# Patient Record
Sex: MALE | Race: WHITE | Employment: FULL TIME | URBAN - METROPOLITAN AREA
[De-identification: names, ages, dates, MRNs, and addresses within clinical notes are randomized per-mention and may not be internally consistent; named-entity substitution may affect disease eponyms.]

---

## 2018-08-06 PROBLEM — I10 HTN (HYPERTENSION), BENIGN: Status: ACTIVE | Noted: 2018-08-06

## 2018-08-06 PROBLEM — E78.2 MIXED HYPERLIPIDEMIA: Status: ACTIVE | Noted: 2018-08-06

## 2018-08-06 PROBLEM — E11.9 TYPE 2 DIABETES MELLITUS WITHOUT COMPLICATION, WITHOUT LONG-TERM CURRENT USE OF INSULIN (HCC): Status: ACTIVE | Noted: 2018-08-06

## 2018-08-06 PROBLEM — E11.65 TYPE 2 DIABETES MELLITUS WITH HYPERGLYCEMIA, WITHOUT LONG-TERM CURRENT USE OF INSULIN (HCC): Status: ACTIVE | Noted: 2018-08-06

## 2018-08-06 PROBLEM — J44.9 CHRONIC OBSTRUCTIVE PULMONARY DISEASE (HCC): Status: ACTIVE | Noted: 2018-08-06

## 2018-08-06 PROBLEM — H40.053 INCREASED PRESSURE IN THE EYE, BILATERAL: Status: ACTIVE | Noted: 2018-08-06

## 2019-02-04 PROBLEM — Z95.5 HISTORY OF CORONARY ARTERY STENT PLACEMENT: Status: ACTIVE | Noted: 2019-02-04

## 2020-02-07 ENCOUNTER — HOSPITAL ENCOUNTER (OUTPATIENT)
Dept: GENERAL RADIOLOGY | Age: 70
Discharge: HOME | End: 2020-02-07
Attending: INTERNAL MEDICINE
Payer: MEDICARE

## 2020-02-07 DIAGNOSIS — J44.9 CHRONIC OBSTRUCTIVE PULMONARY DISEASE, UNSPECIFIED COPD TYPE (HCC): ICD-10-CM

## 2020-02-07 PROCEDURE — 71046 X-RAY EXAM CHEST 2 VIEWS: CPT

## 2020-02-21 ENCOUNTER — HOSPITAL ENCOUNTER (OUTPATIENT)
Dept: CT IMAGING | Age: 70
Discharge: HOME | End: 2020-02-21
Attending: INTERNAL MEDICINE
Payer: MEDICARE

## 2020-02-21 DIAGNOSIS — J44.9 CHRONIC OBSTRUCTIVE PULMONARY DISEASE, UNSPECIFIED COPD TYPE (HCC): ICD-10-CM

## 2020-02-21 PROCEDURE — G0297 LDCT FOR LUNG CA SCREEN: HCPCS

## 2020-06-01 PROBLEM — E11.21 TYPE 2 DIABETES WITH NEPHROPATHY (HCC): Status: ACTIVE | Noted: 2020-06-01

## 2020-06-01 PROBLEM — E11.9 TYPE 2 DIABETES MELLITUS WITHOUT COMPLICATION, WITHOUT LONG-TERM CURRENT USE OF INSULIN (HCC): Status: RESOLVED | Noted: 2018-08-06 | Resolved: 2020-06-01

## 2020-08-31 PROBLEM — E66.01 SEVERE OBESITY (HCC): Status: ACTIVE | Noted: 2020-08-31

## 2020-09-17 PROBLEM — I73.9 PVD (PERIPHERAL VASCULAR DISEASE) (HCC): Status: ACTIVE | Noted: 2020-09-17

## 2021-02-26 ENCOUNTER — HOSPITAL ENCOUNTER (OUTPATIENT)
Dept: CT IMAGING | Age: 71
Discharge: HOME | End: 2021-02-26
Attending: INTERNAL MEDICINE
Payer: MEDICARE

## 2021-02-26 VITALS — WEIGHT: 230 LBS | BODY MASS INDEX: 32.93 KG/M2 | HEIGHT: 70 IN

## 2021-02-26 DIAGNOSIS — J43.2 CENTRILOBULAR EMPHYSEMA (HCC): ICD-10-CM

## 2021-02-26 PROCEDURE — 71271 CT THORAX LUNG CANCER SCR C-: CPT

## 2021-07-12 PROBLEM — M17.0 LOCALIZED OSTEOARTHRITIS OF KNEES, BILATERAL: Status: ACTIVE | Noted: 2021-07-12

## 2021-08-18 ENCOUNTER — HOSPITAL ENCOUNTER (INPATIENT)
Age: 71
Setting detail: SURGERY ADMIT
End: 2021-08-18
Attending: ORTHOPAEDIC SURGERY | Admitting: ORTHOPAEDIC SURGERY

## 2021-09-08 RX ORDER — CEFAZOLIN SODIUM/WATER 2 G/20 ML
2 SYRINGE (ML) INTRAVENOUS ONCE
Status: CANCELLED | OUTPATIENT
Start: 2021-09-08 | End: 2021-09-08

## 2021-09-13 ENCOUNTER — HOSPITAL ENCOUNTER (OUTPATIENT)
Dept: SURGERY | Age: 71
Discharge: HOME | End: 2021-09-13
Attending: ORTHOPAEDIC SURGERY
Payer: MEDICARE

## 2021-09-13 ENCOUNTER — HOSPITAL ENCOUNTER (OUTPATIENT)
Dept: REHABILITATION | Age: 71
Discharge: HOME | End: 2021-09-13
Attending: ORTHOPAEDIC SURGERY
Payer: MEDICARE

## 2021-09-13 VITALS
HEART RATE: 72 BPM | BODY MASS INDEX: 33.7 KG/M2 | WEIGHT: 234.9 LBS | DIASTOLIC BLOOD PRESSURE: 79 MMHG | TEMPERATURE: 97.8 F | OXYGEN SATURATION: 95 % | RESPIRATION RATE: 16 BRPM | SYSTOLIC BLOOD PRESSURE: 140 MMHG

## 2021-09-13 LAB
ANION GAP SERPL CALC-SCNC: 7 MMOL/L (ref 7–16)
APTT PPP: 36.2 SEC (ref 24.1–35.1)
BASOPHILS # BLD: 0.1 K/UL (ref 0–0.2)
BASOPHILS NFR BLD: 1 % (ref 0–2)
BUN SERPL-MCNC: 13 MG/DL (ref 8–23)
CALCIUM SERPL-MCNC: 10.3 MG/DL (ref 8.3–10.4)
CHLORIDE SERPL-SCNC: 103 MMOL/L (ref 98–107)
CO2 SERPL-SCNC: 29 MMOL/L (ref 21–32)
CREAT SERPL-MCNC: 0.79 MG/DL (ref 0.8–1.5)
DIFFERENTIAL METHOD BLD: ABNORMAL
EOSINOPHIL # BLD: 0.2 K/UL (ref 0–0.8)
EOSINOPHIL NFR BLD: 3 % (ref 0.5–7.8)
ERYTHROCYTE [DISTWIDTH] IN BLOOD BY AUTOMATED COUNT: 13.6 % (ref 11.9–14.6)
EST. AVERAGE GLUCOSE BLD GHB EST-MCNC: 143 MG/DL
GLUCOSE SERPL-MCNC: 127 MG/DL (ref 65–100)
HBA1C MFR BLD: 6.6 % (ref 4.2–6.3)
HCT VFR BLD AUTO: 43.9 % (ref 41.1–50.3)
HGB BLD-MCNC: 14.1 G/DL (ref 13.6–17.2)
IMM GRANULOCYTES # BLD AUTO: 0 K/UL (ref 0–0.5)
IMM GRANULOCYTES NFR BLD AUTO: 0 % (ref 0–5)
INR PPP: 1
LYMPHOCYTES # BLD: 1.3 K/UL (ref 0.5–4.6)
LYMPHOCYTES NFR BLD: 16 % (ref 13–44)
MCH RBC QN AUTO: 31.8 PG (ref 26.1–32.9)
MCHC RBC AUTO-ENTMCNC: 32.1 G/DL (ref 31.4–35)
MCV RBC AUTO: 98.9 FL (ref 79.6–97.8)
MONOCYTES # BLD: 0.6 K/UL (ref 0.1–1.3)
MONOCYTES NFR BLD: 8 % (ref 4–12)
NEUTS SEG # BLD: 5.8 K/UL (ref 1.7–8.2)
NEUTS SEG NFR BLD: 73 % (ref 43–78)
NRBC # BLD: 0 K/UL (ref 0–0.2)
PLATELET # BLD AUTO: 229 K/UL (ref 150–450)
PMV BLD AUTO: 10.5 FL (ref 9.4–12.3)
POTASSIUM SERPL-SCNC: 4.4 MMOL/L (ref 3.5–5.1)
PROTHROMBIN TIME: 13.8 SEC (ref 12.6–14.5)
RBC # BLD AUTO: 4.44 M/UL (ref 4.23–5.6)
SODIUM SERPL-SCNC: 139 MMOL/L (ref 136–145)
WBC # BLD AUTO: 8 K/UL (ref 4.3–11.1)

## 2021-09-13 PROCEDURE — 80048 BASIC METABOLIC PNL TOTAL CA: CPT

## 2021-09-13 PROCEDURE — 85610 PROTHROMBIN TIME: CPT

## 2021-09-13 PROCEDURE — 77030012341 HC CHMB SPCR OPTC MDI VYRM -A

## 2021-09-13 PROCEDURE — 85730 THROMBOPLASTIN TIME PARTIAL: CPT

## 2021-09-13 PROCEDURE — 97161 PT EVAL LOW COMPLEX 20 MIN: CPT

## 2021-09-13 PROCEDURE — 36415 COLL VENOUS BLD VENIPUNCTURE: CPT

## 2021-09-13 PROCEDURE — 83036 HEMOGLOBIN GLYCOSYLATED A1C: CPT

## 2021-09-13 PROCEDURE — 87641 MR-STAPH DNA AMP PROBE: CPT

## 2021-09-13 PROCEDURE — 94760 N-INVAS EAR/PLS OXIMETRY 1: CPT

## 2021-09-13 PROCEDURE — 85025 COMPLETE CBC W/AUTO DIFF WBC: CPT

## 2021-09-13 PROCEDURE — 77030027138 HC INCENT SPIROMETER -A

## 2021-09-13 RX ORDER — BIMATOPROST 0.1 MG/ML
1 SOLUTION/ DROPS OPHTHALMIC EVERY EVENING
COMMUNITY

## 2021-09-13 NOTE — PERIOP NOTES
Your patient recently had labs drawn during a hospital appointment due to an upcoming surgery. The results are attached. If you have any questions or concerns please reach out to your patient for a follow-up in your office. Please do not respond to this message as my mailbox is not monitored. You may call [phone] with questions or concerns. Recent Results (from the past 12 hour(s))   CBC WITH AUTOMATED DIFF    Collection Time: 09/13/21 12:14 PM   Result Value Ref Range    WBC 8.0 4.3 - 11.1 K/uL    RBC 4.44 4.23 - 5.6 M/uL    HGB 14.1 13.6 - 17.2 g/dL    HCT 43.9 41.1 - 50.3 %    MCV 98.9 (H) 79.6 - 97.8 FL    MCH 31.8 26.1 - 32.9 PG    MCHC 32.1 31.4 - 35.0 g/dL    RDW 13.6 11.9 - 14.6 %    PLATELET 201 147 - 001 K/uL    MPV 10.5 9.4 - 12.3 FL    ABSOLUTE NRBC 0.00 0.0 - 0.2 K/uL    DF AUTOMATED      NEUTROPHILS 73 43 - 78 %    LYMPHOCYTES 16 13 - 44 %    MONOCYTES 8 4.0 - 12.0 %    EOSINOPHILS 3 0.5 - 7.8 %    BASOPHILS 1 0.0 - 2.0 %    IMMATURE GRANULOCYTES 0 0.0 - 5.0 %    ABS. NEUTROPHILS 5.8 1.7 - 8.2 K/UL    ABS. LYMPHOCYTES 1.3 0.5 - 4.6 K/UL    ABS. MONOCYTES 0.6 0.1 - 1.3 K/UL    ABS. EOSINOPHILS 0.2 0.0 - 0.8 K/UL    ABS. BASOPHILS 0.1 0.0 - 0.2 K/UL    ABS. IMM.  GRANS. 0.0 0.0 - 0.5 K/UL   HEMOGLOBIN A1C WITH EAG    Collection Time: 09/13/21 12:14 PM   Result Value Ref Range    Hemoglobin A1c 6.6 (H) 4.2 - 6.3 %    Est. average glucose 198 mg/dL   METABOLIC PANEL, BASIC    Collection Time: 09/13/21 12:14 PM   Result Value Ref Range    Sodium 139 136 - 145 mmol/L    Potassium 4.4 3.5 - 5.1 mmol/L    Chloride 103 98 - 107 mmol/L    CO2 29 21 - 32 mmol/L    Anion gap 7 7 - 16 mmol/L    Glucose 127 (H) 65 - 100 mg/dL    BUN 13 8 - 23 MG/DL    Creatinine 0.79 (L) 0.8 - 1.5 MG/DL    GFR est AA >60 >60 ml/min/1.73m2    GFR est non-AA >60 >60 ml/min/1.73m2    Calcium 10.3 8.3 - 10.4 MG/DL   PROTHROMBIN TIME + INR    Collection Time: 09/13/21 12:14 PM   Result Value Ref Range    Prothrombin time 13.8 12.6 - 14.5 sec    INR 1.0     PTT    Collection Time: 09/13/21 12:14 PM   Result Value Ref Range    aPTT 36.2 (H) 24.1 - 35.1 SEC

## 2021-09-13 NOTE — PERIOP NOTES
Labs done today within [facility] protocols - except PTT - will have [facility] review. Recent Results (from the past 12 hour(s))   CBC WITH AUTOMATED DIFF    Collection Time: 09/13/21 12:14 PM   Result Value Ref Range    WBC 8.0 4.3 - 11.1 K/uL    RBC 4.44 4.23 - 5.6 M/uL    HGB 14.1 13.6 - 17.2 g/dL    HCT 43.9 41.1 - 50.3 %    MCV 98.9 (H) 79.6 - 97.8 FL    MCH 31.8 26.1 - 32.9 PG    MCHC 32.1 31.4 - 35.0 g/dL    RDW 13.6 11.9 - 14.6 %    PLATELET 719 693 - 184 K/uL    MPV 10.5 9.4 - 12.3 FL    ABSOLUTE NRBC 0.00 0.0 - 0.2 K/uL    DF AUTOMATED      NEUTROPHILS 73 43 - 78 %    LYMPHOCYTES 16 13 - 44 %    MONOCYTES 8 4.0 - 12.0 %    EOSINOPHILS 3 0.5 - 7.8 %    BASOPHILS 1 0.0 - 2.0 %    IMMATURE GRANULOCYTES 0 0.0 - 5.0 %    ABS. NEUTROPHILS 5.8 1.7 - 8.2 K/UL    ABS. LYMPHOCYTES 1.3 0.5 - 4.6 K/UL    ABS. MONOCYTES 0.6 0.1 - 1.3 K/UL    ABS. EOSINOPHILS 0.2 0.0 - 0.8 K/UL    ABS. BASOPHILS 0.1 0.0 - 0.2 K/UL    ABS. IMM.  GRANS. 0.0 0.0 - 0.5 K/UL   HEMOGLOBIN A1C WITH EAG    Collection Time: 09/13/21 12:14 PM   Result Value Ref Range    Hemoglobin A1c 6.6 (H) 4.2 - 6.3 %    Est. average glucose 417 mg/dL   METABOLIC PANEL, BASIC    Collection Time: 09/13/21 12:14 PM   Result Value Ref Range    Sodium 139 136 - 145 mmol/L    Potassium 4.4 3.5 - 5.1 mmol/L    Chloride 103 98 - 107 mmol/L    CO2 29 21 - 32 mmol/L    Anion gap 7 7 - 16 mmol/L    Glucose 127 (H) 65 - 100 mg/dL    BUN 13 8 - 23 MG/DL    Creatinine 0.79 (L) 0.8 - 1.5 MG/DL    GFR est AA >60 >60 ml/min/1.73m2    GFR est non-AA >60 >60 ml/min/1.73m2    Calcium 10.3 8.3 - 10.4 MG/DL   PROTHROMBIN TIME + INR    Collection Time: 09/13/21 12:14 PM   Result Value Ref Range    Prothrombin time 13.8 12.6 - 14.5 sec    INR 1.0     PTT    Collection Time: 09/13/21 12:14 PM   Result Value Ref Range    aPTT 36.2 (H) 24.1 - 35.1 SEC

## 2021-09-13 NOTE — PROGRESS NOTES
Aristeo January  : [birth year](51 y.o.) [facility] at [address] [address].  [phone]       Physical Therapy Prehab Plan of Treatment and Evaluation Summary:2021    ICD-10: Treatment Diagnosis:   · Pain in Left Knee (M25.562)  · Stiffness of Left Knee, Not elsewhere classified (M25.662)  · Difficulty in walking, Not elsewhere classified (R26.2)  Precautions/Allergies:   Shellfish derived  MEDICAL/REFERRING DIAGNOSIS:  Unilateral primary osteoarthritis, left knee [M17.12]  REFERRING PHYSICIAN: Shaji Azevedo MD  DATE OF SURGERY: 21    Assessment:   Comments:  Patient presents prior to L TKA. He needs a R TKA as well and plans on having in December. He will return home at d/c with assist from his spouse. Patient denies pain other than when going up/down stairs but has limited ROM. PROBLEM LIST (Impacting functional limitations):  Mr. Benito Ocampo presents with the following left lower extremity(s) problems:  1. Range of Motion  2. Home Exercise Program  3. Pain   INTERVENTIONS PLANNED:  1. Home Exercise Program  2. Educational Discussion      TREATMENT PLAN: Effective Dates: 2021 TO 2021. Frequency/Duration: Patient to continue to perform home exercise program at least twice per day up until his surgery. GOALS: (Goals have been discussed and agreed upon with patient.)  Discharge Goals: Time Frame: 1 Day  1. Patient will demonstrate independence with a home exercise program designed to increase strength, range of motion, balance, coordination, functional technique and pain control to minimize functional deficits and optimize patient for total joint replacement. Rehabilitation Potential For Stated Goals: Good  Regarding Saulo Hauser's therapy, I certify that the treatment plan above will be carried out by a therapist or under their direction.   Thank you for this referral,  Kerstin Sicard, PT               HISTORY: Present Symptoms:  Pain Intensity 1: 0   History of Present Injury/Illness (Reason for Referral):  Medical/Referring Diagnosis: Unilateral primary osteoarthritis, left knee [M17.12]   Past Medical History/Comorbidities:   Mr. Brendan Hawthorne  has a past medical history of Adverse effect of anesthesia, [facility] (08/06/2018), Coronary artery disease, History of coronary artery stent placement (02/04/2019), HTN (hypertension), benign (8/6/2018), Increased pressure in the eye, bilateral (8/6/2018), Mixed hyperlipidemia (8/6/2018), CITLALI on CPAP, and Type 2 diabetes mellitus with hyperglycemia, without long-term current use of insulin ([facility] (08/06/2018). Mr. Brendan Hawthorne  has a past surgical history that includes hx prostatectomy; hx hernia repair (1985); hx wisdom teeth extraction; and hx coronary stent placement (1995). Social History/Living Environment:   Home Environment: Private residence  # Steps to Enter: 3  Rails to Enter: No  One/Two Story Residence: One story  Living Alone: No  Support Systems: Spouse/Significant Other  Patient Expects to be Discharged toVF Cor[de-identified]ration  Current DME Used/Available at Home: Walker;Cane, straight; Shower chair (borrowing walker)  Tub or Shower Type: Shower    Work/Activity:  Independent   Dominant Side:  RIGHT  Current Medications:  See Pre-assessment nursing note   Number of Personal Factors/Comorbidities that affect the Plan of Care: 0: LOW COMPLEXITY   EXAMINATION:   ADLs (Current Functional Status):   Ambulation:  [x] Independent  [] Walk Indoors Only  [] Walk Outdoors  [] Use Assistive Device  [] Use Wheelchair Only Dressing:  [x] Independent  Requires Assistance from Someone for:  [] Sock/Shoes  [] Pants  [] Everything   Bathing/Showering:   [x] Independent  [] Requires Assistance from Someone  [] Sponge Bath Only Household Activities:  [x] Routine house and yard work  [] Light Housework Only  [] None   Observation/Orthostatic Postural Assessment:   [facility], Genu varus right  ROM/Flexibility:   AROM: Generally decreased, functional                LLE AROM  L Knee Flexion: 94  L Knee Extension: 6          Strength:   Strength: Generally decreased, functional       LLE Strength  L Hip Flexion: 4+  L Knee Flexion: 5  L Knee Extension: 5          Functional Mobility:    Coordination: Within functional limits    Stand to Sit: Independent  Sit to Stand: Independent  Distance (ft): 200 Feet (ft)  Ambulation - Level of Assistance: Independent          Balance:    Sitting: Intact  Standing: Intact   Body Structures Involved:  1. Joints Body Functions Affected:  1. Movement Related Activities and Participation Affected:  1. Mobility   Number of elements that affect the Plan of Care: 3: MODERATE COMPLEXITY   CLINICAL PRESENTATION:   Presentation: Stable and uncomplicated: LOW COMPLEXITY   CLINICAL DECISION MAKING:   Tool Used: Knee injury and Osteoarthritis Outcome Score for Joint Replacement (KOOS, JR)  Score:  Initial: 17 (Interval: 44.905) 9/13/2021 Most Recent: TBD   Interpretation of Score: The KOOS, JR contains 7 items from the original KOOS survey. Items are coded from 0 to 4, none to extreme respectively. Josue Mckee is scored by summing the raw response (range 0-28) and then converting it to an interval score using the table provided below. The interval score ranges from 0 to 100 where 0 represents total knee disability and 100 represents perfect knee health. Medical Necessity:   · Mr. Carlita Tucker is expected to optimize his lower extremity strength and ROM in preparation for joint replacement surgery. Reason for Services/Other Comments:  · Achieve baseline assesment of musculoskeletal system, functional mobility and home environment. , educate in PT HEP in preparation for surgery, educate in hospital plan of care.    Use of outcome tool(s) and clinical judgement create a POC that gives a: Clear prediction of patient's progress: LOW COMPLEXITY TREATMENT:   Treatment/Session Assessment:  Patient was instructed in PT- HEP to increase strength and ROM in LEs. Answered all questions. · Post session pain:  0  · Compliance with Program/Exercises: Will assess as treatment progresses.   Total Treatment Duration:  PT Patient Time In/Time Out  Time In: 1245  Time Out: 75 Amado Kumar PT

## 2021-09-13 NOTE — PROGRESS NOTES
21 1200   Oxygen Therapy   O2 Sat (%) 94 %   Pulse via Oximetry 78 beats per minute   O2 Device None (Room air)   Pre-Treatment   Breath Sounds Bilateral Clear   Pre FEV1 (liters)   (pt not wearing a msk during COVID)     Initial respiratory Assessment completed with pt. Pt was interviewed and evaluated in Joint camp prior to surgery. Patient ID:  Lashawn Long  [id number]  49 y.o.  1950  Surgeon: Dr. Perico Conway  Date of Surgery: 2021  Procedure: Total Left Knee Arthroplasty  Primary Care Physician: Chapis Cohen [place] [phone]  Specialists: [facility] NEXT APPOINTMENT 2021    Pt taught proper COUGH technique  DIAPHRAGMATIC BREATHING EXERCISE INSTRUCTIONS GIVEN    History of smokin PPPD FOR 30 YEARS                 Quit date:         Secondhand smoke:DENIES    Past procedures with Oxygen desaturation or delayed awakening:DENIES    Past Medical History:   Diagnosis Date    Adverse effect of anesthesia     \"bowel function slow to return\"    Chronic obstructive pulmonary disease [facility] 2018    daily and rescue inhaler    Coronary artery disease     History of coronary artery stent placement 2019 RCA    HTN (hypertension), benign 2018    Increased pressure in the eye, bilateral 2018    Mixed hyperlipidemia 2018    CITLALI on CPAP     uses CPAP    Type 2 diabetes mellitus with hyperglycemia, without long-term current use of insulin ([facility] 2018    pre diabetic      CENTRILOBULAR EMPHYSEMA, BASILAR ATELECTASIS ON CHEST X-RAY  & SCARRING  Respiratory history:DENIES SOB                                                                   Respiratory meds:  STIOTO BID  PT uses MDI PRN with PROAIR. MDI instructions given verbally & written along with spacer.   Pt to use home MDI's morning of surgery & bring to [address]:             NO     PAST SLEEP STUDY:        YES                   IN [facility]  HX OF CITLALI:                        YES                   CITLALI assessment:                                               SLEEPS ON SIDE        PHYSICAL EXAM   Body mass index is 33.7 kg/m². Visit Vitals  BP (!) 140/79 (BP 1 Location: Right arm, BP Patient Position: At rest;Sitting)   Pulse 72   Temp 97.8 °F (36.6 °C)   Resp 16   Wt 106.5 kg (234 lb 14.4 oz)   SpO2 95%   BMI 33.70 kg/m²     Neck circumference:40      cm    Loud snoring:                                                 YES             Witnessed apnea or wakening gasping or choking:            APNEA  Awakens with headaches:                                               DENIES  Morning or daytime tiredness/ sleepiness:                   TIRED  Dry mouth or sore throat in morning:            YES                                              Ahuja stage:  4                                   SACS score:13  Stop Bang   STOP-BANG  Does the patient snore loudly (louder than talking or loud enough to be heard through closed doors)?: Yes  Does the patient often feel tired, fatigued, or sleepy during the daytime, even after a \"good\" night's sleep?: Yes  Has anyone ever observed the patient stop breathing during their sleep? : Yes  Does the patient have or are they being treated for high blood pressure?: Yes  Is the patient's BMI greater than 35?: No  Is your neck circumference greater than 17 inches (Male) or 16 inches (Female)?: No  Is the patient older than 48?: Yes  Is the patient male?: Yes  CITLALI Score: 6  Has the patient been referred to Sleep Medicine?: No  Has the patient previously been diagnosed with Obstructive Sleep Apnea?: Yes  Treated or Untreated?: Treated                            Pt. Is positive for CITLALI and uses HOME CPAP and will bring to Hospital day of surgery. PT WILL NEED ASSISTANCE PLACING CPAP ON HS DURING HOSPITALIZATION. Pt. Is positive for CITLALI and uses HOME CPAP and will bring to Hospital day of surgery.   PT WILL NEED ASSISTANCE PLACING CPAP ON HS DURING HOSPITALIZATION.     ALBUTEROL Q 6 PRN                                        Referrals:    Pt. Phone Number:

## 2021-09-13 NOTE — PERIOP NOTES
Patient verified name and . Order for consent IS found in EHR and matches case posting; patient verified. Type 3 surgery, Joint camp assessment complete. Labs per surgeon: CBC,BMP, PT/PTT, A1C, mrsa swab ; results (processing)  Labs per anesthesia protocol: no additional  EKG:in EMR dated 10/20/20 - within [facility] protocols. Most recent card note and echo in EMR if needed DOS. Pt has routine f/u with Pulm on 21  1510 - charge RN to f/u. Patient COVID test date 21  9835; Patient aware. The testing center [facility], [place]  and telephone number [phone] provided to the patient if not appointment found. MRSA/MSSA swab collected; pharmacy to review and dose antibiotic as appropriate. Hospital approved surgical skin cleanser and instructions to return bottle on DOS given per hospital policy. Patient provided with handouts including Guide to Surgery, Pain Management, Hand Hygiene, Blood Transfusion Education, and [facility] Brochure. Patient answered medical/surgical history questions at their best of ability. All prior to admission medications documented in [facility]. Original medication prescription bottle were visualized during patient appointment. Patient instructed to hold all vitamins 3 weeks prior to surgery and NSAIDS 5 days prior to surgery. Patient teach back successful and patient demonstrates knowledge of instruction.

## 2021-09-13 NOTE — PERIOP NOTES
PLEASE CONTINUE TAKING ALL PRESCRIPTION MEDICATIONS UP TO THE DAY OF SURGERY UNLESS OTHERWISE DIRECTED BELOW. DISCONTINUE all vitamins and supplements 21 days prior to surgery. DISCONTINUE Non-Steriodal Anti-Inflammatory (NSAIDS) such as Advil and Aleve 5 days prior to surgery. Home Medications to take  the day of surgery      Use inhalerS     Aspirin 81 mg        Home Medications   to Hold           Comments    Covid test 9/27/21  9:05 am @ [address]    On the day before surgery please take Acetaminophen 1000mg in the morning and then again before bed. You may substitute for Tylenol 650 mg.      Bring inhalers and eye drops to hospital.        Please do not bring home medications with you on the day of surgery unless otherwise directed by your nurse. If you are instructed to bring home medications, please give them to your nurse as they will be administered by the nursing staff. If you have any questions, please call [facility] [phone]   A copy of this note was provided to the patient for reference.

## 2021-09-14 LAB
BACTERIA SPEC CULT: ABNORMAL
SERVICE CMNT-IMP: ABNORMAL

## 2021-09-15 RX ORDER — ALBUTEROL SULFATE 0.83 MG/ML
2.5 SOLUTION RESPIRATORY (INHALATION)
Status: CANCELLED | OUTPATIENT
Start: 2021-09-15

## 2021-09-19 ENCOUNTER — APPOINTMENT (OUTPATIENT)
Dept: GENERAL RADIOLOGY | Age: 71
End: 2021-09-19
Attending: EMERGENCY MEDICINE
Payer: MEDICARE

## 2021-09-19 ENCOUNTER — HOSPITAL ENCOUNTER (EMERGENCY)
Age: 71
Discharge: HOME | End: 2021-09-19
Attending: EMERGENCY MEDICINE
Payer: MEDICARE

## 2021-09-19 VITALS
WEIGHT: 233 LBS | HEIGHT: 70 IN | OXYGEN SATURATION: 94 % | SYSTOLIC BLOOD PRESSURE: 178 MMHG | TEMPERATURE: 98.3 F | BODY MASS INDEX: 33.36 KG/M2 | HEART RATE: 80 BPM | RESPIRATION RATE: 18 BRPM | DIASTOLIC BLOOD PRESSURE: 85 MMHG

## 2021-09-19 DIAGNOSIS — S62.001A CLOSED NONDISPLACED FRACTURE OF SCAPHOID OF RIGHT WRIST, UNSPECIFIED PORTION OF SCAPHOID, INITIAL ENCOUNTER: Primary | ICD-10-CM

## 2021-09-19 DIAGNOSIS — S52.501A CLOSED FRACTURE OF DISTAL END OF RIGHT RADIUS, UNSPECIFIED FRACTURE MORPHOLOGY, INITIAL ENCOUNTER: ICD-10-CM

## 2021-09-19 PROCEDURE — 75810000053 HC SPLINT APPLICATION

## 2021-09-19 PROCEDURE — 73110 X-RAY EXAM OF WRIST: CPT

## 2021-09-19 PROCEDURE — 99283 EMERGENCY DEPT VISIT LOW MDM: CPT

## 2021-09-19 RX ORDER — NAPROXEN 500 MG/1
500 TABLET ORAL 2 TIMES DAILY WITH MEALS
Qty: 20 TABLET | Refills: 0 | Status: SHIPPED | OUTPATIENT
Start: 2021-09-19 | End: 2021-11-18

## 2021-09-19 RX ORDER — HYDROCODONE BITARTRATE AND ACETAMINOPHEN 7.5; 325 MG/1; MG/1
1 TABLET ORAL
Qty: 17 TABLET | Refills: 0 | Status: SHIPPED | OUTPATIENT
Start: 2021-09-19 | End: 2021-09-22

## 2021-09-19 NOTE — ED NOTES
I have reviewed discharge instructions with the patient. The patient verbalized understanding. Patient left ED via Discharge Method: ambulatory to Home with family. Opportunity for questions and clarification provided. Patient given 2 scripts. To continue your aftercare when you leave the hospital, you may receive an automated call from our care team to check in on how you are doing. This is a free service and part of our promise to provide the best care and service to meet your aftercare needs.  If you have questions, or wish to unsubscribe from this service please call [phone]. Thank you for Choosing our [facility] Emergency Department.

## 2021-09-19 NOTE — ED PROVIDER NOTES
75-year-old right-hand-dominant male presents with complaints of right wrist and hand pain. Patient reports that he tripped on a step yesterday fell catching himself baseline his right hand. States she has a small abrasion to his right elbow but is otherwise uninjured    No headache vomiting or diarrhea    The history is provided by the patient. Fall  The accident occurred yesterday. The fall occurred while walking. He fell from a height of ground level. He landed on hard floor. There was no blood loss. The point of impact was the right wrist. The pain is present in the right wrist. The pain is moderate. He was ambulatory at the scene. There was no entrapment after the fall. There was no drug use involved in the accident. There was no alcohol use involved in the accident. Pertinent negatives include no visual change, no fever, no numbness, no abdominal pain, no nausea, no vomiting, no headaches, no loss of consciousness and no laceration. The risk factors include being elderly. The symptoms are aggravated by use of injured limb and pressure on injury. He has tried nothing for the symptoms. Past Medical History:   Diagnosis Date    Adverse effect of anesthesia     \"bowel function slow to return\"    Chronic obstructive pulmonary disease ([facility] [address] 08/06/2018    daily and rescue inhaler    Coronary artery disease     History of coronary artery stent placement 02/04/2019 1995 RCA    HTN (hypertension), benign 8/6/2018    Increased pressure in the eye, bilateral 8/6/2018    Mixed hyperlipidemia 8/6/2018    CITLALI on CPAP     uses CPAP    Type 2 diabetes mellitus with hyperglycemia, without long-term current use of insulin ([facility] [address] 08/06/2018    pre diabetic       Past Surgical History:   Procedure Laterality Date    HX CORONARY STENT PLACEMENT  1995    X 1     HX HERNIA REPAIR  1985    HX PROSTATECTOMY      HX WISDOM TEETH EXTRACTION           History reviewed. No pertinent family history.     Social History     Socioeconomic History    Marital status:      Spouse name: Not on file    Number of children: Not on file    Years of education: Not on file    Highest education level: Not on file   Occupational History    Not on file   Tobacco Use    Smoking status: Former Smoker     Packs/day: 1.00     Years: 30.00     Pack years: 30.00    Smokeless tobacco: Never Used   Substance and Sexual Activity    Alcohol use: No    Drug use: Not on file    Sexual activity: Not on file   Other Topics Concern    Not on file   Social History Narrative    Not on file     Social Determinants of Health     Financial Resource Strain:     Difficulty of Paying Living Expenses:    Food Insecurity:     Worried About [address] in the Last Year:     [address] [employer] in the Last Year:    Transportation Needs:     Lack of Transportation (Medical):  Lack of Transportation (Non-Medical):    Physical Activity:     Days of Exercise per Week:     Minutes of Exercise per Session:    Stress:     Feeling of Stress :    Social Connections:     Frequency of Communication with Friends and Family:     Frequency of Social Gatherings with Friends and Family:     Attends [employer] Services:     Active Member of Clubs or Organizations:     Attends Club or Organization Meetings:     Marital Status:    Intimate Partner Violence:     Fear of Current or Ex-Partner:     Emotionally Abused:     Physically Abused:     Sexually Abused: ALLERGIES: Shellfish derived    Review of Systems   Constitutional: Negative for activity change, chills, diaphoresis and fever. HENT: Negative for dental problem, hearing loss, nosebleeds, rhinorrhea and sore throat. Eyes: Negative for pain, discharge, redness and visual disturbance. Respiratory: Negative for cough, chest tightness and shortness of breath. Cardiovascular: Negative for chest pain, palpitations and leg swelling.    Gastrointestinal: Negative for abdominal pain, constipation, diarrhea, nausea and vomiting. Endocrine: Negative for cold intolerance, heat intolerance, polydipsia and polyuria. Genitourinary: Negative for dysuria and flank pain. Musculoskeletal: Positive for arthralgias. Negative for back pain, joint swelling, myalgias and neck pain. Skin: Negative for pallor and rash. Allergic/Immunologic: Negative for environmental allergies and food allergies. Neurological: Negative for dizziness, tremors, loss of consciousness, light-headedness, numbness and headaches. Hematological: Negative for adenopathy. Does not bruise/bleed easily. Psychiatric/Behavioral: Negative for confusion and dysphoric mood. The patient is not nervous/anxious and is not hyperactive. All other systems reviewed and are negative. Vitals:    09/19/21 0835   BP: (!) 178/85   Pulse: 80   Resp: 18   Temp: 98.3 °F (36.8 °C)   SpO2: 94%   Weight: 105.7 kg (233 lb)   Height: 5' 10\" (1.778 m)            Physical Exam  Vitals and nursing note reviewed. Constitutional:       General: He is in acute distress. Appearance: Normal appearance. He is well-developed. He is obese. HENT:      Head: Normocephalic and atraumatic. Right Ear: External ear normal.      Left Ear: External ear normal.      Mouth/Throat:      Mouth: Mucous membranes are moist.      Pharynx: Oropharynx is clear. No oropharyngeal exudate. Eyes:      General: No scleral icterus. Extraocular Movements: Extraocular movements intact. Conjunctiva/sclera: Conjunctivae normal.      Pupils: Pupils are equal, round, and reactive to light. Neck:      Thyroid: No thyromegaly. Vascular: No JVD. Cardiovascular:      Rate and Rhythm: Normal rate and regular rhythm. Pulses: Normal pulses. Heart sounds: Normal heart sounds. No murmur heard. No friction rub. No gallop. Pulmonary:      Effort: Pulmonary effort is normal. No respiratory distress.       Breath sounds: Normal breath sounds. No wheezing. Musculoskeletal:         General: Swelling, tenderness and signs of injury present. No deformity. Right wrist: Swelling, tenderness and bony tenderness present. No effusion, lacerations or crepitus. Decreased range of motion. Normal pulse. Left wrist: Normal.        Arms:       Cervical back: Normal range of motion and neck supple. Skin:     General: Skin is warm and dry. Capillary Refill: Capillary refill takes less than 2 seconds. Findings: No laceration or rash. Neurological:      General: No focal deficit present. Mental Status: He is alert and oriented to person, place, and time. Cranial Nerves: No cranial nerve deficit. Sensory: No sensory deficit. Motor: No abnormal muscle tone. Coordination: Coordination normal.   Psychiatric:         Mood and Affect: Mood normal.         Behavior: Behavior normal.         Thought Content:  Thought content normal.         Judgment: Judgment normal.          MDM  Number of Diagnoses or Management Options  Closed fracture of distal end of right radius, unspecified fracture morphology, initial encounter: new and requires workup  Closed nondisplaced fracture of scaphoid of right wrist, unspecified portion of scaphoid, initial encounter: new and requires workup  Diagnosis management comments: Imaging reveals scaphoid fracture  Several other abnormalities of of indeterminate age    Patient will need close follow-up with orthopedics    Placed in sugar tong splint       Amount and/or Complexity of Data Reviewed  Tests in the radiology section of CPT®: ordered and reviewed  Tests in the medicine section of CPT®: reviewed  Decide to obtain previous medical records or to obtain history from someone other than the patient: yes  Review and summarize past medical records: yes  Independent visualization of images, tracings, or specimens: yes    Risk of Complications, Morbidity, and/or Mortality  Presenting problems: moderate  Diagnostic procedures: low  Management options: low  General comments: Elements of this note have been dictated via voice recognition software. Text and phrases may be limited by the accuracy of the software. The chart has been reviewed, but errors may still be present. Patient Progress  Patient progress: improved         Splint Eris Steele    Date/Time: 9/19/2021 10:20 AM  Performed by: Roberth Contreras MD  Authorized by: Roberth Contreras MD     Consent:     Consent obtained:  Verbal    Consent given by:  Patient    Risks discussed:  Pain and swelling    Alternatives discussed:  Delayed treatment and alternative treatment  Pre-procedure details:     Sensation:  Normal    Skin color:  Pink  Procedure details:     Laterality:  Right    Location:  Wrist    Wrist:  R wrist    Strapping: no      Splint type:  Sugar tong    Supplies:  Cotton padding, elastic bandage and Ortho-Glass  Post-procedure details:     Pain:  Improved    Sensation:  Normal    Skin color: Thank    Patient tolerance of procedure:   Tolerated well, no immediate complications

## 2021-09-19 NOTE — DISCHARGE INSTRUCTIONS
Rest/Ice/Elevate/Compress(splint)  Do not drink alcohol or drive while taking the prescription pain medications  Call your doctor/follow up doctor to set up appointment for recheck visit  Return to ER for any worsening symptoms or new problems which may arise    Use sling as needed    Call your orthopedic surgeon in the morning for close follow-up visit

## 2021-09-19 NOTE — ED TRIAGE NOTES
Pt states that he tripped on a step and fell yesterday. He broke his fall with his right hand.  Pt is having pain in the right hand and wrist.

## 2021-09-21 ENCOUNTER — ANESTHESIA EVENT (OUTPATIENT)
Dept: SURGERY | Age: 71
End: 2021-09-21
Payer: MEDICARE

## 2021-09-21 NOTE — H&P (VIEW-ONLY)
Orthopa[facility] Note    Name: Elvin Burroughs  YOB: 1950  Gender: male  MRN: [id number]      CC: Patient referred for evaluation of upper extremity pain    HPI: Elvin Burroughs is a 66 y.o. male  with a chief complaint of wrist pain after a fall down some steps. He was seen in the emergency department, x-rays were obtained, he was told he had a broken bone in his wrist.  He was placed in a splint and referred here for further care of note the patient is scheduled to undergo total knee arthroplasty with Dr. Lola Hashimoto next Thursday. ROS/Meds/PSH/PMH/FH/SH: I personally reviewed the patients standard intake form. Pertinents are discussed in the HPI    Physical Examination:    Musculoskeletal Exam:  Examination on the Right upper extremity demonstrates cap refill < 5 seconds in all fingers, skin is intact. He is tender to palpation over the anatomic snuffbox and dorsal SL interval. No tenderness over the distal radius or ulna. Finger range of motion is limited. Light touch sensation is intact throughout. Imaging / Electrodiagnostic Tests:     I independently reviewed and interpreted radiographs of the right wrist, 3 views. They demonstrate a nondisplaced scaphoid waist fracture, and there is also a well-circumscribed cyst with sclerotic borders at the lunate facet of the distal radius. There are cystic changes within the ulnar head and distal pole of the scaphoid as well, and calcification of the TFCC    Assessment:     ICD-10-CM ICD-9-CM    1. Closed transverse fracture of waist of scaphoid bone of right wrist, initial encounter  S62.021A 814.01 REFERRAL TO ORTHOPEDIC SURGERY       Plan:   We discussed the diagnosis and different treatment options.   His scaphoid waist fracture is nondisplaced and he could consider nonsurgical treatment with placement into a cast.  I did discuss that nonsurgical treatment of scaphoid waist fractures can require protracted treatment in a cast sometimes 2 or 3 months or more, and even after this is there is a possibility that the scaphoid fracture goes on to a delayed or nonunion which may necessitate surgical treatment. I also discussed surgical management with the patient which would consist of open reduction internal fixation of the scaphoid fracture with a percutaneous approach. This would allow faster rehabilitation, earlier range of motion, and given that he is scheduled to undergo a total knee arthroplasty and will need to bear weight with a walker, I would recommend surgical treatment. The patient is in agreement. Patient understands risks and benefits of right scaphoid fracture open reduction with internal fixation including but not limited to nerve injury, vessel injury, infection, failure to achieve desired results and possible need for additional surgery. Patient understands and wishes to proceed with surgery. On Exam:   The patient is alert and oriented; ;   Lung auscultation is clear bilaterally   Heart has RRR without murmurs      Patient voiced accordance and understanding of the information provided and the formulated plan. All questions were answered to the patient's satisfaction during the encounter.     4 This is an acute complicated injury  Treatment at this time: Elective major surgery with procedural risk factors    Claudell Labrum, MD  Orthopaedic Surgery  09/21/21  4:03 PM

## 2021-09-22 ENCOUNTER — ANESTHESIA (OUTPATIENT)
Dept: SURGERY | Age: 71
End: 2021-09-22
Payer: MEDICARE

## 2021-09-22 ENCOUNTER — APPOINTMENT (OUTPATIENT)
Dept: GENERAL RADIOLOGY | Age: 71
End: 2021-09-22
Attending: ORTHOPAEDIC SURGERY
Payer: MEDICARE

## 2021-09-22 ENCOUNTER — HOSPITAL ENCOUNTER (OUTPATIENT)
Age: 71
Setting detail: OUTPATIENT SURGERY
Discharge: HOME | End: 2021-09-22
Attending: ORTHOPAEDIC SURGERY | Admitting: ORTHOPAEDIC SURGERY
Payer: MEDICARE

## 2021-09-22 VITALS
SYSTOLIC BLOOD PRESSURE: 118 MMHG | RESPIRATION RATE: 16 BRPM | HEART RATE: 70 BPM | OXYGEN SATURATION: 96 % | WEIGHT: 234 LBS | TEMPERATURE: 97.7 F | DIASTOLIC BLOOD PRESSURE: 65 MMHG | BODY MASS INDEX: 33.58 KG/M2

## 2021-09-22 DIAGNOSIS — S62.024A CLOSED NONDISPLACED FRACTURE OF MIDDLE THIRD OF SCAPHOID BONE OF RIGHT WRIST, INITIAL ENCOUNTER: Primary | ICD-10-CM

## 2021-09-22 LAB
GLUCOSE BLD STRIP.AUTO-MCNC: 117 MG/DL (ref 65–100)
SERVICE CMNT-IMP: ABNORMAL

## 2021-09-22 PROCEDURE — 77030033138 HC SUT PGA STRATFX J&J -B: Performed by: ORTHOPAEDIC SURGERY

## 2021-09-22 PROCEDURE — 76942 ECHO GUIDE FOR BIOPSY: CPT | Performed by: ORTHOPAEDIC SURGERY

## 2021-09-22 PROCEDURE — 76210000063 HC OR PH I REC FIRST 0.5 HR: Performed by: ORTHOPAEDIC SURGERY

## 2021-09-22 PROCEDURE — C1713 ANCHOR/SCREW BN/BN,TIS/BN: HCPCS | Performed by: ORTHOPAEDIC SURGERY

## 2021-09-22 PROCEDURE — 74011000250 HC RX REV CODE- 250: Performed by: NURSE ANESTHETIST, CERTIFIED REGISTERED

## 2021-09-22 PROCEDURE — 74011250636 HC RX REV CODE- 250/636: Performed by: NURSE ANESTHETIST, CERTIFIED REGISTERED

## 2021-09-22 PROCEDURE — 76010010054 HC POST OP PAIN BLOCK: Performed by: ORTHOPAEDIC SURGERY

## 2021-09-22 PROCEDURE — 74011250636 HC RX REV CODE- 250/636: Performed by: ANESTHESIOLOGY

## 2021-09-22 PROCEDURE — 76010000160 HC OR TIME 0.5 TO 1 HR INTENSV-TIER 1: Performed by: ORTHOPAEDIC SURGERY

## 2021-09-22 PROCEDURE — 2709999900 HC NON-CHARGEABLE SUPPLY: Performed by: ORTHOPAEDIC SURGERY

## 2021-09-22 PROCEDURE — 77030037918 HC DRVR CANN DISP SKEL -C: Performed by: ORTHOPAEDIC SURGERY

## 2021-09-22 PROCEDURE — 82962 GLUCOSE BLOOD TEST: CPT

## 2021-09-22 PROCEDURE — 77030031388 HC WRE K SKEL -B: Performed by: ORTHOPAEDIC SURGERY

## 2021-09-22 PROCEDURE — 25628 OPTX CARPL SCPHD FX INT FIXJ: CPT | Performed by: ORTHOPAEDIC SURGERY

## 2021-09-22 PROCEDURE — 77030000032 HC CUF TRNQT ZIMM -B: Performed by: ORTHOPAEDIC SURGERY

## 2021-09-22 PROCEDURE — 76210000020 HC REC RM PH II FIRST 0.5 HR: Performed by: ORTHOPAEDIC SURGERY

## 2021-09-22 PROCEDURE — 77030031139 HC SUT VCRL2 J&J -A: Performed by: ORTHOPAEDIC SURGERY

## 2021-09-22 PROCEDURE — 74011250636 HC RX REV CODE- 250/636: Performed by: ORTHOPAEDIC SURGERY

## 2021-09-22 PROCEDURE — 76060000032 HC ANESTHESIA 0.5 TO 1 HR: Performed by: ORTHOPAEDIC SURGERY

## 2021-09-22 PROCEDURE — 77030003602 HC NDL NRV BLK BBMI -B: Performed by: ANESTHESIOLOGY

## 2021-09-22 PROCEDURE — 77030037917 HC BIT DRL DISP SKEL -C: Performed by: ORTHOPAEDIC SURGERY

## 2021-09-22 DEVICE — IMPLANTABLE DEVICE: Type: IMPLANTABLE DEVICE | Site: WRIST | Status: FUNCTIONAL

## 2021-09-22 RX ORDER — LIDOCAINE HYDROCHLORIDE 10 MG/ML
0.1 INJECTION INFILTRATION; PERINEURAL AS NEEDED
Status: DISCONTINUED | OUTPATIENT
Start: 2021-09-22 | End: 2021-09-22 | Stop reason: HOSPADM

## 2021-09-22 RX ORDER — CEFAZOLIN SODIUM/WATER 2 G/20 ML
2 SYRINGE (ML) INTRAVENOUS ONCE
Status: COMPLETED | OUTPATIENT
Start: 2021-09-22 | End: 2021-09-22

## 2021-09-22 RX ORDER — SODIUM CHLORIDE 0.9 % (FLUSH) 0.9 %
5-40 SYRINGE (ML) INJECTION AS NEEDED
Status: DISCONTINUED | OUTPATIENT
Start: 2021-09-22 | End: 2021-09-22 | Stop reason: HOSPADM

## 2021-09-22 RX ORDER — SODIUM CHLORIDE, SODIUM LACTATE, POTASSIUM CHLORIDE, CALCIUM CHLORIDE 600; 310; 30; 20 MG/100ML; MG/100ML; MG/100ML; MG/100ML
100 INJECTION, SOLUTION INTRAVENOUS CONTINUOUS
Status: DISCONTINUED | OUTPATIENT
Start: 2021-09-22 | End: 2021-09-22 | Stop reason: HOSPADM

## 2021-09-22 RX ORDER — PROPOFOL 10 MG/ML
INJECTION, EMULSION INTRAVENOUS
Status: DISCONTINUED | OUTPATIENT
Start: 2021-09-22 | End: 2021-09-22 | Stop reason: HOSPADM

## 2021-09-22 RX ORDER — HYDROCODONE BITARTRATE AND ACETAMINOPHEN 5; 325 MG/1; MG/1
1 TABLET ORAL
Qty: 15 TABLET | Refills: 0 | Status: SHIPPED | OUTPATIENT
Start: 2021-09-22 | End: 2021-09-27

## 2021-09-22 RX ORDER — MIDAZOLAM HYDROCHLORIDE 1 MG/ML
2 INJECTION, SOLUTION INTRAMUSCULAR; INTRAVENOUS ONCE
Status: COMPLETED | OUTPATIENT
Start: 2021-09-22 | End: 2021-09-22

## 2021-09-22 RX ORDER — FENTANYL CITRATE 50 UG/ML
100 INJECTION, SOLUTION INTRAMUSCULAR; INTRAVENOUS ONCE
Status: DISCONTINUED | OUTPATIENT
Start: 2021-09-22 | End: 2021-09-22 | Stop reason: HOSPADM

## 2021-09-22 RX ORDER — SODIUM CHLORIDE 0.9 % (FLUSH) 0.9 %
5-40 SYRINGE (ML) INJECTION EVERY 8 HOURS
Status: DISCONTINUED | OUTPATIENT
Start: 2021-09-22 | End: 2021-09-22 | Stop reason: HOSPADM

## 2021-09-22 RX ORDER — NALOXONE HYDROCHLORIDE 0.4 MG/ML
0.04 INJECTION, SOLUTION INTRAMUSCULAR; INTRAVENOUS; SUBCUTANEOUS
Status: DISCONTINUED | OUTPATIENT
Start: 2021-09-22 | End: 2021-09-22 | Stop reason: HOSPADM

## 2021-09-22 RX ORDER — HYDROMORPHONE HYDROCHLORIDE 2 MG/ML
0.5 INJECTION, SOLUTION INTRAMUSCULAR; INTRAVENOUS; SUBCUTANEOUS
Status: DISCONTINUED | OUTPATIENT
Start: 2021-09-22 | End: 2021-09-22 | Stop reason: HOSPADM

## 2021-09-22 RX ORDER — OXYCODONE HYDROCHLORIDE 5 MG/1
5 TABLET ORAL
Status: DISCONTINUED | OUTPATIENT
Start: 2021-09-22 | End: 2021-09-22 | Stop reason: HOSPADM

## 2021-09-22 RX ORDER — MIDAZOLAM HYDROCHLORIDE 1 MG/ML
2 INJECTION, SOLUTION INTRAMUSCULAR; INTRAVENOUS
Status: DISCONTINUED | OUTPATIENT
Start: 2021-09-22 | End: 2021-09-22 | Stop reason: HOSPADM

## 2021-09-22 RX ORDER — LIDOCAINE HYDROCHLORIDE 20 MG/ML
INJECTION, SOLUTION EPIDURAL; INFILTRATION; INTRACAUDAL; PERINEURAL AS NEEDED
Status: DISCONTINUED | OUTPATIENT
Start: 2021-09-22 | End: 2021-09-22 | Stop reason: HOSPADM

## 2021-09-22 RX ADMIN — EPINEPHRINE 10 ML: 1 INJECTION, SOLUTION, CONCENTRATE INTRAVENOUS at 11:02

## 2021-09-22 RX ADMIN — CEFAZOLIN 2 G: 1 INJECTION, POWDER, FOR SOLUTION INTRAVENOUS at 14:09

## 2021-09-22 RX ADMIN — SODIUM CHLORIDE, SODIUM LACTATE, POTASSIUM CHLORIDE, AND CALCIUM CHLORIDE 100 ML/HR: 600; 310; 30; 20 INJECTION, SOLUTION INTRAVENOUS at 11:07

## 2021-09-22 RX ADMIN — LIDOCAINE HYDROCHLORIDE 40 MG: 20 INJECTION, SOLUTION EPIDURAL; INFILTRATION; INTRACAUDAL; PERINEURAL at 14:12

## 2021-09-22 RX ADMIN — MIDAZOLAM 1 MG: 1 INJECTION INTRAMUSCULAR; INTRAVENOUS at 10:56

## 2021-09-22 RX ADMIN — ROPIVACAINE HYDROCHLORIDE 30 ML: 5 INJECTION, SOLUTION EPIDURAL; INFILTRATION; PERINEURAL at 11:02

## 2021-09-22 RX ADMIN — PROPOFOL 120 MCG/KG/MIN: 10 INJECTION, EMULSION INTRAVENOUS at 14:12

## 2021-09-22 RX ADMIN — SODIUM CHLORIDE, SODIUM LACTATE, POTASSIUM CHLORIDE, AND CALCIUM CHLORIDE: 600; 310; 30; 20 INJECTION, SOLUTION INTRAVENOUS at 14:03

## 2021-09-22 NOTE — INTERVAL H&P NOTE
H&P Update:  Tia Torres was seen and examined. History and physical has been reviewed. The patient has been examined. There have been no significant clinical changes since the completion of the originally dated History and Physical.  Informed consent was obtained today.     Taye Houser MD  Orthopaedic Surgery  09/22/21  1:58 PM

## 2021-09-22 NOTE — DISCHARGE INSTRUCTIONS
Postoperative  Instructions:      Weightbearing or Lifting: You  are  not  allowed  to  lift  any  weight  or  bear  any  weight  on  the  surgical  extremity  until  cleared  by  your  surgeon. Dressing  instructions:    Keep  your  dressing  and/or  splint  clean  and  dry  at  all  times. It  will  be  removed  at  your  first  post-operative  appointment or therapy apppointment. Your  stitches  will  be  removed  at  this  visit. Showering  Instructions:  May  shower  But keep surgical dressing clean and dry until removed as explained above. Pain  Control:  - You  have  been  given  a  prescription  to  be  taken  as  directed  for  post-operative  pain  control. In  addition,  elevate  the  operative  extremity  above  the  heart  at  all  times  to  prevent  swelling  and  throbbing  pain. - If you develop constipation while taking narcotic pain medications (Norco, Hydrocodone, Percocet, Oxycodone, Dilaudid, Hydromorphone) take  over-the-counter  Colace,  100mg  by  mouth  twice  a  Day. - Nausea  is  a  common  side  effect  of  many  pain  medications. You  will  want  to  eat something  before  taking  your  pain  medicine  to  help  prevent  Nausea. - If  you  are  taking  a  prescription  pain  medication  that  contains  acetaminophen,  we  recommend  that  you  do  not  take  additional  over  the  counter  acetaminophen  (Tylenol®). Other  pain  relieving  options:   - Using  a  cold  pack  to  ice  the  affected  area  a  few  times  a  day  (15  to  20  minutes  at  a  time)  can  help  to  relieve  pain,  reduce  swelling  and  bruising.      - Elevation  of  the  affected  area  can  also  help  to  reduce  pain  and  swelling. Did  you  receive  a  nerve  Block? A  nerve  block  can  provide  pain  relief  for  one  hour  to  two  days  after  your  surgery.   As  long  as  the  nerve  block  is  working,  you  will  experience  little  or  no sensation  in  the  area  the  surgeon  operated  on. As  the  nerve  block  wears  off,  you  will  begin  to  experience  pain  or  discomfort. It  is  very  important  that  you  begin  taking  your  prescribed  pain  medication  before  the  nerve  block  fully  wears  off. The first sign that the nerve block is wearing off is tingling in your fingers. Treating  your  pain  at  the  first  sign  of  the  block  wearing  off  will  ensure  your  pain  is  better  controlled  and  more  tolerable  when  full-sensation  returns. Do  not  wait  until  the  pain  is  intolerable,  as  the  medicine  will  be  less  effective. It  is  better  to  treat  pain  in  advance  than  to  try  and  catch  up. General  Anesthesia or Sedation:      If  you  did  not  receive  a  nerve  block  during  your  surgery,  you  will  need  to  start  taking  your  pain  medication  shortly  after  your  surgery  and  should  continue  to  do  so  as  prescribed  by  your  surgeon. Please  call  [phone]  with any concern and ask to speak with Cristobal Baird    Concerning problems include:      -  Excessive  redness  of  the  incisions      -  Drainage  for  more  than  2  Days after surgery or any foul smelling drainage  -  Fever  of  more  than  101.5  F      Please  call  [phone]  if  you  do  not  receive  or  are  unsure  of  your  first  follow-up  appointment. You  should  see  the  doctor  10-14  days  after  your  Surgery. Thank you for choosing me and [address] [place] [address] for your care. I will go above and beyond to ensure you receive the best care possible.     Radha Centeno MD

## 2021-09-22 NOTE — ANESTHESIA PREPROCEDURE EVALUATION
Relevant Problems   RESPIRATORY SYSTEM   (+) Chronic obstructive pulmonary disease (HCC)      CARDIOVASCULAR   (+) HTN (hypertension), benign      ENDOCRINE   (+) Severe obesity (HCC)   (+) Type 2 diabetes with nephropathy (HCC)       Anesthetic History   No history of anesthetic complications            Review of Systems / Medical History  Pertinent labs reviewed    Pulmonary    COPD (stable, room air sat 93% per pt.): moderate    Sleep apnea: CPAP  Smoker (former)         Neuro/Psych   Within defined limits           Cardiovascular    Hypertension  Valvular problems/murmurs (mild AI and AS 10/20.): aortic stenosis and aortic insufficiency        CAD, PAD and cardiac stents (1995)    Exercise tolerance: <4 METS: Ambulates unassisted. Comments: Preserved EF echo 10/20.    GI/Hepatic/Renal  Within defined limits              Endo/Other    Diabetes (prediabetic): type 2    Obesity and arthritis     Other Findings   Comments: Glaucoma         Physical Exam    Airway  Mallampati: II  TM Distance: 4 - 6 cm  Neck ROM: normal range of motion   Mouth opening: Normal     Cardiovascular  Regular rate and rhythm,  S1 and S2 normal,  no murmur, click, rub, or gallop             Dental    Dentition: Full upper dentures     Pulmonary  Breath sounds clear to auscultation              Comments: distant Abdominal  GI exam deferred       Other Findings            Anesthetic Plan    ASA: 3  Anesthesia type: total IV anesthesia      Post-op pain plan if not by surgeon: peripheral nerve block single    Induction: Intravenous  Anesthetic plan and risks discussed with: Patient

## 2021-09-22 NOTE — OP NOTES
ORTHOPAEDIC SURGICAL NOTE        Elvin Burroughs male 66 y.o.  [id number]   9/22/2021     PRE-OP DIAGNOSIS: Closed transverse fracture of waist of scaphoid of right wrist, initial encounter [S62.021A]   POST-OP DIAGNOSIS: Closed transverse fracture of waist of scaphoid of right wrist, initial encounter [S62.021A]   LATERALITY: Right     Procedure(s):  RIGHT SCAPHOID OPEN REDUCTION INTERNAL FIXATION    SURGEON:   Mike Hernández MD     IMPLANTS:   Implant Name Type Inv. Item Serial No.  Lot No. LRB No. Used Action   3.5 x 22 screw      [id number] Right 1 Implanted   K WIRE FIX L165MM DIA1. 4MM DBL TRCR TIP FOR HDLSS COMPR SCR - [id number]  K WIRE FIX L165MM DIA1. 4MM DBL TRCR TIP FOR HDLSS COMPR SCR  [employer] [id number] Right 1 Implanted          ANESTHESIA: Regional     STAFF:    Circ-1: Richy Gilliam RN  Scrub Tech-1IversFelicity Morales     ESTIMATED BLOOD LOSS: Minimal       TOTAL IV FLUIDS : See anesthesia note    COMPLICATIONS: None     DRAINS:       TOURNIQUET TIME:   Total Tourniquet Time Documented:  Forearm (Right) - 11 minutes  Total: Forearm (Right) - 11 minutes       INDICATION FOR PROCEDURE:     Elvin Burroughs sustained a nondisplaced right scaphoid waist fracture as a result of a fall. Surgical and non-surgical treatment options were discussed with the patient and their family, as well as the risk and benefits of each option. After thorough discussion, the patient decided to proceed with surgical management. Specific to this treatment plan, we discussed in detail surgical risks including scar, pain, bleeding, infection, anesthetic risks, neurovascular injury, failure to achieve desired results, hardware problems, need for further surgery,  weakness, stiffness, risk of death and potential risk of other unforseen complication. The patient consented to the procedure after discussion of the risks and benefits.          DESCRIPTION OF PROCEDURE:     The patient was identified in the holding room. The right upper extremity was marked and confirmed as the correct operative site. They were then brought to the OR and transferred onto the OR table in the supine position. All bony prominences were well padded. SCDs were placed on the bilateral legs throughout the case. A timeout was performed, verifying the correct patient, the correct side  and the correct procedure. Antibiotics were then administered, and were redosed during the procedure as needed at indicated intervals. A non-sterile tourniquet was placed on the arm. The Right upper extremity was pre scrubbed and then prepped and draped in routine sterile fashion. An incisional timeout was performed re-confirming the correct patient, surgical site and procedure, as well as verifying antibiotics. Fluoroscopy was used to localize the starting point in the scaphoid for the skeletal dynamics redirect screw guidewire. The guidewire was inserted percutaneously and driven across the scaphoid fracture. Appropriate positioning of the guidewire was confirmed on fluoroscopy. Following this a small incision was made along the guidewire, and I then drilled, measured and placed a 3.5 mm Skeletal Dynamics Reduct screw across the fracture site. I ensured that the screw was fully seated below subchondral bone. The guidewire was removed, and final fluoroscopy views ensured appropriate placement of the screw. The tourniquet was deflated and hemostasis was ensured. The wounds were then thoroughly irrigated and closed with 4-0 nylon. A sterile dressing was applied followed by a thumb spica splint     The patient was awakened and taken to PACU in stable condition. All sponge and needle counts were correct at the end of the case. I was present and scrubbed for the entire procedure. Fluoroscopy was utilized for this case and images were saved for reference.        DISPOSITION:    The patient will be discharged home.     Weightbearing status: NWB BEKAH       CHEMICAL VTE (DVT) PROPHYLAXIS: None warranted for this case     FOLLOW-UP:  10-14 days for wound check and XRs    Alysha Gonzalez MD    09/22/21  4:52 PM

## 2021-09-22 NOTE — BRIEF OP NOTE
Brief Postoperative Note    Patient: Victor Manuel Bishop  YOB: 1950  MRN: [id number]    Date of Procedure: 9/22/2021     Pre-Op Diagnosis: Closed transverse fracture of waist of scaphoid of right wrist, initial encounter [S62.021A]    Post-Op Diagnosis: Same as preoperative diagnosis. Procedure(s):  RIGHT SCAPHOID OPEN REDUCTION INTERNAL FIXATION    Surgeon(s):  Griffin Russell MD    Surgical Assistant: None    Anesthesia: Regional     Estimated Blood Loss (mL): Minimal    Complications: None    Specimens: * No specimens in log *     Implants:   Implant Name Type Inv. Item Serial No.  Lot No. LRB No. Used Action   3.5 x 22 screw      [id number] Right 1 Implanted   K WIRE FIX L165MM DIA1. 4MM DBL TRCR TIP FOR HDLSS COMPR SCR - [id number]  K WIRE FIX L165MM DIA1. 4MM DBL TRCR TIP FOR HDLSS COMPR SCR  [employer] [id number] Right 1 Implanted       Drains: * No LDAs found *    Findings: see full note    Electronically Signed by Iliana Dean MD on 9/22/2021 at 2:46 PM

## 2021-09-22 NOTE — ANESTHESIA POSTPROCEDURE EVALUATION
Procedure(s):  RIGHT SCAPHOID OPEN REDUCTION INTERNAL FIXATION.     total IV anesthesia    Anesthesia Post Evaluation      Multimodal analgesia: multimodal analgesia used between 6 hours prior to anesthesia start to PACU discharge  Patient location during evaluation: PACU  Patient participation: complete - patient participated  Level of consciousness: awake  Pain management: adequate  Airway patency: patent  Anesthetic complications: no  Cardiovascular status: acceptable  Respiratory status: acceptable  Hydration status: acceptable  Post anesthesia nausea and vomiting:  none  Final Post Anesthesia Temperature Assessment:  Normothermia (36.0-37.5 degrees C)      INITIAL Post-op Vital signs:   Vitals Value Taken Time   /65 09/22/21 1508   Temp 36.5 °C (97.7 °F) 09/22/21 1508   Pulse 70 09/22/21 1508   Resp 16 09/22/21 1508   SpO2 96 % 09/22/21 1508

## 2021-09-22 NOTE — ANESTHESIA PROCEDURE NOTES
Peripheral Block    Start time: 9/22/2021 10:56 AM  End time: 9/22/2021 11:02 AM  Performed by: Erika Callejas MD  Authorized by: Erika Callejas MD       Pre-procedure: Indications: at surgeon's request, post-op pain management and procedure for pain    Preanesthetic Checklist: patient identified, risks and benefits discussed, site marked, timeout performed, anesthesia consent given and patient being monitored    Timeout Time: 10:56 EDT  Preanesthetic Checklist comment:  Risk of nerve damage discussed. Block Type:   Block Type:  Axillary  Laterality:  Right  Monitoring:  Standard ASA monitoring, continuous pulse ox, frequent vital sign checks, heart rate, oxygen and responsive to questions  Injection Technique:  Single shot  Procedures: ultrasound guided and nerve stimulator    Prep: chlorhexidine    Needle Type:  Stimuplex (4 Inch)  Needle Gauge:  20 G  Needle Localization:  Ultrasound guidance and nerve stimulator  Motor Response comment:   Motor Response: minimal motor response >0.4 mA   Medication Injected:  Mepivacaine 1.5% with epinephrine 1:200,000 injection, 10 mL (Mixture components: EPINEPHrine HCl (PF) 1 mg/mL (1 mL) Soln, . 005 mL; mepivacaine-pf 15 mg/mL (1.5 %) Soln, 1 mL)  ropivacaine 0.5% with epinephrine 1:200,000 injection, 30 mL (Mixture components: EPINEPHrine HCl (PF) 1 mg/mL (1 mL) Soln, . 005 mL; ropivacaine (PF) 5 mg/mL (0.5 %) Soln, 1 mL)  Med Admin Time: 9/22/2021 11:02 AM    Assessment:  Number of attempts:  1  Injection Assessment:  Incremental injection every 5 mL, local visualized surrounding nerve on ultrasound, negative aspiration for blood, no paresthesia, no intravascular symptoms and ultrasound image on chart  Patient tolerance:  Patient tolerated the procedure well with no immediate complications  3 cc 1% lidocaine injected at site of needle insertion. Needle inserted and placed in close proximity to the nerve under real time ultrasound guidance.   Ultrasound was used to visualize the spread of local anesthetic in close proximity to the nerve being blocked. The nerve appeared anatomically normal and there were no abnormal findings.

## 2021-09-24 NOTE — H&P
H&P    Patient ID:  Clifton Johnson  [id number]  55 y.o.  1950  Surgeon:  Jolene Llanos MD  Date of Surgery: * No surgery date entered *  Procedure: Left Total Knee Arthroplasty  Primary Care Physician: Chichi Gaffney DO        Subjective:  Clifton Johnson is a 66 y.o. WHITE/NON- male who presents with left knee pain. He has history of left knee pain for several months. Symptoms worse with walking long distances and relieved with rest. Conservative treatment consisting of  activity modification and injections have not helped. The patient lives with their family. The patients goal after surgery is improved pain and function. Past Medical History:   Diagnosis Date    Adverse effect of anesthesia     \"bowel function slow to return\"    Chronic obstructive pulmonary disease ([facility] [address] 08/06/2018    daily and rescue inhaler    Coronary artery disease     History of coronary artery stent placement 02/04/2019 1995 RCA    HTN (hypertension), benign 8/6/2018    Increased pressure in the eye, bilateral 8/6/2018    Mixed hyperlipidemia 8/6/2018    CITLALI on CPAP     uses CPAP    Type 2 diabetes mellitus with hyperglycemia, without long-term current use of insulin ([facility] [address] 08/06/2018    pre diabetic      Past Surgical History:   Procedure Laterality Date    HX CORONARY STENT PLACEMENT  1995    X 1     HX HERNIA REPAIR  1985    HX PROSTATECTOMY      HX WISDOM TEETH EXTRACTION       No family history on file. Social History     Tobacco Use    Smoking status: Former Smoker     Packs/day: 1.00     Years: 30.00     Pack years: 30.00    Smokeless tobacco: Never Used   Substance Use Topics    Alcohol use: No       Prior to Admission medications    Medication Sig Start Date End Date Taking? Authorizing Provider   HYDROcodone-acetaminophen (Norco) 5-325 mg per tablet Take 1 Tablet by mouth every six (6) hours as needed for Pain for up to 5 days. Max Daily Amount: 4 Tablets. 9/22/21 9/27/21  Camacho Hernandez MD   naproxen (NAPROSYN) 500 mg tablet Take 1 Tablet by mouth two (2) times daily (with meals). 9/19/21   Jerome Sharma MD   bimatoprost (Lumigan) 0.01 % ophthalmic drops Administer 1 Drop to both eyes every evening. Provider, Historical   tiotropium-olodateroL (Stiolto Respimat) 2.5-2.5 mcg/actuation inhaler Take 2 Puffs by inhalation daily. Patient taking differently: Take 2 Puffs by inhalation daily. Take / use AM day of surgery  per anesthesia protocols. 7/12/21   Brothers, Surekha VARGAS, DO   metFORMIN (GLUCOPHAGE) 500 mg tablet TAKE 1 TABLET BY MOUTH  TWICE DAILY WITH MEALS 6/25/21   Brothmaryann, Surekha VARGAS, DO   enalapril (VASOTEC) 5 mg tablet TAKE 1 TABLET BY MOUTH  TWICE DAILY 6/20/21   Brothmaryann, Surekha VARGAS, DO   atorvastatin (LIPITOR) 40 mg tablet TAKE 1 TABLET BY MOUTH  DAILY  Patient taking differently: Take 40 mg by mouth every evening. 6/7/21   Christian Surekha VARGAS, DO   nitroglycerin (NITROSTAT) 0.4 mg SL tablet 1 Tab by SubLINGual route every five (5) minutes as needed for Chest Pain. 10/20/20   Fabiola Hernandez MD   albuterol (PROVENTIL HFA, VENTOLIN HFA, PROAIR HFA) 90 mcg/actuation inhaler Take  by inhalation. Provider, Historical   aspirin delayed-release 81 mg tablet Take 81 mg by mouth daily. Take / use AM day of surgery  per anesthesia protocols. Provider, Historical     Allergies   Allergen Reactions    Shellfish Derived Hives        REVIEW OF SYSTEMS:  CONSTITUTIONAL: Denies fever, decreased appetite, weight loss/gain, night sweats or fatigue. HEENT: Denies vision or hearing changes. denies glasses. denies hearing aids. CARDIAC: Denies CP, palpitations, rheumatic fever, murmur, peripheral edema, carotid artery disease or syncopal episodes. RESPIRATORY: Denies dyspnea on exertion, asthma, COPD or orthopnea. GI: Denies GERD, history of GI bleed or melena, PUD, hepatitis or cirrhosis. : Denies dysuria, hematuria. denies BPH symptoms.  HEMATOLOGIC: Denies anemia or blood disorders. ENDOCRINE: Denies thyroid disease. MUSCULOSKELETAL: See HPI. NEUROLOGIC: Denies seizure, peripheral neuropathy or memory loss. PSYCH: Denies depression, anxiety or insomnia. SKIN: Denies rash or open sores. Objective:    PHYSICAL EXAM  GENERAL: No data found. EYES: PERRL. EOM intact. MOUTH:Teeth and Gums normal. NECK: Full ROM. Trachea midline. No thyromegaly or JVD. CARDIOVASCULAR: Regular rate and rhythm. No murmur or gallops. No carotid bruits. Peripheral pulses: radial 2 +, PT 2+, DP 2+ bilaterally. LUNGS: CTA bilaterally. No wheezes, rhonchi or rales. GI: positive BS. Abdomen nontender. NEUROLOGIC: Alert and oriented x 3. Bilateral equal strong had grasp and bilateral equal strong plantar flexion and dorsiflexion. GAIT: abnormal MUSCULOSKELETAL: ROM: full with pain. Tenderness: over the medial and lateral joint lines. Crepitus: present. SKIN: No rash, bruising, swelling, redness or warmth. Labs:  No results found for this or any previous visit (from the past 24 hour(s)). Xray Left knee: joint space narrowing with advanced degenerative changes. Assessment:  Advanced Left Knee Osteoarthritis. Total Leftt Knee Arthroplasty Indicated. Patient Active Problem List   Diagnosis Code    HTN (hypertension), benign I10    Mixed hyperlipidemia E78.2    Chronic obstructive pulmonary disease ([address] J44.9    Increased pressure in the eye, bilateral H40.053    History of coronary artery stent placement Z95.5    Type 2 diabetes with nephropathy ([address] E11.21    Severe obesity ([address] E66.01    PVD (peripheral vascular disease) ([address] I73.9    Localized osteoarthritis of knees, bilateral M17.0       Plan:  I have advised the patient of the risks and consequences, including possible complications of performing total joint replacement, as well as not doing this operation. The patient had the opportunity to ask questions and have them answered to their satisfaction.      Signed:  Flori Randhawa [place], [place] 9/24/2021

## 2021-11-11 ENCOUNTER — HOSPITAL ENCOUNTER (OUTPATIENT)
Dept: SURGERY | Age: 71
Discharge: HOME | End: 2021-11-11
Attending: ORTHOPAEDIC SURGERY
Payer: MEDICARE

## 2021-11-11 VITALS
RESPIRATION RATE: 18 BRPM | WEIGHT: 238 LBS | DIASTOLIC BLOOD PRESSURE: 80 MMHG | SYSTOLIC BLOOD PRESSURE: 141 MMHG | HEIGHT: 70 IN | BODY MASS INDEX: 34.07 KG/M2 | OXYGEN SATURATION: 95 % | TEMPERATURE: 97.6 F | HEART RATE: 58 BPM

## 2021-11-11 LAB
ANION GAP SERPL CALC-SCNC: 5 MMOL/L (ref 7–16)
APTT PPP: 37.2 SEC (ref 24.1–35.1)
BACTERIA SPEC CULT: ABNORMAL
BASOPHILS # BLD: 0.1 K/UL (ref 0–0.2)
BASOPHILS NFR BLD: 1 % (ref 0–2)
BUN SERPL-MCNC: 12 MG/DL (ref 8–23)
CALCIUM SERPL-MCNC: 9.9 MG/DL (ref 8.3–10.4)
CHLORIDE SERPL-SCNC: 106 MMOL/L (ref 98–107)
CO2 SERPL-SCNC: 28 MMOL/L (ref 21–32)
CREAT SERPL-MCNC: 0.86 MG/DL (ref 0.8–1.5)
DIFFERENTIAL METHOD BLD: NORMAL
EOSINOPHIL # BLD: 0.2 K/UL (ref 0–0.8)
EOSINOPHIL NFR BLD: 4 % (ref 0.5–7.8)
ERYTHROCYTE [DISTWIDTH] IN BLOOD BY AUTOMATED COUNT: 13.6 % (ref 11.9–14.6)
EST. AVERAGE GLUCOSE BLD GHB EST-MCNC: 146 MG/DL
GLUCOSE SERPL-MCNC: 115 MG/DL (ref 65–100)
HBA1C MFR BLD: 6.7 % (ref 4.2–6.3)
HCT VFR BLD AUTO: 43.5 % (ref 41.1–50.3)
HGB BLD-MCNC: 14.3 G/DL (ref 13.6–17.2)
IMM GRANULOCYTES # BLD AUTO: 0 K/UL (ref 0–0.5)
IMM GRANULOCYTES NFR BLD AUTO: 0 % (ref 0–5)
INR PPP: 1.1
LYMPHOCYTES # BLD: 1.6 K/UL (ref 0.5–4.6)
LYMPHOCYTES NFR BLD: 24 % (ref 13–44)
MCH RBC QN AUTO: 31.8 PG (ref 26.1–32.9)
MCHC RBC AUTO-ENTMCNC: 32.9 G/DL (ref 31.4–35)
MCV RBC AUTO: 96.9 FL (ref 79.6–97.8)
MONOCYTES # BLD: 0.6 K/UL (ref 0.1–1.3)
MONOCYTES NFR BLD: 9 % (ref 4–12)
NEUTS SEG # BLD: 4.1 K/UL (ref 1.7–8.2)
NEUTS SEG NFR BLD: 62 % (ref 43–78)
NRBC # BLD: 0 K/UL (ref 0–0.2)
PLATELET # BLD AUTO: 215 K/UL (ref 150–450)
PMV BLD AUTO: 10.4 FL (ref 9.4–12.3)
POTASSIUM SERPL-SCNC: 4.2 MMOL/L (ref 3.5–5.1)
PROTHROMBIN TIME: 14.2 SEC (ref 12.6–14.5)
RBC # BLD AUTO: 4.49 M/UL (ref 4.23–5.6)
SERVICE CMNT-IMP: ABNORMAL
SODIUM SERPL-SCNC: 139 MMOL/L (ref 136–145)
WBC # BLD AUTO: 6.6 K/UL (ref 4.3–11.1)

## 2021-11-11 PROCEDURE — 85730 THROMBOPLASTIN TIME PARTIAL: CPT

## 2021-11-11 PROCEDURE — 80048 BASIC METABOLIC PNL TOTAL CA: CPT

## 2021-11-11 PROCEDURE — 83036 HEMOGLOBIN GLYCOSYLATED A1C: CPT

## 2021-11-11 PROCEDURE — 36415 COLL VENOUS BLD VENIPUNCTURE: CPT

## 2021-11-11 PROCEDURE — 85610 PROTHROMBIN TIME: CPT

## 2021-11-11 PROCEDURE — 87641 MR-STAPH DNA AMP PROBE: CPT

## 2021-11-11 PROCEDURE — 85025 COMPLETE CBC W/AUTO DIFF WBC: CPT

## 2021-11-11 NOTE — PERIOP NOTES
PLEASE CONTINUE TAKING ALL PRESCRIPTION MEDICATIONS UP TO THE DAY OF SURGERY UNLESS OTHERWISE DIRECTED BELOW. DISCONTINUE all vitamins, herbals and supplements 21 days prior to surgery. DISCONTINUE Non-Steriodal Anti-Inflammatory (NSAIDS) such as Advil, Ibuprofen, and Aleve 5 days prior to surgery. Home Medications to HOLD      All vitamins, supplements, and herbals stop now. All NSAIDs such as Advil, Aleve, Ibuprofen, Diclofenac, Naproxen, etc. Stop 5 days prior to surgery. *IT IS OK TO TAKE TYLENOL*     Home Medications to take  the day of surgery   Albuterol inhaler if needed, Stiolto inhaler, 81 mg ASA        Comments   *The day before surgery, 11/17/21, take Acetaminophen (Tylenol) 1000mg in the morning and again at bedtime*   BRING: inhalers, eye drops, Nitroglycerin, CPAP, bottle of soap (Dynahex), and incentive spirometer to the hospital.           Please do not bring home medications with you on the day of surgery unless otherwise directed by your nurse. If you are instructed to bring home medications, please give them to your nurse as they will be administered by the nursing staff. If you have any questions, please call [facility] [phone] or [facility] [phone]. Copy of above instructions given to patient.

## 2021-11-11 NOTE — PERIOP NOTES
Patient verified name and . Order for consent found in EHR and matches case posting; patient verified. Type 3 surgery, walk-in assessment complete. Labs per surgeon: CBC,BMP, PT/PTT, HgbA1c; results pending. T&S DOS and POC glucose DOS; orders signed and held in EHR. Labs per anesthesia protocol: no additional  EKG:Completed 10/26/21; results within anesthesia limits. Cardiology note with surgical clearance (10/26/21), Pulmonary note (10/8/21), and Echo (10/13/20) located in EHR if needed. Echo shows \"minimal\" AS; anesthesia to review per protocol. Charge nurse to f/u. Patient COVID test date 11/15/21 at [phone]; Patient aware. The testing center [facility] [address], [place]  and telephone number [phone] provided to the patient if not appointment found. MRSA/MSSA swab collected; pharmacy to review and dose antibiotic as appropriate. Hospital approved surgical skin cleanser and instructions to return bottle on DOS given per hospital policy. Patient provided with handouts including Guide to Surgery, Pain Management, Hand Hygiene, Blood Transfusion Education, and [facility] Brochure. Patient answered medical/surgical history questions at their best of ability. All prior to admission medications documented in [facility]. Original medication prescription bottle NOT visualized during patient appointment. Patient instructed to hold all vitamins, supplements, herbals 3 weeks prior to surgery and NSAIDS 5 days prior to surgery. Patient instructed to continue daily 81 mg ASA due h/o cardiac stent, per anesthesia protocol. Patient teach back successful and patient demonstrates knowledge of instruction.

## 2021-11-11 NOTE — ADVANCED PRACTICE NURSE
Total Joint Surgery Preoperative Chart Review      Patient ID:  Yash Roman  [id number]  24 y.o.  1950  Surgeon: Dr. Nataliia Serrano  Date of Surgery: 2021  Procedure: Total Left Knee Arthroplasty  Primary Care Physician: Jorge Ambrosio [place] [phone]  Specialty Physician(s):      Subjective:   Yash Roman is a 66 y.o. WHITE/NON- male who presents for preoperative evaluation for Total Left Knee arthroplasty. This is a preoperative chart review note based on data collected by the nurse at the surgical Pre-Assessment visit. Past Medical History:   Diagnosis Date    Adverse effect of anesthesia     \"bowel function slow to return\"    Chronic obstructive pulmonary disease ([facility] [address] 2018    daily and rescue inhaler, followed by Dr. Ema Simons Coronary artery disease     Followed by Dr. Aileen Chong H/O echocardiogram 10/01/2020    EF 55-60%    History of coronary artery stent placement 2019 RCA, daily 81 mg ASA    HTN (hypertension), benign 2018    Increased pressure in the eye, bilateral 2018    Mixed hyperlipidemia 2018    CITLALI on CPAP     uses CPAP    Type 2 diabetes mellitus with hyperglycemia, without long-term current use of insulin ([facility] [address] 2018    pre diabetic      Past Surgical History:   Procedure Laterality Date    HX CORONARY STENT PLACEMENT  1995    X 1     HX HERNIA REPAIR  1985    HX ORTHOPAEDIC Right 2021    RIGHT SCAPHOID OPEN REDUCTION INTERNAL FIXATION.  HX PROSTATECTOMY      HX WISDOM TEETH EXTRACTION       No family history on file. Social History     Tobacco Use    Smoking status: Former Smoker     Packs/day: 1.00     Years: 30.00     Pack years: 30.00     Types: Cigarettes     Quit date:      Years since quittin.8    Smokeless tobacco: Never Used   Substance Use Topics    Alcohol use: No       Prior to Admission medications    Medication Sig Start Date End Date Taking?  Authorizing Provider cpap machine kit by Does Not Apply route. Yes Provider, Historical   naproxen (NAPROSYN) 500 mg tablet Take 1 Tablet by mouth two (2) times daily (with meals). 9/19/21  Yes Demi Sharma MD   bimatoprost (Lumigan) 0.01 % ophthalmic drops Administer 1 Drop to both eyes every evening. Yes Provider, Historical   tiotropium-olodateroL (Stiolto Respimat) 2.5-2.5 mcg/actuation inhaler Take 2 Puffs by inhalation daily. Patient taking differently: Take 2 Puffs by inhalation daily. Take / use AM day of surgery  per anesthesia protocols. 7/12/21  Yes Brothers, Maico Lopez, DO   metFORMIN (GLUCOPHAGE) 500 mg tablet TAKE 1 TABLET BY MOUTH  TWICE DAILY WITH MEALS 6/25/21  Yes Brothers, Brendajazmyne Oliver T, DO   enalapril (VASOTEC) 5 mg tablet TAKE 1 TABLET BY MOUTH  TWICE DAILY 6/20/21  Yes Brothers, Brendajazmyne Oliver T, DO   atorvastatin (LIPITOR) 40 mg tablet TAKE 1 TABLET BY MOUTH  DAILY  Patient taking differently: Take 40 mg by mouth every evening. 6/7/21  Yes Brothmaryann Maico Lopez, DO   nitroglycerin (NITROSTAT) 0.4 mg SL tablet 1 Tab by SubLINGual route every five (5) minutes as needed for Chest Pain. 10/20/20  Yes Zeus Agrawal MD   albuterol (PROVENTIL HFA, VENTOLIN HFA, PROAIR HFA) 90 mcg/actuation inhaler Take  by inhalation. Yes Provider, Historical   aspirin delayed-release 81 mg tablet Take 81 mg by mouth daily. Take / use AM day of surgery  per anesthesia protocols.    Yes Provider, Historical     Allergies   Allergen Reactions    Shellfish Derived Hives          Objective:     Physical Exam:   Patient Vitals for the past 24 hrs:   Temp Pulse Resp BP SpO2   11/11/21 1347 97.6 °F (36.4 °C) (!) 58 18 (!) 141/80 95 %       ECG:    EKG Results     None          Data Review:   Labs:     Labs pending    Problem List:  )  Patient Active Problem List   Diagnosis Code    HTN (hypertension), benign I10    Mixed hyperlipidemia E78.2    Chronic obstructive pulmonary disease ([facility] [address] J44.9    Increased pressure in the eye, bilateral H40.053  History of coronary artery stent placement Z95.5    Type 2 diabetes with nephropathy ([facility]) E11.21    Severe obesity ([facility]) E66.01    PVD (peripheral vascular disease) ([facility]) I73.9    Localized osteoarthritis of knees, bilateral M17.0       Total Joint Surgery Pre-Assessment Recommendations:           Patient is to wear home CPAP during hospitalization. Continuous saturation monitoring during hospitalization. O2 prn per respiratory protocol.      Signed By: TRE Stock    November 11, 2021

## 2021-11-12 NOTE — H&P
H&P    Patient ID:  Patience Garcia  [id number]  64 y.o.  1950  Surgeon:  Gely Espinosa MD  Date of Surgery: * No surgery date entered *  Procedure: Left Total Knee Arthroplasty  Primary Care Physician: Carmencita Mccurdy DO        Subjective:  Patience Garcia is a 66 y.o. WHITE/NON- male who presents with left knee pain. He has history of left knee pain for several months. Symptoms worse with walking long distances and relieved with rest. Conservative treatment consisting of  activity modification and injections have not helped. The patient lives with their family. The patients goal after surgery is improved pain and function. Past Medical History:   Diagnosis Date    Adverse effect of anesthesia     \"bowel function slow to return\"    Aortic stenosis     \"minimal\" per echo dated 10/13/21    Chronic obstructive pulmonary disease ([facility] 08/06/2018    daily and rescue inhaler, followed by Dr. Tenisha Truong Coronary artery disease     Followed by Dr. Miranda Bonilla H/O echocardiogram 10/01/2020    EF 55-60%    History of coronary artery stent placement 02/04/2019 1995 RCA, daily 81 mg ASA    HTN (hypertension), benign 8/6/2018    Increased pressure in the eye, bilateral 8/6/2018    Mixed hyperlipidemia 8/6/2018    Murmur     Echo 10/13/20 EF 55-60%, \"minimal aortic valve stenosis) mild aortic regurgitation     CITLALI on CPAP     uses CPAP    Type 2 diabetes mellitus with hyperglycemia, without long-term current use of insulin ([facility] [address] 08/06/2018    pre diabetic, HGB A1c 6.7 on 11/11/21      Past Surgical History:   Procedure Laterality Date    HX CORONARY STENT PLACEMENT  1995    X 1     HX HERNIA REPAIR  1985    HX ORTHOPAEDIC Right 09/2021    RIGHT SCAPHOID OPEN REDUCTION INTERNAL FIXATION.  HX PROSTATECTOMY      HX WISDOM TEETH EXTRACTION       No family history on file.    Social History     Tobacco Use    Smoking status: Former Smoker     Packs/day: 1.00 Years: 30.00     Pack years: 30.00     Types: Cigarettes     Quit date: 18     Years since quittin.8    Smokeless tobacco: Never Used   Substance Use Topics    Alcohol use: No       Prior to Admission medications    Medication Sig Start Date End Date Taking? Authorizing Provider   cpap machine kit by Does Not Apply route. Provider, Historical   naproxen (NAPROSYN) 500 mg tablet Take 1 Tablet by mouth two (2) times daily (with meals). 21   Jeremias Sharma MD   bimatoprost (Lumigan) 0.01 % ophthalmic drops Administer 1 Drop to both eyes every evening. Provider, Historical   tiotropium-olodateroL (Stiolto Respimat) 2.5-2.5 mcg/actuation inhaler Take 2 Puffs by inhalation daily. Patient taking differently: Take 2 Puffs by inhalation daily. Take / use AM day of surgery  per anesthesia protocols. 21   Brothers, Justen VARGAS, DO   metFORMIN (GLUCOPHAGE) 500 mg tablet TAKE 1 TABLET BY MOUTH  TWICE DAILY WITH MEALS 21   BrothersJusten, DO   enalapril (VASOTEC) 5 mg tablet TAKE 1 TABLET BY MOUTH  TWICE DAILY 21   BrothersJusten, DO   atorvastatin (LIPITOR) 40 mg tablet TAKE 1 TABLET BY MOUTH  DAILY  Patient taking differently: Take 40 mg by mouth every evening. 21   Brothmaryann Justen VARGAS, DO   nitroglycerin (NITROSTAT) 0.4 mg SL tablet 1 Tab by SubLINGual route every five (5) minutes as needed for Chest Pain. 10/20/20   Rene Madsen MD   albuterol (PROVENTIL HFA, VENTOLIN HFA, PROAIR HFA) 90 mcg/actuation inhaler Take  by inhalation. Provider, Historical   aspirin delayed-release 81 mg tablet Take 81 mg by mouth daily. Take / use AM day of surgery  per anesthesia protocols. Provider, Historical     Allergies   Allergen Reactions    Shellfish Derived Hives        REVIEW OF SYSTEMS:  CONSTITUTIONAL: Denies fever, decreased appetite, weight loss/gain, night sweats or fatigue. HEENT: Denies vision or hearing changes. denies glasses. denies hearing aids.  CARDIAC: Denies CP, palpitations, rheumatic fever, murmur, peripheral edema, carotid artery disease or syncopal episodes. RESPIRATORY: Denies dyspnea on exertion, asthma, COPD or orthopnea. GI: Denies GERD, history of GI bleed or melena, PUD, hepatitis or cirrhosis. : Denies dysuria, hematuria. denies BPH symptoms. HEMATOLOGIC: Denies anemia or blood disorders. ENDOCRINE: Denies thyroid disease. MUSCULOSKELETAL: See HPI. NEUROLOGIC: Denies seizure, peripheral neuropathy or memory loss. PSYCH: Denies depression, anxiety or insomnia. SKIN: Denies rash or open sores. Objective:    PHYSICAL EXAM  GENERAL: No data found. EYES: PERRL. EOM intact. MOUTH:Teeth and Gums normal. NECK: Full ROM. Trachea midline. No thyromegaly or JVD. CARDIOVASCULAR: Regular rate and rhythm. No murmur or gallops. No carotid bruits. Peripheral pulses: radial 2 +, PT 2+, DP 2+ bilaterally. LUNGS: CTA bilaterally. No wheezes, rhonchi or rales. GI: positive BS. Abdomen nontender. NEUROLOGIC: Alert and oriented x 3. Bilateral equal strong had grasp and bilateral equal strong plantar flexion and dorsiflexion. GAIT: abnormal MUSCULOSKELETAL: ROM: full with pain. Tenderness: over the medial and lateral joint lines. Crepitus: present. SKIN: No rash, bruising, swelling, redness or warmth. Labs:    Recent Results (from the past 24 hour(s))   MSSA/MRSA SC BY PCR, NASAL SWAB    Collection Time: 11/11/21  1:43 PM    Specimen: Nasal swab   Result Value Ref Range    Special Requests: NO SPECIAL REQUESTS      Culture result: (A)       MRSA target DNA not detected, SA target DNA detected. A MRSA negative, SA positive test result does not preclude MRSA nasal colonization.    CBC WITH AUTOMATED DIFF    Collection Time: 11/11/21  2:23 PM   Result Value Ref Range    WBC 6.6 4.3 - 11.1 K/uL    RBC 4.49 4.23 - 5.6 M/uL    HGB 14.3 13.6 - 17.2 g/dL    HCT 43.5 41.1 - 50.3 %    MCV 96.9 79.6 - 97.8 FL    MCH 31.8 26.1 - 32.9 PG    MCHC 32.9 31.4 - 35.0 g/dL    RDW 13.6 11.9 - 14.6 %    PLATELET 164 192 - 504 K/uL    MPV 10.4 9.4 - 12.3 FL    ABSOLUTE NRBC 0.00 0.0 - 0.2 K/uL    DF AUTOMATED      NEUTROPHILS 62 43 - 78 %    LYMPHOCYTES 24 13 - 44 %    MONOCYTES 9 4.0 - 12.0 %    EOSINOPHILS 4 0.5 - 7.8 %    BASOPHILS 1 0.0 - 2.0 %    IMMATURE GRANULOCYTES 0 0.0 - 5.0 %    ABS. NEUTROPHILS 4.1 1.7 - 8.2 K/UL    ABS. LYMPHOCYTES 1.6 0.5 - 4.6 K/UL    ABS. MONOCYTES 0.6 0.1 - 1.3 K/UL    ABS. EOSINOPHILS 0.2 0.0 - 0.8 K/UL    ABS. BASOPHILS 0.1 0.0 - 0.2 K/UL    ABS. IMM. GRANS. 0.0 0.0 - 0.5 K/UL   HEMOGLOBIN A1C WITH EAG    Collection Time: 11/11/21  2:23 PM   Result Value Ref Range    Hemoglobin A1c 6.7 (H) 4.20 - 6.30 %    Est. average glucose 597 mg/dL   METABOLIC PANEL, BASIC    Collection Time: 11/11/21  2:23 PM   Result Value Ref Range    Sodium 139 136 - 145 mmol/L    Potassium 4.2 3.5 - 5.1 mmol/L    Chloride 106 98 - 107 mmol/L    CO2 28 21 - 32 mmol/L    Anion gap 5 (L) 7 - 16 mmol/L    Glucose 115 (H) 65 - 100 mg/dL    BUN 12 8 - 23 MG/DL    Creatinine 0.86 0.8 - 1.5 MG/DL    GFR est AA >60 >60 ml/min/1.73m2    GFR est non-AA >60 >60 ml/min/1.73m2    Calcium 9.9 8.3 - 10.4 MG/DL   PROTHROMBIN TIME + INR    Collection Time: 11/11/21  2:23 PM   Result Value Ref Range    Prothrombin time 14.2 12.6 - 14.5 sec    INR 1.1     PTT    Collection Time: 11/11/21  2:23 PM   Result Value Ref Range    aPTT 37.2 (H) 24.1 - 35.1 SEC       Xray Left knee: joint space narrowing with advanced degenerative changes. Assessment:  Advanced Left Knee Osteoarthritis. Total Leftt Knee Arthroplasty Indicated.   Patient Active Problem List   Diagnosis Code    HTN (hypertension), benign I10    Mixed hyperlipidemia E78.2    Chronic obstructive pulmonary disease ([address] J44.9    Increased pressure in the eye, bilateral H40.053    History of coronary artery stent placement Z95.5    Type 2 diabetes with nephropathy ([address] E11.21    Severe obesity ([address] E66.01    PVD (peripheral vascular disease) ([facility] I73.9    Localized osteoarthritis of knees, bilateral M17.0       Plan:  I have advised the patient of the risks and consequences, including possible complications of performing total joint replacement, as well as not doing this operation. The patient had the opportunity to ask questions and have them answered to their satisfaction.      Signed:  GRIS Nguyen 11/12/2021

## 2021-11-12 NOTE — PERIOP NOTES
Dr. Alysha Rodriguez (anesthesia) reviewed Echo dated 10/13/20 and PTT 37.2. No further orders received. Ok to proceed.

## 2021-11-17 ENCOUNTER — ANESTHESIA EVENT (OUTPATIENT)
Dept: SURGERY | Age: 71
End: 2021-11-17
Payer: MEDICARE

## 2021-11-18 ENCOUNTER — ANESTHESIA (OUTPATIENT)
Dept: SURGERY | Age: 71
End: 2021-11-18
Payer: MEDICARE

## 2021-11-18 ENCOUNTER — HOSPITAL ENCOUNTER (OUTPATIENT)
Age: 71
Setting detail: OUTPATIENT SURGERY
Discharge: HOME HEALTH | End: 2021-11-18
Attending: ORTHOPAEDIC SURGERY | Admitting: ORTHOPAEDIC SURGERY
Payer: MEDICARE

## 2021-11-18 ENCOUNTER — HOME HEALTH ADMISSION (OUTPATIENT)
Dept: HOME HEALTH SERVICES | Facility: HOME HEALTH | Age: 71
End: 2021-11-18
Payer: MEDICARE

## 2021-11-18 VITALS
OXYGEN SATURATION: 91 % | TEMPERATURE: 97.8 F | HEART RATE: 80 BPM | SYSTOLIC BLOOD PRESSURE: 158 MMHG | DIASTOLIC BLOOD PRESSURE: 86 MMHG | RESPIRATION RATE: 18 BRPM

## 2021-11-18 DIAGNOSIS — Z96.652 TOTAL KNEE REPLACEMENT STATUS, LEFT: Primary | ICD-10-CM

## 2021-11-18 LAB
GLUCOSE BLD STRIP.AUTO-MCNC: 156 MG/DL (ref 65–100)
SERVICE CMNT-IMP: ABNORMAL

## 2021-11-18 PROCEDURE — 74011250637 HC RX REV CODE- 250/637: Performed by: PHYSICIAN ASSISTANT

## 2021-11-18 PROCEDURE — 77030003665 HC NDL SPN BBMI -A: Performed by: ANESTHESIOLOGY

## 2021-11-18 PROCEDURE — 77030037715 HC DRSG ZIP STRY -B: Performed by: ORTHOPAEDIC SURGERY

## 2021-11-18 PROCEDURE — 74011000258 HC RX REV CODE- 258: Performed by: ORTHOPAEDIC SURGERY

## 2021-11-18 PROCEDURE — 74011250636 HC RX REV CODE- 250/636: Performed by: ANESTHESIOLOGY

## 2021-11-18 PROCEDURE — 77030012935 HC DRSG AQUACEL BMS -B: Performed by: ORTHOPAEDIC SURGERY

## 2021-11-18 PROCEDURE — 77030019557 HC ELECTRD VES SEAL MEDT -F: Performed by: ORTHOPAEDIC SURGERY

## 2021-11-18 PROCEDURE — 76060000035 HC ANESTHESIA 2 TO 2.5 HR: Performed by: ORTHOPAEDIC SURGERY

## 2021-11-18 PROCEDURE — 2709999900 HC NON-CHARGEABLE SUPPLY

## 2021-11-18 PROCEDURE — 2709999900 HC NON-CHARGEABLE SUPPLY: Performed by: ORTHOPAEDIC SURGERY

## 2021-11-18 PROCEDURE — 97116 GAIT TRAINING THERAPY: CPT

## 2021-11-18 PROCEDURE — 76942 ECHO GUIDE FOR BIOPSY: CPT | Performed by: ORTHOPAEDIC SURGERY

## 2021-11-18 PROCEDURE — 76010000162 HC OR TIME 1.5 TO 2 HR INTENSV-TIER 1: Performed by: ORTHOPAEDIC SURGERY

## 2021-11-18 PROCEDURE — 74011250637 HC RX REV CODE- 250/637: Performed by: ANESTHESIOLOGY

## 2021-11-18 PROCEDURE — 76210000001 HC OR PH I REC 2.5 TO 3 HR: Performed by: ORTHOPAEDIC SURGERY

## 2021-11-18 PROCEDURE — 77030003602 HC NDL NRV BLK BBMI -B: Performed by: ANESTHESIOLOGY

## 2021-11-18 PROCEDURE — 97110 THERAPEUTIC EXERCISES: CPT

## 2021-11-18 PROCEDURE — 74011000250 HC RX REV CODE- 250: Performed by: ORTHOPAEDIC SURGERY

## 2021-11-18 PROCEDURE — 74011250636 HC RX REV CODE- 250/636: Performed by: ORTHOPAEDIC SURGERY

## 2021-11-18 PROCEDURE — 94760 N-INVAS EAR/PLS OXIMETRY 1: CPT

## 2021-11-18 PROCEDURE — 76010010054 HC POST OP PAIN BLOCK: Performed by: ORTHOPAEDIC SURGERY

## 2021-11-18 PROCEDURE — 77030006835 HC BLD SAW SAG STRY -B: Performed by: ORTHOPAEDIC SURGERY

## 2021-11-18 PROCEDURE — 97161 PT EVAL LOW COMPLEX 20 MIN: CPT

## 2021-11-18 PROCEDURE — 82962 GLUCOSE BLOOD TEST: CPT

## 2021-11-18 PROCEDURE — 77030040361 HC SLV COMPR DVT MDII -B

## 2021-11-18 PROCEDURE — 27447 TOTAL KNEE ARTHROPLASTY: CPT | Performed by: ORTHOPAEDIC SURGERY

## 2021-11-18 PROCEDURE — C1776 JOINT DEVICE (IMPLANTABLE): HCPCS | Performed by: ORTHOPAEDIC SURGERY

## 2021-11-18 PROCEDURE — 77030018836 HC SOL IRR NACL ICUM -A: Performed by: ORTHOPAEDIC SURGERY

## 2021-11-18 PROCEDURE — 27447 TOTAL KNEE ARTHROPLASTY: CPT | Performed by: PHYSICIAN ASSISTANT

## 2021-11-18 PROCEDURE — 77030031139 HC SUT VCRL2 J&J -A: Performed by: ORTHOPAEDIC SURGERY

## 2021-11-18 PROCEDURE — 97165 OT EVAL LOW COMPLEX 30 MIN: CPT

## 2021-11-18 PROCEDURE — 77030038692 HC WND DEB SYS IRMX -B: Performed by: ORTHOPAEDIC SURGERY

## 2021-11-18 PROCEDURE — 77030040922 HC BLNKT HYPOTHRM STRY -A: Performed by: ANESTHESIOLOGY

## 2021-11-18 PROCEDURE — 77030020044 HC CLD THERAPY UNIT -B

## 2021-11-18 PROCEDURE — 97535 SELF CARE MNGMENT TRAINING: CPT

## 2021-11-18 PROCEDURE — 74011000250 HC RX REV CODE- 250: Performed by: ANESTHESIOLOGY

## 2021-11-18 PROCEDURE — 77030033067 HC SUT PDO STRATFX SPIR J&J -B: Performed by: ORTHOPAEDIC SURGERY

## 2021-11-18 PROCEDURE — 77030016544 HC BLD SAW RECIP1 STRY -B: Performed by: ORTHOPAEDIC SURGERY

## 2021-11-18 PROCEDURE — 77030037714 HC CLOSR DEV INCIS ZIP STRY -C: Performed by: ORTHOPAEDIC SURGERY

## 2021-11-18 PROCEDURE — 74011250636 HC RX REV CODE- 250/636: Performed by: PHYSICIAN ASSISTANT

## 2021-11-18 PROCEDURE — 77030007880 HC KT SPN EPDRL BBMI -B: Performed by: ANESTHESIOLOGY

## 2021-11-18 DEVICE — INSERT TIB SZ 7 THK6MM KNEE POST STBL ROT PLATFRM ATTUNE: Type: IMPLANTABLE DEVICE | Site: KNEE | Status: FUNCTIONAL

## 2021-11-18 DEVICE — KNEE K2 TOT HEMI ADV CMTLS IMPL CAPPED SYNTHES: Type: IMPLANTABLE DEVICE | Status: FUNCTIONAL

## 2021-11-18 DEVICE — ATTUNE RP TIB BASE SZ 8 POR: Type: IMPLANTABLE DEVICE | Site: KNEE | Status: FUNCTIONAL

## 2021-11-18 DEVICE — COMPONENT FEM PS 7 LT KNEE POROUS ATTUNE: Type: IMPLANTABLE DEVICE | Site: KNEE | Status: FUNCTIONAL

## 2021-11-18 RX ORDER — HYDROMORPHONE HYDROCHLORIDE 2 MG/ML
0.5 INJECTION, SOLUTION INTRAMUSCULAR; INTRAVENOUS; SUBCUTANEOUS
Status: DISCONTINUED | OUTPATIENT
Start: 2021-11-18 | End: 2021-11-18 | Stop reason: HOSPADM

## 2021-11-18 RX ORDER — ATORVASTATIN CALCIUM 40 MG/1
40 TABLET, FILM COATED ORAL EVERY EVENING
Status: DISCONTINUED | OUTPATIENT
Start: 2021-11-18 | End: 2021-11-18 | Stop reason: HOSPADM

## 2021-11-18 RX ORDER — FENTANYL CITRATE 50 UG/ML
100 INJECTION, SOLUTION INTRAMUSCULAR; INTRAVENOUS ONCE
Status: COMPLETED | OUTPATIENT
Start: 2021-11-18 | End: 2021-11-18

## 2021-11-18 RX ORDER — METHOCARBAMOL 750 MG/1
750 TABLET, FILM COATED ORAL
Status: DISCONTINUED | OUTPATIENT
Start: 2021-11-18 | End: 2021-11-18 | Stop reason: HOSPADM

## 2021-11-18 RX ORDER — MIDAZOLAM HYDROCHLORIDE 1 MG/ML
2 INJECTION, SOLUTION INTRAMUSCULAR; INTRAVENOUS
Status: COMPLETED | OUTPATIENT
Start: 2021-11-18 | End: 2021-11-18

## 2021-11-18 RX ORDER — MIDAZOLAM HYDROCHLORIDE 1 MG/ML
INJECTION, SOLUTION INTRAMUSCULAR; INTRAVENOUS AS NEEDED
Status: DISCONTINUED | OUTPATIENT
Start: 2021-11-18 | End: 2021-11-18 | Stop reason: HOSPADM

## 2021-11-18 RX ORDER — NALOXONE HYDROCHLORIDE 0.4 MG/ML
.2-.4 INJECTION, SOLUTION INTRAMUSCULAR; INTRAVENOUS; SUBCUTANEOUS
Status: DISCONTINUED | OUTPATIENT
Start: 2021-11-18 | End: 2021-11-18 | Stop reason: HOSPADM

## 2021-11-18 RX ORDER — TRANEXAMIC ACID 100 MG/ML
INJECTION, SOLUTION INTRAVENOUS AS NEEDED
Status: DISCONTINUED | OUTPATIENT
Start: 2021-11-18 | End: 2021-11-18 | Stop reason: HOSPADM

## 2021-11-18 RX ORDER — DEXAMETHASONE SODIUM PHOSPHATE 4 MG/ML
INJECTION, SOLUTION INTRA-ARTICULAR; INTRALESIONAL; INTRAMUSCULAR; INTRAVENOUS; SOFT TISSUE
Status: COMPLETED | OUTPATIENT
Start: 2021-11-18 | End: 2021-11-18

## 2021-11-18 RX ORDER — PROMETHAZINE HYDROCHLORIDE 25 MG/1
25 TABLET ORAL
Status: DISCONTINUED | OUTPATIENT
Start: 2021-11-18 | End: 2021-11-18 | Stop reason: HOSPADM

## 2021-11-18 RX ORDER — SODIUM CHLORIDE, SODIUM LACTATE, POTASSIUM CHLORIDE, CALCIUM CHLORIDE 600; 310; 30; 20 MG/100ML; MG/100ML; MG/100ML; MG/100ML
100 INJECTION, SOLUTION INTRAVENOUS CONTINUOUS
Status: DISCONTINUED | OUTPATIENT
Start: 2021-11-18 | End: 2021-11-18 | Stop reason: HOSPADM

## 2021-11-18 RX ORDER — LIDOCAINE HYDROCHLORIDE 10 MG/ML
0.3 INJECTION INFILTRATION; PERINEURAL ONCE
Status: DISCONTINUED | OUTPATIENT
Start: 2021-11-18 | End: 2021-11-18 | Stop reason: HOSPADM

## 2021-11-18 RX ORDER — CEFAZOLIN SODIUM/WATER 2 G/20 ML
2 SYRINGE (ML) INTRAVENOUS ONCE
Status: COMPLETED | OUTPATIENT
Start: 2021-11-18 | End: 2021-11-18

## 2021-11-18 RX ORDER — SODIUM CHLORIDE 0.9 % (FLUSH) 0.9 %
5-40 SYRINGE (ML) INJECTION EVERY 8 HOURS
Status: DISCONTINUED | OUTPATIENT
Start: 2021-11-18 | End: 2021-11-18 | Stop reason: HOSPADM

## 2021-11-18 RX ORDER — EPHEDRINE SULFATE/0.9% NACL/PF 50 MG/5 ML
SYRINGE (ML) INTRAVENOUS AS NEEDED
Status: DISCONTINUED | OUTPATIENT
Start: 2021-11-18 | End: 2021-11-18 | Stop reason: HOSPADM

## 2021-11-18 RX ORDER — DEXAMETHASONE SODIUM PHOSPHATE 100 MG/10ML
10 INJECTION INTRAMUSCULAR; INTRAVENOUS ONCE
Status: DISCONTINUED | OUTPATIENT
Start: 2021-11-19 | End: 2021-11-18 | Stop reason: HOSPADM

## 2021-11-18 RX ORDER — OXYCODONE HYDROCHLORIDE 5 MG/1
5-10 TABLET ORAL
Qty: 60 TABLET | Refills: 0 | Status: SHIPPED | OUTPATIENT
Start: 2021-11-18 | End: 2021-11-25

## 2021-11-18 RX ORDER — OXYCODONE HYDROCHLORIDE 5 MG/1
10 TABLET ORAL
Status: COMPLETED | OUTPATIENT
Start: 2021-11-18 | End: 2021-11-18

## 2021-11-18 RX ORDER — ALBUTEROL SULFATE 90 UG/1
2 AEROSOL, METERED RESPIRATORY (INHALATION)
Status: DISCONTINUED | OUTPATIENT
Start: 2021-11-18 | End: 2021-11-18 | Stop reason: HOSPADM

## 2021-11-18 RX ORDER — ASPIRIN 81 MG/1
81 TABLET ORAL EVERY 12 HOURS
Qty: 60 TABLET | Refills: 0 | Status: SHIPPED | OUTPATIENT
Start: 2021-11-18 | End: 2021-12-18

## 2021-11-18 RX ORDER — HYDROMORPHONE HYDROCHLORIDE 1 MG/ML
1 INJECTION, SOLUTION INTRAMUSCULAR; INTRAVENOUS; SUBCUTANEOUS
Status: DISCONTINUED | OUTPATIENT
Start: 2021-11-18 | End: 2021-11-18 | Stop reason: HOSPADM

## 2021-11-18 RX ORDER — ROPIVACAINE HYDROCHLORIDE 2 MG/ML
INJECTION, SOLUTION EPIDURAL; INFILTRATION; PERINEURAL
Status: COMPLETED | OUTPATIENT
Start: 2021-11-18 | End: 2021-11-18

## 2021-11-18 RX ORDER — SODIUM CHLORIDE 9 MG/ML
100 INJECTION, SOLUTION INTRAVENOUS CONTINUOUS
Status: DISCONTINUED | OUTPATIENT
Start: 2021-11-18 | End: 2021-11-18 | Stop reason: HOSPADM

## 2021-11-18 RX ORDER — ACETAMINOPHEN 500 MG
1000 TABLET ORAL ONCE
Status: DISCONTINUED | OUTPATIENT
Start: 2021-11-18 | End: 2021-11-18 | Stop reason: HOSPADM

## 2021-11-18 RX ORDER — PROMETHAZINE HYDROCHLORIDE 25 MG/1
25 TABLET ORAL
Qty: 40 TABLET | Refills: 0 | Status: SHIPPED | OUTPATIENT
Start: 2021-11-18 | End: 2022-01-12 | Stop reason: ALTCHOICE

## 2021-11-18 RX ORDER — METFORMIN HYDROCHLORIDE 500 MG/1
500 TABLET ORAL 2 TIMES DAILY WITH MEALS
Status: DISCONTINUED | OUTPATIENT
Start: 2021-11-18 | End: 2021-11-18 | Stop reason: HOSPADM

## 2021-11-18 RX ORDER — LATANOPROST 50 UG/ML
1 SOLUTION/ DROPS OPHTHALMIC EVERY EVENING
Status: DISCONTINUED | OUTPATIENT
Start: 2021-11-18 | End: 2021-11-18 | Stop reason: HOSPADM

## 2021-11-18 RX ORDER — LISINOPRIL 5 MG/1
5 TABLET ORAL 2 TIMES DAILY
Status: DISCONTINUED | OUTPATIENT
Start: 2021-11-18 | End: 2021-11-18 | Stop reason: HOSPADM

## 2021-11-18 RX ORDER — AMOXICILLIN 250 MG
2 CAPSULE ORAL DAILY
Status: DISCONTINUED | OUTPATIENT
Start: 2021-11-19 | End: 2021-11-18 | Stop reason: HOSPADM

## 2021-11-18 RX ORDER — ACETAMINOPHEN 650 MG/1
650 SUPPOSITORY RECTAL ONCE
Status: DISCONTINUED | OUTPATIENT
Start: 2021-11-18 | End: 2021-11-18

## 2021-11-18 RX ORDER — SODIUM CHLORIDE 0.9 % (FLUSH) 0.9 %
5-40 SYRINGE (ML) INJECTION AS NEEDED
Status: DISCONTINUED | OUTPATIENT
Start: 2021-11-18 | End: 2021-11-18 | Stop reason: HOSPADM

## 2021-11-18 RX ORDER — PROPOFOL 10 MG/ML
INJECTION, EMULSION INTRAVENOUS
Status: DISCONTINUED | OUTPATIENT
Start: 2021-11-18 | End: 2021-11-18 | Stop reason: HOSPADM

## 2021-11-18 RX ORDER — SODIUM CHLORIDE, SODIUM LACTATE, POTASSIUM CHLORIDE, CALCIUM CHLORIDE 600; 310; 30; 20 MG/100ML; MG/100ML; MG/100ML; MG/100ML
100 INJECTION, SOLUTION INTRAVENOUS CONTINUOUS
Status: ACTIVE | OUTPATIENT
Start: 2021-11-18 | End: 2021-11-18

## 2021-11-18 RX ORDER — DEXAMETHASONE SODIUM PHOSPHATE 4 MG/ML
INJECTION, SOLUTION INTRA-ARTICULAR; INTRALESIONAL; INTRAMUSCULAR; INTRAVENOUS; SOFT TISSUE AS NEEDED
Status: DISCONTINUED | OUTPATIENT
Start: 2021-11-18 | End: 2021-11-18 | Stop reason: HOSPADM

## 2021-11-18 RX ORDER — NITROGLYCERIN 0.4 MG/1
0.4 TABLET SUBLINGUAL
Status: DISCONTINUED | OUTPATIENT
Start: 2021-11-18 | End: 2021-11-18

## 2021-11-18 RX ORDER — ACETAMINOPHEN 500 MG
1000 TABLET ORAL EVERY 6 HOURS
Qty: 56 TABLET | Refills: 0 | Status: SHIPPED | OUTPATIENT
Start: 2021-11-19 | End: 2021-11-26

## 2021-11-18 RX ORDER — PROPOFOL 10 MG/ML
INJECTION, EMULSION INTRAVENOUS AS NEEDED
Status: DISCONTINUED | OUTPATIENT
Start: 2021-11-18 | End: 2021-11-18 | Stop reason: HOSPADM

## 2021-11-18 RX ORDER — ACETAMINOPHEN 500 MG
1000 TABLET ORAL EVERY 6 HOURS
Status: DISCONTINUED | OUTPATIENT
Start: 2021-11-18 | End: 2021-11-18 | Stop reason: HOSPADM

## 2021-11-18 RX ORDER — CELECOXIB 200 MG/1
200 CAPSULE ORAL EVERY 12 HOURS
Status: DISCONTINUED | OUTPATIENT
Start: 2021-11-18 | End: 2021-11-18 | Stop reason: HOSPADM

## 2021-11-18 RX ORDER — KETOROLAC TROMETHAMINE 30 MG/ML
INJECTION, SOLUTION INTRAMUSCULAR; INTRAVENOUS AS NEEDED
Status: DISCONTINUED | OUTPATIENT
Start: 2021-11-18 | End: 2021-11-18 | Stop reason: HOSPADM

## 2021-11-18 RX ORDER — OXYCODONE HYDROCHLORIDE 5 MG/1
5-10 TABLET ORAL
Status: DISCONTINUED | OUTPATIENT
Start: 2021-11-18 | End: 2021-11-18 | Stop reason: HOSPADM

## 2021-11-18 RX ORDER — DIPHENHYDRAMINE HCL 25 MG
25 CAPSULE ORAL
Status: DISCONTINUED | OUTPATIENT
Start: 2021-11-18 | End: 2021-11-18 | Stop reason: HOSPADM

## 2021-11-18 RX ORDER — ONDANSETRON 8 MG/1
8 TABLET, ORALLY DISINTEGRATING ORAL
Status: DISCONTINUED | OUTPATIENT
Start: 2021-11-18 | End: 2021-11-18 | Stop reason: HOSPADM

## 2021-11-18 RX ORDER — CEFAZOLIN SODIUM/WATER 2 G/20 ML
2 SYRINGE (ML) INTRAVENOUS EVERY 8 HOURS
Status: DISCONTINUED | OUTPATIENT
Start: 2021-11-18 | End: 2021-11-18 | Stop reason: HOSPADM

## 2021-11-18 RX ORDER — CELECOXIB 200 MG/1
200 CAPSULE ORAL ONCE
Status: COMPLETED | OUTPATIENT
Start: 2021-11-18 | End: 2021-11-18

## 2021-11-18 RX ORDER — ACETAMINOPHEN 325 MG/1
975 TABLET ORAL ONCE
Status: DISCONTINUED | OUTPATIENT
Start: 2021-11-18 | End: 2021-11-18

## 2021-11-18 RX ORDER — METHOCARBAMOL 750 MG/1
750 TABLET, FILM COATED ORAL
Qty: 40 TABLET | Refills: 0 | Status: SHIPPED | OUTPATIENT
Start: 2021-11-18

## 2021-11-18 RX ORDER — ONDANSETRON 2 MG/ML
INJECTION INTRAMUSCULAR; INTRAVENOUS AS NEEDED
Status: DISCONTINUED | OUTPATIENT
Start: 2021-11-18 | End: 2021-11-18 | Stop reason: HOSPADM

## 2021-11-18 RX ORDER — CELECOXIB 200 MG/1
200 CAPSULE ORAL DAILY
Qty: 30 CAPSULE | Refills: 0 | Status: SHIPPED | OUTPATIENT
Start: 2021-11-18 | End: 2021-12-18

## 2021-11-18 RX ORDER — ROPIVACAINE HYDROCHLORIDE 2 MG/ML
INJECTION, SOLUTION EPIDURAL; INFILTRATION; PERINEURAL AS NEEDED
Status: DISCONTINUED | OUTPATIENT
Start: 2021-11-18 | End: 2021-11-18 | Stop reason: HOSPADM

## 2021-11-18 RX ORDER — ASPIRIN 81 MG/1
81 TABLET ORAL EVERY 12 HOURS
Status: DISCONTINUED | OUTPATIENT
Start: 2021-11-18 | End: 2021-11-18 | Stop reason: HOSPADM

## 2021-11-18 RX ADMIN — PROPOFOL 20 MG: 10 INJECTION, EMULSION INTRAVENOUS at 09:14

## 2021-11-18 RX ADMIN — PHENYLEPHRINE HYDROCHLORIDE 100 MCG: 10 INJECTION INTRAVENOUS at 09:57

## 2021-11-18 RX ADMIN — TRANEXAMIC ACID 1 G: 100 INJECTION, SOLUTION INTRAVENOUS at 10:19

## 2021-11-18 RX ADMIN — MIDAZOLAM 2 MG: 1 INJECTION INTRAMUSCULAR; INTRAVENOUS at 09:37

## 2021-11-18 RX ADMIN — ROPIVACAINE HYDROCHLORIDE 40 MG: 2 INJECTION, SOLUTION EPIDURAL; INFILTRATION at 08:31

## 2021-11-18 RX ADMIN — MEPIVACAINE HYDROCHLORIDE 60 MG: 15 INJECTION, SOLUTION EPIDURAL; INFILTRATION at 09:12

## 2021-11-18 RX ADMIN — PHENYLEPHRINE HYDROCHLORIDE 100 MCG: 10 INJECTION INTRAVENOUS at 09:44

## 2021-11-18 RX ADMIN — DEXAMETHASONE SODIUM PHOSPHATE 4 MG: 4 INJECTION, SOLUTION INTRAMUSCULAR; INTRAVENOUS at 08:31

## 2021-11-18 RX ADMIN — OXYCODONE 10 MG: 5 TABLET ORAL at 16:45

## 2021-11-18 RX ADMIN — PHENYLEPHRINE HYDROCHLORIDE 50 MCG: 10 INJECTION INTRAVENOUS at 09:22

## 2021-11-18 RX ADMIN — OXYCODONE 10 MG: 5 TABLET ORAL at 12:05

## 2021-11-18 RX ADMIN — MIDAZOLAM 2 MG: 1 INJECTION INTRAMUSCULAR; INTRAVENOUS at 08:29

## 2021-11-18 RX ADMIN — SODIUM CHLORIDE, SODIUM LACTATE, POTASSIUM CHLORIDE, AND CALCIUM CHLORIDE: 600; 310; 30; 20 INJECTION, SOLUTION INTRAVENOUS at 09:39

## 2021-11-18 RX ADMIN — DEXAMETHASONE SODIUM PHOSPHATE 8 MG: 4 INJECTION, SOLUTION INTRA-ARTICULAR; INTRALESIONAL; INTRAMUSCULAR; INTRAVENOUS; SOFT TISSUE at 09:14

## 2021-11-18 RX ADMIN — TRANEXAMIC ACID 1 G: 100 INJECTION, SOLUTION INTRAVENOUS at 09:23

## 2021-11-18 RX ADMIN — CEFAZOLIN 2 G: 1 INJECTION, POWDER, FOR SOLUTION INTRAVENOUS at 08:58

## 2021-11-18 RX ADMIN — CELECOXIB 200 MG: 200 CAPSULE ORAL at 07:19

## 2021-11-18 RX ADMIN — Medication 3 AMPULE: at 07:19

## 2021-11-18 RX ADMIN — Medication 7 MG: at 09:56

## 2021-11-18 RX ADMIN — PHENYLEPHRINE HYDROCHLORIDE 50 MCG: 10 INJECTION INTRAVENOUS at 09:49

## 2021-11-18 RX ADMIN — PHENYLEPHRINE HYDROCHLORIDE 100 MCG: 10 INJECTION INTRAVENOUS at 10:14

## 2021-11-18 RX ADMIN — PHENYLEPHRINE HYDROCHLORIDE 100 MCG: 10 INJECTION INTRAVENOUS at 10:06

## 2021-11-18 RX ADMIN — ONDANSETRON 4 MG: 2 INJECTION INTRAMUSCULAR; INTRAVENOUS at 09:14

## 2021-11-18 RX ADMIN — PHENYLEPHRINE HYDROCHLORIDE 100 MCG: 10 INJECTION INTRAVENOUS at 09:33

## 2021-11-18 RX ADMIN — FENTANYL CITRATE 100 MCG: 50 INJECTION INTRAMUSCULAR; INTRAVENOUS at 08:29

## 2021-11-18 RX ADMIN — PROPOFOL 75 MCG/KG/MIN: 10 INJECTION, EMULSION INTRAVENOUS at 09:14

## 2021-11-18 RX ADMIN — PHENYLEPHRINE HYDROCHLORIDE 50 MCG: 10 INJECTION INTRAVENOUS at 09:25

## 2021-11-18 RX ADMIN — SODIUM CHLORIDE, SODIUM LACTATE, POTASSIUM CHLORIDE, AND CALCIUM CHLORIDE 100 ML/HR: 600; 310; 30; 20 INJECTION, SOLUTION INTRAVENOUS at 07:00

## 2021-11-18 NOTE — INTERVAL H&P NOTE
Update History & Physical    The Patient's History and Physical of November 12, 21 was reviewed with the patient and I examined the patient. There was no change. The surgical site was confirmed by the patient and me. Plan:  The risk, benefits, expected outcome, and alternative to the recommended procedure have been discussed with the patient. Patient understands and wants to proceed with the procedure.     Electronically signed by GRIS Hanna on 11/18/2021 at 8:36 AM

## 2021-11-18 NOTE — OP NOTES
[address] [facility]  CementlessTotal Knee Arthroplasty  Patient:Ravindra Batista   : 1950  Medical Record [id number]  Pre-operative Diagnosis:  Primary osteoarthritis of left knee [M17.12]  Post-operative Diagnosis: Primary osteoarthritis of left knee     Surgeon: Roverto Diaz MD  Assistant: Zaida Kc PA-C    Anesthesia: Spinal    Procedure: Cementless Total Knee Arthroplasty   The complexity of the total joint surgery requires the use of a first assistant for positioning, retraction and assistance in closure. The patient's BMI is 34.15, BMI's greater then 40 make surgical exposure and retraction extremely difficult and increase operative time. Tourniquet Time: none  EBL: 150cc  Additional Findings: Severe DJD/ Pre-op ROM / Post-op  0-125  Releases none    Boni Fulling was brought to the operating room and positioned on the operating table. He was anethestized  IV antibiotics were administered per CMS protocol. Prior to the incision being made a timeout was called identifying the patient, procedure ,operative side and surgeon. The left leg was prepped and draped in the usual sterile manner  An anterior longitudinal incision was accomplished just medial to the tibial tubercle and extending approximal 6 centimeters proximal to the superior pole of the patella. A medial parapatellar capsular incision was performed. The medial capsular flap was elevated around to the insertion of the semimembranous tendon. The patella was everted and the knee flexed and externally rotated. The medial and external menisci were excised. The lateral half of the fat pad excised and the patella femoral ligament was released. The anterior cruciate ligament was resected and the posterior cruciate ligament was substituted. Using extramedullary instrumentation, the tibial cut was accomplished with appropriate posterior slope.   Approxiamately 2 mm of bone was removed from the low side of the tibia. The distal femur was next addressed. A drill hole was made above the intracondylar notch. Using appropriate intramedullary instrumentation,a 5 degree valgus distal cut was accomplished. A femur was sized. The anterior and posterior cuts were then made about the distal femur. The osteophytes were removed from the tibial and femoral surfaces. The flexion and extension gaps were assessed with the appropriate spacer blocks. Additional surgical procedures included none. The flexion and extension gaps were deemed appropriately balanced. The appropriate cutting blocks were then utilized to perform the anterior chamfer, posterior chamfer and notch cuts, with appropriate lateral tranlation accomplished for the patellofemoral groove. The tibia was sized. The tibial base plate was pinned into place with the appropriate external rotation and stem site prepared. A preliminary range of motion was accomplished with the above size trial components. A polyethylene insert allowed the patient to obtain full extension as well as appropriate flexion. The patient's ligaments were stable in flexion and extension to medial and lateral stressing and the alignment was through the appropriate mechanical axis. The patella was then everted. Given acceptable condition, the patella was left unresurfaced following patelloplasty. All trial components were removed and the implants were impacted into position with good fixation. The Irrasept lavage protocol was  performed. Jann Wallis knee was placed through range of motion and noted to be stable as mentioned above with the trial components. The wound was dry, therefore no drain was used. The operative knee was injected with 60cc of Naropin, 10 cc's of morphine and 1 cc of 30mg of Toradol.   The capsular layer was closed using a #1 vicryl suture, while subcutaneous layers were closed using 2-0 Vicryl interrupted sutures and a #1 Stratofix. Finally the skin was closed using 3-0 Vicryl and a Zipline closure. A sterile sterile bandage was applied. An Iceman cryo pad was applied on the operative leg. Sponge count and needle counts were correct. Anastacia Fajardo left the operating room     Implants:   Implant Name Type Inv.  Item Serial No.  Lot No. LRB No. Used Action   ATTUNE RP TIB BASE SZ 8 POR - [id number]  ATTUNE RP TIB BASE SZ 8 POR  [facility] [employer] [id number] Left 1 Implanted   COMPONENT FEM PS 7 LT KNEE POROUS ATTUNE - [id number]  COMPONENT FEM PS 7 LT KNEE POROUS ATTUNE  [facility] [employer] [id number] Left 1 Implanted   INSERT TIB SZ 7 THK6MM KNEE POST STBL ROT PLATFRM ATTUNE - [id number]  INSERT TIB SZ 7 THK6MM KNEE POST STBL ROT PLATFRM ATTUNE  [facility] [employer] [id number] Left 1 Implanted     Signed By: Param Mitchell MD

## 2021-11-18 NOTE — DISCHARGE SUMMARY
[address] [place] [address]  Total Joint Discharge Summary      Patient ID:  Saranya Ontiveros  [id number]  24 y.o.  1950    Admit date: 11/18/2021  Discharge date and time: 11-18-21  Admitting Physician: Emerson Hooper MD  Surgeon: Same  Admission Diagnoses: Primary osteoarthritis of left knee [M17.12]  Discharge Diagnoses: Active Problems:    * No active hospital problems. *                              Perioperative Antibiotics: Ancef 1 to 3 g was given depending on patient's weight. If allergic to Ancef or due to other indications, patient was given Vancomycin/Gent per protocol      Hospital Medications given:   [unfilled]  [unfilled]  [unfilled]    Discharge Medications given:  Current Discharge Medication List      START taking these medications    Details   acetaminophen (TYLENOL) 500 mg tablet Take 2 Tablets by mouth every six (6) hours for 7 days. Qty: 56 Tablet, Refills: 0      celecoxib (CELEBREX) 200 mg capsule Take 1 Capsule by mouth daily for 30 days. Qty: 30 Capsule, Refills: 0      methocarbamoL (ROBAXIN) 750 mg tablet Take 1 Tablet by mouth four (4) times daily as needed for Muscle Spasm(s). Qty: 40 Tablet, Refills: 0      oxyCODONE IR (ROXICODONE) 5 mg immediate release tablet Take 1-2 Tablets by mouth every four (4) hours as needed for Pain for up to 7 days. Max Daily Amount: 60 mg.  Qty: 60 Tablet, Refills: 0    Associated Diagnoses: Total knee replacement status, left      promethazine (PHENERGAN) 25 mg tablet Take 1 Tablet by mouth every six (6) hours as needed for Nausea. Qty: 40 Tablet, Refills: 0         CONTINUE these medications which have CHANGED    Details   aspirin delayed-release 81 mg tablet Take 1 Tablet by mouth every twelve (12) hours every twelve (12) hours for 30 days. Qty: 60 Tablet, Refills: 0         CONTINUE these medications which have NOT CHANGED    Details   bimatoprost (Lumigan) 0.01 % ophthalmic drops Administer 1 Drop to both eyes every evening. tiotropium-olodateroL (Stiolto Respimat) 2.5-2.5 mcg/actuation inhaler Take 2 Puffs by inhalation daily. Qty: 3 Inhaler, Refills: 3    Associated Diagnoses: Chronic obstructive pulmonary disease, unspecified COPD type (HCC)      metFORMIN (GLUCOPHAGE) 500 mg tablet TAKE 1 TABLET BY MOUTH  TWICE DAILY WITH MEALS  Qty: 180 Tablet, Refills: 1    Comments: Requesting 1 year supply  Associated Diagnoses: Type 2 diabetes with nephropathy (HCC)      enalapril (VASOTEC) 5 mg tablet TAKE 1 TABLET BY MOUTH  TWICE DAILY  Qty: 180 Tablet, Refills: 3    Comments: Requesting 1 year supply      atorvastatin (LIPITOR) 40 mg tablet TAKE 1 TABLET BY MOUTH  DAILY  Qty: 90 Tablet, Refills: 3    Comments: Requesting 1 year supply  Associated Diagnoses: Mixed hyperlipidemia      cpap machine kit by Does Not Apply route. nitroglycerin (NITROSTAT) 0.4 mg SL tablet 1 Tab by SubLINGual route every five (5) minutes as needed for Chest Pain. Qty: 1 Bottle, Refills: 3      albuterol (PROVENTIL HFA, VENTOLIN HFA, PROAIR HFA) 90 mcg/actuation inhaler Take  by inhalation. STOP taking these medications       naproxen (NAPROSYN) 500 mg tablet Comments:   Reason for Stopping:                Additional DVT Prophylaxis:  DYLAN Hose,Plexi-Pulse    Postoperative transfusions:   none  Post Op complications: none    Hemoglobin at discharge:   Lab Results   Component Value Date/Time    HGB 14.3 11/11/2021 02:23 PM       Wound appears to be healing without any evidence of infection. Physical Therapy started on the day following surgery and progressed to independent ambulation with the aid of a walker. At the time of discharge, able to go up and down stairs and had understanding of precautions needed following surgery.       PT/OT:                             Discharged to: home    Discharge instructions:  -Rx pain medication given  - Anticoagulate with: Ecotrin 81 mg PO BID x 4 weeks  -Resume pre hospital diet             -Resume home medications per medical continuation form     -Ambulate with walker, appropriate total joint protocol  -Follow up in office as scheduled       Signed:  GRIS Beard  11/18/2021  12:20 PM

## 2021-11-18 NOTE — ANESTHESIA PREPROCEDURE EVALUATION
Relevant Problems   RESPIRATORY SYSTEM   (+) Chronic obstructive pulmonary disease (HCC)      CARDIOVASCULAR   (+) HTN (hypertension), benign      ENDOCRINE   (+) Severe obesity (HCC)   (+) Type 2 diabetes with nephropathy (HCC)       Anesthetic History   No history of anesthetic complications            Review of Systems / Medical History  Pertinent labs reviewed    Pulmonary    COPD: moderate    Sleep apnea: CPAP  Smoker (former)         Neuro/Psych   Within defined limits           Cardiovascular    Hypertension  Valvular problems/murmurs (mild AI and AS 10/20.): aortic stenosis and aortic insufficiency        CAD, PAD and cardiac stents (1995)    Exercise tolerance: <4 METS: Ambulates unassisted. Comments: Preserved EF echo 10/20.    GI/Hepatic/Renal  Within defined limits              Endo/Other    Diabetes (prediabetic): type 2    Obesity and arthritis     Other Findings   Comments: Glaucoma           Physical Exam    Airway  Mallampati: II  TM Distance: 4 - 6 cm  Neck ROM: normal range of motion   Mouth opening: Normal     Cardiovascular    Rhythm: regular  Rate: normal    Murmur: Grade 2     Dental    Dentition: Full upper dentures     Pulmonary  Breath sounds clear to auscultation               Abdominal  GI exam deferred       Other Findings            Anesthetic Plan    ASA: 3  Anesthesia type: spinal      Post-op pain plan if not by surgeon: peripheral nerve block single    Induction: Intravenous  Anesthetic plan and risks discussed with: Patient

## 2021-11-18 NOTE — PROGRESS NOTES
Had therapy. Medicated with oxycodone 10 mg. Has been dressed and in recliner. O2 sat in 80s per RT and instructed pt to use IS. Pt ambulated on room air in bullock, sat 87% to 92%. Wants to go home. Has CPAP at home and sat monitor. [facility]. Home therapy to see pt. Prescriptions were sent to pharmacy. I have reviewed discharge instructions with the patient and spouse. The patient and spouse verbalized understanding.

## 2021-11-18 NOTE — PROGRESS NOTES
Care Management Interventions  PCP Verified by CM: Yes  Transition of Care Consult (CM Consult): [address]: Yes  Support Systems: Spouse/Significant Other  Discharge Location  Discharge Placement: Home with home health ([address] [place] [address])    Patient is a 66y.o. year old male admitted for Left TKA . Patient plans to return home on discharge. Order received to arrange home health. Patient without preference towards agency. [facility]. Patient needs a bedside commodePatient requesting we arrange a bedside commode. Referral sent to [address] [place] [address] Str. delivered to the hospital room prior to discharge. Will follow until discharge.      SILAS Neves

## 2021-11-18 NOTE — PROGRESS NOTES
Problem: Self Care Deficits Care Plan (Adult)  Goal: *Acute Goals and Plan of Care (Insert Text)  Outcome: Resolved/Met  Note:   Pt will be able to perform self care with stand by to minimal assistance and ambulate short distances with family that is capable of assisting patient. JOINT CAMP OCCUPATIONAL THERAPY TKA: Initial Assessment, Daily Note, Discharge, Treatment Day: Day of Assessment, and PM 11/18/2021  OUTPATIENT SURGERY: Hospital Day: 1  Payor: [employer] / Plan: [address] / Product Type: Managed Care Medicare /      NAME/AGE/GENDER: Carrol Schultz is a 66 y.o. male   PRIMARY DIAGNOSIS:  Primary osteoarthritis of left knee [M17.12]   Procedure(s) and Anesthesia Type:     * LEFT KNEE ARTHROPLASTY TOTAL/**SDD** - Spinal (Left)  ICD-10: Treatment Diagnosis:    Pain in Left Knee (M25.562)  Stiffness of Left Knee, Not elsewhere classified (D81.677)      ASSESSMENT:     Mr. Leidy Gonzales is s/p left TKA and presents with decreased weight bearing on left LE and decreased independence with functional mobility and activities of daily living as compared to baseline level of function and safety. Patient would benefit from skilled Occupational Therapy to maximize independence and safety with self-care task and functional mobility. Pt would also benefit from education on adaptive equipment and safety precautions in preparation for going home. Patient completed toileting and dressing as charted below in ADL grid and is ambulating with rolling walker and stand by assist.  Patient plans to return home with good family support. Family able to provide patient with appropriate level of assistance at this time. OT reviewed safety precautions throughout session and therapy schedule for the remainder of today. Patient instructed to call for assistance when needing to get up from recliner and all needs in reach. Patient verbalized understanding of call light.        This section established at most recent assessment   PROBLEM LIST (Impairments causing functional limitations):  Decreased Strength  Decreased ADL/Functional Activities  Decreased Transfer Abilities  Increased Pain  Increased Fatigue  Decreased Flexibility/Joint Mobility  Decreased Knowledge of Precautions   INTERVENTIONS PLANNED: (Benefits and precautions of occupational therapy have been discussed with the patient.)  Activities of daily living training  Adaptive equipment training  Balance training  Clothing management  Donning&doffing training  Theraputic activity     TREATMENT PLAN: Frequency/Duration: Follow patient 1 time to address above goals. Rehabilitation Potential For Stated Goals: Good     RECOMMENDED REHABILITATION/EQUIPMENT: (at time of discharge pending progress): Continue Skilled Therapy. OCCUPATIONAL PROFILE AND HISTORY:   History of Present Injury/Illness (Reason for Referral): Pt presents this date s/p (left) TKA. Past Medical History/Comorbidities:    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  [facility]  has a past surgical history that includes hx prostatectomy; hx hernia repair (1985); hx wisdom teeth extraction; hx coronary stent placement (1995); and hx orthopaedic (Right, 09/2021).   Social History/Living Environment:   Home Environment: Private residence  # Steps to Enter: 3  Rails to Enter: No  One/Two Story Residence: One story  Living Alone: No  Support Systems: Spouse/Significant Other  Patient Expects to be Discharged to[de-identified] House  Current DME Used/Available at Home: Winsome Poser; Cane, straight; Shower chair (borrowing walker)  Tub or Shower Type: Shower    Prior Level of Function/Work/Activity:  Independent with all activities of daily living to include driving. Number of Personal Factors/Comorbidities that affect the Plan of Care: Brief history (0):  LOW COMPLEXITY   ASSESSMENT OF OCCUPATIONAL PERFORMANCE[de-identified]   Most Recent Physical Functioning:   Balance  Sitting: Intact  Standing: With support       Gross Assessment: Yes  Gross Assessment  AROM: Generally decreased, functional (right LE)  Strength: Generally decreased, functional (right LE)          LLE AROM  L Knee Flexion: 75  L Knee Extension: 5 Coordination  Fine Motor Skills-Upper: Left Intact; Right Intact  Gross Motor Skills-Upper: Left Intact; Right Intact         Mental Status  Neurologic State: Alert  Orientation Level: Oriented X4  Cognition: Follows commands                Basic ADLs (From Assessment) Complex ADLs (From Assessment)   Basic ADL  Feeding: Independent  Oral Facial Hygiene/Grooming: Supervision  Bathing:  Moderate assistance  Upper Body Dressing: Supervision  Lower Body Dressing: Stand-by assistance  Toileting: Stand by assistance     Grooming/Bathing/Dressing Activities of Daily Living                       Functional Transfers  Bathroom Mobility: Stand-by assistance  Toilet Transfer : Stand-by assistance  Shower Transfer: Stand-by assistance     Bed/Mat Mobility  Supine to Sit: Stand-by assistance  Sit to Stand: Stand-by assistance  Stand to Sit: Stand-by assistance  Bed to Chair: Stand-by assistance         Physical Skills Involved:  Range of Motion  Balance  Pain (acute) Cognitive Skills Affected (resulting in the inability to perform in a timely and safe manner):  none Psychosocial Skills Affected:  Environmental Adaptation   Number of elements that affect the Plan of Care: 3-5:  MODERATE COMPLEXITY   CLINICAL DECISION MAKING:   [employer] AM-PAC 6 Clicks   Daily Activity Inpatient Short Form  How much help from another person does the patient currently need. .. Total A Lot A Little None   1. Putting on and taking off regular lower body clothing? [] 1   [] 2   [] 3   [x] 4   2. Bathing (including washing, rinsing, drying)? [] 1   [] 2   [] 3   [x] 4   3. Toileting, which includes using toilet, bedpan or urinal?   [] 1   [] 2   [] 3   [x] 4   4. Putting on and taking off regular upper body clothing? [] 1   [] 2   [] 3   [x] 4   5. Taking care of personal grooming such as brushing teeth? [] 1   [] 2   [] 3   [x] 4   6. Eating meals? [] 1   [] 2   [] 3   [x] 4   © 2007, [address] [address], under license to [employer]. All rights reserved     Score:  Initial:24 Most Recent: X (Date: -- )    Interpretation of Tool:  Represents activities that are increasingly more difficult (i.e. Bed mobility, Transfers, Gait). Use of outcome tool(s) and clinical judgement create a POC that gives a: LOW COMPLEXITY            TREATMENT:   (In addition to Assessment/Re-Assessment sessions the following treatments were rendered)     Pre-treatment Symptoms/Complaints:  no complaint of pain  Pain: Initial:     0 Post Session:  0     Self Care: (25 min): Procedure(s) (per grid) utilized to improve and/or restore self-care/home management as related to dressing, toileting, grooming, and functional mobility . Required minimal verbal cueing to facilitate activities of daily living skills and compensatory activities. Assessment    Treatment/Session Assessment:     Response to Treatment:  pt up in room, bathroom, bullock and gym tolerated well.     Education:  [] Home Exercises  [x] Fall Precautions  [] Hip Precautions [] Going Home Video  [x] Knee/Hip Prosthesis Review  [x] Walker Management/Safety [x] Adaptive Equipment as Needed       Interdisciplinary Collaboration:   Physical Therapist  Occupational Therapist  Registered Nurse    After treatment position/precautions:   Up in chair  Bed/Chair-wheels locked  Call light within reach  RN notified  Family at bedside     Compliance with Program/Exercises: Compliant all of the time. Recommendations/Intent for next treatment session:  Pt doing well all goals met and will do well at home with support from wife. Patient will be discharged home with home health PT. No further Occupational Therapy warranted, will discharge Occupational Therapy services.        Total Treatment Duration:  OT Patient Time In/Time Out  Time In: 1420  Time Out: [address], OT

## 2021-11-18 NOTE — PROGRESS NOTES
11/18/21 1604   Oxygen Therapy   O2 Sat (%) 93 %   Pulse via Oximetry 96 beats per minute   O2 Device None (Room air)   O2 Flow Rate (L/min) 0 l/min   Incentive Spirometry Treatment   Actual Volume (ml) 2000 ml

## 2021-11-18 NOTE — ANESTHESIA PROCEDURE NOTES
Spinal Block    Start time: 11/18/2021 9:04 AM  End time: 11/18/2021 9:12 AM  Performed by: Ericka Hopkins MD  Authorized by: Ericka Hopkins MD     Pre-procedure:   Indications: primary anesthetic  Preanesthetic Checklist: patient identified, risks and benefits discussed, anesthesia consent, site marked, patient being monitored and timeout performed    Timeout Time: 09:02 EST          Spinal Block:   Patient Position:  Seated  Prep Region:  Lumbar  Prep: chlorhexidine and patient draped      Location:  L3-4          Needle:   Needle Type:  Quincke  Needle Gauge:  25 G  Attempts:  1      Events: CSF confirmed, no blood with aspiration and no paresthesia        Assessment:  Insertion:  Uncomplicated  Patient tolerance:  Patient tolerated the procedure well with no immediate complications

## 2021-11-18 NOTE — PROGRESS NOTES
Problem: Mobility Impaired (Adult and Pediatric)  Goal: *Acute Goals and Plan of Care (Insert Text)  Outcome: Progressing Towards Goal  Note: GOALS (1-4 days):  (1.)Mr. Danni Rios will move from supine to sit and sit to supine  in bed with STAND BY ASSIST.  (2.)Mr. Danni Rios will transfer from bed to chair and chair to bed with STAND BY ASSIST using the least restrictive device. (3.)Mr. Danni Rios will ambulate with STAND BY ASSIST for 200 feet with the least restrictive device. (4.)Mr. Danni Rios will ambulate up/down 3 steps with left railing with MINIMAL ASSIST with cane. (5.)Mr. Danni Rios will increase left knee ROM to 0°-90°. Goals met except 5  ________________________________________________________________________________________________       PHYSICAL THERAPY JOINT CAMP TKA: Initial Assessment and PM 11/18/2021  OUTPATIENT SURGERY: Hospital Day: 1  Payor: Ruslan Garza / Plan: [address] / Product Type: [employer] Medicare /      NAME/AGE/GENDER: Patience Garcia is a 66 y.o. male   PRIMARY DIAGNOSIS:  Primary osteoarthritis of left knee [M17.12]   Procedure(s) and Anesthesia Type:     * LEFT KNEE ARTHROPLASTY TOTAL/**SDD** - Spinal (Left)  ICD-10: Treatment Diagnosis:    Pain in Left Knee (M25.562)  Stiffness of Left Knee, Not elsewhere classified (M25.662)  Difficulty in walking, Not elsewhere classified (R26.2)      ASSESSMENT:     Mr. Danni Rios presents with decreased strength and range of motion left lower extremity and with decreased independence with functional mobility s/p left TKA. Pt will benefit from skilled PT interventions to maximize independence with functional mobility and TKA management. Pt did well with assessment and exercises and gait and steps. He stood at toilet to urinate. Pt plans to discharge to home today with continued therapy for follow up. Pt. Christopher Tenorio to go home today, wife present.    Reviewed safety and importance of rom and swelling control and extension positioning and that he will have more pain and swelling at home as the block wears off. He did well with mobility. They had no questions or concerns. This section established at most recent assessment   PROBLEM LIST (Impairments causing functional limitations):  Decreased Strength  Decreased ADL/Functional Activities  Decreased Transfer Abilities  Decreased Ambulation Ability/Technique  Decreased Flexibility/Joint Mobility  Edema/Girth  Decreased [place] with Home Exercise Program   INTERVENTIONS PLANNED: (Benefits and precautions of physical therapy have been discussed with the patient.)  Bed Mobility  Cold  Gait Training  Home Exercise Program (HEP)  Range of Motion (ROM)  Therapeutic Activites  Therapeutic Exercise/Strengthening  Transfer Training     TREATMENT PLAN: Frequency/Duration: Follow patient BID for duration of hospital stay to address above goals. Rehabilitation Potential For Stated Goals: Good     RECOMMENDED REHABILITATION/EQUIPMENT: (at time of discharge pending progress): Continue Skilled Therapy and Home Health: Physical Therapy. HISTORY:   History of Present Injury/Illness (Reason for Referral):  Pt s/p total knee arthroplasty on 11/18  Past Medical History/Comorbidities:   Mr. 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   Mr. Mehreen boyce  has a past surgical history that includes hx prostatectomy; hx hernia repair (1985); hx wisdom teeth extraction; hx coronary stent placement (1995); and hx orthopaedic (Right, 2021). Social History/Living Environment:   Support Systems: Spouse/Significant Other  Prior Level of Function/Work/Activity:  independent   Number of Personal Factors/Comorbidities that affect the Plan of Care: 1-2: MODERATE COMPLEXITY   EXAMINATION:   Most Recent Physical Functioning:   Gross Assessment: Yes  Gross Assessment  AROM: Generally decreased, functional (right LE)  Strength: Generally decreased, functional (right LE)          LLE AROM  L Knee Flexion: 75  L Knee Extension: 5          Bed Mobility  Supine to Sit: Stand-by assistance    Transfers  Sit to Stand: Stand-by assistance  Stand to Sit: Stand-by assistance  Bed to Chair: Stand-by assistance    Balance  Sitting: Intact  Standing: With support    Posture  Posture Assessment: Genu varus right         Weight Bearing Status  Left Side Weight Bearing: As tolerated  Distance (ft): 250 Feet (ft)  Ambulation - Level of Assistance: Stand-by assistance  Assistive Device: Walker, rolling  Speed/Leah: Delayed  Step Length: Left shortened; Right shortened  Stance: Left decreased  Gait Abnormalities: Antalgic; Decreased step clearance  Number of Stairs Trained: 3  Stairs - Level of Assistance: Contact guard assistance; Stand-by assistance  Rail Use: Left  (cane in right)  Interventions: Safety awareness training; Verbal cues     Braces/Orthotics:     Left Knee Cold  Type: Cryocuff      Body Structures Involved:  Joints  Muscles Body Functions Affected: Movement Related Activities and Participation Affected: Mobility  Self Care   Number of elements that affect the Plan of Care: 4+: HIGH COMPLEXITY   CLINICAL PRESENTATION:   Presentation: Stable and uncomplicated: LOW COMPLEXITY   CLINICAL DECISION MAKIN [facility] [address] [address] AM-PAC 6 Clicks   Basic Mobility Inpatient Short Form  How much difficulty does the patient currently have. .. Unable A Lot A Little None   1. Turning over in bed (including adjusting bedclothes, sheets and blankets)?    [] 1   [] 2   [] 3   [x] 4   2. Sitting down on and standing up from a chair with arms ( e.g., wheelchair, bedside commode, etc.)   [] 1   [] 2   [] 3   [x] 4   3. Moving from lying on back to sitting on the side of the bed? [] 1   [] 2   [] 3   [x] 4   How much help from another person does the patient currently need. .. Total A Lot A Little None   4. Moving to and from a bed to a chair (including a wheelchair)? [] 1   [] 2   [] 3   [x] 4   5. Need to walk in hospital room? [] 1   [] 2   [] 3   [x] 4   6. Climbing 3-5 steps with a railing? [] 1   [] 2   [] 3   [x] 4   © 2007, Trustees of [address] [place] [address], under license to [employer]. All rights reserved     Score:  Initial: 24 Most Recent: X (Date: -- )    Interpretation of Tool:  Represents activities that are increasingly more difficult (i.e. Bed mobility, Transfers, Gait). Medical Necessity:     Patient is expected to demonstrate progress in   strength, range of motion, and functional technique   to   decrease assistance required with functional mobility and TKA managment  . Reason for Services/Other Comments:  Patient continues to require skilled intervention due to   Inability to complete functional mobility and TKA management independently  . Use of outcome tool(s) and clinical judgement create a POC that gives a: Clear prediction of patient's progress: LOW COMPLEXITY            TREATMENT:   (In addition to Assessment/Re-Assessment sessions the following treatments were rendered)     Pre-treatment Symptoms/Complaints:  none  Pain Initial:      Post Session:  0   assessment  Gait Training (25 Minutes):  Gait training to improve and/or restore physical functioning as related to mobility, strength, and balance.   Ambulated 250 Feet (ft) with Stand-by assistance using a Walker, rolling and minimal Safety awareness training; Verbal cues related to their stance phase to promote proper body alignment, promote proper body posture, and promote proper body mechanics. Therapeutic Exercise: (15 Minutes):  Exercises per grid below to improve mobility and strength. Required minimal visual, verbal, and manual cues to promote proper body alignment, promote proper body posture, and promote proper body mechanics. Progressed range and repetitions as indicated. Date:  11/18 Date:   Date:     ACTIVITY/EXERCISE AM PM AM PM AM PM     []  []  []  []  []  []   Ankle Pumps  10       Quad Sets  10       Gluteal Sets  10       Hip ABd/ADduction  10       Straight Leg Raises  10       Knee Slides  10       Short Arc Quads  10       Chair Slides                           B = bilateral; AA = active assistive; A = active; P = passive      Treatment/Session Assessment:     Response to Treatment:  did well. Education:  [x] Home Exercises  [x] Fall Precautions  [x] Use of Cold Therapy Unit [x] D/C Instruction Review  [x] Knee Prosthesis Review  [x] Walker Management/Safety [] Adaptive Equipment as Needed  [x] No pillow under knee       Interdisciplinary Collaboration:   Occupational Therapist  Registered Nurse    After treatment position/precautions:   Up in chair  Bed/Chair-wheels locked  Bed in low position  Caregiver at bedside  Call light within reach  Family at bedside    Compliance with Program/Exercises: Compliant most of the time. Recommendations/Intent for next treatment session:  Treatment next visit will focus on increasing Mr. Yonatan Meraz independence with bed mobility, transfers, gait training, strength/ROM exercises, modalities for pain, and patient education.       Total Treatment Duration:  PT Patient Time In/Time Out  Time In: 1415  Time Out: 1 Nely Poon PT

## 2021-11-18 NOTE — PERIOP NOTES
TRANSFER - OUT REPORT:    Verbal report given to [facility] (name) on Dc Conway  being transferred to [facility](unit) for routine post - op       Report consisted of patients Situation, Background, Assessment and   Recommendations(SBAR). Information from the following report(s) SBAR, Kardex, OR Summary, Procedure Summary, Intake/Output and MAR was reviewed with the receiving nurse. Lines:   Peripheral IV 11/18/21 Left; Posterior Wrist (Active)   Site Assessment Clean, dry, & intact 11/18/21 1125   Phlebitis Assessment 0 11/18/21 1125   Infiltration Assessment 0 11/18/21 1125   Dressing Status Clean, dry, & intact 11/18/21 1125   Dressing Type Transparent 11/18/21 1125   Hub Color/Line Status Green 11/18/21 1100        Opportunity for questions and clarification was provided.       Patient transported with:   O2 @ 3 liters

## 2021-11-18 NOTE — DISCHARGE INSTRUCTIONS
[address] [facility]   Patient Discharge Instructions    Dc Conway / [id number] : 1950    Admitted 2021 Discharged: 2021     IF YOU HAVE ANY PROBLEMS ONCE YOU ARE AT HOME CALL THE FOLLOWING NUMBERS:   Main office number: [phone]    Take Home Medications         · It is important that you take the medication exactly as they are prescribed. · Keep your medication in the bottles provided by the pharmacist and keep a list of the medication names, dosages, and times to be taken in your wallet. · Do not take other medications without consulting your doctor. What to do at [address] [place] [address] Ave your prehospital diet. If you have excessive nausea or vomitting call your doctor's office     Home Physical Therapy is arranged. Use rolling walker when walking. Patients who have had a joint replacement should not drive until you are seen for your follow up appointment by Dr. Perry Dominguez. When to Call    - Call if you have a temperature greater then 101  - Unable to keep food down  - Loose control of your bladder or bowel function  - Are unable to bear any weight   - Need a pain medication refill     Patient Education        Total Knee Replacement: What to Expect at  [address] had a total knee replacement. The doctor replaced the worn ends of the bones that connect to your knee (thighbone and lower leg bone) with plastic and metal parts. When you leave the hospital, you should be able to move around with a walker or crutches. But you will need someone to help you at home until you have more energy and can move around better. You will go home with a bandage and stitches, staples, skin glue, or tape strips. Change the bandage as your doctor tells you to. If you have stitches or staples, your doctor will remove them about 2 weeks after your surgery. Glue or tape strips will fall off on their own over time.  You may still have some mild pain, and the area may be swollen for a few months after surgery. Your knee will continue to improve for up to a year. You will probably use a walker for some time after surgery. When you are ready, you can use a cane. You may be able to walk without support after a couple weeks, or when you are comfortable. You will need to do months of physical rehabilitation (rehab) after a knee replacement. Rehab will help you strengthen the muscles of the knee and help you regain movement. After you recover, your artificial knee will allow you to do normal daily activities with less pain or no pain at all. You may be able to hike, dance, or ride a bike. Talk to your doctor about whether you can do more strenuous activities. Always tell your caregivers that you have an artificial knee. How long it will take to walk on your own, return to normal activities, and go back to work depends on your health and how well your rehabilitation (rehab) program goes. The better you do with your rehab exercises, the quicker you will get your strength and movement back. This care sheet gives you a general idea about how long it will take for you to recover. But each person recovers at a different pace. Follow the steps below to get better as quickly as possible. How can you care for yourself at home? Activity    · Rest when you feel tired. You may take a nap, but don't stay in bed all day. When you sit, use a chair with arms. You can use the arms to help you stand up.     · Work with your physical therapist to find the best way to exercise. What you can do as your knee heals will depend on whether your new knee is cemented or uncemented. You may not be able to do certain things for a while if your new knee is uncemented.     · After your knee has healed enough, you can do more strenuous activities with caution. ? You can golf, but you may want to use a golf cart for some time. And don't wear shoes with spikes.   ? You can bike on a flat road or on a stationary bike. Talk to your doctor before biking uphill. ? Your doctor may suggest that you stay away from activities that put stress on your knee. These include tennis, badminton, contact sports like football, jumping (such as in basketball), jogging, and running. ? Avoid activities where you might fall.     · Do not sit for more than 1 hour at a time. Get up and walk around for a while before you sit again. If you must sit for a long time, prop up your leg with a chair or footstool. This will help you avoid swelling.     · Ask your doctor when you can drive again. It may take several weeks after knee replacement surgery before it's safe for you to drive.     · When you get into a car, sit on the edge of the seat. Then pull in your legs, and turn to face the front.     · You should be able to do many everyday activities 3 to 6 weeks after your surgery. You will probably need to take 4 to 16 weeks off from work. When you can go back to work depends on the type of work you do and how you feel.     · Ask your doctor when it is okay for you to have sex.     · For 12 weeks, do not lift anything heavier than 10 pounds and do not lift weights. Diet    · By the time you leave the hospital, you should be eating your normal diet. If your stomach is upset, try bland, low-fat foods like plain rice, broiled chicken, toast, and yogurt. Your doctor may suggest that you take iron and vitamin supplements.     · Drink plenty of fluids (unless your doctor tells you not to).   · Eat healthy foods, and watch your portion sizes. Try to stay at your ideal weight. Too much weight puts more stress on your new knee.     · You may notice that your bowel movements are not regular right after your surgery. This is common. Try to avoid constipation and straining with bowel movements. Drinking enough fluids, taking a stool softener, and eating foods that are good sources of fiber can help you avoid constipation.  If you have not had a bowel movement after a couple of days, talk to your doctor. Medicines    · Your doctor will tell you if and when you can restart your medicines. You will also get instructions about taking any new medicines.     · If you take aspirin or some other blood thinner, ask your doctor if and when to start taking it again. Make sure that you understand exactly what your doctor wants you to do.     · Your doctor may give you a blood-thinning medicine to prevent blood clots. If you take a blood thinner, be sure you get instructions about how to take your medicine safely. Blood thinners can cause serious bleeding problems. This medicine could be in pill form or as a shot (injection). If a shot is needed, your doctor will tell you how to do this.     · Be safe with medicines. Take pain medicines exactly as directed. ? If the doctor gave you a prescription medicine for pain, take it as prescribed. ? If you are not taking a prescription pain medicine, ask your doctor if you can take an over-the-counter medicine. ? Plan to take your pain medicine 30 minutes before exercises. It is easier to prevent pain before it starts than to stop it after it has started.     · If you think your pain medicine is making you sick to your stomach:  ? Take your medicine after meals (unless your doctor has told you not to). ? Ask your doctor for a different pain medicine.     · If your doctor prescribed antibiotics, take them as directed. Do not stop taking them just because you feel better. You need to take the full course of antibiotics. Incision care    · If your doctor told you how to care for your cut (incision), follow your doctor's instructions. You will have a dressing over the cut. A dressing helps the incision heal and protects it. Your doctor will tell you how to take care of this.     · If you did not get instructions, follow this general advice:  ?  If you have strips of tape on the cut the doctor made, leave the tape on for a week or until it falls off.  ? If you have stitches or staples, your doctor will tell you when to come back to have them removed. ? If you have skin glue on the cut, leave it on until it falls off. Skin glue is also called skin adhesive or liquid stitches. ? Change the bandage every day. ? Wash the area daily with warm water, and pat it dry. Don't use hydrogen peroxide or alcohol. They can slow healing. ? You may cover the area with a gauze bandage if it oozes fluid or rubs against clothing. ? You may shower 24 to 48 hours after surgery. Pat the incision dry. Don't swim or take a bath for the first 2 weeks, or until your doctor tells you it is okay. Exercise    · Your rehab program will give you a number of exercises to do to help you get back your knee's range of motion and strength. Always do them as your therapist tells you. Ice    · For pain and swelling, put ice or a cold pack on the area for 10 to 20 minutes at a time. Put a thin cloth between the ice and your skin. Other instructions    · Wear compression stockings if your doctor told you to. These may help to prevent blood clots. Your doctor will tell you how long you need to keep wearing the compression stockings.     · Carry a medical alert card that says you have an artificial joint. You have metal pieces in your knee. These may set off some airport metal detectors. Follow-up care is a key part of your treatment and safety. Be sure to make and go to all appointments, and call your doctor if you are having problems. It's also a good idea to know your test results and keep a list of the medicines you take. When should you call for help? Call 911 anytime you think you may need emergency care. For example, call if:    · You passed out (lost consciousness).     · You have severe trouble breathing.     · You have sudden chest pain and shortness of breath, or you cough up blood.    Call your doctor now or seek immediate medical care if:    · You have signs of infection, such as:  ? Increased pain, swelling, warmth, or redness. ? Red streaks leading from the incision. ? Pus draining from the incision. ? A fever.     · You have signs of a blood clot, such as:  ? Pain in your calf, back of the knee, thigh, or groin. ? Redness and swelling in your leg or groin.     · Your incision comes open and begins to bleed, or the bleeding increases.     · You have pain that does not get better after you take pain medicine. Watch closely for changes in your health, and be sure to contact your doctor if:    · You do not have a bowel movement after taking a laxative. Where can you learn more? Go to http://www.gray.com/  Enter T054 in the search box to learn more about \"Total Knee Replacement: What to Expect at Home. \"  Current as of: July 1, 2021               Content Version: 13.0  © 2006-2021 [employer]. Care instructions adapted under license by [employer] (which disclaims liability or warranty for this information). If you have questions about a medical condition or this instruction, always ask your healthcare professional. [place] [address] any warranty or liability for your use of this information. Information obtained by :  I understand that if any problems occur once I am at home I am to contact my physician. I understand and acknowledge receipt of the instructions indicated above.                                                                                                                                            Physician's or R.N.'s Signature                                                                  Date/Time                                                                                                                                              Patient or Representative Signature                                                          Date/Time

## 2021-11-18 NOTE — PROGRESS NOTES
TRANSFER - IN REPORT:    Verbal report received from Eleazar Mancuso RN on Donnalee Meter  being received from PACU for routine post - op      Report consisted of patients Situation, Background, Assessment and   Recommendations(SBAR). Information from the following report(s) SBAR was reviewed with the receiving nurse. Opportunity for questions and clarification was provided. Assessment completed upon patients arrival to unit and care assumed. Oriented to room, bed controls, and how to order meals. Aquacel dry and intact with iceman.  SCDs and yellow gripper socks to LEs and instructed not to get up without staff to assist.

## 2021-11-18 NOTE — ANESTHESIA PROCEDURE NOTES
Peripheral Block    Start time: 11/18/2021 8:28 AM  End time: 11/18/2021 8:31 AM  Performed by: Duane Chary, MD  Authorized by: Duane Chary, MD       Pre-procedure: Indications: at surgeon's request and post-op pain management    Preanesthetic Checklist: patient identified, risks and benefits discussed, site marked, timeout performed, anesthesia consent given and patient being monitored    Timeout Time: 08:28 EST          Block Type:   Block Type:   Adductor canal  Laterality:  Left  Monitoring:  Continuous pulse ox, frequent vital sign checks, heart rate, oxygen and responsive to questions  Injection Technique:  Single shot  Procedures: ultrasound guided    Patient Position: supine  Prep: chlorhexidine    Location:  Mid thigh  Needle Gauge:  20 G  Needle Localization:  Ultrasound guidance  Medication Injected:  Ropivacaine (NAROPIN) 2 mg/mL (0.2 %) injection, 40 mg  dexamethasone (DECADRON) 4 mg/mL injection, 4 mg  Med Admin Time: 11/18/2021 8:31 AM    Assessment:  Number of attempts:  1  Injection Assessment:  Incremental injection every 5 mL, local visualized surrounding nerve on ultrasound, negative aspiration for blood, no intravascular symptoms, no paresthesia and ultrasound image on chart  Patient tolerance:  Patient tolerated the procedure well with no immediate complications

## 2021-11-19 ENCOUNTER — HOME CARE VISIT (OUTPATIENT)
Dept: SCHEDULING | Facility: HOME HEALTH | Age: 71
End: 2021-11-19
Payer: MEDICARE

## 2021-11-19 VITALS
HEART RATE: 68 BPM | RESPIRATION RATE: 16 BRPM | SYSTOLIC BLOOD PRESSURE: 130 MMHG | DIASTOLIC BLOOD PRESSURE: 78 MMHG | OXYGEN SATURATION: 91 % | TEMPERATURE: 98.9 F

## 2021-11-19 PROCEDURE — 400013 HH SOC

## 2021-11-19 PROCEDURE — G0151 HHCP-SERV OF PT,EA 15 MIN: HCPCS

## 2021-11-22 ENCOUNTER — HOME CARE VISIT (OUTPATIENT)
Dept: SCHEDULING | Facility: HOME HEALTH | Age: 71
End: 2021-11-22
Payer: MEDICARE

## 2021-11-22 VITALS
HEART RATE: 96 BPM | RESPIRATION RATE: 16 BRPM | DIASTOLIC BLOOD PRESSURE: 66 MMHG | TEMPERATURE: 98.1 F | SYSTOLIC BLOOD PRESSURE: 134 MMHG | OXYGEN SATURATION: 94 %

## 2021-11-22 PROCEDURE — G0157 HHC PT ASSISTANT EA 15: HCPCS

## 2021-11-23 ENCOUNTER — HOME CARE VISIT (OUTPATIENT)
Dept: SCHEDULING | Facility: HOME HEALTH | Age: 71
End: 2021-11-23
Payer: MEDICARE

## 2021-11-23 VITALS
RESPIRATION RATE: 16 BRPM | TEMPERATURE: 97.2 F | OXYGEN SATURATION: 93 % | HEART RATE: 96 BPM | SYSTOLIC BLOOD PRESSURE: 118 MMHG | DIASTOLIC BLOOD PRESSURE: 70 MMHG

## 2021-11-23 PROCEDURE — G0157 HHC PT ASSISTANT EA 15: HCPCS

## 2021-11-26 ENCOUNTER — HOME CARE VISIT (OUTPATIENT)
Dept: SCHEDULING | Facility: HOME HEALTH | Age: 71
End: 2021-11-26
Payer: MEDICARE

## 2021-11-26 VITALS
TEMPERATURE: 98 F | DIASTOLIC BLOOD PRESSURE: 70 MMHG | OXYGEN SATURATION: 93 % | RESPIRATION RATE: 16 BRPM | SYSTOLIC BLOOD PRESSURE: 118 MMHG | HEART RATE: 68 BPM

## 2021-11-26 PROCEDURE — G0157 HHC PT ASSISTANT EA 15: HCPCS

## 2021-11-26 NOTE — CASE COMMUNICATION
Upon arrival, pt had significant amount of fluid in the bandage. This was odd, as the pt had no drainage prior to this visit. Pt reported that, after his shower, it was saturated. The aquacel appeared to be intact, but it clearly was not. PTA removed the aquacel and replaced with a new aquacel dressing due to the old dressing being completely saturated. The wound was not actively draining, and an image was captured via [employer]. Pt is  scheduled for routine removal of dressing this coming Monday.  PTA simply informing team.

## 2021-11-29 ENCOUNTER — HOME CARE VISIT (OUTPATIENT)
Dept: SCHEDULING | Facility: HOME HEALTH | Age: 71
End: 2021-11-29
Payer: MEDICARE

## 2021-11-29 VITALS
SYSTOLIC BLOOD PRESSURE: 132 MMHG | RESPIRATION RATE: 15 BRPM | TEMPERATURE: 98.4 F | HEART RATE: 96 BPM | DIASTOLIC BLOOD PRESSURE: 80 MMHG | OXYGEN SATURATION: 91 %

## 2021-11-29 PROCEDURE — G0157 HHC PT ASSISTANT EA 15: HCPCS

## 2021-11-29 NOTE — CASE COMMUNICATION
Pt requesting to attend OP PT at [facility]. Pt is set for discharge next Wednesday, 12/8/2021. PTA requesting order to be sent to [facility] at earliest convenience. Thanks!

## 2021-12-01 ENCOUNTER — HOME CARE VISIT (OUTPATIENT)
Dept: SCHEDULING | Facility: HOME HEALTH | Age: 71
End: 2021-12-01
Payer: MEDICARE

## 2021-12-01 VITALS
RESPIRATION RATE: 16 BRPM | DIASTOLIC BLOOD PRESSURE: 62 MMHG | OXYGEN SATURATION: 93 % | HEART RATE: 84 BPM | TEMPERATURE: 98.1 F | SYSTOLIC BLOOD PRESSURE: 128 MMHG

## 2021-12-01 PROCEDURE — G0157 HHC PT ASSISTANT EA 15: HCPCS

## 2021-12-06 ENCOUNTER — HOME CARE VISIT (OUTPATIENT)
Dept: SCHEDULING | Facility: HOME HEALTH | Age: 71
End: 2021-12-06
Payer: MEDICARE

## 2021-12-06 VITALS
DIASTOLIC BLOOD PRESSURE: 68 MMHG | HEART RATE: 88 BPM | RESPIRATION RATE: 15 BRPM | OXYGEN SATURATION: 93 % | SYSTOLIC BLOOD PRESSURE: 124 MMHG | TEMPERATURE: 98.3 F

## 2021-12-06 PROCEDURE — G0157 HHC PT ASSISTANT EA 15: HCPCS

## 2021-12-08 ENCOUNTER — HOME CARE VISIT (OUTPATIENT)
Dept: SCHEDULING | Facility: HOME HEALTH | Age: 71
End: 2021-12-08
Payer: MEDICARE

## 2021-12-08 VITALS
RESPIRATION RATE: 16 BRPM | SYSTOLIC BLOOD PRESSURE: 118 MMHG | TEMPERATURE: 97.9 F | HEART RATE: 60 BPM | DIASTOLIC BLOOD PRESSURE: 68 MMHG | BODY MASS INDEX: 43.43 KG/M2 | HEIGHT: 61 IN | OXYGEN SATURATION: 96 % | WEIGHT: 230 LBS

## 2021-12-08 PROCEDURE — G0151 HHCP-SERV OF PT,EA 15 MIN: HCPCS

## 2021-12-09 ENCOUNTER — APPOINTMENT (OUTPATIENT)
Dept: PHYSICAL THERAPY | Age: 71
End: 2021-12-09
Payer: MEDICARE

## 2021-12-14 ENCOUNTER — HOSPITAL ENCOUNTER (OUTPATIENT)
Dept: PHYSICAL THERAPY | Age: 71
Discharge: HOME | End: 2021-12-14
Payer: MEDICARE

## 2021-12-14 DIAGNOSIS — Z96.652 S/P TOTAL KNEE ARTHROPLASTY, LEFT: ICD-10-CM

## 2021-12-14 PROCEDURE — 97016 VASOPNEUMATIC DEVICE THERAPY: CPT

## 2021-12-14 PROCEDURE — 97162 PT EVAL MOD COMPLEX 30 MIN: CPT

## 2021-12-14 PROCEDURE — 97110 THERAPEUTIC EXERCISES: CPT

## 2021-12-14 NOTE — THERAPY EVALUATION
Feli Portillo  : 1950 [facility]  [address], [place].  [phone]   [phone]        OUTPATIENT PHYSICAL THERAPY:Initial Assessment 2021         ICD-10: Treatment Diagnosis: Pain in right knee (M25.561) and Stiffness of right knee, not elsewhere classified (M25.661) and Muscle weakness (generalized) (M62.81) and Other abnormalities of gait and mobility (R26.89)  Precautions/Allergies:   Shellfish derived   Fall Risk Score:     Ambulatory/Rehab Services H2 Model Falls Risk Assessment    Risk Factors:       (1)  Gender [Male] Ability to Rise from Chair:       (1)  Pushes up, successful in one attempt    Falls Prevention Plan:       No modifications necessary   Total: (5 or greater = High Risk): 2     [facility] [employer]. All Rights Reserved. [facility] Patent #5,[id number]. Federal Law prohibits the replication, distribution or use without written permission from [employer]     MD Orders: eval and treat MEDICAL/REFERRING DIAGNOSIS:  S/P total knee arthroplasty, left [Z96.652]   DATE OF ONSET: 21 surgery  REFERRING PHYSICIAN: Iron Jackman MD  RETURN PHYSICIAN APPOINTMENT: 1/3/22     INITIAL ASSESSMENT:  Mr. Dario Simon presents with marked stiffness, weakness, pain, altered gait, decreased balance following L TKA. He will benefit from skilled PT to address these deficits to return pt to premorbid status. PROBLEM LIST (Impacting functional limitations):  1. Decreased Strength  2. Decreased ADL/Functional Activities  3. Decreased Transfer Abilities  4. Decreased Ambulation Ability/Technique  5. Decreased Balance  6. Increased Pain  7. Decreased Activity Tolerance  8. Decreased Flexibility/Joint Mobility  9. Decreased [place] with Home Exercise Program INTERVENTIONS PLANNED:  1. Balance Exercise  2. Cryotherapy  3. Home Exercise Program (HEP)  4. Range of Motion (ROM)  5.  Therapeutic Activites  6. Therapeutic Exercise/Strengthening  7. aquatics    TREATMENT PLAN:  Effective Dates: 12/14/2021 TO 3/14/2022 (90 days). Frequency/Duration: 2-3 times a week for 90 Days  GOALS: (Goals have been discussed and agreed upon with patient.)  Short-Term Functional Goals: Time Frame: 4 weeks  1. Pt to report compliance with HEP  2. Pt to restore good quad set for full knee ext, normal gait mechanics. 3. Pt to restore at least  AROM to prevent need for manipulation. 4. Pt to demonstrate normal gait mechanics level surfaces  Discharge Goals: Time Frame: 12 weeks  1. Pt to restore 5-120 AROM for normal ADL's without compensation  2. Pt to increase strength for sit to stand without UE assist x 30 reps  3. Pt to increase strength for reciprocal gait on stairs  4. Pt to increase SLS > 15 sec B for safe amb all surfaces  Rehabilitation Potential For Stated Goals: Good  Regarding You Hauser's therapy, I certify that the treatment plan above will be carried out by a therapist or under their direction. Thank you for this referral,  Dina Narayan, PT       Referring Physician Signature: Nehal Esquivel MD              Date                      HISTORY:   History of Present Injury/Illness (Reason for Referral):  Pt reports L TKA 11/18. Knee is still very stiff. R knee will be done as well. Walking is not a problem, stairs are difficult. Sleeping is difficult due to pain.    Past Medical History/Comorbidities:   Mr. 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 [address] (08/06/2018). Mr. Sanjay Barksdale  has a past surgical history that includes hx prostatectomy; hx hernia repair (1985); hx wisdom teeth extraction; hx coronary stent placement (1995); and hx orthopaedic (Right, 09/2021). 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 , Disp: 60 Tablet, Rfl: 0    celecoxib (CELEBREX) 200 mg capsule, Take 1 Capsule by mouth daily for 30 days. , Disp: 30 Capsule, Rfl: 0    methocarbamoL (ROBAXIN) 750 mg tablet, Take 1 Tablet by mouth four (4) times daily as needed for Muscle Spasm(s). , Disp: 40 Tablet, Rfl: 0    promethazine (PHENERGAN) 25 mg tablet, Take 1 Tablet by mouth every six (6) hours as needed for Nausea., Disp: 40 Tablet, Rfl: 0    cpap machine kit, by Does Not Apply route., Disp: , Rfl:     bimatoprost (Lumigan) 0.01 % ophthalmic drops, Administer 1 Drop to both eyes every evening., Disp: , Rfl:     tiotropium-olodateroL (Stiolto Respimat) 2.5-2.5 mcg/actuation inhaler, Take 2 Puffs by inhalation daily. , Disp: 3 Inhaler, Rfl: 3    enalapril (VASOTEC) 5 mg tablet, TAKE 1 TABLET BY MOUTH  TWICE DAILY, Disp: 180 Tablet, Rfl: 3    atorvastatin (LIPITOR) 40 mg tablet, TAKE 1 TABLET BY MOUTH  DAILY (Patient taking differently: Take 40 mg by mouth every evening.), Disp: 90 Tablet, Rfl: 3    nitroglycerin (NITROSTAT) 0.4 mg SL tablet, 1 Tab by SubLINGual route every five (5) minutes as needed for Chest Pain.  (Patient not taking: Reported on 11/18/2021), Disp: 1 Bottle, Rfl: 3    albuterol (PROVENTIL HFA, VENTOLIN HFA, PROAIR HFA) 90 mcg/actuation inhaler, Take 1 Puff by inhalation every six (6) hours as needed for Shortness of Breath., Disp: , Rfl:   No current facility-administered medications for this encounter. Date Last Reviewed:  12/14/2021   # of Personal Factors/Comorbidities that affect the Plan of Care: 1-2: MODERATE COMPLEXITY   EXAMINATION:   Observation/Orthostatic Postural Assessment:          Incision is clean and healed; dysfunctional breather   Palpation:          Good patellar mobility, some decreased scar mobility distally  ROM:         AROM knee flex-ext R 108-10, L 90-20 before, 103-10 after  Strength:         Fair quad set L  Neurological Screen:        unremarkable  Functional Mobility:         Gait/Ambulation:  Antalgic with straight cane - foot flat B, side to side trunk motion with exaggerated arm swing         Transfers:  Uses UE assist, trunk momentum, L LE in front        Stairs:  Step-to pattern  Balance:          < 1 sec   Body Structures Involved:  1. Joints  2. Muscles Body Functions Affected:  1. Sensory/Pain  2. Movement Related Activities and Participation Affected:  1. General Tasks and Demands  2. Mobility  3. Self Care  4. Domestic Life  5. Interpersonal Interactions and Relationships  6. Community, Social and [place]   # of elements that affect the Plan of Care: 3: MODERATE COMPLEXITY   CLINICAL PRESENTATION:   Presentation: Evolving clinical presentation with changing clinical characteristics: MODERATE COMPLEXITY   CLINICAL DECISION MAKING:   KOOS: 9 raw score, 63.776 interval score    Medical Necessity:   · Patient demonstrates good rehab potential due to higher previous functional level. Reason for Services/Other Comments:  · Patient continues to require present interventions due to patient's inability to resume normal function due to stiffness, weakness, pain.    Use of outcome tool(s) and clinical judgement create a POC that gives a: Questionable prediction of patient's progress: MODERATE COMPLEXITY     Total Treatment Duration:  PT Patient Time In/Time Out  Time In: 0315  Time Out: [address] Tomeka Healy, PT

## 2021-12-14 NOTE — PROGRESS NOTES
Jie Shaffer  : 1950  Primary: Mao Scott Medicare Complete  Secondary:  [facility] @ [address], [address] [place] [address]  [phone]   [phone]      OUTPATIENT PHYSICAL THERAPY: Daily Treatment Note  2021   Pain in right knee (M25.561) and Stiffness of right knee, not elsewhere classified (M25.661) and Muscle weakness (generalized) (M62.81) and Other abnormalities of gait and mobility (R26.89)     21 L TKA  Effective Dates: 2021 TO 3/14/2022 (90 days). Frequency/Duration: 2-3 times a week for 90 Days  GOALS: (Goals have been discussed and agreed upon with patient.)  Short-Term Functional Goals: Time Frame: 4 weeks  1. Pt to report compliance with HEP  2. Pt to restore good quad set for full knee ext, normal gait mechanics. 3. Pt to restore at least  AROM to prevent need for manipulation. 4. Pt to demonstrate normal gait mechanics level surfaces  Discharge Goals: Time Frame: 12 weeks  1. Pt to restore 5-120 AROM for normal ADL's without compensation  2. Pt to increase strength for sit to stand without UE assist x 30 reps  3. Pt to increase strength for reciprocal gait on stairs  4. Pt to increase SLS > 15 sec B for safe amb all surfaces  _______________________________________________________________________________  Pre-treatment Symptoms/Complaints:   [facility] presents with marked stiffness, weakness, pain, altered gait, decreased balance following L TKA.   Pain: Initial: 6/10 Post Session:  4/10   Medications Last Reviewed:  2021    Updated Objective Findings:      Observation/Orthostatic Postural Assessment:          Incision is clean and healed; dysfunctional breather   Palpation:          Good patellar mobility, some decreased scar mobility distally  ROM:         AROM knee flex-ext R 108-10, L 90-20 before, 103-10 after  Strength:         Fair quad set L  Functional Mobility:         Gait/Ambulation: Antalgic with straight cane - foot flat B, side to side trunk motion with exaggerated arm swing         Transfers:  Uses UE assist, trunk momentum, L LE in front        Stairs:  Step-to pattern  Balance:          < 1 sec    KOOS: 9 raw score, 63.776 interval score    See evaluation note from today     TREATMENT:   THERAPEUTIC ACTIVITY: ( see below for minutes): Therapeutic activities per grid below to improve mobility. Required moderate verbal and manual cues to improve functional mobility . THERAPEUTIC EXERCISE: (see below for minutes):  Exercises per grid below to improve strength. Required moderate verbal and manual cues to promote proper body alignment, promote proper body posture, promote proper body mechanics and promote proper body breathing techniques. Progressed resistance, range, repetitions and complexity of movement as indicated. MANUAL THERAPY: (see below for minutes): Joint mobilization and Soft tissue mobilization was utilized and necessary because of the patient's restricted joint motion, painful spasm, loss of articular motion and restricted motion of soft tissue. MODALITIES: (see below for minutes):      for pain modulation    AQUATIC THERAPY (see below for minutes): Aquatic treatment performed per flow grid for Decreased muscle strength, Decreased endurance, Decreased static/dynamic balance and reactive control, Decreased activity endurance, Decompression, Ease of movement and Low impact and reduced weight bearing activity.     Date: 12/14/21       Modalities: 15 mins       Game ready L seated               Manual Therapy:                        Aquatics Activities:        GAIT (F/B/S/M)        SLR gait        Hamstring Curl gait         Rockettes        Squats        Calf raises        Hamstring stretch B        Piriformis stretch B        Step ups ant B        Deep well                                Therapeutic Exercises: 30 mins       Pt education, postural education, HEP, functional breathing 15 mins       Quad set L X 10       LAQ L X 10       Prone lying for ext  7 mins       PFK with overpressure  X 10       Seated PROM flex stretch L passively       HEP: see hand out    [employer] Portal  Treatment/Session Summary:    · Response to Treatment:  Pt had good understanding and atul to information presented. · Communication/Consultation:  None today  · Equipment provided today:  None today  · Recommendations/Intent for next treatment session: Next visit will focus on stretching, strengthening, gait, balance, modalities as indicated.     Total Treatment Billable Duration:  60 mins  PT Patient Time In/Time Out  Time In: 4550  Time Out: [address], PT    Future Appointments   Date Time Provider Susana Esme   12/15/2021  3:00 PM Caryl Healy [facility], PT [facility]   12/17/2021  3:00 PM NolanМарина bass Repress, [facility]   12/20/2021  8:45 AM Caryl Healy [facility], [facility]   12/22/2021  8:00 AM NolanShereeМарина Repress, PTA [facility]   12/23/2021  1:45 PM Deandra Murray MD [facility] UCDG UCD   12/28/2021  1:30 PM NolanShereeМарина Repress, PTA [facility]   12/30/2021 11:00 AM NolanМарина Repress, PTA [facility]   1/3/2022  1:50 PM Siri Whipple MD POAG POA   1/4/2022 10:00 AM Linsey Alvarado NP SSA PSCD PP   1/12/2022  9:00 AM Morgan Fulton T, DO SSA TRIM TRIM   2/28/2022  0:50 AM SFD CT 59 SLICE UNIT 1 SFDRCT SFD   3/3/2022  8:10 AM Annita Leal MD SSA PP PP

## 2021-12-15 ENCOUNTER — HOSPITAL ENCOUNTER (OUTPATIENT)
Dept: PHYSICAL THERAPY | Age: 71
Discharge: HOME | End: 2021-12-15
Payer: MEDICARE

## 2021-12-15 PROCEDURE — 97110 THERAPEUTIC EXERCISES: CPT

## 2021-12-15 PROCEDURE — 97016 VASOPNEUMATIC DEVICE THERAPY: CPT

## 2021-12-15 NOTE — PROGRESS NOTES
Saranya Weston  : 1950  Primary: Monico Scott Medicare Complete  Secondary:  [address] [facility] @ [address], [place].  [phone]   [phone]      OUTPATIENT PHYSICAL THERAPY: Daily Treatment Note  12/15/2021   Pain in right knee (M25.561) and Stiffness of right knee, not elsewhere classified (M25.661) and Muscle weakness (generalized) (M62.81) and Other abnormalities of gait and mobility (R26.89)     21 L TKA  Effective Dates: 2021 TO 3/14/2022 (90 days). Frequency/Duration: 2-3 times a week for 90 Days  GOALS: (Goals have been discussed and agreed upon with patient.)  Short-Term Functional Goals: Time Frame: 4 weeks  1. Pt to report compliance with HEP  2. Pt to restore good quad set for full knee ext, normal gait mechanics. 3. Pt to restore at least  AROM to prevent need for manipulation. 4. Pt to demonstrate normal gait mechanics level surfaces  Discharge Goals: Time Frame: 12 weeks  1. Pt to restore 5-120 AROM for normal ADL's without compensation  2. Pt to increase strength for sit to stand without UE assist x 30 reps  3. Pt to increase strength for reciprocal gait on stairs  4. Pt to increase SLS > 15 sec B for safe amb all surfaces  _______________________________________________________________________________  Pre-treatment Symptoms/Complaints:  I have done the routine 3 times since I saw you. It is still painful to stretch it.   Pain: Initial: 3/10 Post Session: no pain with ex's   Medications Last Reviewed:  12/15/2021    Updated Objective Findings:      Observation/Orthostatic Postural Assessment:          Incision is clean and healed; dysfunctional breather   Palpation:          Good patellar mobility, some decreased scar mobility distally  ROM:         AROM knee flex-ext R 108-10, L 103-15 before, 105-10 after  12/15/21       Tightness in B gastroc, soleus, hams, quads, piriformis  Strength:         Fair quad set L; L glut med weakness noted with SLS 12/15/21  Functional Mobility:         Gait/Ambulation:  Antalgic with straight cane - foot flat B, side to side trunk motion with exaggerated arm swing         Transfers:  Uses UE assist, trunk momentum, L LE in front        Stairs:  Step-to pattern  Balance:          < 1 sec    KOOS: 9 raw score, 63.776 interval score     TREATMENT:   THERAPEUTIC ACTIVITY: ( see below for minutes): Therapeutic activities per grid below to improve mobility. Required moderate verbal and manual cues to improve functional mobility . THERAPEUTIC EXERCISE: (see below for minutes):  Exercises per grid below to improve strength. Required moderate verbal and manual cues to promote proper body alignment, promote proper body posture, promote proper body mechanics and promote proper body breathing techniques. Progressed resistance, range, repetitions and complexity of movement as indicated. MANUAL THERAPY: (see below for minutes): Joint mobilization and Soft tissue mobilization was utilized and necessary because of the patient's restricted joint motion, painful spasm, loss of articular motion and restricted motion of soft tissue. MODALITIES: (see below for minutes):      for pain modulation    AQUATIC THERAPY (see below for minutes): Aquatic treatment performed per flow grid for Decreased muscle strength, Decreased endurance, Decreased static/dynamic balance and reactive control, Decreased activity endurance, Decompression, Ease of movement and Low impact and reduced weight bearing activity.     Date: 12/14/21 12/15/21  Visit 2         Modalities: 15 mins 10 mins         Game ready L seated seated                    Manual Therapy:                                 Aquatics Activities:           GAIT (F/B/S/M)           SLR gait           Hamstring Curl gait            Rockettes           Squats           Calf raises           Hamstring stretch B           Piriformis stretch B           Step ups ant B           SLS B                                 Therapeutic Exercises: 30 mins 45 mins         Pt education, postural education, HEP, functional breathing 15 mins          Quad set L X 10 X 30         SLR L  X 30         LAQ L X 10 X 30                    Calf raises B  X 20         Slant board bent and straight knee   3 x 15 sec         Mini squats  X 20         SLS B  X 3         Prone lying for ext  7 mins 10 mins         PFK with overpressure  X 10 X 10         Seated PROM flex stretch L passively By therapist         HEP: see hand out    [employer] Portal  Treatment/Session Summary:    · Response to Treatment:  Pt maintained flex gains in ROM which was excellent response. He struggles to atul full ext for more than 5-6 mins due to tightness and didn't gain flex ROM passively due to pain with stretching. Pt to continue with stretching at home and will monitor response as next visit. · Communication/Consultation:  None today  · Equipment provided today:  None today  · Recommendations/Intent for next treatment session: Next visit will focus on stretching, strengthening, gait, balance, modalities as indicated.     Total Treatment Billable Duration:  55 mins  PT Patient Time In/Time Out  Time In: 5460  Time Out: [facility], PT    Future Appointments   Date Time Provider Susana Castelani   12/17/2021  3:00 PM Alexandra Healy, PT [facility]   12/20/2021  8:45 AM Alexandra Healy, PT [facility]   12/22/2021  8:00 AM NolanLashawn lozoya Croissant, PTA [facility]   12/28/2021  1:30 PM NolanLashawn lozoya Croissant, PTA [facility]   12/30/2021 11:00 AM NolanLashawn lozoya Croissant, PTA [facility]   1/3/2022  1:50 PM Iron Jackman MD POAG POA   1/4/2022 10:00 AM Arsenio Fagan NP [facility] PSCD PP   1/12/2022  9:00 AM Hermelindo Block T, DO [facility] TRIM TRIM   2/28/2022  9:33 AM SFD CT 59 SLICE UNIT 1 SFDRCT SFD   3/3/2022  8:10 AM Jennifer Leal MD SSA PP PP

## 2021-12-17 ENCOUNTER — HOSPITAL ENCOUNTER (OUTPATIENT)
Dept: PHYSICAL THERAPY | Age: 71
Discharge: HOME | End: 2021-12-17
Payer: MEDICARE

## 2021-12-17 PROCEDURE — 97016 VASOPNEUMATIC DEVICE THERAPY: CPT

## 2021-12-17 PROCEDURE — 97110 THERAPEUTIC EXERCISES: CPT

## 2021-12-20 ENCOUNTER — HOSPITAL ENCOUNTER (OUTPATIENT)
Dept: PHYSICAL THERAPY | Age: 71
Discharge: HOME | End: 2021-12-20
Payer: MEDICARE

## 2021-12-20 PROCEDURE — 97113 AQUATIC THERAPY/EXERCISES: CPT

## 2021-12-20 PROCEDURE — 97016 VASOPNEUMATIC DEVICE THERAPY: CPT

## 2021-12-20 PROCEDURE — 97110 THERAPEUTIC EXERCISES: CPT

## 2021-12-20 NOTE — PROGRESS NOTES
Norbert Toribio  : 1950  Primary: Karina Scott Medicare Complete  Secondary:  [facility] @ [address], [address] [place] [address]  [phone]   [phone]      OUTPATIENT PHYSICAL THERAPY: Daily Treatment Note  2021   Pain in right knee (M25.561) and Stiffness of right knee, not elsewhere classified (M25.661) and Muscle weakness (generalized) (M62.81) and Other abnormalities of gait and mobility (R26.89)     21 L TKA  Effective Dates: 2021 TO 3/14/2022 (90 days). Frequency/Duration: 2-3 times a week for 90 Days  GOALS: (Goals have been discussed and agreed upon with patient.)  Short-Term Functional Goals: Time Frame: 4 weeks  1. Pt to report compliance with HEP  2. Pt to restore good quad set for full knee ext, normal gait mechanics. 3. Pt to restore at least  AROM to prevent need for manipulation. 4. Pt to demonstrate normal gait mechanics level surfaces  Discharge Goals: Time Frame: 12 weeks  1. Pt to restore 5-120 AROM for normal ADL's without compensation  2. Pt to increase strength for sit to stand without UE assist x 30 reps  3. Pt to increase strength for reciprocal gait on stairs  4. Pt to increase SLS > 15 sec B for safe amb all surfaces  _______________________________________________________________________________  Pre-treatment Symptoms/Complaints: I stretched some when I thought about it.   Pain: Initial: 6/10 Post Session: no pain with ex's   Medications Last Reviewed:  2021    Updated Objective Findings:      ROM:         AROM knee flex-ext R 108-10, L 93-15 before, 98-10 after  21       Tightness in B gastroc, soleus, hams, quads, piriformis  Strength:         Fair quad set L; L glut med weakness noted with SLS 12/15/21  Functional Mobility:         Gait/Ambulation:  Antalgic with straight cane - foot flat B, side to side trunk motion with exaggerated arm swing         Transfers:  Uses UE assist, trunk momentum, L LE in front        Stairs:  Step-to pattern  Balance:          < 1 sec    KOOS: 9 raw score, 63.776 interval score     TREATMENT:   THERAPEUTIC ACTIVITY: ( see below for minutes): Therapeutic activities per grid below to improve mobility. Required moderate verbal and manual cues to improve functional mobility . THERAPEUTIC EXERCISE: (see below for minutes):  Exercises per grid below to improve strength. Required moderate verbal and manual cues to promote proper body alignment, promote proper body posture, promote proper body mechanics and promote proper body breathing techniques. Progressed resistance, range, repetitions and complexity of movement as indicated. MANUAL THERAPY: (see below for minutes): Joint mobilization and Soft tissue mobilization was utilized and necessary because of the patient's restricted joint motion, painful spasm, loss of articular motion and restricted motion of soft tissue. MODALITIES: (see below for minutes):      for pain modulation    AQUATIC THERAPY (see below for minutes): Aquatic treatment performed per flow grid for Decreased muscle strength, Decreased endurance, Decreased static/dynamic balance and reactive control, Decreased activity endurance, Decompression, Ease of movement and Low impact and reduced weight bearing activity.     Date: 12/14/21 12/15/21  Visit 2 12/17/21  Visit 3 12/20/21  Visit 4       Modalities: 15 mins 10 mins 10 mins 10 mins       Game ready L seated seated seated seated                  Manual Therapy:                                 Aquatics Activities:    60 mins       GAIT (F/B/S/M)    3 laps       SLR gait    3 laps       Hamstring Curl gait     3 laps       Rockettes    3 laps       Squats    X 30       Calf raises    X 20       Hamstring stretch B    3 x 15 sec       Piriformis stretch B           Step ups ant B           SLS B                                 Therapeutic Exercises: 30 mins 45 mins 45 mins 10 mins       Pt education, postural education, HEP, functional breathing 15 mins          Slant board bent and straight knee   3 x 15 sec         PFK alone and with overpressure  X 10 X 15 X 15 each X 20 each       PROM flex stretch L passively By therapist Prone, seated prone       Bike for ROM   15 mins        HEP: see hand out    [employer] Portal  Treatment/Session Summary:    · Response to Treatment:  Pt lost flex ROM from last visit to start (98-93) and only regained back to 98 vs 110 last visit. Anxiety noted with stretching and pain limiting atul. Encouraged more compliance with frequent stretching at home to maximize gains. Cues needed to correct marked trunk/hip flex posture with ex's. Continue as indicated. · Communication/Consultation:  None today  · Equipment provided today:  None today  · Recommendations/Intent for next treatment session: Next visit will focus on stretching, strengthening, gait, balance, modalities as indicated.     Total Treatment Billable Duration:  80 mins  PT Patient Time In/Time Out  Time In: 0845  Time Out: Cain [address], PT    Future Appointments   Date Time Provider Susana Victoria   12/22/2021  8:00 AM Dusty Francisco, PTA [facility]   12/28/2021  1:30 PM Yue Francisco, PTA [facility]   12/30/2021 11:00 AM Yue Francisco, PTA [facility]   1/3/2022  1:50 PM Ezio Cervantes MD POAG POA   1/4/2022 10:00 AM Rene Walsh NP SSA PSCD PP   1/4/2022  3:00 PM Monet, Maryclare Breath, PT [facility]   1/6/2022  3:00 PM Monet, Maryclare Breath, PT [facility]   1/11/2022  3:00 PM [place], Maryclare Breath, PT [facility]   1/12/2022  9:00 AM Eugenia Domingo DO SSA TRIM TRIM   1/13/2022  3:00 PM Monet, Maryclare Breath, PT [facility]   1/18/2022  3:00 PM Monet, Maryclare Breath, PT [facility]   1/20/2022  3:00 PM Monet, Maryclare Breath, PT [facility]   1/25/2022  3:00 PM Jose Healy Breath, PT [facility]   1/27/2022  3:00 PM Jose Healy Breath, PT [facility]   2/28/2022  6:77 AM SFD CT 64 SLICE UNIT 1 SFDRCT D   3/3/2022  8:10 AM Prince Leal MD SSA PP PP

## 2021-12-22 ENCOUNTER — HOSPITAL ENCOUNTER (OUTPATIENT)
Dept: PHYSICAL THERAPY | Age: 71
Discharge: HOME | End: 2021-12-22
Payer: MEDICARE

## 2021-12-22 PROCEDURE — 97113 AQUATIC THERAPY/EXERCISES: CPT

## 2021-12-22 PROCEDURE — 97016 VASOPNEUMATIC DEVICE THERAPY: CPT

## 2021-12-22 NOTE — PROGRESS NOTES
Ghada Fraser  : 1950  Primary: Chantal Scott Medicare Complete  Secondary:  [facility] @ [address], [address] [place] [address]  [phone]   [phone]      OUTPATIENT PHYSICAL THERAPY: Daily Treatment Note  2021   Pain in right knee (M25.561) and Stiffness of right knee, not elsewhere classified (M25.661) and Muscle weakness (generalized) (M62.81) and Other abnormalities of gait and mobility (R26.89)     21 L TKA  Effective Dates: 2021 TO 3/14/2022 (90 days). Frequency/Duration: 2-3 times a week for 90 Days  GOALS: (Goals have been discussed and agreed upon with patient.)  Short-Term Functional Goals: Time Frame: 4 weeks  1. Pt to report compliance with HEP  2. Pt to restore good quad set for full knee ext, normal gait mechanics. 3. Pt to restore at least  AROM to prevent need for manipulation. 4. Pt to demonstrate normal gait mechanics level surfaces  Discharge Goals: Time Frame: 12 weeks  1. Pt to restore 5-120 AROM for normal ADL's without compensation  2. Pt to increase strength for sit to stand without UE assist x 30 reps  3. Pt to increase strength for reciprocal gait on stairs  4. Pt to increase SLS > 15 sec B for safe amb all surfaces  _______________________________________________________________________________  Pre-treatment Symptoms/Complaints: I did try to stretch more. I spent most of my day in a desk chair so I used that. It felt like I was getting a good stretch.   Pain: Initial: 5/10 Post Session: no pain with ex's   Medications Last Reviewed:  2021    Updated Objective Findings:      ROM:         AROM knee flex-ext R 108-10, L 98-15 before, 98-10 after  21       Tightness in B gastroc, soleus, hams, quads, piriformis  Strength:         Fair quad set L; L glut med weakness noted with SLS 12/15/21  Functional Mobility:         Gait/Ambulation:  Antalgic with straight cane - foot flat B, side to side trunk motion with exaggerated arm swing         Transfers:  Uses UE assist, trunk momentum, L LE in front        Stairs:  Step-to pattern  Balance:          < 1 sec    KOOS: 9 raw score, 63.776 interval score     TREATMENT:   THERAPEUTIC ACTIVITY: ( see below for minutes): Therapeutic activities per grid below to improve mobility. Required moderate verbal and manual cues to improve functional mobility . THERAPEUTIC EXERCISE: (see below for minutes):  Exercises per grid below to improve strength. Required moderate verbal and manual cues to promote proper body alignment, promote proper body posture, promote proper body mechanics and promote proper body breathing techniques. Progressed resistance, range, repetitions and complexity of movement as indicated. MANUAL THERAPY: (see below for minutes): Joint mobilization and Soft tissue mobilization was utilized and necessary because of the patient's restricted joint motion, painful spasm, loss of articular motion and restricted motion of soft tissue. MODALITIES: (see below for minutes):      for pain modulation    AQUATIC THERAPY (see below for minutes): Aquatic treatment performed per flow grid for Decreased muscle strength, Decreased endurance, Decreased static/dynamic balance and reactive control, Decreased activity endurance, Decompression, Ease of movement and Low impact and reduced weight bearing activity.     Date: 12/14/21 12/15/21  Visit 2 12/17/21  Visit 3 12/20/21  Visit 4 12/22/21 Visit 5      Modalities: 15 mins 10 mins 10 mins 10 mins 10 mins      Game ready L seated seated seated seated seated                 Manual Therapy:                                 Aquatics Activities:    60 mins 60 mins      GAIT (F/B/S/M)    3 laps 3 laps      SLR gait    3 laps 3 laps      Hamstring Curl gait     3 laps 3 laps      Rockettes    3 laps 3 laps      Squats    X 30 X 30      Calf raises    X 20 X 20      Hamstring stretch B    3 x 15 sec 3 x 15 sec Piriformis stretch B           Step ups ant B           SLS B     X 2      Flexions stretch at handrail     X 3                 Therapeutic Exercises: 30 mins 45 mins 45 mins 10 mins 10 mins      Pt education, postural education, HEP, functional breathing 15 mins          Slant board bent and straight knee   3 x 15 sec         PFK alone and with overpressure  X 10 X 15 X 15 each X 20 each       PROM flex stretch L passively By therapist Prone, seated prone       Bike for ROM   15 mins  10 mins      HEP: see hand out    [employer] Portal  Treatment/Session Summary:    · Response to Treatment:  Pt has not gained flex - maintained 98 deg from previous visit. States he was stretching using his rolling desk chair with feet flat and rolling forward. Explained to pt how hip mechanics impact knee flexion stretch to the point where that stretch is not effective. Demonstrated seated flexion stretch with hips 90 deg using uninvolved leg to pull LLE into flexion. Emphasized importance of prone flexion stretch hourly to regain motion. Ex's atul fair with pt demonstrating signs of exertion. Requires cues to stand up straight out of forward flexed posture. Cont POC. · Communication/Consultation:  None today  · Equipment provided today:  None today  · Recommendations/Intent for next treatment session: Next visit will focus on stretching, strengthening, gait, balance, modalities as indicated.     Total Treatment Billable Duration:  80 mins  PT Patient Time In/Time Out  Time In: 0800  Time Out: 0935    Rusty Fletcher PTA    Future Appointments   Date Time Provider Susana Victoria   12/28/2021  1:30 PM Pa Jacobo PTA [facility]   12/30/2021 11:00 AM Yue Francisco PTA [facility]   1/3/2022  1:50 PM Matt Licea MD POAG POA   1/4/2022  3:00 PM [facility]   1/6/2022  3:00 PM [facility]riel [facility]   1/11/2022  3:00 PM [facility], [facility] 1/12/2022  9:00 AM Xiao Gonzales, DO SSA TRIM TRIM   1/13/2022  3:00 PM Lowden, Severo Sins, PT [facility]   1/18/2022  3:00 PM Lowden, Severo Sins, PT [facility]   1/20/2022  3:00 PM Monet, Severo Sins, PT [facility]   1/25/2022  3:00 PM Monet, Severo Sins, PT [facility]   1/27/2022  3:00 PM Monet, Severo Sins, PT [facility]   2/17/2022  8:40 AM Juan Logan NP SSA PSCD PP   2/28/2022  2:85 AM SFD CT 64 SLICE UNIT 1 SFDRCT SFD   3/3/2022  8:10 AM Prince Leal MD SSA PP PP

## 2021-12-28 ENCOUNTER — HOSPITAL ENCOUNTER (OUTPATIENT)
Dept: PHYSICAL THERAPY | Age: 71
Discharge: HOME | End: 2021-12-28
Payer: MEDICARE

## 2021-12-28 PROCEDURE — 97016 VASOPNEUMATIC DEVICE THERAPY: CPT

## 2021-12-28 PROCEDURE — 97113 AQUATIC THERAPY/EXERCISES: CPT

## 2021-12-28 PROCEDURE — 97110 THERAPEUTIC EXERCISES: CPT

## 2021-12-30 ENCOUNTER — HOSPITAL ENCOUNTER (OUTPATIENT)
Dept: PHYSICAL THERAPY | Age: 71
Discharge: HOME | End: 2021-12-30
Payer: MEDICARE

## 2021-12-30 PROCEDURE — 97113 AQUATIC THERAPY/EXERCISES: CPT

## 2021-12-30 PROCEDURE — 97016 VASOPNEUMATIC DEVICE THERAPY: CPT

## 2021-12-30 PROCEDURE — 97110 THERAPEUTIC EXERCISES: CPT

## 2021-12-30 NOTE — PROGRESS NOTES
Boris Clermont  : 1950  Primary: Abhishek Scott Medicare Complete  Secondary:  Siddharth Cintron [facility] [address]  [phone]   [phone]      OUTPATIENT PHYSICAL THERAPY: Daily Treatment Note  2021   Pain in right knee (M25.561) and Stiffness of right knee, not elsewhere classified (M25.661) and Muscle weakness (generalized) (M62.81) and Other abnormalities of gait and mobility (R26.89)     21 L TKA  Effective Dates: 2021 TO 3/14/2022 (90 days). Frequency/Duration: 2-3 times a week for 90 Days  GOALS: (Goals have been discussed and agreed upon with patient.)  Short-Term Functional Goals: Time Frame: 4 weeks  1. Pt to report compliance with HEP  2. Pt to restore good quad set for full knee ext, normal gait mechanics. 3. Pt to restore at least  AROM to prevent need for manipulation. 4. Pt to demonstrate normal gait mechanics level surfaces  Discharge Goals: Time Frame: 12 weeks  1. Pt to restore 5-120 AROM for normal ADL's without compensation  2. Pt to increase strength for sit to stand without UE assist x 30 reps  3. Pt to increase strength for reciprocal gait on stairs  4. Pt to increase SLS > 15 sec B for safe amb all surfaces  _______________________________________________________________________________  Pre-treatment Symptoms/Complaints: I am stretching constantly. This is discouraging.   Pain: Initial: 3/10 Post Session: no pain with ex's   Medications Last Reviewed:  2021    Updated Objective Findings:      ROM:         AROM knee flex-ext R 108-10, L 98-15 before, 100-12 after  21       Tightness in B gastroc, soleus, hams, quads, piriformis  Strength:         Fair quad set L; L glut med weakness noted with SLS 12/15/21  Functional Mobility:         Gait/Ambulation:  Antalgic with straight cane - foot flat B, side to side trunk motion with exaggerated arm swing         Transfers:  Uses UE assist, trunk momentum, L LE in front        Stairs:  Step-to pattern  Balance:          < 1 sec    KOOS: 9 raw score, 63.776 interval score     TREATMENT:   THERAPEUTIC ACTIVITY: ( see below for minutes): Therapeutic activities per grid below to improve mobility. Required moderate verbal and manual cues to improve functional mobility . THERAPEUTIC EXERCISE: (see below for minutes):  Exercises per grid below to improve strength. Required moderate verbal and manual cues to promote proper body alignment, promote proper body posture, promote proper body mechanics and promote proper body breathing techniques. Progressed resistance, range, repetitions and complexity of movement as indicated. MANUAL THERAPY: (see below for minutes): Joint mobilization and Soft tissue mobilization was utilized and necessary because of the patient's restricted joint motion, painful spasm, loss of articular motion and restricted motion of soft tissue. MODALITIES: (see below for minutes):      for pain modulation    AQUATIC THERAPY (see below for minutes): Aquatic treatment performed per flow grid for Decreased muscle strength, Decreased endurance, Decreased static/dynamic balance and reactive control, Decreased activity endurance, Decompression, Ease of movement and Low impact and reduced weight bearing activity.     Date: 12/14/21 12/15/21  Visit 2 12/17/21  Visit 3 12/20/21  Visit 4 12/22/21 Visit 5 12/28/21 Visit 6 12/30/21 Visit 7    Modalities: 15 mins 10 mins 10 mins 10 mins 10 mins 15 mins  15 mins     Game ready L seated seated seated seated seated seated seated               Manual Therapy:                                 Aquatics Activities:    60 mins 60 mins 60 mins 60 mins    GAIT (F/B/S/M)    3 laps 3 laps 4 laps 4 laps    SLR gait    3 laps 3 laps 4 laps 4 laps    Hamstring Curl gait     3 laps 3 laps 4 laps 4 laps    Rockettes    3 laps 3 laps 4 laps 4 laps    Squats    X 30 X 30 X 30 X 30 on step    Calf raises    X 20 X 20 X 30 X 30    Hamstring stretch B    3 x 15 sec 3 x 15 sec 3 x 15 sec 3 x 15 sec    Piriformis stretch B           Step ups ant B      X 10 X 15    SLS B     X 2 X 2 X 3               Therapeutic Exercises: 30 mins 45 mins 45 mins 10 mins 10 mins 10 mins 10 mins    Pt education, postural education, HEP, functional breathing 15 mins          Slant board bent and straight knee   3 x 15 sec         PFK alone and with overpressure  X 10 X 15 X 15 each X 20 each   X 20 ea    PROM flex stretch L passively By therapist Prone, seated prone  prone prone    Bike for ROM   15 mins  10 mins 10 mins     HEP: see hand out    [employer] Portal  Treatment/Session Summary:    · Response to Treatment:  Pt continues to lose any motion gained during previous PT session. When asked about stretches pt states he thought stretching while seated in a chair was sufficient for flexion. We again reviewed prone knee flexion with over pressure to eliminate hip compensation. Fair effort during ex's with cues necessary for mechanics. Pt splints through trunk and walks with forward flexed posture. Cont POC. · Communication/Consultation:  None today  · Equipment provided today:  None today  · Recommendations/Intent for next treatment session: Next visit will focus on stretching, strengthening, gait, balance, modalities as indicated.     Total Treatment Billable Duration:  85 mins  PT Patient Time In/Time Out  Time In: 1100  Time Out: 1215    Emily Ayoub PTA    Future Appointments   Date Time Provider Susana Victoria   1/3/2022  1:50 PM Sebas Logan MD [facility]   1/4/2022  3:00 PM Carrie Healy, PT [facility]   1/6/2022  3:00 PM Carrie Healy, PT [facility]   1/11/2022  3:00 PM Carrie Healy, PT [facility]   1/12/2022  9:00 AM Davon Estevez DO [facility] TR[facility]   1/13/2022  3:00 PM Carrie Healy, PT [facility]   1/18/2022  3:00 PM Carrie Healy, PT [facility]   1/20/2022  3:00 PM Robin Healy, PT [facility]   1/25/2022  3:00 PM Robin Healy, PT [facility]   1/27/2022  3:00 PM Robin Healy, PT [facility]   2/17/2022  8:40 AM Jose Tolentino NP [facility] PP   2/28/2022  7:08 AM SFD CT 64 SLICE UNIT 1 [facility]DRCT [facility]   3/3/2022  8:10 AM Renu Leal MD [facility] PP PP

## 2022-01-04 ENCOUNTER — HOSPITAL ENCOUNTER (OUTPATIENT)
Dept: PHYSICAL THERAPY | Age: 72
Discharge: HOME | End: 2022-01-04
Payer: MEDICARE

## 2022-01-04 PROCEDURE — 97113 AQUATIC THERAPY/EXERCISES: CPT

## 2022-01-04 PROCEDURE — 97016 VASOPNEUMATIC DEVICE THERAPY: CPT

## 2022-01-04 PROCEDURE — 97110 THERAPEUTIC EXERCISES: CPT

## 2022-01-04 NOTE — PROGRESS NOTES
Elliot Factor  : 1950  Primary: Xiomy Scott Medicare Complete  Secondary:  Trever Garrido @ [address], [address] [place] [address]  [phone]   [phone]      OUTPATIENT PHYSICAL THERAPY: Daily Treatment Note and Progress Note  2022   Pain in right knee (M25.561) and Stiffness of right knee, not elsewhere classified (M25.661) and Muscle weakness (generalized) (M62.81) and Other abnormalities of gait and mobility (R26.89)     21 L TKA  Effective Dates: 2021 TO 3/14/2022 (90 days). Frequency/Duration: 2-3 times a week for 90 Days  GOALS: (Goals have been discussed and agreed upon with patient.)  Short-Term Functional Goals: Time Frame: 4 weeks  1. Pt to report compliance with HEP - MET  2. Pt to restore good quad set for full knee ext, normal gait mechanics. - MET  3. Pt to restore at least  AROM to prevent need for manipulation. - ongoing  4. Pt to demonstrate normal gait mechanics level surfaces - ongoing  Discharge Goals: Time Frame: 12 weeks  1. Pt to restore 5-120 AROM for normal ADL's without compensation - ongoing  2. Pt to increase strength for sit to stand without UE assist x 30 reps - ongoing 20 reps  3. Pt to increase strength for reciprocal gait on stairs - ongoing  4. Pt to increase SLS > 15 sec B for safe amb all surfaces - MET  _______________________________________________________________________________  Pre-treatment Symptoms/Complaints: I finally figured out the laying on my stomach stretch and I have been doing that at home 4-5 times/day and I can feel it doing its thing.   Pain: Initial: 3/10 Post Session: no pain with ex's   Medications Last Reviewed:  2022    Updated Objective Findings:  22    ROM:         AROM knee flex-ext R 108-10, L 108-10 before        Tightness in B gastroc, soleus, hams, quads, piriformis  Strength:         Good quad set L;  R glut max and med weakness with SLS, gait  Functional Mobility:         Gait/Ambulation:  L trunk SB with gait, R LE ER, B trunk/hip flex        Transfers:  WNL x 20 reps with cues        Stairs:  Reciprocal but antalgic  Balance:          19 sec L, 16 sec R    KOOS: 9 raw score, 63.776 interval score  1/4/22:  7 Raw score,  68.284 Interval score     TREATMENT:   THERAPEUTIC ACTIVITY: ( see below for minutes): Therapeutic activities per grid below to improve mobility. Required moderate verbal and manual cues to improve functional mobility . THERAPEUTIC EXERCISE: (see below for minutes):  Exercises per grid below to improve strength. Required moderate verbal and manual cues to promote proper body alignment, promote proper body posture, promote proper body mechanics and promote proper body breathing techniques. Progressed resistance, range, repetitions and complexity of movement as indicated. MANUAL THERAPY: (see below for minutes): Joint mobilization and Soft tissue mobilization was utilized and necessary because of the patient's restricted joint motion, painful spasm, loss of articular motion and restricted motion of soft tissue. MODALITIES: (see below for minutes):      for pain modulation    AQUATIC THERAPY (see below for minutes): Aquatic treatment performed per flow grid for Decreased muscle strength, Decreased endurance, Decreased static/dynamic balance and reactive control, Decreased activity endurance, Decompression, Ease of movement and Low impact and reduced weight bearing activity.     Date: 12/14/21 12/15/21  Visit 2 12/17/21  Visit 3 12/20/21  Visit 4 12/22/21 Visit 5 12/28/21 Visit 6 12/30/21 Visit 7 1/4/22  Visit 8  PN   Modalities: 15 mins 10 mins 10 mins 10 mins 10 mins 15 mins  15 mins  15 mins   Game ready L knee seated seated seated seated seated seated seated seated              Manual Therapy:                                 Aquatics Activities:    60 mins 60 mins 60 mins 60 mins 60 mins   GAIT (F/B/S/M)    3 laps 3 laps 4 laps 4 laps 4 laps SLR gait    3 laps 3 laps 4 laps 4 laps 4 laps   Hamstring Curl gait     3 laps 3 laps 4 laps 4 laps 4 laps   Rockettes    3 laps 3 laps 4 laps 4 laps 4 laps   Squats    X 30 X 30 X 30 X 30 on step X 30  On step   Calf raises    X 20 X 20 X 30 X 30 X 30   Hamstring stretch B    3 x 15 sec 3 x 15 sec 3 x 15 sec 3 x 15 sec 3 x 15 sec   Piriformis stretch B        3 x 15 sec   Step ups ant B      X 10 X 15 X 20   SLS B     X 2 X 2 X 3 X 3              Therapeutic Exercises: 30 mins 45 mins 45 mins 10 mins 10 mins 10 mins 10 mins 10 mins   Pt education, postural education, HEP, functional breathing 15 mins          Bike for ROM   15 mins  10 mins 10 mins  10 mins   HEP: see hand out    [employer] Portal  Treatment/Session Summary:    · Response to Treatment:  Emphasis on hip strength and normalizing gait. Good ROM gains as pt now compliant and understands PKF with overpressure. · Communication/Consultation:  None today  · Equipment provided today:  None today  · Recommendations/Intent for next treatment session: Next visit will focus on stretching, strengthening, gait, balance, modalities as indicated.     Total Treatment Billable Duration:  85 mins  PT Patient Time In/Time Out  Time In: 6439  Time Out: 7000    Gerald Bowens PT    Future Appointments   Date Time Provider Susana Victoria   1/4/2022  3:00 PM Marleny Healyt, PT [facility]   1/6/2022  3:00 PM Marleny Healy Rist, PT [facility]   1/11/2022  3:00 PM Marleny Healy Rist, PT [facility]   1/12/2022  9:00 AM Kavita Groves, DO [facility]   1/13/2022  3:00 PM Marleny Healy Rist, PT [facility]   1/18/2022  3:00 PM Marleny Healy Rist, PT [facility]   1/20/2022  3:00 PM Marleny Healy Rist, PT [facility]   1/25/2022  3:00 PM SnowmassMarleny garcia Rist, PT [facility]   1/27/2022  3:00 PM Marleny Healy, PT [facility]   2/17/2022  8:40 AM Nick Found, NP [facility] PP   2/28/2022  2:98 AM SFD CT 59 SLICE UNIT 1 SFDRCT SFD 3/3/2022  8:10 AM Devika Leal MD [facility] PP PP

## 2022-01-06 ENCOUNTER — HOSPITAL ENCOUNTER (OUTPATIENT)
Dept: PHYSICAL THERAPY | Age: 72
Discharge: HOME | End: 2022-01-06
Payer: MEDICARE

## 2022-01-06 PROCEDURE — 97016 VASOPNEUMATIC DEVICE THERAPY: CPT

## 2022-01-06 PROCEDURE — 97110 THERAPEUTIC EXERCISES: CPT

## 2022-01-06 PROCEDURE — 97113 AQUATIC THERAPY/EXERCISES: CPT

## 2022-01-06 NOTE — PROGRESS NOTES
Elliot Factor  : 1950  Primary: Xiomy Scott Medicare Complete  Secondary:  [address] @ [address], [place].  [phone]   [phone]      OUTPATIENT PHYSICAL THERAPY: Daily Treatment Note  2022   Pain in right knee (M25.561) and Stiffness of right knee, not elsewhere classified (M25.661) and Muscle weakness (generalized) (M62.81) and Other abnormalities of gait and mobility (R26.89)     21 L TKA  Effective Dates: 2021 TO 3/14/2022 (90 days). Frequency/Duration: 2-3 times a week for 90 Days  GOALS: (Goals have been discussed and agreed upon with patient.)  Short-Term Functional Goals: Time Frame: 4 weeks  1. Pt to report compliance with HEP - MET  2. Pt to restore good quad set for full knee ext, normal gait mechanics. - MET  3. Pt to restore at least  AROM to prevent need for manipulation. - ongoing  4. Pt to demonstrate normal gait mechanics level surfaces - ongoing  Discharge Goals: Time Frame: 12 weeks  1. Pt to restore 5-120 AROM for normal ADL's without compensation - ongoing  2. Pt to increase strength for sit to stand without UE assist x 30 reps - ongoing 20 reps  3. Pt to increase strength for reciprocal gait on stairs - ongoing  4. Pt to increase SLS > 15 sec B for safe amb all surfaces - MET  _______________________________________________________________________________  Pre-treatment Symptoms/Complaints: It isn't too bad. Going down stairs is still difficult.    Pain: Initial: 3/10 Post Session: no pain with ex's   Medications Last Reviewed:  2022    Updated Objective Findings:  22    ROM:         AROM knee flex-ext R 108-10, L 108-10 before, 115-10 after 22       Tightness in B gastroc, soleus, hams, quads, piriformis  Strength:         Good quad set L;  R glut max and med weakness with SLS, gait  Functional Mobility:         Gait/Ambulation:  L trunk SB with gait, R LE ER, B trunk/hip flex        Transfers:  WNL x 20 reps with cues        Stairs:  Reciprocal but antalgic  Balance:          19 sec L, 16 sec R    KOOS: 9 raw score, 63.776 interval score  1/4/22:  7 Raw score,  68.284 Interval score     TREATMENT:   THERAPEUTIC ACTIVITY: ( see below for minutes): Therapeutic activities per grid below to improve mobility. Required moderate verbal and manual cues to improve functional mobility . THERAPEUTIC EXERCISE: (see below for minutes):  Exercises per grid below to improve strength. Required moderate verbal and manual cues to promote proper body alignment, promote proper body posture, promote proper body mechanics and promote proper body breathing techniques. Progressed resistance, range, repetitions and complexity of movement as indicated. MANUAL THERAPY: (see below for minutes): Joint mobilization and Soft tissue mobilization was utilized and necessary because of the patient's restricted joint motion, painful spasm, loss of articular motion and restricted motion of soft tissue. MODALITIES: (see below for minutes):      for pain modulation    AQUATIC THERAPY (see below for minutes): Aquatic treatment performed per flow grid for Decreased muscle strength, Decreased endurance, Decreased static/dynamic balance and reactive control, Decreased activity endurance, Decompression, Ease of movement and Low impact and reduced weight bearing activity.     Date: 12/30/21 Visit 7 1/4/22  Visit 8  PN 1/6/22  Visit 9        Modalities: 15 mins  15 mins 15 mins        Game ready L knee seated seated seated                   Manual Therapy:                                 Aquatics Activities: 60 mins 60 mins 45 mins        GAIT (F/B/S/M) 4 laps 4 laps 4 laps        SLR gait 4 laps 4 laps 4 laps        Hamstring Curl gait  4 laps 4 laps 4 laps        Rockettes 4 laps 4 laps 4 laps        Squats X 30 on step X 30  On step X 30  On step        Calf raises X 30 X 30 X 30  On step        Hamstring stretch B 3 x 15 sec 3 x 15 sec 3 x 15 sec        Piriformis stretch B  3 x 15 sec 3 x 15 sec        Step ups ant B X 15 X 20 X 30        SLS B X 3 X 3 X 3                   Therapeutic Exercises: 10 mins 10 mins 10 mins        Pt education, postural education, HEP, functional breathing           Bike for ROM  10 mins 10 mins        HEP: see hand out    [employer] Portal  Treatment/Session Summary:    · Response to Treatment:  Pt maintaining flex gains. Cues needed for ex's. Good progress overall but remains functionally weak in L > R gluts and quads. Continue as indicated. · Communication/Consultation:  None today  · Equipment provided today:  None today  · Recommendations/Intent for next treatment session: Next visit will focus on stretching, strengthening, gait, balance, modalities as indicated.     Total Treatment Billable Duration:  70 mins  PT Patient Time In/Time Out  Time In: 2936  Time Out: [address] Healy, PT    Future Appointments   Date Time Provider Susana Victoria   1/11/2022  3:00 PM Slinger, Manuel Canovanas, PT [facility]   1/12/2022  9:00 AM Luther Stockton DO [facility] TRIM TRIM   1/13/2022  3:00 PM Monet, Jackson Canovanas, PT [facility]   1/18/2022  3:00 PM MonetManuel Canovanas, PT [facility]   1/20/2022  3:00 PM Slinger, Jackson Canovanas, PT [facility]   1/25/2022  3:00 PM Monet, Jackson Canovanas, PT [facility]   1/27/2022  3:00 PM SlingerManuel Canovanas, PT [facility]   2/17/2022  8:40 AM Rimma July, NP [facility] PP   2/28/2022  6:58 AM SFD CT 64 SLICE UNIT 1 SFDRCT SFD   3/3/2022  8:10 AM Chester Leal MD [facility] PP PP

## 2022-01-11 ENCOUNTER — HOSPITAL ENCOUNTER (OUTPATIENT)
Dept: PHYSICAL THERAPY | Age: 72
Discharge: HOME | End: 2022-01-11
Payer: MEDICARE

## 2022-01-11 PROCEDURE — 97110 THERAPEUTIC EXERCISES: CPT

## 2022-01-11 PROCEDURE — 97016 VASOPNEUMATIC DEVICE THERAPY: CPT

## 2022-01-11 PROCEDURE — 97113 AQUATIC THERAPY/EXERCISES: CPT

## 2022-01-11 NOTE — PROGRESS NOTES
Zohra Goldsmith  : 1950  Primary: Jose Scott Medicare Complete  Secondary:  [address] [facility] @ [address] [place] [address]  [phone]   [phone]      OUTPATIENT PHYSICAL THERAPY: Daily Treatment Note  2022   Pain in right knee (M25.561) and Stiffness of right knee, not elsewhere classified (M25.661) and Muscle weakness (generalized) (M62.81) and Other abnormalities of gait and mobility (R26.89)     21 L TKA  Effective Dates: 2021 TO 3/14/2022 (90 days). Frequency/Duration: 2-3 times a week for 90 Days  GOALS: (Goals have been discussed and agreed upon with patient.)  Short-Term Functional Goals: Time Frame: 4 weeks  1. Pt to report compliance with HEP - MET  2. Pt to restore good quad set for full knee ext, normal gait mechanics. - MET  3. Pt to restore at least  AROM to prevent need for manipulation. - ongoing  4. Pt to demonstrate normal gait mechanics level surfaces - ongoing  Discharge Goals: Time Frame: 12 weeks  1. Pt to restore 5-120 AROM for normal ADL's without compensation - ongoing  2. Pt to increase strength for sit to stand without UE assist x 30 reps - ongoing 20 reps  3. Pt to increase strength for reciprocal gait on stairs - ongoing  4. Pt to increase SLS > 15 sec B for safe amb all surfaces - MET  _______________________________________________________________________________  Pre-treatment Symptoms/Complaints: I am disappointed in the motion. I thought I was stretching enough at home.   Pain: Initial: 3/10 Post Session: no pain with ex's   Medications Last Reviewed:  2022    Updated Objective Findings:  22    ROM:         AROM knee flex-ext R 108-10, L 100-10 before, 115-10 after 22       Tightness in B gastroc, soleus, hams, quads, piriformis  Strength:         Good quad set L;  R glut max and med weakness with SLS, gait  Functional Mobility:         Gait/Ambulation:  L trunk SB with gait, R LE ER, B trunk/hip flex        Transfers:  WNL x 20 reps with cues        Stairs:  Reciprocal but antalgic  Balance:          19 sec L, 16 sec R    KOOS: 9 raw score, 63.776 interval score  1/4/22:  7 Raw score,  68.284 Interval score     TREATMENT:   THERAPEUTIC ACTIVITY: ( see below for minutes): Therapeutic activities per grid below to improve mobility. Required moderate verbal and manual cues to improve functional mobility . THERAPEUTIC EXERCISE: (see below for minutes):  Exercises per grid below to improve strength. Required moderate verbal and manual cues to promote proper body alignment, promote proper body posture, promote proper body mechanics and promote proper body breathing techniques. Progressed resistance, range, repetitions and complexity of movement as indicated. MANUAL THERAPY: (see below for minutes): Joint mobilization and Soft tissue mobilization was utilized and necessary because of the patient's restricted joint motion, painful spasm, loss of articular motion and restricted motion of soft tissue. MODALITIES: (see below for minutes):      for pain modulation    AQUATIC THERAPY (see below for minutes): Aquatic treatment performed per flow grid for Decreased muscle strength, Decreased endurance, Decreased static/dynamic balance and reactive control, Decreased activity endurance, Decompression, Ease of movement and Low impact and reduced weight bearing activity.     Date: 12/30/21 Visit 7 1/4/22  Visit 8  PN 1/6/22  Visit 9 1/11/22  Visit 10       Modalities: 15 mins  15 mins 15 mins 15 mins       Game ready L knee seated seated seated seated                  Manual Therapy:                                 Aquatics Activities: 60 mins 60 mins 45 mins 45 mins       GAIT (F/B/S/M) 4 laps 4 laps 4 laps 4 laps       SLR gait 4 laps 4 laps 4 laps 4 laps       Hamstring Curl gait  4 laps 4 laps 4 laps 4 laps       Rockettes 4 laps 4 laps 4 laps 4 laps       Squats X 30 on step X 30  On step X 30  On step X 30  On step       Calf raises X 30 X 30 X 30  On step X 30  On step       Hamstring stretch B 3 x 15 sec 3 x 15 sec 3 x 15 sec 3 x 15 sec       Piriformis stretch B  3 x 15 sec 3 x 15 sec 3 x 15 sec       Step ups ant B X 15 X 20 X 30 X 30       SLS B X 3 X 3 X 3 X 3                  Therapeutic Exercises: 10 mins 10 mins 10 mins 15 mins       Pt education, postural education, HEP, functional breathing           H/K rocking back    5 mins       Bike for ROM  10 mins 10 mins 10 mins       HEP: see hand out    [employer] Portal  Treatment/Session Summary:    · Response to Treatment:  Pt had lost 8 degrees flex ROM but was able to regain back to 115 after stretching. Added H/K rocking back to HEP so pt can increase leverage for stretching. Continue as indicated. · Communication/Consultation:  None today  · Equipment provided today:  None today  · Recommendations/Intent for next treatment session: Next visit will focus on stretching, strengthening, gait, balance, modalities as indicated.     Total Treatment Billable Duration:  75 mins  PT Patient Time In/Time Out  Time In: 2398  Time Out: [address], PT    Future Appointments   Date Time Provider Susana Victoria   1/12/2022  9:00 AM Conor VARGAS DO [facility] TRIM TRIM   1/13/2022  3:00 PM Teton, Alize Neither, PT [facility]   1/18/2022  3:00 PM Monet, Alize Neither, PT [facility]   1/20/2022  3:00 PM Monet, Alize Neither, PT [facility]   1/25/2022  3:00 PM Teton, Alize Neither, PT [facility]   1/27/2022  3:00 PM Caroline Francisco, PTA [facility]   2/17/2022  8:40 AM Tete Samuel NP [facility] PP   2/28/2022  3:64 AM SFD CT 64 SLICE UNIT 1 SFDRCT SFD   3/3/2022  8:10 AM Ella Leal MD [facility] PP PP

## 2022-01-13 ENCOUNTER — HOSPITAL ENCOUNTER (OUTPATIENT)
Dept: PHYSICAL THERAPY | Age: 72
Discharge: HOME | End: 2022-01-13
Payer: MEDICARE

## 2022-01-13 PROCEDURE — 97016 VASOPNEUMATIC DEVICE THERAPY: CPT

## 2022-01-13 PROCEDURE — 97110 THERAPEUTIC EXERCISES: CPT

## 2022-01-13 PROCEDURE — 97113 AQUATIC THERAPY/EXERCISES: CPT

## 2022-01-13 NOTE — PROGRESS NOTES
Marleen Almodovar  : 1950  Primary: Leslie Scott Medicare Complete  Secondary:  [address] [place] @ [address], [place].  [phone]   [phone]      OUTPATIENT PHYSICAL THERAPY: Daily Treatment Note  2022   Pain in right knee (M25.561) and Stiffness of right knee, not elsewhere classified (M25.661) and Muscle weakness (generalized) (M62.81) and Other abnormalities of gait and mobility (R26.89)     21 L TKA  Effective Dates: 2021 TO 3/14/2022 (90 days). Frequency/Duration: 2-3 times a week for 90 Days  GOALS: (Goals have been discussed and agreed upon with patient.)  Short-Term Functional Goals: Time Frame: 4 weeks  1. Pt to report compliance with HEP - MET  2. Pt to restore good quad set for full knee ext, normal gait mechanics. - MET  3. Pt to restore at least  AROM to prevent need for manipulation. - ongoing  4. Pt to demonstrate normal gait mechanics level surfaces - ongoing  Discharge Goals: Time Frame: 12 weeks  1. Pt to restore 5-120 AROM for normal ADL's without compensation - ongoing  2. Pt to increase strength for sit to stand without UE assist x 30 reps - ongoing 20 reps  3. Pt to increase strength for reciprocal gait on stairs - ongoing  4. Pt to increase SLS > 15 sec B for safe amb all surfaces - MET  _______________________________________________________________________________  Pre-treatment Symptoms/Complaints: I have found the hands/knees stretch to be the best way for me to really stretch it. I am finally starting to sleep better. Pain: Initial: 3/10 Post Session: no pain with ex's. It is affirming to see the motion get better.     Medications Last Reviewed:  2022    Updated Objective Findings:  22    ROM:         AROM knee flex-ext R 108-10, L 115-10 before, 117-10 after 22       Tightness in B gastroc, soleus, hams, quads, piriformis  Strength:         Good quad set L;  R glut max and med weakness with SLS, gait  Functional Mobility:         Gait/Ambulation:  L trunk SB with gait, R LE ER, B trunk/hip flex        Transfers:  WNL x 20 reps with cues        Stairs:  Reciprocal but antalgic  Balance:          19 sec L, 16 sec R    KOOS: 9 raw score, 63.776 interval score  1/4/22:  7 Raw score,  68.284 Interval score     TREATMENT:   THERAPEUTIC ACTIVITY: ( see below for minutes): Therapeutic activities per grid below to improve mobility. Required moderate verbal and manual cues to improve functional mobility . THERAPEUTIC EXERCISE: (see below for minutes):  Exercises per grid below to improve strength. Required moderate verbal and manual cues to promote proper body alignment, promote proper body posture, promote proper body mechanics and promote proper body breathing techniques. Progressed resistance, range, repetitions and complexity of movement as indicated. MANUAL THERAPY: (see below for minutes): Joint mobilization and Soft tissue mobilization was utilized and necessary because of the patient's restricted joint motion, painful spasm, loss of articular motion and restricted motion of soft tissue. MODALITIES: (see below for minutes):      for pain modulation    AQUATIC THERAPY (see below for minutes): Aquatic treatment performed per flow grid for Decreased muscle strength, Decreased endurance, Decreased static/dynamic balance and reactive control, Decreased activity endurance, Decompression, Ease of movement and Low impact and reduced weight bearing activity.     Date: 12/30/21 Visit 7 1/4/22  Visit 8  PN 1/6/22  Visit 9 1/11/22  Visit 10 1/13/22  Visit 11      Modalities: 15 mins  15 mins 15 mins 15 mins 15 mins      Game ready L knee seated seated seated seated seated                 Manual Therapy:                                 Aquatics Activities: 60 mins 60 mins 45 mins 45 mins 45 mins      GAIT (F/B/S/M) 4 laps 4 laps 4 laps 4 laps 4 laps      SLR gait 4 laps 4 laps 4 laps 4 laps 4 laps      Hamstring Curl gait  4 laps 4 laps 4 laps 4 laps 4 laps      Rockettes 4 laps 4 laps 4 laps 4 laps 4 laps      Squats X 30 on step X 30  On step X 30  On step X 30  On step X 30  On step      Calf raises X 30 X 30 X 30  On step X 30  On step X 30  On step      Hamstring stretch B 3 x 15 sec 3 x 15 sec 3 x 15 sec 3 x 15 sec 3 x 15 sec      Piriformis stretch B  3 x 15 sec 3 x 15 sec 3 x 15 sec 3 x 15 sec      Step ups ant B X 15 X 20 X 30 X 30 X 30      SLS B X 3 X 3 X 3 X 3 X 3                 Therapeutic Exercises: 10 mins 10 mins 10 mins 15 mins 10 mins      Pt education, postural education, HEP, functional breathing           H/K rocking back    5 mins       Bike for ROM  10 mins 10 mins 10 mins 10 mins      HEP: see hand out    [employer] Portal  Treatment/Session Summary:    · Response to Treatment:  Excellent progress with ROM. Good effort and atul to ex's. Continue as indicated. · Communication/Consultation:  None today  · Equipment provided today:  None today  · Recommendations/Intent for next treatment session: Next visit will focus on stretching, strengthening, gait, balance, modalities as indicated.     Total Treatment Billable Duration:  70 mins  PT Patient Time In/Time Out  Time In: 0300  Time Out: [facility] PT    Future Appointments   Date Time Provider Susana Victoria   1/18/2022  3:00 PM Amina Healy, PT [facility]   1/20/2022  3:00 PM Amina Healy, PT [facility]   1/25/2022  3:00 PM Amina Healy, PT [facility]   1/27/2022  3:00 PM Marci Francisco, PTA [facility]   2/17/2022  8:40 AM Edgar Coronel NP [facility] PSCD PP   2/28/2022  6:23 AM SFD CT 64 SLICE UNIT 1 SFDRCT SFD   3/3/2022  8:10 AM Lily Lott MD [facility] PP PP   7/11/2022  8:45 AM Brothers, Damian Cabot,  [facility]A TRIM TRIM

## 2022-01-18 ENCOUNTER — HOSPITAL ENCOUNTER (OUTPATIENT)
Dept: PHYSICAL THERAPY | Age: 72
Discharge: HOME | End: 2022-01-18
Payer: MEDICARE

## 2022-01-18 PROCEDURE — 97110 THERAPEUTIC EXERCISES: CPT

## 2022-01-18 PROCEDURE — 97113 AQUATIC THERAPY/EXERCISES: CPT

## 2022-01-18 PROCEDURE — 97016 VASOPNEUMATIC DEVICE THERAPY: CPT

## 2022-01-18 NOTE — PROGRESS NOTES
Elicia Strong  : 1950  Primary: Girish Scott Medicare Complete  Secondary:  [address] @ [address], [facility] [place].  [phone]   [phone]      OUTPATIENT PHYSICAL THERAPY: Daily Treatment Note  2022   Pain in right knee (M25.561) and Stiffness of right knee, not elsewhere classified (M25.661) and Muscle weakness (generalized) (M62.81) and Other abnormalities of gait and mobility (R26.89)     21 L TKA  Effective Dates: 2021 TO 3/14/2022 (90 days). Frequency/Duration: 2-3 times a week for 90 Days  GOALS: (Goals have been discussed and agreed upon with patient.)  Short-Term Functional Goals: Time Frame: 4 weeks  1. Pt to report compliance with HEP - MET  2. Pt to restore good quad set for full knee ext, normal gait mechanics. - MET  3. Pt to restore at least  AROM to prevent need for manipulation. - ongoing  4. Pt to demonstrate normal gait mechanics level surfaces - ongoing  Discharge Goals: Time Frame: 12 weeks  1. Pt to restore 5-120 AROM for normal ADL's without compensation - ongoing  2. Pt to increase strength for sit to stand without UE assist x 30 reps - ongoing 20 reps  3. Pt to increase strength for reciprocal gait on stairs - ongoing  4. Pt to increase SLS > 15 sec B for safe amb all surfaces - MET  _______________________________________________________________________________  Pre-treatment Symptoms/Complaints: I guess if it hurts I can tell that I'm stretching it correctly. Pain: Initial: 3/10 Post Session: This is fine.     Medications Last Reviewed:  2022    Updated Objective Findings:  22    ROM:         AROM knee flex-ext R 108-10, L 115-10 before, 117-10 after 22       Tightness in B gastroc, soleus, hams, quads, piriformis  Strength:         Good quad set L;  R glut max and med weakness with SLS, gait  Functional Mobility:         Gait/Ambulation:  L trunk SB with gait, R LE ER, B trunk/hip flex        Transfers:  WNL x 20 reps with cues        Stairs:  Reciprocal but antalgic  Balance:          19 sec L, 16 sec R    KOOS: 9 raw score, 63.776 interval score  1/4/22:  7 Raw score,  68.284 Interval score     TREATMENT:   THERAPEUTIC ACTIVITY: ( see below for minutes): Therapeutic activities per grid below to improve mobility. Required moderate verbal and manual cues to improve functional mobility . THERAPEUTIC EXERCISE: (see below for minutes):  Exercises per grid below to improve strength. Required moderate verbal and manual cues to promote proper body alignment, promote proper body posture, promote proper body mechanics and promote proper body breathing techniques. Progressed resistance, range, repetitions and complexity of movement as indicated. MANUAL THERAPY: (see below for minutes): Joint mobilization and Soft tissue mobilization was utilized and necessary because of the patient's restricted joint motion, painful spasm, loss of articular motion and restricted motion of soft tissue. MODALITIES: (see below for minutes):      for pain modulation    AQUATIC THERAPY (see below for minutes): Aquatic treatment performed per flow grid for Decreased muscle strength, Decreased endurance, Decreased static/dynamic balance and reactive control, Decreased activity endurance, Decompression, Ease of movement and Low impact and reduced weight bearing activity.     Date: 12/30/21 Visit 7 1/4/22  Visit 8  PN 1/6/22  Visit 9 1/11/22  Visit 10 1/13/22  Visit 11 1/18/22 Visit 12     Modalities: 15 mins  15 mins 15 mins 15 mins 15 mins 15 mins     Game ready L knee seated seated seated seated seated seated                Manual Therapy:                                 Aquatics Activities: 60 mins 60 mins 45 mins 45 mins 45 mins 45 mins     GAIT (F/B/S/M) 4 laps 4 laps 4 laps 4 laps 4 laps 4 laps      SLR gait 4 laps 4 laps 4 laps 4 laps 4 laps 4 laps     Hamstring Curl gait  4 laps 4 laps 4 laps 4 laps 4 laps 4 laps     Rockettes 4 laps 4 laps 4 laps 4 laps 4 laps 4 laps     Squats X 30 on step X 30  On step X 30  On step X 30  On step X 30  On step X 30 on step     Calf raises X 30 X 30 X 30  On step X 30  On step X 30  On step X 30 on step     Hamstring stretch B 3 x 15 sec 3 x 15 sec 3 x 15 sec 3 x 15 sec 3 x 15 sec 3 x 15 sec     Piriformis stretch B  3 x 15 sec 3 x 15 sec 3 x 15 sec 3 x 15 sec 3 x 15 sec     Step ups ant B X 15 X 20 X 30 X 30 X 30 X 30     SLS B X 3 X 3 X 3 X 3 X 3 X 3                Therapeutic Exercises: 10 mins 10 mins 10 mins 15 mins 10 mins 10 mins     Pt education, postural education, HEP, functional breathing           H/K rocking back    5 mins       Bike for ROM  10 mins 10 mins 10 mins 10 mins 10 mins     HEP: see hand out    [employer] Portal  Treatment/Session Summary:    · Response to Treatment:  Most motion maintained, 2 deg restored with bike. Good effort and atul to ex's. Continue as indicated. · Communication/Consultation:  None today  · Equipment provided today:  None today  · Recommendations/Intent for next treatment session: Next visit will focus on stretching, strengthening, gait, balance, modalities as indicated.     Total Treatment Billable Duration:  70 mins  PT Patient Time In/Time Out  Time In: 2521  Time Out: [address] [place] [address]    Future Appointments   Date Time Provider Susana Victoria   1/20/2022  3:00 PM Santino Draper PTA [facility]   1/25/2022  3:00 PM Daniel Healy, PT [facility]   1/27/2022  7:00 PM Daniel Healy, PT [facility]   2/17/2022  8:40 AM Chelo Lamar NP [facility] PSCD PP   2/28/2022  3:77 AM SFD CT 64 SLICE UNIT 1 SFDRCT SFD   3/3/2022  8:10 AM Kaylie Pinzon MD [facility] PP PP   7/11/2022  8:45 AM Cayla Gonzales DO [facility] TRIM TRIM

## 2022-01-20 ENCOUNTER — HOSPITAL ENCOUNTER (OUTPATIENT)
Dept: PHYSICAL THERAPY | Age: 72
Discharge: HOME | End: 2022-01-20
Payer: MEDICARE

## 2022-01-20 PROCEDURE — 97016 VASOPNEUMATIC DEVICE THERAPY: CPT

## 2022-01-20 PROCEDURE — 97113 AQUATIC THERAPY/EXERCISES: CPT

## 2022-01-20 PROCEDURE — 97110 THERAPEUTIC EXERCISES: CPT

## 2022-01-20 NOTE — PROGRESS NOTES
Tu Zhang  : 1950  Primary: Adele Scott Medicare Complete  Secondary:  Jacqueline Rahman @ [place] [address].  [phone]   [phone]      OUTPATIENT PHYSICAL THERAPY: Daily Treatment Note  2022   Pain in right knee (M25.561) and Stiffness of right knee, not elsewhere classified (M25.661) and Muscle weakness (generalized) (M62.81) and Other abnormalities of gait and mobility (R26.89)     21 L TKA  Effective Dates: 2021 TO 3/14/2022 (90 days). Frequency/Duration: 2-3 times a week for 90 Days  GOALS: (Goals have been discussed and agreed upon with patient.)  Short-Term Functional Goals: Time Frame: 4 weeks  1. Pt to report compliance with HEP - MET  2. Pt to restore good quad set for full knee ext, normal gait mechanics. - MET  3. Pt to restore at least  AROM to prevent need for manipulation. - ongoing  4. Pt to demonstrate normal gait mechanics level surfaces - ongoing  Discharge Goals: Time Frame: 12 weeks  1. Pt to restore 5-120 AROM for normal ADL's without compensation - ongoing  2. Pt to increase strength for sit to stand without UE assist x 30 reps - ongoing 20 reps  3. Pt to increase strength for reciprocal gait on stairs - ongoing  4. Pt to increase SLS > 15 sec B for safe amb all surfaces - MET  _______________________________________________________________________________  Pre-treatment Symptoms/Complaints: A little sore, it feels tight. Pain: Initial: 3/10 The bike made my knee hurt last time. Post Session: 1/10 Better after the exercises, actually.    Medications Last Reviewed:  2022    Updated Objective Findings:  22    ROM:         AROM knee flex-ext R 108-10, L 110-10 before, 117-10 after 22       Tightness in B gastroc, soleus, hams, quads, piriformis  Strength:         Good quad set L;  R glut max and med weakness with SLS, gait  Functional Mobility:         Gait/Ambulation:  L trunk SB with gait, R LE ER, B trunk/hip flex        Transfers:  WNL x 20 reps with cues        Stairs:  Reciprocal but antalgic  Balance:          19 sec L, 16 sec R    KOOS: 9 raw score, 63.776 interval score  1/4/22:  7 Raw score,  68.284 Interval score     TREATMENT:   THERAPEUTIC ACTIVITY: ( see below for minutes): Therapeutic activities per grid below to improve mobility. Required moderate verbal and manual cues to improve functional mobility . THERAPEUTIC EXERCISE: (see below for minutes):  Exercises per grid below to improve strength. Required moderate verbal and manual cues to promote proper body alignment, promote proper body posture, promote proper body mechanics and promote proper body breathing techniques. Progressed resistance, range, repetitions and complexity of movement as indicated. MANUAL THERAPY: (see below for minutes): Joint mobilization and Soft tissue mobilization was utilized and necessary because of the patient's restricted joint motion, painful spasm, loss of articular motion and restricted motion of soft tissue. MODALITIES: (see below for minutes):      for pain modulation    AQUATIC THERAPY (see below for minutes): Aquatic treatment performed per flow grid for Decreased muscle strength, Decreased endurance, Decreased static/dynamic balance and reactive control, Decreased activity endurance, Decompression, Ease of movement and Low impact and reduced weight bearing activity.     Date: 12/30/21 Visit 7 1/4/22  Visit 8  PN 1/6/22  Visit 9 1/11/22  Visit 10 1/13/22  Visit 11 1/18/22 Visit 12 1/20/22 Visit 13    Modalities: 15 mins  15 mins 15 mins 15 mins 15 mins 15 mins 15 mins    Game ready L knee seated seated seated seated seated seated seated               Manual Therapy:                                 Aquatics Activities: 60 mins 60 mins 45 mins 45 mins 45 mins 45 mins 45 mins    GAIT (F/B/S/M) 4 laps 4 laps 4 laps 4 laps 4 laps 4 laps  4 laps    SLR gait 4 laps 4 laps 4 laps 4 laps 4 laps 4 laps 4 laps    Hamstring Curl gait  4 laps 4 laps 4 laps 4 laps 4 laps 4 laps 4 laps    Rockettes 4 laps 4 laps 4 laps 4 laps 4 laps 4 laps 4 laps    Squats X 30 on step X 30  On step X 30  On step X 30  On step X 30  On step X 30 on step X 30 on step    Calf raises X 30 X 30 X 30  On step X 30  On step X 30  On step X 30 on step X 30 on step    Hamstring stretch B 3 x 15 sec 3 x 15 sec 3 x 15 sec 3 x 15 sec 3 x 15 sec 3 x 15 sec 3 x 15 sec    Piriformis stretch B  3 x 15 sec 3 x 15 sec 3 x 15 sec 3 x 15 sec 3 x 15 sec 3 x 15 sec    Step ups ant B X 15 X 20 X 30 X 30 X 30 X 30 X 30    SLS B X 3 X 3 X 3 X 3 X 3 X 3 X 3               Therapeutic Exercises: 10 mins 10 mins 10 mins 15 mins 10 mins 10 mins 10 mins    Pt education, postural education, HEP, functional breathing       review    H/K rocking back    5 mins   10 mins    Bike for ROM  10 mins 10 mins 10 mins 10 mins 10 mins     HEP: see hand out    [employer] Portal  Treatment/Session Summary:    · Response to Treatment:  Some motion lost, able to regain with H/K rocking stretch. Good effort. Cont POC. · Communication/Consultation:  None today  · Equipment provided today:  None today  · Recommendations/Intent for next treatment session: Next visit will focus on stretching, strengthening, gait, balance, modalities as indicated.     Total Treatment Billable Duration:  70 mins  PT Patient Time In/Time Out  Time In: 0300  Time Out: 0420    Maria M Muñiz PTA    Future Appointments   Date Time Provider Susana Victoria   1/25/2022  3:00 PM Channing Healy, PT [facility]   1/27/2022  7:00 PM Channing Healy, PT [facility]   2/1/2022  3:00 PM Chichi Francisco, PTA [facility]   2/3/2022  3:00 PM Yue Francisco PTA [facility]   2/8/2022  3:00 PM Channing Healy, PT [facility]   2/10/2022  3:00 PM Chichi Francisco, JACOB [facility]   2/15/2022  3:00 PM Channing Healy, PT [facility]   2/17/2022  8:40 AM Rimma July, LISA [facility] PSCD PP   2/17/2022  3:00 PM Trang Francisco Splinter, PTA [facility]   2/22/2022  3:00 PM Trang Francisco Splinter, PTA [facility]   2/24/2022  3:00 PM Manuel Healy, PT [facility]   2/28/2022  7:93 AM SFD CT 64 SLICE UNIT 1 SFDRCT SFD   3/3/2022  8:10 AM Chester Leal MD SSA PP PP   7/11/2022  8:45 AM Mar Gonzales, DO [facility] TRIM TRIM

## 2022-01-25 ENCOUNTER — HOSPITAL ENCOUNTER (OUTPATIENT)
Dept: PHYSICAL THERAPY | Age: 72
Discharge: HOME | End: 2022-01-25
Payer: MEDICARE

## 2022-01-25 PROCEDURE — 97016 VASOPNEUMATIC DEVICE THERAPY: CPT

## 2022-01-25 PROCEDURE — 97113 AQUATIC THERAPY/EXERCISES: CPT

## 2022-01-25 PROCEDURE — 97110 THERAPEUTIC EXERCISES: CPT

## 2022-01-25 NOTE — PROGRESS NOTES
Brenda Campuzano  : 1950  Primary: Margot Marrero Aarp Medicare Complete  Secondary:  [address] @ [address]  [address] [address].  [phone]   [phone]      OUTPATIENT PHYSICAL THERAPY: Daily Treatment Note  2022   Pain in right knee (M25.561) and Stiffness of right knee, not elsewhere classified (M25.661) and Muscle weakness (generalized) (M62.81) and Other abnormalities of gait and mobility (R26.89)     21 L TKA  Effective Dates: 2021 TO 3/14/2022 (90 days). Frequency/Duration: 2-3 times a week for 90 Days  GOALS: (Goals have been discussed and agreed upon with patient.)  Short-Term Functional Goals: Time Frame: 4 weeks  1. Pt to report compliance with HEP - MET  2. Pt to restore good quad set for full knee ext, normal gait mechanics. - MET  3. Pt to restore at least  AROM to prevent need for manipulation. - ongoing  4. Pt to demonstrate normal gait mechanics level surfaces - ongoing  Discharge Goals: Time Frame: 12 weeks  1. Pt to restore 5-120 AROM for normal ADL's without compensation - ongoing  2. Pt to increase strength for sit to stand without UE assist x 30 reps - ongoing 20 reps  3. Pt to increase strength for reciprocal gait on stairs - ongoing  4. Pt to increase SLS > 15 sec B for safe amb all surfaces - MET  _______________________________________________________________________________  Pre-treatment Symptoms/Complaints:  I think it seems to be pretty good.   Pain: Initial: 3/10  Post Session: no increased pain with ex's   Medications Last Reviewed:  2022    Updated Objective Findings:  22    ROM:         AROM knee flex-ext R 108-10, L 115-10 before, 120-10 after 22       Tightness in B gastroc, soleus, hams, quads, piriformis  Strength:         Good quad set L;  R glut max and med weakness with SLS, gait  Functional Mobility:         Gait/Ambulation:  L trunk SB with gait, R LE ER, B trunk/hip flex Transfers:  WNL x 20 reps with cues        Stairs:  Reciprocal but antalgic  Balance:          19 sec L, 16 sec R    KOOS: 9 raw score, 63.776 interval score  1/4/22:  7 Raw score,  68.284 Interval score     TREATMENT:   THERAPEUTIC ACTIVITY: ( see below for minutes): Therapeutic activities per grid below to improve mobility. Required moderate verbal and manual cues to improve functional mobility . THERAPEUTIC EXERCISE: (see below for minutes):  Exercises per grid below to improve strength. Required moderate verbal and manual cues to promote proper body alignment, promote proper body posture, promote proper body mechanics and promote proper body breathing techniques. Progressed resistance, range, repetitions and complexity of movement as indicated. MANUAL THERAPY: (see below for minutes): Joint mobilization and Soft tissue mobilization was utilized and necessary because of the patient's restricted joint motion, painful spasm, loss of articular motion and restricted motion of soft tissue. MODALITIES: (see below for minutes):      for pain modulation    AQUATIC THERAPY (see below for minutes): Aquatic treatment performed per flow grid for Decreased muscle strength, Decreased endurance, Decreased static/dynamic balance and reactive control, Decreased activity endurance, Decompression, Ease of movement and Low impact and reduced weight bearing activity.     Date: 1/4/22  Visit 8  PN 1/6/22  Visit 9 1/11/22  Visit 10 1/13/22  Visit 11 1/18/22 Visit 12 1/20/22 Visit 13 1/25/22  Visit 14    Modalities: 15 mins 15 mins 15 mins 15 mins 15 mins 15 mins 15 mins    Game ready L knee seated seated seated seated seated seated seated               Manual Therapy:                                 Aquatics Activities: 60 mins 45 mins 45 mins 45 mins 45 mins 45 mins 45 mins    GAIT (F/B/S/M) 4 laps 4 laps 4 laps 4 laps 4 laps  4 laps 4 laps    SLR gait 4 laps 4 laps 4 laps 4 laps 4 laps 4 laps 4 laps    Hamstring Curl gait 4 laps 4 laps 4 laps 4 laps 4 laps 4 laps 4 laps    Rockettes 4 laps 4 laps 4 laps 4 laps 4 laps 4 laps 4 laps    Squats X 30  On step X 30  On step X 30  On step X 30  On step X 30 on step X 30 on step X 30  On step    Calf raises X 30 X 30  On step X 30  On step X 30  On step X 30 on step X 30 on step X 30  On step    Hamstring stretch B 3 x 15 sec 3 x 15 sec 3 x 15 sec 3 x 15 sec 3 x 15 sec 3 x 15 sec 3 x 15 sec    Piriformis stretch B 3 x 15 sec 3 x 15 sec 3 x 15 sec 3 x 15 sec 3 x 15 sec 3 x 15 sec 3 x 15 sec    Step ups ant B X 20 X 30 X 30 X 30 X 30 X 30 X 30    SLS B X 3 X 3 X 3 X 3 X 3 X 3 X 3               Therapeutic Exercises: 10 mins 10 mins 15 mins 10 mins 10 mins 10 mins 10 mins    Pt education, postural education, HEP, functional breathing      review     H/K rocking back   5 mins   10 mins     Bike for ROM 10 mins 10 mins 10 mins 10 mins 10 mins  10 mins    HEP: see hand out    [employer] Portal  Treatment/Session Summary:    · Response to Treatment:  Good ROM before and after stretching today. More emphasis on functional strength with squats, step ups now. Continue as indicated. · Communication/Consultation:  None today  · Equipment provided today:  None today  · Recommendations/Intent for next treatment session: Next visit will focus on stretching, strengthening, gait, balance, modalities as indicated.     Total Treatment Billable Duration:  70 mins  PT Patient Time In/Time Out  Time In: 0300  Time Out: 0707 [facility] PT    Future Appointments   Date Time Provider Susana Victoria   1/27/2022  7:00 PM Norma Healy, PT [facility]   2/1/2022  3:00 PM Leighton Francisco PTA [facility]   2/3/2022  3:00 PM Yue Francisco PTA [facility]   2/8/2022  3:00 PM Norma Healy, PT [facility]   2/10/2022  3:00 PM Leighton Francisco, PTA [facility]   2/15/2022  3:00 PM [facility], PT [facility]   2/17/2022  8:40 AM Jesse Leone NP [facility] PSCD PP 2/17/2022  3:00 PM Nolan, Fayrene Countess, PTA [facility]   2/22/2022  3:00 PM Nolan, Fayrene Countess, PTA [facility]   2/24/2022  3:00 PM Monet, Erasmo Dunbar, PT [facility]   2/28/2022  4:57 AM SFD CT 64 SLICE UNIT 1 [facility]   3/3/2022  8:10 AM Elizabeth Leal MD [facility]SA PP PP   7/11/2022  8:45 AM Nasreen Gonzales,  SSA TRIM TRIM

## 2022-01-27 ENCOUNTER — HOSPITAL ENCOUNTER (OUTPATIENT)
Dept: PHYSICAL THERAPY | Age: 72
Discharge: HOME | End: 2022-01-27
Payer: MEDICARE

## 2022-02-01 ENCOUNTER — HOSPITAL ENCOUNTER (OUTPATIENT)
Dept: PHYSICAL THERAPY | Age: 72
Discharge: HOME | End: 2022-02-01
Payer: MEDICARE

## 2022-02-03 ENCOUNTER — HOSPITAL ENCOUNTER (OUTPATIENT)
Dept: PHYSICAL THERAPY | Age: 72
Discharge: HOME | End: 2022-02-03
Payer: MEDICARE

## 2022-02-03 PROCEDURE — 97016 VASOPNEUMATIC DEVICE THERAPY: CPT

## 2022-02-03 PROCEDURE — 97113 AQUATIC THERAPY/EXERCISES: CPT

## 2022-02-08 ENCOUNTER — HOSPITAL ENCOUNTER (OUTPATIENT)
Dept: PHYSICAL THERAPY | Age: 72
Discharge: HOME | End: 2022-02-08
Payer: MEDICARE

## 2022-02-08 PROCEDURE — 97016 VASOPNEUMATIC DEVICE THERAPY: CPT

## 2022-02-08 PROCEDURE — 97113 AQUATIC THERAPY/EXERCISES: CPT

## 2022-02-08 NOTE — THERAPY DISCHARGE
Anna Bansal  : 1950  Primary: Velvetkatiadevon Garay Aarp Medicare Complete  Secondary:  Geronimo Michael @ [address] [address].  [phone]   [phone]      OUTPATIENT PHYSICAL THERAPY: Daily Treatment Note and Progress Note/Discharge Summary 2022   Pain in right knee (M25.561) and Stiffness of right knee, not elsewhere classified (M25.661) and Muscle weakness (generalized) (M62.81) and Other abnormalities of gait and mobility (R26.89)     21 L TKA  Effective Dates: 2021 TO 3/14/2022 (90 days). Frequency/Duration: 2-3 times a week for 90 Days  GOALS: (Goals have been discussed and agreed upon with patient.)  Short-Term Functional Goals: Time Frame: 4 weeks  1. Pt to report compliance with HEP - MET  2. Pt to restore good quad set for full knee ext, normal gait mechanics. - MET  3. Pt to restore at least  AROM to prevent need for manipulation. - MET  4. Pt to demonstrate normal gait mechanics level surfaces - ongoing  Discharge Goals: Time Frame: 12 weeks  1. Pt to restore 5-120 AROM for normal ADL's without compensation - MET  2. Pt to increase strength for sit to stand without UE assist x 30 reps - MET  3. Pt to increase strength for reciprocal gait on stairs - MET  4. Pt to increase SLS > 15 sec B for safe amb all surfaces - MET  _______________________________________________________________________________  Pre-treatment Symptoms/Complaints:  I feel like I am even better than before surgery. Pain: Initial: 0/10  Post Session: no increased pain with ex's   Medications Last Reviewed:  2022    Updated Objective Findings:  22    ROM:         AROM knee flex-ext R 108-10, L 120-10 before        Tightness in B gastroc, soleus, hams, quads, piriformis  Strength:         Good quad set L; WFL B LE's  Functional Mobility:         Gait/Ambulation: trunk/hip flex        Transfers:   WNL         Stairs:  Reciprocal  Balance: 30 sec B    KOOS: 9 raw score, 63.776 interval score  1/4/22:  7 Raw score,  68.284 Interval score  2/8/22:  4 Raw score, interval score 76.332     TREATMENT:   THERAPEUTIC ACTIVITY: ( see below for minutes): Therapeutic activities per grid below to improve mobility. Required moderate verbal and manual cues to improve functional mobility . THERAPEUTIC EXERCISE: (see below for minutes):  Exercises per grid below to improve strength. Required moderate verbal and manual cues to promote proper body alignment, promote proper body posture, promote proper body mechanics and promote proper body breathing techniques. Progressed resistance, range, repetitions and complexity of movement as indicated. MANUAL THERAPY: (see below for minutes): Joint mobilization and Soft tissue mobilization was utilized and necessary because of the patient's restricted joint motion, painful spasm, loss of articular motion and restricted motion of soft tissue. MODALITIES: (see below for minutes):      for pain modulation    AQUATIC THERAPY (see below for minutes): Aquatic treatment performed per flow grid for Decreased muscle strength, Decreased endurance, Decreased static/dynamic balance and reactive control, Decreased activity endurance, Decompression, Ease of movement and Low impact and reduced weight bearing activity.     Date: 1/20/22 Visit 13 1/25/22  Visit 14 2/3/22 Visit 15 2/8/22  Visit 16  PN       Modalities: 15 mins 15 mins 15 mins 15 mins       Game ready L knee seated seated seated seated                  Manual Therapy:                                 Aquatics Activities: 45 mins 45 mins 55 mins 55 mins       GAIT (F/B/S/M) 4 laps 4 laps 4 laps with sport cord 4 laps sport cord       SLR gait 4 laps 4 laps 4 laps with sport cord 4 laps sport cord       Hamstring Curl gait  4 laps 4 laps 4 laps with sport cord 4 laps sport cord       Rockettes 4 laps 4 laps 4 laps 4 laps       Squats X 30 on step X 30  On step X 30 on step X 30  On step       Calf raises X 30 on step X 30  On step X 30 on step X 30  On step       Hamstring stretch B 3 x 15 sec 3 x 15 sec 3 x 15 sec 3 x 15 sec       Piriformis stretch B 3 x 15 sec 3 x 15 sec 3 x 15 sec 3 x 15 sec       Step ups ant B X 30 X 30 X 30 X 30       SLS B X 3 X 3 X 3 X 3                  Therapeutic Exercises: 10 mins 10 mins         Pt education, postural education, HEP, functional breathing review          H/K rocking back 10 mins          Bike for ROM  10 mins         HEP: see hand out    [employer] Portal  Treatment/Session Summary:    · Response to Treatment:  Pt has made excellent progress and has regained function. He will continue with HEP and call with any questions.   · Communication/Consultation:  None today  · Equipment provided today:  None today    Total Treatment Billable Duration:  70 mins  PT Patient Time In/Time Out  Time In: 0255  Time Out: [address], PT    Future Appointments   Date Time Provider Susana Castelani   2/8/2022  3:00 PM Marvin Healy, PT [facility]   2/10/2022  3:00 PM Flor Francisco, PTA [facility]   2/15/2022  3:00 PM Marvin Healy, PT [facility]   2/17/2022  9:20 AM Latoya Rios MD [facility] PSCD PP   2/17/2022  3:00 PM Flor Francisco, PTA [facility]   2/22/2022  3:00 PM Flor Francisco[facility]   2/24/2022  3:00 PM Marvin Healy, PT [facility]   2/28/2022  1:30 AM SFD CT 64 SLICE UNIT 1 [facility]   3/3/2022  8:10 AM Uri Leal MD SSA PP PP   3/17/2022 10:30 AM SFE ASSESSMENT RM 01 SFEORPA SFE   3/18/2022  9:20 AM Millie Underwood PA [facility]SA POAI POA   3/21/2022 10:00 AM [address] DRIVE THRU SSA IMD IMD   4/25/2022  1:10 PM Mimi Kilgore MD POAG POA   7/11/2022  8:45 AM Trung Gonzales DO [facility] TRIM TRIM

## 2022-02-10 ENCOUNTER — APPOINTMENT (OUTPATIENT)
Dept: PHYSICAL THERAPY | Age: 72
End: 2022-02-10
Payer: MEDICARE

## 2022-02-15 ENCOUNTER — APPOINTMENT (OUTPATIENT)
Dept: PHYSICAL THERAPY | Age: 72
End: 2022-02-15
Payer: MEDICARE

## 2022-02-17 ENCOUNTER — APPOINTMENT (OUTPATIENT)
Dept: PHYSICAL THERAPY | Age: 72
End: 2022-02-17
Payer: MEDICARE

## 2022-02-17 PROBLEM — G47.33 OSA ON CPAP: Status: ACTIVE | Noted: 2022-02-17

## 2022-02-17 PROBLEM — G47.34 NOCTURNAL HYPOXEMIA: Status: ACTIVE | Noted: 2022-02-17

## 2022-02-17 PROBLEM — Z99.89 OSA ON CPAP: Status: ACTIVE | Noted: 2022-02-17

## 2022-02-22 ENCOUNTER — APPOINTMENT (OUTPATIENT)
Dept: PHYSICAL THERAPY | Age: 72
End: 2022-02-22
Payer: MEDICARE

## 2022-02-24 ENCOUNTER — APPOINTMENT (OUTPATIENT)
Dept: PHYSICAL THERAPY | Age: 72
End: 2022-02-24
Payer: MEDICARE

## 2022-02-24 ENCOUNTER — TRANSCRIBE ORDER (OUTPATIENT)
Dept: SCHEDULING | Age: 72
End: 2022-02-24

## 2022-02-24 DIAGNOSIS — N18.1 CHRONIC KIDNEY DISEASE, STAGE I: ICD-10-CM

## 2022-02-24 DIAGNOSIS — I10 PRIMARY HYPERTENSION: ICD-10-CM

## 2022-02-24 DIAGNOSIS — R80.1 PERSISTENT PROTEINURIA: Primary | ICD-10-CM

## 2022-02-25 ENCOUNTER — TRANSCRIBE ORDER (OUTPATIENT)
Dept: SCHEDULING | Age: 72
End: 2022-02-25

## 2022-02-25 DIAGNOSIS — N18.1 CHRONIC KIDNEY DISEASE, STAGE I: Primary | ICD-10-CM

## 2022-02-28 ENCOUNTER — HOSPITAL ENCOUNTER (OUTPATIENT)
Dept: CT IMAGING | Age: 72
Discharge: HOME | End: 2022-02-28
Attending: INTERNAL MEDICINE
Payer: MEDICARE

## 2022-02-28 VITALS — HEIGHT: 70 IN | WEIGHT: 233 LBS | BODY MASS INDEX: 33.36 KG/M2

## 2022-02-28 DIAGNOSIS — Z87.891 HISTORY OF TOBACCO USE: ICD-10-CM

## 2022-02-28 PROCEDURE — 71271 CT THORAX LUNG CANCER SCR C-: CPT

## 2022-03-17 ENCOUNTER — HOSPITAL ENCOUNTER (OUTPATIENT)
Dept: SURGERY | Age: 72
Discharge: HOME | End: 2022-03-17
Attending: ORTHOPAEDIC SURGERY
Payer: MEDICARE

## 2022-03-17 VITALS
DIASTOLIC BLOOD PRESSURE: 72 MMHG | OXYGEN SATURATION: 94 % | SYSTOLIC BLOOD PRESSURE: 117 MMHG | RESPIRATION RATE: 16 BRPM | HEIGHT: 70 IN | HEART RATE: 86 BPM | WEIGHT: 234.5 LBS | BODY MASS INDEX: 33.57 KG/M2 | TEMPERATURE: 98.3 F

## 2022-03-17 LAB
ANION GAP SERPL CALC-SCNC: 6 MMOL/L (ref 7–16)
APTT PPP: 39.3 SEC (ref 24.1–35.1)
BACTERIA SPEC CULT: ABNORMAL
BASOPHILS # BLD: 0.1 K/UL (ref 0–0.2)
BASOPHILS NFR BLD: 1 % (ref 0–2)
BUN SERPL-MCNC: 12 MG/DL (ref 8–23)
CALCIUM SERPL-MCNC: 10.1 MG/DL (ref 8.3–10.4)
CHLORIDE SERPL-SCNC: 103 MMOL/L (ref 98–107)
CO2 SERPL-SCNC: 28 MMOL/L (ref 21–32)
CREAT SERPL-MCNC: 0.93 MG/DL (ref 0.8–1.5)
DIFFERENTIAL METHOD BLD: NORMAL
EOSINOPHIL # BLD: 0.3 K/UL (ref 0–0.8)
EOSINOPHIL NFR BLD: 4 % (ref 0.5–7.8)
ERYTHROCYTE [DISTWIDTH] IN BLOOD BY AUTOMATED COUNT: 13.7 % (ref 11.9–14.6)
EST. AVERAGE GLUCOSE BLD GHB EST-MCNC: 146 MG/DL
GLUCOSE SERPL-MCNC: 125 MG/DL (ref 65–100)
HBA1C MFR BLD: 6.7 % (ref 4.2–6.3)
HCT VFR BLD AUTO: 46.9 % (ref 41.1–50.3)
HGB BLD-MCNC: 15.6 G/DL (ref 13.6–17.2)
IMM GRANULOCYTES # BLD AUTO: 0 K/UL (ref 0–0.5)
IMM GRANULOCYTES NFR BLD AUTO: 0 % (ref 0–5)
INR PPP: 1
LYMPHOCYTES # BLD: 1.2 K/UL (ref 0.5–4.6)
LYMPHOCYTES NFR BLD: 17 % (ref 13–44)
MCH RBC QN AUTO: 31.8 PG (ref 26.1–32.9)
MCHC RBC AUTO-ENTMCNC: 33.3 G/DL (ref 31.4–35)
MCV RBC AUTO: 95.5 FL (ref 79.6–97.8)
MONOCYTES # BLD: 0.6 K/UL (ref 0.1–1.3)
MONOCYTES NFR BLD: 8 % (ref 4–12)
NEUTS SEG # BLD: 5.2 K/UL (ref 1.7–8.2)
NEUTS SEG NFR BLD: 70 % (ref 43–78)
NRBC # BLD: 0 K/UL (ref 0–0.2)
PLATELET # BLD AUTO: 247 K/UL (ref 150–450)
PMV BLD AUTO: 10.6 FL (ref 9.4–12.3)
POTASSIUM SERPL-SCNC: 4.3 MMOL/L (ref 3.5–5.1)
PROTHROMBIN TIME: 13.6 SEC (ref 12.6–14.5)
RBC # BLD AUTO: 4.91 M/UL (ref 4.23–5.6)
SERVICE CMNT-IMP: ABNORMAL
SODIUM SERPL-SCNC: 137 MMOL/L (ref 136–145)
WBC # BLD AUTO: 7.4 K/UL (ref 4.3–11.1)

## 2022-03-17 PROCEDURE — 80048 BASIC METABOLIC PNL TOTAL CA: CPT

## 2022-03-17 PROCEDURE — 36415 COLL VENOUS BLD VENIPUNCTURE: CPT

## 2022-03-17 PROCEDURE — 83036 HEMOGLOBIN GLYCOSYLATED A1C: CPT

## 2022-03-17 PROCEDURE — 87641 MR-STAPH DNA AMP PROBE: CPT

## 2022-03-17 PROCEDURE — 85025 COMPLETE CBC W/AUTO DIFF WBC: CPT

## 2022-03-17 PROCEDURE — 85730 THROMBOPLASTIN TIME PARTIAL: CPT

## 2022-03-17 PROCEDURE — 85610 PROTHROMBIN TIME: CPT

## 2022-03-17 RX ORDER — ASPIRIN 81 MG/1
81 TABLET ORAL DAILY
COMMUNITY
End: 2022-03-25

## 2022-03-17 NOTE — PERIOP NOTES
Patient verified name and . Order for consent is found in EHR and matches case posting; patient verified. Type 3 surgery, Walk in assessment complete. Labs per surgeon: CBC,BMP, PT/PTT, hgba1c, mrsa swab ; results (processing)  Labs per anesthesia protocol: no additional  EKG:in emr dated 10/26/21 (within [facility] protocols. Has had echo (21) since last surgery (21) - will have [facility] review. Most recent pulm, nephrology and cardiology notes in emr if needed DOS. MRSA/MSSA swab collected; pharmacy to review and dose antibiotic as appropriate. Hospital approved surgical skin cleanser and instructions to return bottle on DOS given per hospital policy. Patient provided with handouts including Guide to Surgery, Pain Management, Hand Hygiene, Blood Transfusion Education, and [facility]esthesia Brochure. Patient answered medical/surgical history questions at their best of ability. All prior to admission medications documented in [facility]. Original medication prescription bottle not visualized during patient appointment. Patient instructed to hold all vitamins 3 weeks prior to surgery and NSAIDS 5 days prior to surgery. Patient teach back successful and patient demonstrates knowledge of instruction.

## 2022-03-17 NOTE — PERIOP NOTES
Labs done today within [facility] protocols except PTT - will have anesthesia review. Recent Results (from the past 12 hour(s))   CBC WITH AUTOMATED DIFF    Collection Time: 03/17/22  9:41 AM   Result Value Ref Range    WBC 7.4 4.3 - 11.1 K/uL    RBC 4.91 4.23 - 5.6 M/uL    HGB 15.6 13.6 - 17.2 g/dL    HCT 46.9 41.1 - 50.3 %    MCV 95.5 79.6 - 97.8 FL    MCH 31.8 26.1 - 32.9 PG    MCHC 33.3 31.4 - 35.0 g/dL    RDW 13.7 11.9 - 14.6 %    PLATELET 744 640 - 045 K/uL    MPV 10.6 9.4 - 12.3 FL    ABSOLUTE NRBC 0.00 0.0 - 0.2 K/uL    DF AUTOMATED      NEUTROPHILS 70 43 - 78 %    LYMPHOCYTES 17 13 - 44 %    MONOCYTES 8 4.0 - 12.0 %    EOSINOPHILS 4 0.5 - 7.8 %    BASOPHILS 1 0.0 - 2.0 %    IMMATURE GRANULOCYTES 0 0.0 - 5.0 %    ABS. NEUTROPHILS 5.2 1.7 - 8.2 K/UL    ABS. LYMPHOCYTES 1.2 0.5 - 4.6 K/UL    ABS. MONOCYTES 0.6 0.1 - 1.3 K/UL    ABS. EOSINOPHILS 0.3 0.0 - 0.8 K/UL    ABS. BASOPHILS 0.1 0.0 - 0.2 K/UL    ABS. IMM.  GRANS. 0.0 0.0 - 0.5 K/UL   HEMOGLOBIN A1C WITH EAG    Collection Time: 03/17/22  9:41 AM   Result Value Ref Range    Hemoglobin A1c 6.7 (H) 4.20 - 6.30 %    Est. average glucose 927 mg/dL   METABOLIC PANEL, BASIC    Collection Time: 03/17/22  9:41 AM   Result Value Ref Range    Sodium 137 136 - 145 mmol/L    Potassium 4.3 3.5 - 5.1 mmol/L    Chloride 103 98 - 107 mmol/L    CO2 28 21 - 32 mmol/L    Anion gap 6 (L) 7 - 16 mmol/L    Glucose 125 (H) 65 - 100 mg/dL    BUN 12 8 - 23 MG/DL    Creatinine 0.93 0.8 - 1.5 MG/DL    GFR est AA >60 >60 ml/min/1.73m2    GFR est non-AA >60 >60 ml/min/1.73m2    Calcium 10.1 8.3 - 10.4 MG/DL   PROTHROMBIN TIME + INR    Collection Time: 03/17/22  9:41 AM   Result Value Ref Range    Prothrombin time 13.6 12.6 - 14.5 sec    INR 1.0     PTT    Collection Time: 03/17/22  9:41 AM   Result Value Ref Range    aPTT 39.3 (H) 24.1 - 35.1 SEC

## 2022-03-17 NOTE — PERIOP NOTES
PLEASE CONTINUE TAKING ALL PRESCRIPTION MEDICATIONS UP TO THE DAY OF SURGERY UNLESS OTHERWISE DIRECTED BELOW. DISCONTINUE all vitamins and supplements 21 days prior to surgery. DISCONTINUE Non-Steriodal Anti-Inflammatory (NSAIDS) such as Advil and Aleve 5 days prior to surgery. Home Medications to take  the day of surgery     Aspirin 81 mg     Eye drops     Inhaler     Home Medications   to Hold           Comments     Bring CPAP, inhalers, eye drops to hospital.    On the day before surgery please take Acetaminophen 1000mg in the morning and then again before bed. You may substitute for Tylenol 650 mg. Please do not bring home medications with you on the day of surgery unless otherwise directed by your nurse. If you are instructed to bring home medications, please give them to your nurse as they will be administered by the nursing staff. If you have any questions, please call [facility] [phone]  A copy of this note was provided to the patient for reference.

## 2022-03-17 NOTE — PERIOP NOTES
Your patient recently had labs drawn during a hospital appointment due to an upcoming surgery. The results are attached. If you have any questions or concerns please reach out to your patient for a follow-up in your office. Please do not respond to this message as my mailbox is not monitored. You may call [phone] with questions or concerns. Recent Results (from the past 12 hour(s))   CBC WITH AUTOMATED DIFF    Collection Time: 03/17/22  9:41 AM   Result Value Ref Range    WBC 7.4 4.3 - 11.1 K/uL    RBC 4.91 4.23 - 5.6 M/uL    HGB 15.6 13.6 - 17.2 g/dL    HCT 46.9 41.1 - 50.3 %    MCV 95.5 79.6 - 97.8 FL    MCH 31.8 26.1 - 32.9 PG    MCHC 33.3 31.4 - 35.0 g/dL    RDW 13.7 11.9 - 14.6 %    PLATELET 762 550 - 728 K/uL    MPV 10.6 9.4 - 12.3 FL    ABSOLUTE NRBC 0.00 0.0 - 0.2 K/uL    DF AUTOMATED      NEUTROPHILS 70 43 - 78 %    LYMPHOCYTES 17 13 - 44 %    MONOCYTES 8 4.0 - 12.0 %    EOSINOPHILS 4 0.5 - 7.8 %    BASOPHILS 1 0.0 - 2.0 %    IMMATURE GRANULOCYTES 0 0.0 - 5.0 %    ABS. NEUTROPHILS 5.2 1.7 - 8.2 K/UL    ABS. LYMPHOCYTES 1.2 0.5 - 4.6 K/UL    ABS. MONOCYTES 0.6 0.1 - 1.3 K/UL    ABS. EOSINOPHILS 0.3 0.0 - 0.8 K/UL    ABS. BASOPHILS 0.1 0.0 - 0.2 K/UL    ABS. IMM.  GRANS. 0.0 0.0 - 0.5 K/UL   HEMOGLOBIN A1C WITH EAG    Collection Time: 03/17/22  9:41 AM   Result Value Ref Range    Hemoglobin A1c 6.7 (H) 4.20 - 6.30 %    Est. average glucose 774 mg/dL   METABOLIC PANEL, BASIC    Collection Time: 03/17/22  9:41 AM   Result Value Ref Range    Sodium 137 136 - 145 mmol/L    Potassium 4.3 3.5 - 5.1 mmol/L    Chloride 103 98 - 107 mmol/L    CO2 28 21 - 32 mmol/L    Anion gap 6 (L) 7 - 16 mmol/L    Glucose 125 (H) 65 - 100 mg/dL    BUN 12 8 - 23 MG/DL    Creatinine 0.93 0.8 - 1.5 MG/DL    GFR est AA >60 >60 ml/min/1.73m2    GFR est non-AA >60 >60 ml/min/1.73m2    Calcium 10.1 8.3 - 10.4 MG/DL   PROTHROMBIN TIME + INR    Collection Time: 03/17/22  9:41 AM   Result Value Ref Range    Prothrombin time 13.6 12.6 - 14.5 sec    INR 1.0     PTT    Collection Time: 03/17/22  9:41 AM   Result Value Ref Range    aPTT 39.3 (H) 24.1 - 35.1 SEC

## 2022-03-18 PROBLEM — G47.34 NOCTURNAL HYPOXEMIA: Status: ACTIVE | Noted: 2022-02-17

## 2022-03-18 NOTE — H&P
H&P    Patient ID:  Kita Pennington  [id number]  34 y.o.  1950  Surgeon:  Amie Gurera MD  Date of Surgery: * No surgery date entered *  Procedure: Right Total Knee Arthroplasty  Primary Care Physician: Akila Headley DO        Subjective:  Kita Pennington is a 74 y.o. WHITE/NON- male who presents with right knee pain. They have a history of right knee pain for several months. Symptoms worse with walking long distances and relieved with rest. Conservative treatment consisting of  activity modification and injections have not helped. The patient lives with their family. The patients goal after surgery is improved pain and function. Past Medical History:   Diagnosis Date    Adverse effect of anesthesia     \"bowel function slow to return\"    Aortic stenosis     \"minimal\" per echo dated 10/13/21    Chronic obstructive pulmonary disease ([facility] [address] 08/06/2018    daily and rescue inhaler, followed by Dr. Kristine Sarmiento Coronary artery disease     Followed by Dr. Aditya Schneider H/O echocardiogram 10/13/2020    Minimal AS. EF 55-60%    History of coronary artery stent placement 02/04/2019    1995 RCA, daily 81 mg ASA    History of echocardiogram 12/14/2021    Mild AS. EF 55-60%.  HTN (hypertension), benign 8/6/2018    Increased pressure in the eye, bilateral 8/6/2018    Mixed hyperlipidemia 8/6/2018    Murmur     Echo 10/13/20 EF 55-60%, \"minimal aortic valve stenosis) mild aortic regurgitation     CITLALI on CPAP     uses CPAP    Type 2 diabetes mellitus with hyperglycemia, without long-term current use of insulin ([facility] [address] 08/06/2018    pre diabetic, HGB A1c 6.7 on 11/11/21      Past Surgical History:   Procedure Laterality Date    HX CORONARY STENT PLACEMENT  1995    X 1     HX HERNIA REPAIR  1985    HX KNEE REPLACEMENT Left 11/18/2021    HX ORTHOPAEDIC Right 09/2021    RIGHT SCAPHOID OPEN REDUCTION INTERNAL FIXATION.     HX PROSTATECTOMY      HX WISDOM TEETH EXTRACTION       No family history on file. Social History     Tobacco Use    Smoking status: Former Smoker     Packs/day: 1.00     Years: 30.00     Pack years: 30.00     Types: Cigarettes     Quit date:      Years since quittin.2    Smokeless tobacco: Never Used   Substance Use Topics    Alcohol use: No       Prior to Admission medications    Medication Sig Start Date End Date Taking? Authorizing Provider   aspirin delayed-release 81 mg tablet Take 81 mg by mouth daily. Provider, Historical   albuterol (PROVENTIL HFA, VENTOLIN HFA, PROAIR HFA) 90 mcg/actuation inhaler Take 2 Puffs by inhalation every six (6) hours as needed for Shortness of Breath. 22   Vipin Morrissey MD   metFORMIN (GLUCOPHAGE) 500 mg tablet TAKE 1 TABLET BY MOUTH  TWICE DAILY WITH MEALS 21   Brothers, Arabella VARGAS, DO   methocarbamoL (ROBAXIN) 750 mg tablet Take 1 Tablet by mouth four (4) times daily as needed for Muscle Spasm(s). Patient not taking: Reported on 3/11/2022 11/18/21   Starr Underwood PA   cpap machine kit by Does Not Apply route. Provider, Historical   bimatoprost (Lumigan) 0.01 % ophthalmic drops Administer 1 Drop to both eyes every evening. Provider, Historical   tiotropium-olodateroL (Stiolto Respimat) 2.5-2.5 mcg/actuation inhaler Take 2 Puffs by inhalation daily. 21   BrothArabella wolf, DO   enalapril (VASOTEC) 5 mg tablet TAKE 1 TABLET BY MOUTH  TWICE DAILY 21   BrothArabella wolf, DO   atorvastatin (LIPITOR) 40 mg tablet TAKE 1 TABLET BY MOUTH  DAILY  Patient taking differently: Take 40 mg by mouth every evening. 21   Arabella Gonzales, DO   nitroglycerin (NITROSTAT) 0.4 mg SL tablet 1 Tab by SubLINGual route every five (5) minutes as needed for Chest Pain.  10/20/20   Ashkan Dalton MD     Allergies   Allergen Reactions    Shellfish Derived Hives     Shrimp only        REVIEW OF SYSTEMS:  CONSTITUTIONAL: Denies fever, decreased appetite, weight loss/gain, night sweats or fatigue. HEENT: Denies vision or hearing changes. denies glasses. denies hearing aids. CARDIAC: Denies CP, palpitations, rheumatic fever, murmur, peripheral edema, carotid artery disease or syncopal episodes. RESPIRATORY: Denies dyspnea on exertion, asthma, COPD or orthopnea. GI: Denies GERD, history of GI bleed or melena, PUD, hepatitis or cirrhosis. : Denies dysuria, hematuria. denies BPH symptoms. HEMATOLOGIC: Denies anemia or blood disorders. ENDOCRINE: Denies thyroid disease. MUSCULOSKELETAL: See HPI. NEUROLOGIC: Denies seizure, peripheral neuropathy or memory loss. PSYCH: Denies depression, anxiety or insomnia. SKIN: Denies rash or open sores. Objective:    PHYSICAL EXAM  GENERAL: No data found. EYES: PERRL. EOM intact. MOUTH:Teeth and Gums normal. NECK: Full ROM. Trachea midline. No thyromegaly or JVD. CARDIOVASCULAR: Regular rate and rhythm. No murmur or gallops. No carotid bruits. Peripheral pulses: radial 2 +, PT 2+, DP 2+ bilaterally. LUNGS: CTA bilaterally. No wheezes, rhonchi or rales. GI: positive BS. Abdomen nontender. NEUROLOGIC: Alert and oriented x 3. Bilateral equal strong had grasp and bilateral equal strong plantar flexion and dorsiflexion. GAIT: abnormal MUSCULOSKELETAL: ROM: full with pain. Tenderness: over the medial and lateral joint lines. Crepitus: present. SKIN: No rash, bruising, swelling, redness or warmth. Labs:    Recent Results (from the past 24 hour(s))   MSSA/MRSA SC BY PCR, NASAL SWAB    Collection Time: 03/17/22  9:31 AM    Specimen: Nasal swab   Result Value Ref Range    Special Requests: NO SPECIAL REQUESTS      Culture result: (A)       MRSA target DNA not detected, SA target DNA detected. A MRSA negative, SA positive test result does not preclude MRSA nasal colonization.    CBC WITH AUTOMATED DIFF    Collection Time: 03/17/22  9:41 AM   Result Value Ref Range    WBC 7.4 4.3 - 11.1 K/uL    RBC 4.91 4.23 - 5.6 M/uL    HGB 15.6 13.6 - 17.2 g/dL    HCT 46.9 41.1 - 50.3 %    MCV 95.5 79.6 - 97.8 FL    MCH 31.8 26.1 - 32.9 PG    MCHC 33.3 31.4 - 35.0 g/dL    RDW 13.7 11.9 - 14.6 %    PLATELET 026 269 - 722 K/uL    MPV 10.6 9.4 - 12.3 FL    ABSOLUTE NRBC 0.00 0.0 - 0.2 K/uL    DF AUTOMATED      NEUTROPHILS 70 43 - 78 %    LYMPHOCYTES 17 13 - 44 %    MONOCYTES 8 4.0 - 12.0 %    EOSINOPHILS 4 0.5 - 7.8 %    BASOPHILS 1 0.0 - 2.0 %    IMMATURE GRANULOCYTES 0 0.0 - 5.0 %    ABS. NEUTROPHILS 5.2 1.7 - 8.2 K/UL    ABS. LYMPHOCYTES 1.2 0.5 - 4.6 K/UL    ABS. MONOCYTES 0.6 0.1 - 1.3 K/UL    ABS. EOSINOPHILS 0.3 0.0 - 0.8 K/UL    ABS. BASOPHILS 0.1 0.0 - 0.2 K/UL    ABS. IMM. GRANS. 0.0 0.0 - 0.5 K/UL   HEMOGLOBIN A1C WITH EAG    Collection Time: 03/17/22  9:41 AM   Result Value Ref Range    Hemoglobin A1c 6.7 (H) 4.20 - 6.30 %    Est. average glucose 578 mg/dL   METABOLIC PANEL, BASIC    Collection Time: 03/17/22  9:41 AM   Result Value Ref Range    Sodium 137 136 - 145 mmol/L    Potassium 4.3 3.5 - 5.1 mmol/L    Chloride 103 98 - 107 mmol/L    CO2 28 21 - 32 mmol/L    Anion gap 6 (L) 7 - 16 mmol/L    Glucose 125 (H) 65 - 100 mg/dL    BUN 12 8 - 23 MG/DL    Creatinine 0.93 0.8 - 1.5 MG/DL    GFR est AA >60 >60 ml/min/1.73m2    GFR est non-AA >60 >60 ml/min/1.73m2    Calcium 10.1 8.3 - 10.4 MG/DL   PROTHROMBIN TIME + INR    Collection Time: 03/17/22  9:41 AM   Result Value Ref Range    Prothrombin time 13.6 12.6 - 14.5 sec    INR 1.0     PTT    Collection Time: 03/17/22  9:41 AM   Result Value Ref Range    aPTT 39.3 (H) 24.1 - 35.1 SEC       Xray Right knee: joint space narrowing with advanced degenerative changes. Assessment:  Advanced Right Knee Osteoarthritis. Total Right Knee Arthroplasty Indicated.   Patient Active Problem List   Diagnosis Code    HTN (hypertension), benign I10    Mixed hyperlipidemia E78.2    Chronic obstructive pulmonary disease ([facility] [address] J44.9    Increased pressure in the eye, bilateral H40.053    History of coronary artery stent placement Z95.5    Type 2 diabetes with nephropathy (HCC) E11.21    Severe obesity (HCC) E66.01    PVD (peripheral vascular disease) ([facility]) I73.9    Localized osteoarthritis of knees, bilateral M17.0    CITLALI on CPAP G47.33, Z99.89    Nocturnal hypoxemia G47.34       Plan:  I have advised the patient of the risks and consequences, including possible complications of performing total joint replacement, as well as not doing this operation. The patient had the opportunity to ask questions and have them answered to their satisfaction.      Signed:  GRIS Mott 3/18/2022

## 2022-03-19 PROBLEM — I73.9 PVD (PERIPHERAL VASCULAR DISEASE) (HCC): Status: ACTIVE | Noted: 2020-09-17

## 2022-03-19 PROBLEM — M17.0 LOCALIZED OSTEOARTHRITIS OF KNEES, BILATERAL: Status: ACTIVE | Noted: 2021-07-12

## 2022-03-19 PROBLEM — E11.21 TYPE 2 DIABETES WITH NEPHROPATHY (HCC): Status: ACTIVE | Noted: 2020-06-01

## 2022-03-19 PROBLEM — H40.053 INCREASED PRESSURE IN THE EYE, BILATERAL: Status: ACTIVE | Noted: 2018-08-06

## 2022-03-19 PROBLEM — J44.9 CHRONIC OBSTRUCTIVE PULMONARY DISEASE (HCC): Status: ACTIVE | Noted: 2018-08-06

## 2022-03-19 PROBLEM — E78.2 MIXED HYPERLIPIDEMIA: Status: ACTIVE | Noted: 2018-08-06

## 2022-03-19 PROBLEM — Z95.5 HISTORY OF CORONARY ARTERY STENT PLACEMENT: Status: ACTIVE | Noted: 2019-02-04

## 2022-03-19 PROBLEM — Z99.89 OSA ON CPAP: Status: ACTIVE | Noted: 2022-02-17

## 2022-03-19 PROBLEM — G47.33 OSA ON CPAP: Status: ACTIVE | Noted: 2022-02-17

## 2022-03-19 PROBLEM — E66.01 SEVERE OBESITY (HCC): Status: ACTIVE | Noted: 2020-08-31

## 2022-03-19 PROBLEM — I10 HTN (HYPERTENSION), BENIGN: Status: ACTIVE | Noted: 2018-08-06

## 2022-03-21 NOTE — ADVANCED PRACTICE NURSE
Total Joint Surgery Preoperative Chart Review      Patient ID:  Valerio Franco  [id number]  41 y.o.  1950  Surgeon: Dr. Aranza Santiago  Date of Surgery: 3/24/2022  Procedure: Total Right Knee Arthroplasty  Primary Care Physician: Rita Mejia [phone]  Specialty Physician(s):      Subjective:   Valerio Franco is a 74 y.o. WHITE/NON- male who presents for preoperative evaluation for Total Right Knee arthroplasty. This is a preoperative chart review note based on data collected by the nurse at the surgical Pre-Assessment visit. Past Medical History:   Diagnosis Date    Adverse effect of anesthesia     \"bowel function slow to return\"    Aortic stenosis     \"minimal\" per echo dated 10/13/21    Chronic obstructive pulmonary disease ([facility] [address] 08/06/2018    daily and rescue inhaler, followed by Dr. Ponce Corey Coronary artery disease     Followed by Dr. Jackelyn Cannon H/O echocardiogram 10/13/2020    Minimal AS. EF 55-60%    History of coronary artery stent placement 02/04/2019    1995 RCA, daily 81 mg ASA    History of echocardiogram 12/14/2021    Mild AS. EF 55-60%.  HTN (hypertension), benign 8/6/2018    Increased pressure in the eye, bilateral 8/6/2018    Mixed hyperlipidemia 8/6/2018    Murmur     Echo 10/13/20 EF 55-60%, \"minimal aortic valve stenosis) mild aortic regurgitation     CITLALI on CPAP     uses CPAP    Type 2 diabetes mellitus with hyperglycemia, without long-term current use of insulin ([facility] [address] 08/06/2018    pre diabetic, HGB A1c 6.7 on 11/11/21      Past Surgical History:   Procedure Laterality Date    HX CORONARY STENT PLACEMENT  1995    X 1     HX HERNIA REPAIR  1985    HX KNEE REPLACEMENT Left 11/18/2021    HX ORTHOPAEDIC Right 09/2021    RIGHT SCAPHOID OPEN REDUCTION INTERNAL FIXATION.  HX PROSTATECTOMY      HX WISDOM TEETH EXTRACTION       No family history on file.    Social History     Tobacco Use    Smoking status: Former Smoker     Packs/day: 1.00     Years: 30.00     Pack years: 30.00     Types: Cigarettes     Quit date: 18     Years since quittin.2    Smokeless tobacco: Never Used   Substance Use Topics    Alcohol use: No       Prior to Admission medications    Medication Sig Start Date End Date Taking? Authorizing Provider   aspirin delayed-release 81 mg tablet Take 81 mg by mouth daily. Yes Provider, Historical   albuterol (PROVENTIL HFA, VENTOLIN HFA, PROAIR HFA) 90 mcg/actuation inhaler Take 2 Puffs by inhalation every six (6) hours as needed for Shortness of Breath. 22  Yes Kellie Bahena MD   metFORMIN (GLUCOPHAGE) 500 mg tablet TAKE 1 TABLET BY MOUTH  TWICE DAILY WITH MEALS 21  Yes Brothers, Annetta VARGAS, DO   cpap machine kit by Does Not Apply route. Yes Provider, Historical   bimatoprost (Lumigan) 0.01 % ophthalmic drops Administer 1 Drop to both eyes every evening. Yes Provider, Historical   tiotropium-olodateroL (Stiolto Respimat) 2.5-2.5 mcg/actuation inhaler Take 2 Puffs by inhalation daily. 21  Yes BrothersAnnetta, DO   enalapril (VASOTEC) 5 mg tablet TAKE 1 TABLET BY MOUTH  TWICE DAILY 21  Yes Brothers, Annetta VARGAS, DO   atorvastatin (LIPITOR) 40 mg tablet TAKE 1 TABLET BY MOUTH  DAILY  Patient taking differently: Take 40 mg by mouth every evening. 21  Yes BrothersCayla, DO   nitroglycerin (NITROSTAT) 0.4 mg SL tablet 1 Tab by SubLINGual route every five (5) minutes as needed for Chest Pain. 10/20/20  Yes Yonathan Cr MD   methocarbamoL (ROBAXIN) 750 mg tablet Take 1 Tablet by mouth four (4) times daily as needed for Muscle Spasm(s). Patient not taking: Reported on 3/11/2022 11/18/21   Richelle Garciat GRIS URIARTE     Allergies   Allergen Reactions    Shellfish Derived Hives     Shrimp only          Objective:     Physical Exam:   No data found.     ECG:    EKG Results     None          Data Review:   Labs:   Labs reviewed    Problem List:  )  Patient Active Problem List   Diagnosis Code    HTN (hypertension), benign I10    Mixed hyperlipidemia E78.2    Chronic obstructive pulmonary disease ([facility]) J44.9    Increased pressure in the eye, bilateral H40.053    History of coronary artery stent placement Z95.5    Type 2 diabetes with nephropathy ([facility]) E11.21    Severe obesity ([facility]) E66.01    PVD (peripheral vascular disease) ([facility]) I73.9    Localized osteoarthritis of knees, bilateral M17.0    CITLALI on CPAP G47.33, Z99.89    Nocturnal hypoxemia G47.34       Total Joint Surgery Pre-Assessment Recommendations:           Patient is to wear home CPAP during hospitalization. Continuous saturation monitoring during hospitalization. O2 prn per respiratory protocol.      Signed By: TRE Washburn    March 21, 2022

## 2022-03-23 ENCOUNTER — ANESTHESIA EVENT (OUTPATIENT)
Dept: SURGERY | Age: 72
End: 2022-03-23
Payer: MEDICARE

## 2022-03-23 NOTE — ANESTHESIA PREPROCEDURE EVALUATION
Relevant Problems   RESPIRATORY SYSTEM   (+) Chronic obstructive pulmonary disease (HCC)   (+) CITLALI on CPAP      CARDIOVASCULAR   (+) HTN (hypertension), benign      ENDOCRINE   (+) Severe obesity (HCC)   (+) Type 2 diabetes with nephropathy (HCC)       Anesthetic History               Review of Systems / Medical History  Patient summary reviewed, nursing notes reviewed and pertinent labs reviewed    Pulmonary    COPD: moderate    Sleep apnea           Neuro/Psych   Within defined limits           Cardiovascular    Hypertension: well controlled  Valvular problems/murmurs: mitral insufficiency, aortic stenosis and aortic insufficiency        CAD      Comments: Echo -  12/2021  Left Ventricle:EF of 55 - 60%. Normal diastolic function. Normal left ventricular filling pressure.   Aortic Valve: Moderately sclerosed cusps. Moderate transvalvular regurgitation. Mild stenosis. AV mean gradient is 12 mmHg. AV peak gradient is 25 mmHg. AV mean velocity is 1.6 m/s. LVOT:AV VTI Index is 0.55. LVOT diameter is 2.1 cm.   Mitral Valve: Mild transvalvular regurgitation.   Interatrial Septum: Grade I Positive (1 to 9 bubbles). Agitated saline study was positive with provocation. Right to left shunt was noted.      GI/Hepatic/Renal                Endo/Other    Diabetes: well controlled, type 2    Arthritis     Other Findings   Comments: PFO - with right to left shunt (minimal)  AS with AR - moderate   today         Physical Exam    Airway  Mallampati: II  TM Distance: 4 - 6 cm  Neck ROM: normal range of motion   Mouth opening: Normal     Cardiovascular    Rhythm: regular  Rate: normal         Dental    Dentition: Full upper dentures and Lower dentition intact     Pulmonary  Breath sounds clear to auscultation               Abdominal         Other Findings            Anesthetic Plan    ASA: 3  Anesthesia type: spinal      Post-op pain plan if not by surgeon: peripheral nerve block single    Induction: Intravenous  Anesthetic plan and risks discussed with: Patient and Spouse      Adductor canal block , Spinal with phenylephrine infusion for AS, and propofol for sedation- Please keep IV's without any air in tubing due to his PFO - with right to left shunt (minimal); AS with AR - moderate

## 2022-03-24 ENCOUNTER — HOME HEALTH ADMISSION (OUTPATIENT)
Dept: HOME HEALTH SERVICES | Facility: HOME HEALTH | Age: 72
End: 2022-03-24
Payer: MEDICARE

## 2022-03-24 ENCOUNTER — ANESTHESIA (OUTPATIENT)
Dept: SURGERY | Age: 72
End: 2022-03-24
Payer: MEDICARE

## 2022-03-24 ENCOUNTER — HOSPITAL ENCOUNTER (OUTPATIENT)
Age: 72
Setting detail: OUTPATIENT SURGERY
Discharge: HOME HEALTH | End: 2022-03-25
Attending: ORTHOPAEDIC SURGERY | Admitting: ORTHOPAEDIC SURGERY
Payer: MEDICARE

## 2022-03-24 DIAGNOSIS — Z96.651 STATUS POST TOTAL KNEE REPLACEMENT, RIGHT: Primary | ICD-10-CM

## 2022-03-24 LAB
GLUCOSE BLD STRIP.AUTO-MCNC: 126 MG/DL (ref 65–100)
HGB BLD-MCNC: 14.2 G/DL (ref 13.6–17.2)
SERVICE CMNT-IMP: ABNORMAL

## 2022-03-24 PROCEDURE — 77030040922 HC BLNKT HYPOTHRM STRY -A: Performed by: ANESTHESIOLOGY

## 2022-03-24 PROCEDURE — 77030003602 HC NDL NRV BLK BBMI -B: Performed by: ANESTHESIOLOGY

## 2022-03-24 PROCEDURE — 77030033067 HC SUT PDO STRATFX SPIR J&J -B: Performed by: ORTHOPAEDIC SURGERY

## 2022-03-24 PROCEDURE — 77030007880 HC KT SPN EPDRL BBMI -B: Performed by: ANESTHESIOLOGY

## 2022-03-24 PROCEDURE — 77030012935 HC DRSG AQUACEL BMS -B: Performed by: ORTHOPAEDIC SURGERY

## 2022-03-24 PROCEDURE — 74011250637 HC RX REV CODE- 250/637: Performed by: ANESTHESIOLOGY

## 2022-03-24 PROCEDURE — 76010000171 HC OR TIME 2 TO 2.5 HR INTENSV-TIER 1: Performed by: ORTHOPAEDIC SURGERY

## 2022-03-24 PROCEDURE — 77030031139 HC SUT VCRL2 J&J -A: Performed by: ORTHOPAEDIC SURGERY

## 2022-03-24 PROCEDURE — 77030006835 HC BLD SAW SAG STRY -B: Performed by: ORTHOPAEDIC SURGERY

## 2022-03-24 PROCEDURE — 77010033678 HC OXYGEN DAILY

## 2022-03-24 PROCEDURE — 85018 HEMOGLOBIN: CPT

## 2022-03-24 PROCEDURE — 76010010054 HC POST OP PAIN BLOCK: Performed by: ORTHOPAEDIC SURGERY

## 2022-03-24 PROCEDURE — 77030013819 HC MX SYS CEM ZIMM -B: Performed by: ORTHOPAEDIC SURGERY

## 2022-03-24 PROCEDURE — 82962 GLUCOSE BLOOD TEST: CPT

## 2022-03-24 PROCEDURE — 94760 N-INVAS EAR/PLS OXIMETRY 1: CPT

## 2022-03-24 PROCEDURE — 76060000035 HC ANESTHESIA 2 TO 2.5 HR: Performed by: ORTHOPAEDIC SURGERY

## 2022-03-24 PROCEDURE — 77030038692 HC WND DEB SYS IRMX -B: Performed by: ORTHOPAEDIC SURGERY

## 2022-03-24 PROCEDURE — 76942 ECHO GUIDE FOR BIOPSY: CPT | Performed by: ORTHOPAEDIC SURGERY

## 2022-03-24 PROCEDURE — 74011250636 HC RX REV CODE- 250/636: Performed by: PHYSICIAN ASSISTANT

## 2022-03-24 PROCEDURE — 74011000258 HC RX REV CODE- 258: Performed by: ORTHOPAEDIC SURGERY

## 2022-03-24 PROCEDURE — 74011250636 HC RX REV CODE- 250/636: Performed by: ANESTHESIOLOGY

## 2022-03-24 PROCEDURE — 77030021668 HC NEB PREFIL KT VYRM -A

## 2022-03-24 PROCEDURE — 74011250637 HC RX REV CODE- 250/637: Performed by: PHYSICIAN ASSISTANT

## 2022-03-24 PROCEDURE — 97530 THERAPEUTIC ACTIVITIES: CPT

## 2022-03-24 PROCEDURE — 74011250636 HC RX REV CODE- 250/636: Performed by: ORTHOPAEDIC SURGERY

## 2022-03-24 PROCEDURE — 97161 PT EVAL LOW COMPLEX 20 MIN: CPT

## 2022-03-24 PROCEDURE — 76210000006 HC OR PH I REC 0.5 TO 1 HR: Performed by: ORTHOPAEDIC SURGERY

## 2022-03-24 PROCEDURE — 97110 THERAPEUTIC EXERCISES: CPT

## 2022-03-24 PROCEDURE — 2709999900 HC NON-CHARGEABLE SUPPLY: Performed by: ORTHOPAEDIC SURGERY

## 2022-03-24 PROCEDURE — 27447 TOTAL KNEE ARTHROPLASTY: CPT | Performed by: ORTHOPAEDIC SURGERY

## 2022-03-24 PROCEDURE — 97165 OT EVAL LOW COMPLEX 30 MIN: CPT

## 2022-03-24 PROCEDURE — 74011000250 HC RX REV CODE- 250: Performed by: ORTHOPAEDIC SURGERY

## 2022-03-24 PROCEDURE — 77030019557 HC ELECTRD VES SEAL MEDT -F: Performed by: ORTHOPAEDIC SURGERY

## 2022-03-24 PROCEDURE — 36415 COLL VENOUS BLD VENIPUNCTURE: CPT

## 2022-03-24 PROCEDURE — 77030018836 HC SOL IRR NACL ICUM -A: Performed by: ORTHOPAEDIC SURGERY

## 2022-03-24 PROCEDURE — 74011000250 HC RX REV CODE- 250: Performed by: ANESTHESIOLOGY

## 2022-03-24 PROCEDURE — 77030037715 HC DRSG ZIP STRY -B: Performed by: ORTHOPAEDIC SURGERY

## 2022-03-24 PROCEDURE — 97535 SELF CARE MNGMENT TRAINING: CPT

## 2022-03-24 PROCEDURE — C1776 JOINT DEVICE (IMPLANTABLE): HCPCS | Performed by: ORTHOPAEDIC SURGERY

## 2022-03-24 PROCEDURE — 74011250637 HC RX REV CODE- 250/637: Performed by: ORTHOPAEDIC SURGERY

## 2022-03-24 PROCEDURE — 94762 N-INVAS EAR/PLS OXIMTRY CONT: CPT

## 2022-03-24 PROCEDURE — 77030003665 HC NDL SPN BBMI -A: Performed by: ANESTHESIOLOGY

## 2022-03-24 PROCEDURE — 77030016544 HC BLD SAW RECIP1 STRY -B: Performed by: ORTHOPAEDIC SURGERY

## 2022-03-24 PROCEDURE — 77030037714 HC CLOSR DEV INCIS ZIP STRY -C: Performed by: ORTHOPAEDIC SURGERY

## 2022-03-24 DEVICE — INSERT TIB SZ 7 THK7MM KNEE POST STBL ROT PLATFRM ATTUNE: Type: IMPLANTABLE DEVICE | Site: KNEE | Status: FUNCTIONAL

## 2022-03-24 DEVICE — SYSTEM KNEE SZ 7 TIB BASE PORCOAT ROT PLATFRM CEMENTLESS: Type: IMPLANTABLE DEVICE | Site: KNEE | Status: FUNCTIONAL

## 2022-03-24 DEVICE — KNEE K2 TOT HEMI ADV CMTLS IMPL CAPPED SYNTHES: Type: IMPLANTABLE DEVICE | Status: FUNCTIONAL

## 2022-03-24 DEVICE — COMPONENT FEM PS 7 RT KNEE CMNTLS POROUS ATTUNE: Type: IMPLANTABLE DEVICE | Site: KNEE | Status: FUNCTIONAL

## 2022-03-24 RX ORDER — CEFAZOLIN SODIUM/WATER 2 G/20 ML
2 SYRINGE (ML) INTRAVENOUS ONCE
Status: COMPLETED | OUTPATIENT
Start: 2022-03-24 | End: 2022-03-24

## 2022-03-24 RX ORDER — MIDAZOLAM HYDROCHLORIDE 1 MG/ML
2 INJECTION, SOLUTION INTRAMUSCULAR; INTRAVENOUS ONCE
Status: COMPLETED | OUTPATIENT
Start: 2022-03-24 | End: 2022-03-24

## 2022-03-24 RX ORDER — SODIUM CHLORIDE 0.9 % (FLUSH) 0.9 %
5-40 SYRINGE (ML) INJECTION EVERY 8 HOURS
Status: DISCONTINUED | OUTPATIENT
Start: 2022-03-24 | End: 2022-03-25 | Stop reason: HOSPADM

## 2022-03-24 RX ORDER — PROMETHAZINE HYDROCHLORIDE 25 MG/1
25 TABLET ORAL
Qty: 30 TABLET | Refills: 1 | Status: SHIPPED | OUTPATIENT
Start: 2022-03-24

## 2022-03-24 RX ORDER — ROPIVACAINE HYDROCHLORIDE 2 MG/ML
INJECTION, SOLUTION EPIDURAL; INFILTRATION; PERINEURAL AS NEEDED
Status: DISCONTINUED | OUTPATIENT
Start: 2022-03-24 | End: 2022-03-24 | Stop reason: HOSPADM

## 2022-03-24 RX ORDER — ROPIVACAINE HYDROCHLORIDE 2 MG/ML
INJECTION, SOLUTION EPIDURAL; INFILTRATION; PERINEURAL
Status: COMPLETED | OUTPATIENT
Start: 2022-03-24 | End: 2022-03-24

## 2022-03-24 RX ORDER — ALBUTEROL SULFATE 0.83 MG/ML
2.5 SOLUTION RESPIRATORY (INHALATION)
Status: DISCONTINUED | OUTPATIENT
Start: 2022-03-24 | End: 2022-03-24 | Stop reason: HOSPADM

## 2022-03-24 RX ORDER — SODIUM CHLORIDE, SODIUM LACTATE, POTASSIUM CHLORIDE, CALCIUM CHLORIDE 600; 310; 30; 20 MG/100ML; MG/100ML; MG/100ML; MG/100ML
100 INJECTION, SOLUTION INTRAVENOUS CONTINUOUS
Status: DISCONTINUED | OUTPATIENT
Start: 2022-03-24 | End: 2022-03-24 | Stop reason: HOSPADM

## 2022-03-24 RX ORDER — SODIUM CHLORIDE 0.9 % (FLUSH) 0.9 %
5-40 SYRINGE (ML) INJECTION EVERY 8 HOURS
Status: DISCONTINUED | OUTPATIENT
Start: 2022-03-24 | End: 2022-03-24 | Stop reason: HOSPADM

## 2022-03-24 RX ORDER — LIDOCAINE HYDROCHLORIDE 10 MG/ML
0.1 INJECTION INFILTRATION; PERINEURAL AS NEEDED
Status: DISCONTINUED | OUTPATIENT
Start: 2022-03-24 | End: 2022-03-24 | Stop reason: HOSPADM

## 2022-03-24 RX ORDER — MIDAZOLAM HYDROCHLORIDE 1 MG/ML
INJECTION, SOLUTION INTRAMUSCULAR; INTRAVENOUS AS NEEDED
Status: DISCONTINUED | OUTPATIENT
Start: 2022-03-24 | End: 2022-03-24 | Stop reason: HOSPADM

## 2022-03-24 RX ORDER — DEXAMETHASONE SODIUM PHOSPHATE 4 MG/ML
INJECTION, SOLUTION INTRA-ARTICULAR; INTRALESIONAL; INTRAMUSCULAR; INTRAVENOUS; SOFT TISSUE
Status: COMPLETED | OUTPATIENT
Start: 2022-03-24 | End: 2022-03-24

## 2022-03-24 RX ORDER — ASPIRIN 81 MG/1
81 TABLET ORAL EVERY 12 HOURS
Status: DISCONTINUED | OUTPATIENT
Start: 2022-03-24 | End: 2022-03-25 | Stop reason: HOSPADM

## 2022-03-24 RX ORDER — LISINOPRIL 5 MG/1
5 TABLET ORAL 2 TIMES DAILY
Status: DISCONTINUED | OUTPATIENT
Start: 2022-03-24 | End: 2022-03-25 | Stop reason: HOSPADM

## 2022-03-24 RX ORDER — FLUMAZENIL 0.1 MG/ML
0.2 INJECTION INTRAVENOUS
Status: DISCONTINUED | OUTPATIENT
Start: 2022-03-24 | End: 2022-03-24 | Stop reason: HOSPADM

## 2022-03-24 RX ORDER — METHOCARBAMOL 750 MG/1
750 TABLET, FILM COATED ORAL 4 TIMES DAILY
Qty: 60 TABLET | Refills: 1 | Status: SHIPPED | OUTPATIENT
Start: 2022-03-24

## 2022-03-24 RX ORDER — ATORVASTATIN CALCIUM 40 MG/1
40 TABLET, FILM COATED ORAL
Status: DISCONTINUED | OUTPATIENT
Start: 2022-03-24 | End: 2022-03-25 | Stop reason: HOSPADM

## 2022-03-24 RX ORDER — DEXAMETHASONE SODIUM PHOSPHATE 100 MG/10ML
INJECTION INTRAMUSCULAR; INTRAVENOUS AS NEEDED
Status: DISCONTINUED | OUTPATIENT
Start: 2022-03-24 | End: 2022-03-24 | Stop reason: HOSPADM

## 2022-03-24 RX ORDER — OXYCODONE HYDROCHLORIDE 5 MG/1
5 TABLET ORAL
Status: DISCONTINUED | OUTPATIENT
Start: 2022-03-24 | End: 2022-03-24 | Stop reason: HOSPADM

## 2022-03-24 RX ORDER — NALOXONE HYDROCHLORIDE 0.4 MG/ML
.2-.4 INJECTION, SOLUTION INTRAMUSCULAR; INTRAVENOUS; SUBCUTANEOUS
Status: DISCONTINUED | OUTPATIENT
Start: 2022-03-24 | End: 2022-03-25 | Stop reason: HOSPADM

## 2022-03-24 RX ORDER — TRANEXAMIC ACID 100 MG/ML
INJECTION, SOLUTION INTRAVENOUS AS NEEDED
Status: DISCONTINUED | OUTPATIENT
Start: 2022-03-24 | End: 2022-03-24 | Stop reason: HOSPADM

## 2022-03-24 RX ORDER — CELECOXIB 200 MG/1
200 CAPSULE ORAL EVERY 12 HOURS
Status: DISCONTINUED | OUTPATIENT
Start: 2022-03-24 | End: 2022-03-25 | Stop reason: HOSPADM

## 2022-03-24 RX ORDER — ALBUTEROL SULFATE 0.83 MG/ML
2.5 SOLUTION RESPIRATORY (INHALATION)
Status: DISCONTINUED | OUTPATIENT
Start: 2022-03-24 | End: 2022-03-25 | Stop reason: HOSPADM

## 2022-03-24 RX ORDER — ASPIRIN 81 MG/1
81 TABLET ORAL EVERY 12 HOURS
Qty: 60 TABLET | Refills: 0 | Status: SHIPPED | OUTPATIENT
Start: 2022-03-24 | End: 2022-04-23

## 2022-03-24 RX ORDER — SODIUM CHLORIDE 0.9 % (FLUSH) 0.9 %
5-40 SYRINGE (ML) INJECTION AS NEEDED
Status: DISCONTINUED | OUTPATIENT
Start: 2022-03-24 | End: 2022-03-24 | Stop reason: HOSPADM

## 2022-03-24 RX ORDER — AMOXICILLIN 250 MG
2 CAPSULE ORAL DAILY
Status: DISCONTINUED | OUTPATIENT
Start: 2022-03-25 | End: 2022-03-25 | Stop reason: HOSPADM

## 2022-03-24 RX ORDER — HYDROMORPHONE HYDROCHLORIDE 1 MG/ML
0.5 INJECTION, SOLUTION INTRAMUSCULAR; INTRAVENOUS; SUBCUTANEOUS
Status: DISCONTINUED | OUTPATIENT
Start: 2022-03-24 | End: 2022-03-24 | Stop reason: HOSPADM

## 2022-03-24 RX ORDER — SODIUM CHLORIDE 9 MG/ML
INJECTION, SOLUTION INTRAVENOUS
Status: COMPLETED | OUTPATIENT
Start: 2022-03-24 | End: 2022-03-24

## 2022-03-24 RX ORDER — DEXAMETHASONE SODIUM PHOSPHATE 100 MG/10ML
10 INJECTION INTRAMUSCULAR; INTRAVENOUS ONCE
Status: DISCONTINUED | OUTPATIENT
Start: 2022-03-25 | End: 2022-03-25 | Stop reason: HOSPADM

## 2022-03-24 RX ORDER — ACETAMINOPHEN 500 MG
1000 TABLET ORAL EVERY 6 HOURS
Status: DISCONTINUED | OUTPATIENT
Start: 2022-03-24 | End: 2022-03-25 | Stop reason: HOSPADM

## 2022-03-24 RX ORDER — SODIUM CHLORIDE 0.9 % (FLUSH) 0.9 %
5-40 SYRINGE (ML) INJECTION AS NEEDED
Status: DISCONTINUED | OUTPATIENT
Start: 2022-03-24 | End: 2022-03-25 | Stop reason: HOSPADM

## 2022-03-24 RX ORDER — NALOXONE HYDROCHLORIDE 0.4 MG/ML
0.2 INJECTION, SOLUTION INTRAMUSCULAR; INTRAVENOUS; SUBCUTANEOUS AS NEEDED
Status: DISCONTINUED | OUTPATIENT
Start: 2022-03-24 | End: 2022-03-24 | Stop reason: HOSPADM

## 2022-03-24 RX ORDER — SODIUM CHLORIDE, SODIUM LACTATE, POTASSIUM CHLORIDE, CALCIUM CHLORIDE 600; 310; 30; 20 MG/100ML; MG/100ML; MG/100ML; MG/100ML
100 INJECTION, SOLUTION INTRAVENOUS CONTINUOUS
Status: DISCONTINUED | OUTPATIENT
Start: 2022-03-24 | End: 2022-03-25 | Stop reason: HOSPADM

## 2022-03-24 RX ORDER — DIPHENHYDRAMINE HCL 25 MG
25 CAPSULE ORAL
Status: DISCONTINUED | OUTPATIENT
Start: 2022-03-24 | End: 2022-03-25 | Stop reason: HOSPADM

## 2022-03-24 RX ORDER — CELECOXIB 200 MG/1
200 CAPSULE ORAL DAILY
Qty: 30 CAPSULE | Refills: 1 | Status: SHIPPED | OUTPATIENT
Start: 2022-03-24 | End: 2022-04-23

## 2022-03-24 RX ORDER — CELECOXIB 200 MG/1
200 CAPSULE ORAL ONCE
Status: COMPLETED | OUTPATIENT
Start: 2022-03-24 | End: 2022-03-24

## 2022-03-24 RX ORDER — SODIUM CHLORIDE, SODIUM LACTATE, POTASSIUM CHLORIDE, CALCIUM CHLORIDE 600; 310; 30; 20 MG/100ML; MG/100ML; MG/100ML; MG/100ML
75 INJECTION, SOLUTION INTRAVENOUS CONTINUOUS
Status: DISCONTINUED | OUTPATIENT
Start: 2022-03-24 | End: 2022-03-24 | Stop reason: HOSPADM

## 2022-03-24 RX ORDER — FENTANYL CITRATE 50 UG/ML
100 INJECTION, SOLUTION INTRAMUSCULAR; INTRAVENOUS ONCE
Status: COMPLETED | OUTPATIENT
Start: 2022-03-24 | End: 2022-03-24

## 2022-03-24 RX ORDER — ACETAMINOPHEN 325 MG/1
650 TABLET ORAL ONCE
Status: COMPLETED | OUTPATIENT
Start: 2022-03-24 | End: 2022-03-24

## 2022-03-24 RX ORDER — PROPOFOL 10 MG/ML
INJECTION, EMULSION INTRAVENOUS
Status: DISCONTINUED | OUTPATIENT
Start: 2022-03-24 | End: 2022-03-24 | Stop reason: HOSPADM

## 2022-03-24 RX ORDER — ONDANSETRON 2 MG/ML
4 INJECTION INTRAMUSCULAR; INTRAVENOUS
Status: DISCONTINUED | OUTPATIENT
Start: 2022-03-24 | End: 2022-03-24 | Stop reason: HOSPADM

## 2022-03-24 RX ORDER — METHOCARBAMOL 750 MG/1
750 TABLET, FILM COATED ORAL
Status: DISCONTINUED | OUTPATIENT
Start: 2022-03-24 | End: 2022-03-25 | Stop reason: HOSPADM

## 2022-03-24 RX ORDER — OXYCODONE HYDROCHLORIDE 5 MG/1
5-10 TABLET ORAL
Status: DISCONTINUED | OUTPATIENT
Start: 2022-03-24 | End: 2022-03-25 | Stop reason: HOSPADM

## 2022-03-24 RX ORDER — OXYCODONE HYDROCHLORIDE 5 MG/1
5-10 TABLET ORAL
Qty: 60 TABLET | Refills: 0 | Status: SHIPPED | OUTPATIENT
Start: 2022-03-24 | End: 2022-04-01 | Stop reason: SDUPTHER

## 2022-03-24 RX ORDER — METFORMIN HYDROCHLORIDE 500 MG/1
500 TABLET ORAL 2 TIMES DAILY WITH MEALS
Status: DISCONTINUED | OUTPATIENT
Start: 2022-03-24 | End: 2022-03-25 | Stop reason: HOSPADM

## 2022-03-24 RX ORDER — LATANOPROST 50 UG/ML
1 SOLUTION/ DROPS OPHTHALMIC EVERY EVENING
Status: DISCONTINUED | OUTPATIENT
Start: 2022-03-24 | End: 2022-03-25 | Stop reason: HOSPADM

## 2022-03-24 RX ORDER — CEFAZOLIN SODIUM/WATER 2 G/20 ML
2 SYRINGE (ML) INTRAVENOUS EVERY 8 HOURS
Status: COMPLETED | OUTPATIENT
Start: 2022-03-24 | End: 2022-03-25

## 2022-03-24 RX ORDER — KETOROLAC TROMETHAMINE 30 MG/ML
INJECTION, SOLUTION INTRAMUSCULAR; INTRAVENOUS AS NEEDED
Status: DISCONTINUED | OUTPATIENT
Start: 2022-03-24 | End: 2022-03-24 | Stop reason: HOSPADM

## 2022-03-24 RX ORDER — PROMETHAZINE HYDROCHLORIDE 25 MG/1
25 TABLET ORAL
Status: DISCONTINUED | OUTPATIENT
Start: 2022-03-24 | End: 2022-03-25 | Stop reason: HOSPADM

## 2022-03-24 RX ORDER — NITROGLYCERIN 0.4 MG/1
0.4 TABLET SUBLINGUAL
Status: DISCONTINUED | OUTPATIENT
Start: 2022-03-24 | End: 2022-03-24

## 2022-03-24 RX ORDER — ONDANSETRON 2 MG/ML
INJECTION INTRAMUSCULAR; INTRAVENOUS AS NEEDED
Status: DISCONTINUED | OUTPATIENT
Start: 2022-03-24 | End: 2022-03-24 | Stop reason: HOSPADM

## 2022-03-24 RX ORDER — ACETAMINOPHEN 500 MG
1000 TABLET ORAL EVERY 6 HOURS
Qty: 112 TABLET | Refills: 0 | Status: SHIPPED | OUTPATIENT
Start: 2022-03-25 | End: 2022-04-08

## 2022-03-24 RX ADMIN — SODIUM CHLORIDE, PRESERVATIVE FREE 10 ML: 5 INJECTION INTRAVENOUS at 13:50

## 2022-03-24 RX ADMIN — SODIUM CHLORIDE, SODIUM LACTATE, POTASSIUM CHLORIDE, AND CALCIUM CHLORIDE: 600; 310; 30; 20 INJECTION, SOLUTION INTRAVENOUS at 09:52

## 2022-03-24 RX ADMIN — FENTANYL CITRATE 100 MCG: 50 INJECTION INTRAMUSCULAR; INTRAVENOUS at 08:20

## 2022-03-24 RX ADMIN — Medication 2 G: at 09:25

## 2022-03-24 RX ADMIN — MIDAZOLAM 2 MG: 1 INJECTION INTRAMUSCULAR; INTRAVENOUS at 09:29

## 2022-03-24 RX ADMIN — LATANOPROST 1 DROP: 50 SOLUTION OPHTHALMIC at 18:00

## 2022-03-24 RX ADMIN — CELECOXIB 200 MG: 200 CAPSULE ORAL at 08:00

## 2022-03-24 RX ADMIN — ROPIVACAINE HYDROCHLORIDE 40 MG: 2 INJECTION, SOLUTION EPIDURAL; INFILTRATION at 08:23

## 2022-03-24 RX ADMIN — MIDAZOLAM 2 MG: 1 INJECTION INTRAMUSCULAR; INTRAVENOUS at 08:20

## 2022-03-24 RX ADMIN — TRANEXAMIC ACID 1000 MG: 100 INJECTION, SOLUTION INTRAVENOUS at 09:45

## 2022-03-24 RX ADMIN — DEXAMETHASONE SODIUM PHOSPHATE 10 MG: 10 INJECTION INTRAMUSCULAR; INTRAVENOUS at 09:56

## 2022-03-24 RX ADMIN — PROPOFOL 50 MCG/KG/MIN: 10 INJECTION, EMULSION INTRAVENOUS at 09:53

## 2022-03-24 RX ADMIN — OXYCODONE 5 MG: 5 TABLET ORAL at 18:11

## 2022-03-24 RX ADMIN — Medication 81 MG: at 20:23

## 2022-03-24 RX ADMIN — MEPIVACAINE HYDROCHLORIDE 60 MG: 20 INJECTION, SOLUTION EPIDURAL; INFILTRATION at 09:40

## 2022-03-24 RX ADMIN — SODIUM CHLORIDE, PRESERVATIVE FREE 10 ML: 5 INJECTION INTRAVENOUS at 20:24

## 2022-03-24 RX ADMIN — LIDOCAINE HYDROCHLORIDE 0.1 ML: 10 INJECTION, SOLUTION INFILTRATION; PERINEURAL at 07:55

## 2022-03-24 RX ADMIN — OXYCODONE 10 MG: 5 TABLET ORAL at 13:44

## 2022-03-24 RX ADMIN — ACETAMINOPHEN 650 MG: 325 TABLET, FILM COATED ORAL at 07:36

## 2022-03-24 RX ADMIN — Medication 1 AMPULE: at 20:23

## 2022-03-24 RX ADMIN — METFORMIN HYDROCHLORIDE 500 MG: 500 TABLET ORAL at 17:07

## 2022-03-24 RX ADMIN — ACETAMINOPHEN 1000 MG: 500 TABLET, FILM COATED ORAL at 17:07

## 2022-03-24 RX ADMIN — DEXAMETHASONE SODIUM PHOSPHATE 4 MG: 4 INJECTION, SOLUTION INTRAMUSCULAR; INTRAVENOUS at 08:23

## 2022-03-24 RX ADMIN — Medication 3 AMPULE: at 07:38

## 2022-03-24 RX ADMIN — CEFAZOLIN SODIUM 2 G: 100 INJECTION, POWDER, LYOPHILIZED, FOR SOLUTION INTRAVENOUS at 17:07

## 2022-03-24 RX ADMIN — LISINOPRIL 5 MG: 5 TABLET ORAL at 17:07

## 2022-03-24 RX ADMIN — SODIUM CHLORIDE, SODIUM LACTATE, POTASSIUM CHLORIDE, AND CALCIUM CHLORIDE 100 ML/HR: 600; 310; 30; 20 INJECTION, SOLUTION INTRAVENOUS at 07:54

## 2022-03-24 RX ADMIN — Medication 15 MCG/MIN: at 09:48

## 2022-03-24 RX ADMIN — CELECOXIB 200 MG: 200 CAPSULE ORAL at 20:23

## 2022-03-24 RX ADMIN — ONDANSETRON 4 MG: 2 INJECTION INTRAMUSCULAR; INTRAVENOUS at 09:56

## 2022-03-24 NOTE — PROGRESS NOTES
Problem: Self Care Deficits Care Plan (Adult)  Goal: *Acute Goals and Plan of Care (Insert Text)  Outcome: Progressing Towards Goal  Note: GOALS:   DISCHARGE GOALS (in preparation for going home/rehab):  3 days  1. Mr. Aron Vilchis will perform one lower body dressing activity with minimal assistance required to demonstrate improved functional mobility and safety. GOAL MET 3/24/2022    2. Mr. Aron Vilchis will perform one lower body bathing activity with minimal assistance required to demonstrate improved functional mobility and safety. 3.  Mr. Aron Vilchis will perform toileting/toilet transfer with contact guard assistance to demonstrate improved functional mobility and safety. GOAL MET 3/24/2022    4. Mr. Aron Vilchis will perform shower transfer with contact guard assistance to demonstrate improved functional mobility and safety. 5.  Mr. Aron Vilchis will be able to perform self care with stand by to minimal assistance and ambulate short distances with family that is capable of assisting patient.  GOAL MET 3/24/2022         JOINT CAMP OCCUPATIONAL THERAPY TKA: Initial Assessment and PM 3/24/2022  OUTPATIENT SURGERY: Hospital Day: 1  Payor: Kyle Romeo / Plan: [address] / Product Type: [employer] /      NAME/AGE/GENDER: Dayami Torres is a 74 y.o. male   PRIMARY DIAGNOSIS:  Primary osteoarthritis of right knee [M17.11]   Procedure(s) and Anesthesia Type:     * RIGHT KNEE ARTHROPLASTY TOTAL/ DEPUY**SDD** - Spinal (Right)  ICD-10: Treatment Diagnosis:    Pain in Right Knee (M25.561)  Stiffness of Right Knee, Not elsewhere classified (M25.661)  Other lack of cordination (R27.8)  Difficulty in walking, Not elsewhere classified (R26.2)  Other abnormalities of gait and mobility (R26.89)      ASSESSMENT:     Mr. Aron Vilchis is s/p right TKA and presents with decreased weight bearing on right LE and decreased independence with functional mobility and activities of daily living as compared to baseline level of function and safety. Patient would benefit from skilled Occupational Therapy to maximize independence and safety with self-care task and functional mobility. Pt would also benefit from education on adaptive equipment and safety precautions in preparation for going home. He donned his clothes and transferred to EOB with min assist. OT assisted with donning socks and shoes. He stood and ambulated with PT. OT returned and He ambulated to the bathroom and toileted. He stood at the sink and washed his hands and returned to recliner. He and his wife were educated on Post op TKA ADL task performance and safety. He had his L TKA in November 2021. He plans to discharge home today. Will follow. This section established at most recent assessment   PROBLEM LIST (Impairments causing functional limitations):  Decreased Strength  Decreased ADL/Functional Activities  Decreased Transfer Abilities  Increased Pain  Increased Fatigue  Decreased Flexibility/Joint Mobility  Decreased Knowledge of Precautions   INTERVENTIONS PLANNED: (Benefits and precautions of occupational therapy have been discussed with the patient.)  Activities of daily living training  Adaptive equipment training  Balance training  Clothing management  Donning&doffing training  Theraputic activity     TREATMENT PLAN: Frequency/Duration: Follow patient 2-3 times to address above goals. Rehabilitation Potential For Stated Goals: Good     RECOMMENDED REHABILITATION/EQUIPMENT: (at time of discharge pending progress): Continue Skilled Therapy. OCCUPATIONAL PROFILE AND HISTORY:   History of Present Injury/Illness (Reason for Referral): Pt presents this date s/p (right) TKA.     Past Medical History/Comorbidities:   Mr. Kraig Bird  has a past medical history of Adverse effect of anesthesia, Aortic stenosis, Chronic obstructive pulmonary disease ([facility] [address] (08/06/2018), Coronary artery disease, H/O echocardiogram (10/13/2020), History of coronary artery stent placement (02/04/2019), History of echocardiogram (12/14/2021), HTN (hypertension), benign (8/6/2018), Increased pressure in the eye, bilateral (8/6/2018), Mixed hyperlipidemia (8/6/2018), Murmur, CITLALI on CPAP, and Type 2 diabetes mellitus with hyperglycemia, without long-term current use of insulin ([address] (08/06/2018). Mr. Shane Cruz  has a past surgical history that includes hx prostatectomy; hx hernia repair (1985); hx wisdom teeth extraction; hx coronary stent placement (1995); hx orthopaedic (Right, 09/2021); and hx knee replacement (Left, 11/18/2021). Social History/Living Environment:   Home Environment: Private residence  # Steps to Enter: 3  Rails to Enter: No  One/Two Story Residence: One story  Living Alone: No  Support Systems: Spouse/Significant Other  Patient Expects to be Discharged to[de-identified] Home with home health  Current DME Used/Available at Home: Cane, straight; Shower chair; Walker, rolling  Tub or Shower Type: Shower    Prior Level of Function/Work/Activity:  Mod I     Number of Personal Factors/Comorbidities that affect the Plan of Care: Brief history (0):  LOW COMPLEXITY   ASSESSMENT OF OCCUPATIONAL PERFORMANCE[de-identified]   Most Recent Physical Functioning:   Balance  Sitting: Intact  Standing: With support                    Coordination  Fine Motor Skills-Upper: Left Intact; Right Intact  Gross Motor Skills-Upper: Left Intact; Right Intact         Mental Status  Neurologic State: Alert; Appropriate for age  Orientation Level: Appropriate for age  Cognition: Appropriate decision making; Appropriate for age attention/concentration; Appropriate safety awareness; Follows commands  Perception: Appears intact  Perseveration: No perseveration noted  Safety/Judgement: Awareness of environment; Fall prevention                Basic ADLs (From Assessment) Complex ADLs (From Assessment)   Basic ADL  Feeding: Independent  Oral Facial Hygiene/Grooming: Supervision  Bathing: Minimum assistance  Upper Body Dressing: Supervision  Lower Body Dressing: Minimum assistance  Toileting: Contact guard assistance     Grooming/Bathing/Dressing Activities of Daily Living   Grooming  Grooming Assistance: Stand-by assistance  Position Performed: Standing  Washing Hands: Stand-by assistance Cognitive Retraining  Safety/Judgement: Awareness of environment; Fall prevention           Toileting  Toileting Assistance: Contact guard assistance  Bladder Hygiene: Contact guard assistance  Clothing Management: Contact guard assistance     Functional Transfers  Bathroom Mobility: Contact guard assistance  Toilet Transfer : Contact guard assistance;Minimum assistance  Shower Transfer: Contact guard assistance;Minimum assistance     Bed/Mat Mobility  Supine to Sit: Minimum assistance  Sit to Stand: Contact guard assistance  Stand to Sit: Contact guard assistance  Bed to Chair: Contact guard assistance  Scooting: Stand-by assistance         Physical Skills Involved:  Strength  Activity Tolerance  Pain (acute) Cognitive Skills Affected (resulting in the inability to perform in a timely and safe manner):  none Psychosocial Skills Affected:  none   Number of elements that affect the Plan of Care: 1-3:  LOW COMPLEXITY   CLINICAL DECISION MAKIN[address] [place] [id number] AM-PAC 6 Clicks   Daily Activity Inpatient Short Form  How much help from another person does the patient currently need. .. Total A Lot A Little None   1. Putting on and taking off regular lower body clothing? [] 1   [] 2   [x] 3   [] 4   2. Bathing (including washing, rinsing, drying)? [] 1   [] 2   [x] 3   [] 4   3. Toileting, which includes using toilet, bedpan or urinal?   [] 1   [] 2   [x] 3   [] 4   4. Putting on and taking off regular upper body clothing? [] 1   [] 2   [] 3   [x] 4   5. Taking care of personal grooming such as brushing teeth? [] 1   [] 2   [] 3   [x] 4   6. Eating meals?    [] 1   [] 2   [] 3   [x] 4   © , Trustees of [address] [place] [address] [address] [id number], under license to [employer]. All rights reserved     Score:  Initial: 21 Most Recent: X (Date: -- )    Interpretation of Tool:  Represents activities that are increasingly more difficult (i.e. Bed mobility, Transfers, Gait). Medical Necessity:     · Patient is expected to demonstrate progress in   · Self care skills and functional mobility  ·     Reason for Services/Other Comments:  · Patient continues to require skilled intervention due to   · Above listed deficits     Use of outcome tool(s) and clinical judgement create a POC that gives a: LOW COMPLEXITY            TREATMENT:   (In addition to Assessment/Re-Assessment sessions the following treatments were rendered)     Pre-treatment Symptoms/Complaints:    Pain: Initial:   Pain Intensity 1: 4  Pain Location 1: Knee  Pain Orientation 1: Right  Pain Intervention(s) 1: Nurse notified  Post Session:  4/10 rest icepack on knee     Self Care: (25): Procedure(s) (per grid) utilized to improve and/or restore self-care/home management as related to dressing, bathing, toileting, grooming, and functional mobility . Required moderate visual, verbal, manual, and tactile cueing to facilitate activities of daily living skills and compensatory activities. Assessment complete    Treatment/Session Assessment:     Response to Treatment:  tolerated well, plans to discharge home today. Education:  [] Home Exercises  [x] Fall Precautions  [] Hip Precautions [] Going Home Video  [x] Knee/Hip Prosthesis Review  [x] Walker Management/Safety [x] Adaptive Equipment as Needed       Interdisciplinary Collaboration:   Physical Therapist  Occupational Therapist  Registered Nurse    After treatment position/precautions:   Up in chair  Bed/Chair-wheels locked  Bed in low position  Call light within reach  RN notified  Family at bedside     Compliance with Program/Exercises: Compliant all of the time.     Recommendations/Intent for next treatment session:  Treatment next visit will focus on increasing Mr. Analisa's independence with bed mobility, transfers, self care, functional mobility, modalities for pain, and patient education.       Total Treatment Duration:25  Time in 1330  Time out 4931  OT Patient Time In/Time Out  Time In: 3873  Time Out: 4417 Giovanni Mcpherson, OT

## 2022-03-24 NOTE — DISCHARGE SUMMARY
[address] [place] [address]  Total Joint Discharge Summary      Patient ID:  Jordana Vega  [id number]  31 y.o.  1950    Admit date: 3/24/2022  Discharge date and time: 3/24/22  Admitting Physician: Daisy Todd MD  Surgeon: Same  Admission Diagnoses: Primary osteoarthritis of right knee [M17.11]  Discharge Diagnoses: Active Problems:    * No active hospital problems. *                              Perioperative Antibiotics: Ancef 1 to 3 g was given depending on patient's weight. If allergic to Ancef or due to other indications, patient was given Vancomycin/Gent per protocol      Hospital Medications given:   [unfilled]  [unfilled]  [unfilled]    Discharge Medications given:  Current Discharge Medication List      START taking these medications    Details   acetaminophen (TYLENOL) 500 mg tablet Take 2 Tablets by mouth every six (6) hours for 14 days. Qty: 112 Tablet, Refills: 0      celecoxib (CELEBREX) 200 mg capsule Take 1 Capsule by mouth daily for 30 days. Qty: 30 Capsule, Refills: 1      oxyCODONE IR (ROXICODONE) 5 mg immediate release tablet Take 1-2 Tablets by mouth every four (4) hours as needed for Pain for up to 7 days. Max Daily Amount: 60 mg.  Qty: 60 Tablet, Refills: 0    Associated Diagnoses: Status post total knee replacement, right      promethazine (PHENERGAN) 25 mg tablet Take 1 Tablet by mouth every six (6) hours as needed for Nausea. Qty: 30 Tablet, Refills: 1      !! methocarbamoL (Robaxin-750) 750 mg tablet Take 1 Tablet by mouth four (4) times daily. Qty: 60 Tablet, Refills: 1       !! - Potential duplicate medications found. Please discuss with provider. CONTINUE these medications which have CHANGED    Details   aspirin delayed-release 81 mg tablet Take 1 Tablet by mouth every twelve (12) hours every twelve (12) hours for 30 days.   Qty: 60 Tablet, Refills: 0         CONTINUE these medications which have NOT CHANGED    Details   metFORMIN (GLUCOPHAGE) 500 mg tablet TAKE 1 TABLET BY MOUTH  TWICE DAILY WITH MEALS  Qty: 180 Tablet, Refills: 3    Comments: Requesting 1 year supply  Associated Diagnoses: Type 2 diabetes with nephropathy ([facility])      cpap machine kit by Does Not Apply route. bimatoprost (Lumigan) 0.01 % ophthalmic drops Administer 1 Drop to both eyes every evening. tiotropium-olodateroL (Stiolto Respimat) 2.5-2.5 mcg/actuation inhaler Take 2 Puffs by inhalation daily. Qty: 3 Inhaler, Refills: 3    Associated Diagnoses: Chronic obstructive pulmonary disease, unspecified COPD type ([facility])      enalapril (VASOTEC) 5 mg tablet TAKE 1 TABLET BY MOUTH  TWICE DAILY  Qty: 180 Tablet, Refills: 3    Comments: Requesting 1 year supply      atorvastatin (LIPITOR) 40 mg tablet TAKE 1 TABLET BY MOUTH  DAILY  Qty: 90 Tablet, Refills: 3    Comments: Requesting 1 year supply  Associated Diagnoses: Mixed hyperlipidemia      albuterol (PROVENTIL HFA, VENTOLIN HFA, PROAIR HFA) 90 mcg/actuation inhaler Take 2 Puffs by inhalation every six (6) hours as needed for Shortness of Breath. Qty: 18 g, Refills: 5      !! methocarbamoL (ROBAXIN) 750 mg tablet Take 1 Tablet by mouth four (4) times daily as needed for Muscle Spasm(s). Qty: 40 Tablet, Refills: 0      nitroglycerin (NITROSTAT) 0.4 mg SL tablet 1 Tab by SubLINGual route every five (5) minutes as needed for Chest Pain. Qty: 1 Bottle, Refills: 3       !! - Potential duplicate medications found. Please discuss with provider. Additional DVT Prophylaxis:  DYLAN Hose,Plexi-Pulse    Postoperative transfusions:   none  Post Op complications: none    Hemoglobin at discharge:   Lab Results   Component Value Date/Time    HGB 15.6 03/17/2022 09:41 AM       Wound appears to be healing without any evidence of infection. Physical Therapy started on the day following surgery and progressed to independent ambulation with the aid of a walker.   At the time of discharge, able to go up and down stairs and had understanding of precautions needed following surgery.       PT/OT:                             Discharged to: home    Discharge instructions:  -Rx pain medication given  - Anticoagulate with: Ecotrin 81 mg PO BID x 4 weeks  -Resume pre hospital diet             -Resume home medications per medical continuation form     -Ambulate with walker, appropriate total joint protocol  -Follow up in office as scheduled       Signed:  Tristan Dickinson MD  3/24/2022  12:48 PM

## 2022-03-24 NOTE — PROGRESS NOTES
03/24/22 1555   Oxygen Therapy   O2 Sat (%) 92 %   Pulse via Oximetry 63 beats per minute   O2 Device Nasal cannula   O2 Flow Rate (L/min) 3 l/min   Incentive Spirometry Treatment   Actual Volume (ml) 2500 ml   Number of Attempts 1   Patient encouraged to do 10 breaths every hour while awake-patient agreed and demonstrated. No shortness of breath or distress noted. BS are clear b/l.    [facility] Camp notes reviewed-

## 2022-03-24 NOTE — PROGRESS NOTES
Problem: Mobility Impaired (Adult and Pediatric)  Goal: *Acute Goals and Plan of Care (Insert Text)  Outcome: Progressing Towards Goal  Note: GOALS (1-4 days):  (1.)Mr. Dean Mejia will move from supine to sit and sit to supine  in bed with MODIFIED INDEPENDENCE. (2.)Mr. Dean Mejia will transfer from bed to chair and chair to bed with MODIFIED INDEPENDENCE using the least restrictive device. (3.)Mr. Dean Mejia will ambulate with MODIFIED INDEPENDENCE for 400 feet with the least restrictive device. (4.)Mr. Dean Mejia will ambulate up/down 2 steps with left railing with MODIFIED INDEPENDENCE with cane. (5.)Mr. Dean Mejia will increase right knee ROM to 0°-90°.  ________________________________________________________________________________________________      PHYSICAL THERAPY JOINT CAMP TKA: Initial Assessment 3/24/2022  OUTPATIENT SURGERY: Hospital Day: 1  Payor: Arnel Plan / Plan: Maxine Whittington / Product Type: [employer] /      NAME/AGE/GENDER: Anna Bansal is a 74 y.o. male   PRIMARY DIAGNOSIS:  Primary osteoarthritis of right knee [M17.11]   Procedure(s) and Anesthesia Type:     * RIGHT KNEE ARTHROPLASTY TOTAL/ DEPUY**SDD** - Spinal (Right)  ICD-10: Treatment Diagnosis:    · Pain in Right Knee (M25.561)  · Stiffness of Right Knee, Not elsewhere classified (M25.661)  · Difficulty in walking, Not elsewhere classified (R26.2)      ASSESSMENT:     Mr. Dean Mejia presents POD #0 s/p R TKA. Upon PT evaluation, pt exhibits expected post-operative pain and R knee strength/ROM deficits, resulting in reduced independence with functional mobility. He currently requires CGA for bed mobility, transfers, and stairs, and requires supervision for ambulation with RW x 250'. At baseline, Mr. Dean Mejia lives with his spouse in a single story home and was independent with all mobility. He plans to return home with home health PT and assistance from spouse following hospital stay.   He will continue to benefit from skilled PT to address above impairments and maximize functional mobility. This section established at most recent assessment   PROBLEM LIST (Impairments causing functional limitations):  1. Decreased Strength  2. Decreased ADL/Functional Activities  3. Decreased Transfer Abilities  4. Decreased Ambulation Ability/Technique  5. Decreased Balance  6. Increased Pain  7. Decreased Activity Tolerance  8. Decreased Flexibility/Joint Mobility  9. Edema/Girth  10. Decreased Knowledge of Precautions  11. Decreased Jones with Home Exercise Program   INTERVENTIONS PLANNED: (Benefits and precautions of physical therapy have been discussed with the patient.)  1. Bed Mobility  2. Cold  3. Gait Training  4. Home Exercise Program (HEP)  5. Range of Motion (ROM)  6. Therapeutic Activites  7. Therapeutic Exercise/Strengthening  8. Transfer Training     TREATMENT PLAN: Frequency/Duration: Follow patient BID for duration of hospital stay to address above goals. Rehabilitation Potential For Stated Goals: Good     RECOMMENDED REHABILITATION/EQUIPMENT: (at time of discharge pending progress): Continue Skilled Therapy and Home Health: Physical Therapy. HISTORY:   History of Present Injury/Illness (Reason for Referral):  S/p R TKA  Past Medical History/Comorbidities:   Mr. 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   Mr. Berta Randhawa  has a past surgical history that includes hx prostatectomy; hx hernia repair (1985); hx wisdom teeth extraction; hx coronary stent placement (1995); hx orthopaedic (Right, 09/2021); and hx knee replacement (Left, 11/18/2021). Social History/Living Environment:   Home Environment: Private residence  # Steps to Enter: 3  Rails to Enter: Yes  Hand Rails : Left  One/Two Story Residence: One story  Living Alone: No  Support Systems: Spouse/Significant Other  Patient Expects to be Discharged to[de-identified] Home with home health  Current DME Used/Available at Home: Elkylahria Curtis, rolling; Shower chair  Tub or Shower Type: Shower    Prior Level of Function/Work/Activity:  Pt was independent for all mobility. Number of Personal Factors/Comorbidities that affect the Plan of Care: 1-2: MODERATE COMPLEXITY   EXAMINATION:   Most Recent Physical Functioning:   Gross Assessment: Yes  Gross Assessment  AROM: Within functional limits (Contralateral LE)  Strength: Within functional limits (Contralateral LE)         RLE PROM  R Knee Flexion: 86  R Knee Extension: 4      RLE Strength  R Hip Flexion: 4  R Hip ABduction: 4-  R Knee Flexion: 2+  R Knee Extension: 2+    Bed Mobility  Supine to Sit: Minimum assistance  Scooting: Stand-by assistance    Transfers  Sit to Stand: Contact guard assistance  Stand to Sit: Contact guard assistance  Bed to Chair: Contact guard assistance    Balance  Sitting: Intact  Standing: With support         Gait Training: Yes    Weight Bearing Status  Right Side Weight Bearing: As tolerated  Left Side Weight Bearing: As tolerated  Distance (ft): 300 Feet (ft)  Ambulation - Level of Assistance: Stand-by assistance  Assistive Device: Walker, rolling  Base of Support: Shift to left; Widened  Speed/Leah: Pace decreased (<100 feet/min)  Step Length: Left shortened  Stance: Right decreased  Gait Abnormalities: Antalgic  Number of Stairs Trained: 4  Stairs - Level of Assistance: Contact guard assistance  Rail Use: Left  (and cane in contralateral UE)  Interventions: Manual cues; Verbal cues; Safety awareness training;Visual/Demos     Braces/Orthotics: None    Right Knee Cold  Type: Cold/ice pack      Body Structures Involved:  1. Bones  2. Joints  3. Muscles  4. Ligaments Body Functions Affected:  1. Neuromusculoskeletal  2. Movement Related Activities and Participation Affected:  1. Mobility  2. Self Care  3. Community, Social and [place]   Number of elements that affect the Plan of Care: 1-2: LOW COMPLEXITY   CLINICAL PRESENTATION:   Presentation: Stable and uncomplicated: LOW COMPLEXITY   CLINICAL DECISION MAKIN [facility] Inpatient Short Form  How much difficulty does the patient currently have. .. Unable A Lot A Little None   1. Turning over in bed (including adjusting bedclothes, sheets and blankets)? [] 1   [] 2   [x] 3   [] 4   2. Sitting down on and standing up from a chair with arms ( e.g., wheelchair, bedside commode, etc.)   [] 1   [] 2   [x] 3   [] 4   3. Moving from lying on back to sitting on the side of the bed? [] 1   [] 2   [x] 3   [] 4   How much help from another person does the patient currently need. .. Total A Lot A Little None   4. Moving to and from a bed to a chair (including a wheelchair)? [] 1   [] 2   [x] 3   [] 4   5. Need to walk in hospital room? [] 1   [] 2   [x] 3   [] 4   6. Climbing 3-5 steps with a railing? [] 1   [] 2   [x] 3   [] 4   © , Trustees of [facility] [employer], under license to [employer]. All rights reserved     Score:  Initial: 18 Most Recent: X (Date: -- )    Interpretation of Tool:  Represents activities that are increasingly more difficult (i.e. Bed mobility, Transfers, Gait). Medical Necessity:     · Skilled intervention continues to be required due to limited functional mobility and above imprairments. Reason for Services/Other Comments:  · Patient continues to require skilled intervention due to limited functional mobility and above impairments.    Use of outcome tool(s) and clinical judgement create a POC that gives a: Clear prediction of patient's progress: LOW COMPLEXITY            TREATMENT:   (In addition to Assessment/Re-Assessment sessions the following treatments were rendered)     Pre-treatment Symptoms/Complaints:  Pt complaining of R knee pain  Pain Initial:   Pain Intensity 1: 6  Pain Location 1: Knee  Pain Orientation 1: Right  Pain Intervention(s) 1: Ice,Position,Repositioned  Post Session:  6/10 pain. RN aware. Therapeutic Activity: (  15 minutes): Therapeutic activities including Bed transfers, Chair transfers, education, and ambulation training to improve mobility, strength, balance and coordination. Required minimal Manual cues; Verbal cues; Safety awareness training;Visual/Demos to promote static and dynamic balance in standing, promote coordination of right and promote motor control of right. Therapeutic Exercise: (10 Minutes):  Exercises per grid below to improve mobility, strength, balance and coordination. Required minimal visual, verbal and manual cues to promote proper body alignment, promote proper body posture and promote proper body mechanics. Date:  3/24/2022 Date:   Date:     ACTIVITY/EXERCISE AM PM AM PM AM PM   GROUP THERAPY  []  []  []  []  []  []   Ankle Pumps  10       Quad Sets  10       Gluteal Sets  10       Hip ABd/ADduction  10       Straight Leg Raises  10       Knee Slides  10       Short Arc Quads  10       Long Arc Quads  10       Chair Slides  10                B = bilateral; AA = active assistive; A = active; P = passive      Treatment/Session Assessment:     Response to Treatment:  Pt tolerated tx well, able to perform all therex without increase in pain level. He required intermittent CGA while performing stairs, but was able to reduce to CGA while using cane in contralateral hand.   He was able to teach-back proper gait/stair sequencing and need to maintain knee extension while at rest.    Education:  [x] Home Exercises  [x] Fall Precautions  [x] Use of Cold Therapy Unit [x] D/C Instruction Review  [x] Knee Prosthesis Review  [x] Walker Management/Safety [x] Adaptive Equipment as Needed       Interdisciplinary Collaboration:   o Physical Therapist  o Registered Nurse    After treatment position/precautions:   o Up in chair  o Bed/Chair-wheels locked  o Call light within reach  o Family at bedside    Compliance with Program/Exercises: Will assess as treatment progresses. Recommendations/Intent for next treatment session:  Treatment next visit will focus on increasing Mr. Eduardo Lopez independence with bed mobility, transfers, gait training, strength/ROM exercises, modalities for pain, and patient education.       Total Treatment Duration:  PT Patient Time In/Time Out  Time In: 1345  Time Out: [address], PT

## 2022-03-24 NOTE — DISCHARGE INSTRUCTIONS
[id number] [facility]   Patient Discharge Instructions    [place] / [id number] : 1950    Admitted 3/24/2022 Discharged: 3/24/2022     IF YOU HAVE ANY PROBLEMS ONCE YOU ARE AT HOME CALL THE FOLLOWING NUMBERS:   Main office number: [phone]    Take Home Medications         · It is important that you take the medication exactly as they are prescribed. · Keep your medication in the bottles provided by the pharmacist and keep a list of the medication names, dosages, and times to be taken in your wallet. · Do not take other medications without consulting your doctor. What to do at [address] your prehospital diet. If you have excessive nausea or vomitting call your doctor's office     Home Physical Therapy is arranged. Use rolling walker when walking. Patients who have had a joint replacement should not drive until you are seen for your follow up appointment by Dr. Crystal Waggoner. When to Call    - Call if you have a temperature greater then 101  - Unable to keep food down  - Loose control of your bladder or bowel function  - Are unable to bear any weight   - Need a pain medication refill     Patient Education        Total Knee Replacement: What to Expect at [address] had a total knee replacement. The doctor replaced the worn ends of the bones that connect to your knee (thighbone and lower leg bone) with plastic and metal parts. When you leave the hospital, you should be able to move around with a walker or crutches. But you will need someone to help you at home until you have more energy and can move around better. You will go home with a bandage and stitches, staples, skin glue, or tape strips. Change the bandage as your doctor tells you to. If you have stitches or staples, your doctor will remove them about 2 weeks after your surgery. Glue or tape strips will fall off on their own over time.  You may still have some mild pain, and the area may be swollen for a few months after surgery. Your knee will continue to improve for up to a year. You will probably use a walker for some time after surgery. When you are ready, you can use a cane. You may be able to walk without support after a couple weeks, or when you are comfortable. You will need to do months of physical rehabilitation (rehab) after a knee replacement. Rehab will help you strengthen the muscles of the knee and help you regain movement. After you recover, your artificial knee will allow you to do normal daily activities with less pain or no pain at all. You may be able to hike, dance, or ride a bike. Talk to your doctor about whether you can do more strenuous activities. Always tell your caregivers that you have an artificial knee. How long it will take to walk on your own, return to normal activities, and go back to work depends on your health and how well your rehabilitation (rehab) program goes. The better you do with your rehab exercises, the quicker you will get your strength and movement back. This care sheet gives you a general idea about how long it will take for you to recover. But each person recovers at a different pace. Follow the steps below to get better as quickly as possible. How can you care for yourself at home? Activity    · Rest when you feel tired. You may take a nap, but don't stay in bed all day. When you sit, use a chair with arms. You can use the arms to help you stand up.     · Work with your physical therapist to find the best way to exercise. What you can do as your knee heals will depend on whether your new knee is cemented or uncemented. You may not be able to do certain things for a while if your new knee is uncemented.     · After your knee has healed enough, you can do more strenuous activities with caution. ? You can golf, but you may want to use a golf cart for some time. And don't wear shoes with spikes. ? You can bike on a flat road or on a stationary bike.  Talk to your doctor before biking uphill. ? Your doctor may suggest that you stay away from activities that put stress on your knee. These include tennis, badminton, contact sports like football, jumping (such as in basketball), jogging, and running. ? Avoid activities where you might fall.     · Do not sit for more than 1 hour at a time. Get up and walk around for a while before you sit again. If you must sit for a long time, prop up your leg with a chair or footstool. This will help you avoid swelling.     · Ask your doctor when you can drive again. It may take several weeks after knee replacement surgery before it's safe for you to drive.     · When you get into a car, sit on the edge of the seat. Then pull in your legs, and turn to face the front.     · You should be able to do many everyday activities 3 to 6 weeks after your surgery. You will probably need to take 4 to 16 weeks off from work. When you can go back to work depends on the type of work you do and how you feel.     · Ask your doctor when it is okay for you to have sex.     · For 12 weeks, do not lift anything heavier than 10 pounds and do not lift weights. Diet    · By the time you leave the hospital, you should be eating your normal diet. If your stomach is upset, try bland, low-fat foods like plain rice, broiled chicken, toast, and yogurt. Your doctor may suggest that you take iron and vitamin supplements.     · Drink plenty of fluids (unless your doctor tells you not to).   · Eat healthy foods, and watch your portion sizes. Try to stay at your ideal weight. Too much weight puts more stress on your new knee.     · You may notice that your bowel movements are not regular right after your surgery. This is common. Try to avoid constipation and straining with bowel movements. Drinking enough fluids, taking a stool softener, and eating foods that are good sources of fiber can help you avoid constipation.  If you have not had a bowel movement after a couple of days, talk to your doctor. Medicines    · Your doctor will tell you if and when you can restart your medicines. You will also get instructions about taking any new medicines.     · If you take aspirin or some other blood thinner, ask your doctor if and when to start taking it again. Make sure that you understand exactly what your doctor wants you to do.     · Your doctor may give you a blood-thinning medicine to prevent blood clots. If you take a blood thinner, be sure you get instructions about how to take your medicine safely. Blood thinners can cause serious bleeding problems. This medicine could be in pill form or as a shot (injection). If a shot is needed, your doctor will tell you how to do this.     · Be safe with medicines. Take pain medicines exactly as directed. ? If the doctor gave you a prescription medicine for pain, take it as prescribed. ? If you are not taking a prescription pain medicine, ask your doctor if you can take an over-the-counter medicine. ? Plan to take your pain medicine 30 minutes before exercises. It is easier to prevent pain before it starts than to stop it after it has started.     · If you think your pain medicine is making you sick to your stomach:  ? Take your medicine after meals (unless your doctor has told you not to). ? Ask your doctor for a different pain medicine.     · If your doctor prescribed antibiotics, take them as directed. Do not stop taking them just because you feel better. You need to take the full course of antibiotics. Incision care    · If your doctor told you how to care for your cut (incision), follow your doctor's instructions. You will have a dressing over the cut. A dressing helps the incision heal and protects it. Your doctor will tell you how to take care of this.     · If you did not get instructions, follow this general advice:  ?  If you have strips of tape on the cut the doctor made, leave the tape on for a week or until it falls off.  ? If you have stitches or staples, your doctor will tell you when to come back to have them removed. ? If you have skin glue on the cut, leave it on until it falls off. Skin glue is also called skin adhesive or liquid stitches. ? Change the bandage every day. ? Wash the area daily with warm water, and pat it dry. Don't use hydrogen peroxide or alcohol. They can slow healing. ? You may cover the area with a gauze bandage if it oozes fluid or rubs against clothing. ? You may shower 24 to 48 hours after surgery. Pat the incision dry. Don't swim or take a bath for the first 2 weeks, or until your doctor tells you it is okay. Exercise    · Your rehab program will give you a number of exercises to do to help you get back your knee's range of motion and strength. Always do them as your therapist tells you. Ice    · For pain and swelling, put ice or a cold pack on the area for 10 to 20 minutes at a time. Put a thin cloth between the ice and your skin. If your doctor recommended cold therapy using a portable machine, follow the instructions that came with the machine. Other instructions    · Wear compression stockings if your doctor told you to. These may help to prevent blood clots. Your doctor will tell you how long you need to keep wearing the compression stockings.     · Carry a medical alert card that says you have an artificial joint. You have metal pieces in your knee. These may set off some airport metal detectors. Follow-up care is a key part of your treatment and safety. Be sure to make and go to all appointments, and call your doctor if you are having problems. It's also a good idea to know your test results and keep a list of the medicines you take. When should you call for help? Call 911 anytime you think you may need emergency care.  For example, call if:    · You passed out (lost consciousness).     · You have severe trouble breathing.     · You have sudden chest pain and shortness of breath, or you cough up blood. Call your doctor now or seek immediate medical care if:    · You have signs of infection, such as:  ? Increased pain, swelling, warmth, or redness. ? Red streaks leading from the incision. ? Pus draining from the incision. ? A fever.     · You have signs of a blood clot, such as:  ? Pain in your calf, back of the knee, thigh, or groin. ? Redness and swelling in your leg or groin.     · Your incision comes open and begins to bleed, or the bleeding increases.     · You have pain that does not get better after you take pain medicine. Watch closely for changes in your health, and be sure to contact your doctor if:    · You do not have a bowel movement after taking a laxative. Where can you learn more? Go to http://www.gray.com/  Enter T054 in the search box to learn more about \"Total Knee Replacement: What to Expect at Home. \"  Current as of: July 1, 2021               Content Version: 13.2  © 2006-2022 [employer]. Care instructions adapted under license by [employer] (which disclaims liability or warranty for this information). If you have questions about a medical condition or this instruction, always ask your healthcare professional. [place] [address] any warranty or liability for your use of this information. Information obtained by :  I understand that if any problems occur once I am at home I am to contact my physician. I understand and acknowledge receipt of the instructions indicated above.                                                                                                                                            Physician's or R.N.'s Signature                                                                  Date/Time                                                                                                                                              Patient or Representative Signature                                                          Date/Time

## 2022-03-24 NOTE — PROGRESS NOTES
Care Management Interventions  PCP Verified by CM: Yes  Mode of Transport at Discharge: Self  Transition of Care Consult (CM Consult): [address]: Yes  Discharge Durable Medical Equipment: No  Physical Therapy Consult: Yes  Occupational Therapy Consult: Yes  Support Systems: Spouse/Significant Other  Confirm Follow Up Transport: Self  The Plan for Transition of Care is Related to the Following Treatment Goals : improve mobility  Name of the Patient Representative Who was Provided with a Choice of Provider and Agrees with the Discharge Plan: pt  Freedom of Choice List was Provided with Basic Dialogue that Supports the Patient's Individualized Plan of Care/Goals, Treatment Preferences and Shares the Quality Data Associated with the Providers?: Yes  Discharge Location  Patient Expects to be Discharged to[de-identified] Home with home health  Patient is a 74y.o. year old male admitted for Right TKA . Patient plans to return home on discharge. Order received to arrange home health. Patient without preference towards agency. Referral sent to [facility]. Patient denies any equipment needs as patient has a walker and bedside commode. Will follow until discharge.

## 2022-03-24 NOTE — ANESTHESIA POSTPROCEDURE EVALUATION
Procedure(s):  RIGHT KNEE ARTHROPLASTY TOTAL/ DEPUY**SDD**.    spinal, regional    Anesthesia Post Evaluation      Multimodal analgesia: multimodal analgesia used between 6 hours prior to anesthesia start to PACU discharge  Patient location during evaluation: PACU  Patient participation: complete - patient participated  Level of consciousness: awake and alert and responsive to verbal stimuli  Pain score: 0  Pain management: adequate  Airway patency: patent  Anesthetic complications: no  Cardiovascular status: acceptable and hemodynamically stable  Respiratory status: acceptable  Hydration status: acceptable  Post anesthesia nausea and vomiting:  controlled  Final Post Anesthesia Temperature Assessment:  Normothermia (36.0-37.5 degrees C)      INITIAL Post-op Vital signs:   Vitals Value Taken Time   /66 03/24/22 1201   Temp 36.8 °C (98.2 °F) 03/24/22 1136   Pulse 58 03/24/22 1207   Resp 18 03/24/22 1205   SpO2 94 % 03/24/22 1207   Vitals shown include unvalidated device data.

## 2022-03-24 NOTE — PERIOP NOTES
Dr. Perkins Brooms to bedside; states patient O2 saturation was 91% on room air upon arrival to hospital.

## 2022-03-24 NOTE — PERIOP NOTES
TRANSFER - OUT REPORT:    Verbal report given to Alfonso Alonso RN  on Salina Klinefelter  being transferred to  for routine progression of care       Report consisted of patients Situation, Background, Assessment and   Recommendations(SBAR). Information from the following report(s) SBAR, OR Summary, Procedure Summary and Cardiac Rhythm SR was reviewed with the receiving nurse. Lines:   Peripheral IV 03/24/22 Posterior;Right Hand (Active)   Site Assessment Clean, dry, & intact 03/24/22 1136   Phlebitis Assessment 0 03/24/22 0753   Infiltration Assessment 0 03/24/22 0753   Dressing Status Clean, dry, & intact 03/24/22 0753   Dressing Type Transparent 03/24/22 0753   Hub Color/Line Status Green; Infusing 03/24/22 0753        Opportunity for questions and clarification was provided.       Patient transported with:   O2 @ 2 liters

## 2022-03-24 NOTE — PROGRESS NOTES
TRANSFER - IN REPORT:    Verbal report received from RON Gaitan rn(name) on Marlene Almodovar  being received from [employer]) for routine progression of care      Report consisted of patients Situation, Background, Assessment and   Recommendations(SBAR). Information from the following report(s) SBAR, Kardex, Procedure Summary, Intake/Output, MAR and Recent Results was reviewed with the receiving nurse. Opportunity for questions and clarification was provided. Assessment completed upon patients arrival to unit and care assumed.

## 2022-03-24 NOTE — PROGRESS NOTES
Pt up in recliner for dinner. VSS. Pt denies any CP or SOB. 02 sat remains at 90- 92% on 3 liters N/C. Pain to knee controlled well. Appetite good. Pt voiding well. Pt /wife has decided to stay overnight r/t pt 02 sat. inst pt to call for needs.

## 2022-03-24 NOTE — INTERVAL H&P NOTE
Update History & Physical    The Patient's History and Physical of March 18, 2022 was reviewed with the patient and I examined the patient. There was no change. The surgical site was confirmed by the patient and me. Plan:  The risk, benefits, expected outcome, and alternative to the recommended procedure have been discussed with the patient. Patient understands and wants to proceed with the procedure.     Electronically signed by Chayito Cano MD on 3/24/2022 at 8:36 AM

## 2022-03-24 NOTE — OP NOTES
[id number] [facility]  CementlessTotal Knee Arthroplasty  Patient:Ravindra Waters   : 1950  Medical Record FCSALN:[id number]  Pre-operative Diagnosis:  Primary osteoarthritis of right knee [M17.11]  Post-operative Diagnosis: Primary osteoarthritis of right knee [M17.11]    Surgeon: Ladi Mayer MD  Assistant: Devorah Vazquez CSA    Anesthesia: Spinal    Procedure: Cementless Total Knee Arthroplasty   The complexity of the total joint surgery requires the use of a first assistant for positioning, retraction and assistance in closure. The patient's Body mass index is 33.95 kg/m²., BMI's greater then 40 make surgical exposure and retraction extremely difficult and increase operative time. Tourniquet Time: none  EBL: 150cc  Additional Findings: Severe DJD  Releases none    Jesus Gonzalez was brought to the operating room and positioned on the operating table. He was anethestized  IV antibiotics were administered per CMS protocol. Prior to the incision being made a timeout was called identifying the patient, procedure ,operative side and surgeon. The right leg was prepped and draped in the usual sterile manner  An anterior longitudinal incision was accomplished just medial to the tibial tubercle and extending approximal 6 centimeters proximal to the superior pole of the patella. A medial parapatellar capsular incision was performed. The medial capsular flap was elevated around to the insertion of the semimembranous tendon. The patella was everted and the knee flexed and externally rotated. The medial and external menisci were excised. The lateral half of the fat pad excised and the patella femoral ligament was released. The anterior cruciate ligament was resected and the posterior cruciate ligament was substituted. Using extramedullary instrumentation, the tibial cut was accomplished with appropriate posterior slope.   Approxiamately 2 mm of bone was removed from the low side of the tibia. The distal femur was next addressed. A drill hole was made above the intracondylar notch. Using appropriate intramedullary instrumentation,a 5 degree valgus distal cut was accomplished. A femur was sized. The anterior and posterior cuts were then made about the distal femur. The osteophytes were removed from the tibial and femoral surfaces. The flexion and extension gaps were assessed with the appropriate spacer blocks. Additional surgical procedures included none. The flexion and extension gaps were deemed appropriately balanced. The appropriate cutting blocks were then utilized to perform the anterior chamfer, posterior chamfer and notch cuts, with appropriate lateral tranlation accomplished for the patellofemoral groove. The tibia was sized. The tibial base plate was pinned into place with the appropriate external rotation and stem site prepared. A preliminary range of motion was accomplished with the above size trial components. A polyethylene insert allowed the patient to obtain full extension as well as appropriate flexion. The patient's ligaments were stable in flexion and extension to medial and lateral stressing and the alignment was through the appropriate mechanical axis. The patella was then everted. Given acceptable condition, the patella was left unresurfaced following patelloplasty. All trial components were removed and the implants were impacted into position with good fixation. The Irrisept lavage protocol was performed. Anna Nimisha knee was placed through range of motion and noted to be stable as mentioned above with the trial components. The wound was dry, therefore no drain was used. The operative knee was injected with 60cc of Naropin, 10 cc's of morphine and 1 cc of 30mg of Toradol. The capsular layer was closed using a #1 vicryl suture, while subcutaneous layers were closed using 2-0 Vicryl interrupted sutures and a #1 Stratofix. Finally the skin was closed using 3-0 Vicryl and a Zipline closure. A sterile sterile bandage was applied. An Iceman cryo pad was applied on the operative leg. Sponge count and needle counts were correct. Valerio Franco left the operating room     Implants:   Implant Name Type Inv.  Item Serial No.  Lot No. LRB No. Used Action   INSERT TIB PS RP SZ 7 7MM -- ATTUNE - SN/A  INSERT TIB PS RP SZ 7 7MM -- ATTUNE N/A [facility] [employer]_ [id number] Right 1 Implanted   SYSTEM KNEE SZ 7 TIB BASE PORCOAT ROT PLATFRM CEMENTLESS - SN/A  SYSTEM KNEE SZ 7 TIB BASE PORCOAT ROT PLATFRM CEMENTLESS N/A [facility] [employer]_ [id number] Right 1 Implanted   FEM TANYA POR PS RT SZ7 -- ATTUNE - SN/A  FEM TANYA POR PS RT SZ7 -- ATTUNE N/A [facility] ORTHOPEDICS_ [id number] Right 1 Implanted     Signed By: Janet Lee MD

## 2022-03-24 NOTE — ANESTHESIA PROCEDURE NOTES
Spinal Block    Start time: 3/24/2022 9:34 AM  End time: 3/24/2022 9:40 AM  Performed by: Manish Sutton MD  Authorized by: Manish Sutton MD     Pre-procedure: Indications: primary anesthetic  Preanesthetic Checklist: patient identified, risks and benefits discussed, anesthesia consent, patient being monitored and timeout performed  Preanesthetic Checklist comment:  Risk of nerve damage discussed.     Spinal Block:   Patient Position:  Seated  Prep Region:  Lumbar  Prep: chlorhexidine and patient draped      Location:  L3-4  Technique:  Single shot    Local Dose (mL):  3    Needle:   Needle Type:  Pencan  Needle Gauge:  25 G  Attempts:  1      Events: CSF confirmed, no blood with aspiration and no paresthesia        Assessment:  Insertion:  Uncomplicated  Patient tolerance:  Patient tolerated the procedure well with no immediate complications

## 2022-03-25 VITALS
WEIGHT: 236.6 LBS | TEMPERATURE: 98.5 F | BODY MASS INDEX: 33.95 KG/M2 | RESPIRATION RATE: 17 BRPM | HEART RATE: 66 BPM | DIASTOLIC BLOOD PRESSURE: 77 MMHG | OXYGEN SATURATION: 96 % | SYSTOLIC BLOOD PRESSURE: 113 MMHG

## 2022-03-25 PROBLEM — Z96.651 STATUS POST TOTAL KNEE REPLACEMENT, RIGHT: Status: ACTIVE | Noted: 2022-03-25

## 2022-03-25 PROCEDURE — 97535 SELF CARE MNGMENT TRAINING: CPT

## 2022-03-25 PROCEDURE — 94760 N-INVAS EAR/PLS OXIMETRY 1: CPT

## 2022-03-25 PROCEDURE — 77010033678 HC OXYGEN DAILY

## 2022-03-25 PROCEDURE — 97110 THERAPEUTIC EXERCISES: CPT

## 2022-03-25 PROCEDURE — 97530 THERAPEUTIC ACTIVITIES: CPT

## 2022-03-25 PROCEDURE — 74011250637 HC RX REV CODE- 250/637: Performed by: ORTHOPAEDIC SURGERY

## 2022-03-25 PROCEDURE — 74011000250 HC RX REV CODE- 250: Performed by: ORTHOPAEDIC SURGERY

## 2022-03-25 PROCEDURE — 74011250636 HC RX REV CODE- 250/636: Performed by: ORTHOPAEDIC SURGERY

## 2022-03-25 RX ADMIN — CELECOXIB 200 MG: 200 CAPSULE ORAL at 08:04

## 2022-03-25 RX ADMIN — CEFAZOLIN SODIUM 2 G: 100 INJECTION, POWDER, LYOPHILIZED, FOR SOLUTION INTRAVENOUS at 02:44

## 2022-03-25 RX ADMIN — DOCUSATE SODIUM 50MG AND SENNOSIDES 8.6MG 2 TABLET: 8.6; 5 TABLET, FILM COATED ORAL at 08:00

## 2022-03-25 RX ADMIN — OXYCODONE 5 MG: 5 TABLET ORAL at 05:26

## 2022-03-25 RX ADMIN — Medication 81 MG: at 08:00

## 2022-03-25 RX ADMIN — ACETAMINOPHEN 1000 MG: 500 TABLET, FILM COATED ORAL at 05:24

## 2022-03-25 RX ADMIN — SODIUM CHLORIDE, PRESERVATIVE FREE 10 ML: 5 INJECTION INTRAVENOUS at 05:27

## 2022-03-25 RX ADMIN — Medication 1 AMPULE: at 08:00

## 2022-03-25 RX ADMIN — METFORMIN HYDROCHLORIDE 500 MG: 500 TABLET ORAL at 08:00

## 2022-03-25 RX ADMIN — OXYCODONE 5 MG: 5 TABLET ORAL at 00:55

## 2022-03-25 RX ADMIN — LISINOPRIL 5 MG: 5 TABLET ORAL at 08:00

## 2022-03-25 RX ADMIN — ACETAMINOPHEN 1000 MG: 500 TABLET, FILM COATED ORAL at 00:55

## 2022-03-25 NOTE — PROGRESS NOTES
Problem: Self Care Deficits Care Plan (Adult)  Goal: *Acute Goals and Plan of Care (Insert Text)  Outcome: Progressing Towards Goal  Note: GOALS:   DISCHARGE GOALS (in preparation for going home/rehab):  3 days  1. Mr. Thien Yepez will perform one lower body dressing activity with minimal assistance required to demonstrate improved functional mobility and safety. GOAL MET 3/25/2022    2. Mr. Thien Yeepz will perform one lower body bathing activity with minimal assistance required to demonstrate improved functional mobility and safety. 3.  Mr. Thien Yepez will perform toileting/toilet transfer with contact guard assistance to demonstrate improved functional mobility and safety. GOAL MET 3/25/2022    4. Mr. Thien Yepez will perform shower transfer with contact guard assistance to demonstrate improved functional mobility and safety. 5.  Mr. Thien Yepez will be able to perform self care with stand by to minimal assistance and ambulate short distances with family that is capable of assisting patient. GOAL MET 3/25/2022         JOINT CAMP OCCUPATIONAL THERAPY TKA: Daily Note, Discharge and AM 3/25/2022  OUTPATIENT SURGERY: Hospital Day: 2  Payor: [address] [place] [address] / Plan: [address] / Product Type: [employer] /      NAME/AGE/GENDER: Matt Gamboa is a 74 y.o. male   PRIMARY DIAGNOSIS:  Primary osteoarthritis of right knee [M17.11]   Procedure(s) and Anesthesia Type:     * RIGHT KNEE ARTHROPLASTY TOTAL/ DEPUY**SDD** - Spinal (Right)  ICD-10: Treatment Diagnosis:    · Pain in Right Knee (M25.561)  · Stiffness of Right Knee, Not elsewhere classified (M25.661)  · Other lack of cordination (R27.8)  · Difficulty in walking, Not elsewhere classified (R26.2)  · Other abnormalities of gait and mobility (R26.89)      ASSESSMENT:   3/25/22 755am He is on 2 LPM NC. He transferred to EOB with SBa. He nurse present to give meds. He ambulated to the bathroom and toileted with SBA.  He stood at the sink to brush his teeth. He ambulated back to recliner and resp therapy present o2 sat taken. He returnd to sit and was set up with breakfast. OT to return later this am for full ADL session. Will continue with OT Poc.    3/25/22 1030 OT returned and patient declined shower. He is doing discharge paperwork with nursing. Wife was present. He worked with PT earlier and is on room air now. Educated on home safety and ADL task performance showering at home. No concerns expressed. Discharge OT. This section established at most recent assessment   PROBLEM LIST (Impairments causing functional limitations):  1. Decreased Strength  2. Decreased ADL/Functional Activities  3. Decreased Transfer Abilities  4. Increased Pain  5. Increased Fatigue  6. Decreased Flexibility/Joint Mobility  7. Decreased Knowledge of Precautions   INTERVENTIONS PLANNED: (Benefits and precautions of occupational therapy have been discussed with the patient.)  1. Activities of daily living training  2. Adaptive equipment training  3. Balance training  4. Clothing management  5. Donning&doffing training  6. Theraputic activity     TREATMENT PLAN: Frequency/Duration: Follow patient 2-3 times to address above goals. Rehabilitation Potential For Stated Goals: Good     RECOMMENDED REHABILITATION/EQUIPMENT: (at time of discharge pending progress): Continue Skilled Therapy. OCCUPATIONAL PROFILE AND HISTORY:   History of Present Injury/Illness (Reason for Referral): Pt presents this date s/p (right) TKA.     Past Medical History/Comorbidities:   Mr. 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 Mr. Morales [address]  has a past surgical history that includes hx prostatectomy; hx hernia repair (1985); hx wisdom teeth extraction; hx coronary stent placement (1995); hx orthopaedic (Right, 09/2021); and hx knee replacement (Left, 11/18/2021). Social History/Living Environment:   Home Environment: Private residence  # Steps to Enter: 3  Rails to Enter: Yes  Hand Rails : Left  One/Two Story Residence: One story  Living Alone: No  Support Systems: Spouse/Significant Other  Patient Expects to be Discharged to[de-identified] Home with home health  Current DME Used/Available at Home: radha Barrera; Shower chair  Tub or Shower Type: Shower    Prior Level of Function/Work/Activity:  Mod I     Number of Personal Factors/Comorbidities that affect the Plan of Care: Brief history (0):  LOW COMPLEXITY   ASSESSMENT OF OCCUPATIONAL PERFORMANCE[de-identified]   Most Recent Physical Functioning:   Balance  Sitting: Intact  Standing: With support                              Mental Status  Neurologic State: Alert; Appropriate for age  Orientation Level: Appropriate for age  Cognition: Appropriate decision making; Appropriate for age attention/concentration; Appropriate safety awareness; Follows commands  Perception: Appears intact  Perseveration: No perseveration noted  Safety/Judgement: Awareness of environment; Fall prevention                Basic ADLs (From Assessment) Complex ADLs (From Assessment)   Basic ADL  Feeding: Independent  Oral Facial Hygiene/Grooming: Supervision  Bathing: Minimum assistance  Upper Body Dressing: Supervision  Lower Body Dressing: Minimum assistance  Toileting: Contact guard assistance     Grooming/Bathing/Dressing Activities of Daily Living   Grooming  Grooming Assistance: Stand-by assistance  Position Performed: Standing  Washing Hands: Stand-by assistance Cognitive Retraining  Safety/Judgement: Awareness of environment; Fall prevention           Toileting  Toileting Assistance: Stand-by assistance  Bladder Hygiene: Stand-by assistance  Clothing Management: Stand-by assistance     Functional Transfers  Bathroom Mobility: Stand-by assistance  Toilet Transfer : Stand-by assistance     Bed/Mat Mobility  Supine to Sit: Stand-by assistance  Sit to Stand: Stand-by assistance  Stand to Sit: Stand-by assistance  Bed to Chair: Stand-by assistance  Scooting: Stand-by assistance         Physical Skills Involved:  1. Strength  2. Activity Tolerance  3. Pain (acute) Cognitive Skills Affected (resulting in the inability to perform in a timely and safe manner): 1. none Psychosocial Skills Affected:  1. none   Number of elements that affect the Plan of Care: 1-3:  LOW COMPLEXITY   CLINICAL DECISION MAKIN[address] [place], [place] [id number] AM-PAC 6 Clicks   Daily Activity Inpatient Short Form  How much help from another person does the patient currently need. .. Total A Lot A Little None   1. Putting on and taking off regular lower body clothing? [] 1   [] 2   [x] 3   [] 4   2. Bathing (including washing, rinsing, drying)? [] 1   [] 2   [x] 3   [] 4   3. Toileting, which includes using toilet, bedpan or urinal?   [] 1   [] 2   [x] 3   [] 4   4. Putting on and taking off regular upper body clothing? [] 1   [] 2   [] 3   [x] 4   5. Taking care of personal grooming such as brushing teeth? [] 1   [] 2   [] 3   [x] 4   6. Eating meals? [] 1   [] 2   [] 3   [x] 4   © , Trustees of [address] [place] [address], under license to [employer]. All rights reserved     Score:  Initial: 21 Most Recent:21 discharge  3/25/22    Interpretation of Tool:  Represents activities that are increasingly more difficult (i.e. Bed mobility, Transfers, Gait).         Use of outcome tool(s) and clinical judgement create a POC that gives a: LOW COMPLEXITY            TREATMENT:   (In addition to Assessment/Re-Assessment sessions the following treatments were rendered)     Pre-treatment Symptoms/Complaints:    Pain: Initial:   Pain Intensity 1: 0  Pain Location 1: Knee  Pain Orientation 1: Right  Pain Intervention(s) 1:   Post Session:  0/10 rest       Self Care: (25): Procedure(s) (per grid) utilized to improve and/or restore self-care/home management as related to bathing, toileting, grooming and functional mobility. Required min visual and verbal cueing to facilitate activities of daily living skills and compensatory activities. Treatment/Session Assessment:     Response to Treatment:  tolerated well,  On o2 plans to discharge home today, 2 nd session education on home safety discharge OT    Education:  [] Home Exercises  [x] Fall Precautions  [] Hip Precautions [] Going Home Video  [x] Knee/Hip Prosthesis Review  [x] Walker Management/Safety [x] Adaptive Equipment as Needed       Interdisciplinary Collaboration:   o Physical Therapist  o Occupational Therapist  o Registered Nurse    After treatment position/precautions:   o Up in chair  o Bed/Chair-wheels locked  o Bed in low position  o Call light within reach  o RN notified     Compliance with Program/Exercises: Compliant all of the time. Recommendations/Intent for next treatment session:   Pt doing well all goals met and will do well at home with support from spouse. Patient will be discharged home with home health PT. No further Occupational Therapy warranted, will discharge Occupational Therapy services.         Total Treatment Duration:25    OT Patient Time In/Time Out  Time In: 5312  Time Out: 1846   time [id number]  Time out 30 [facility]

## 2022-03-25 NOTE — PROGRESS NOTES
Problem: Diabetes Self-Management  Goal: *Disease process and treatment process  Description: Define diabetes and identify own type of diabetes; list 3 options for treating diabetes. 3/25/2022 3639 by Kay Granados RN  Outcome: Resolved/Met  3/25/2022 0845 by Kay Granados RN  Outcome: Progressing Towards Goal  Goal: *Incorporating nutritional management into lifestyle  Description: Describe effect of type, amount and timing of food on blood glucose; list 3 methods for planning meals. 3/25/2022 7441 by Kay Granados RN  Outcome: Resolved/Met  3/25/2022 0845 by Kay Granados RN  Outcome: Progressing Towards Goal  Goal: *Incorporating physical activity into lifestyle  Description: State effect of exercise on blood glucose levels. 3/25/2022 3369 by Kay Granados RN  Outcome: Resolved/Met  3/25/2022 0845 by Kay Granados RN  Outcome: Progressing Towards Goal  Goal: *Developing strategies to promote health/change behavior  Description: Define the ABC's of diabetes; identify appropriate screenings, schedule and personal plan for screenings. 3/25/2022 9265 by Kay Granados RN  Outcome: Resolved/Met  3/25/2022 0845 by Kay Granados RN  Outcome: Progressing Towards Goal  Goal: *Using medications safely  Description: State effect of diabetes medications on diabetes; name diabetes medication taking, action and side effects. 3/25/2022 0977 by Kay Granados RN  Outcome: Resolved/Met  3/25/2022 0845 by Kay Granados RN  Outcome: Progressing Towards Goal  Goal: *Monitoring blood glucose, interpreting and using results  Description: Identify recommended blood glucose targets  and personal targets.   3/25/2022 6831 by Kay Granados RN  Outcome: Resolved/Met  3/25/2022 0845 by Kay Granados RN  Outcome: Progressing Towards Goal  Goal: *Prevention, detection, treatment of acute complications  Description: List symptoms of hyper- and hypoglycemia; describe how to treat low blood sugar and actions for lowering  high blood glucose level. 3/25/2022 1700 by Mariaelena Montaño RN  Outcome: Resolved/Met  3/25/2022 0845 by Mariaelena Montaño RN  Outcome: Progressing Towards Goal  Goal: *Prevention, detection and treatment of chronic complications  Description: Define the natural course of diabetes and describe the relationship of blood glucose levels to long term complications of diabetes. 3/25/2022 5797 by Mariaelena Montaño RN  Outcome: Resolved/Met  3/25/2022 0845 by Mariaelena Montaño RN  Outcome: Progressing Towards Goal  Goal: *Developing strategies to address psychosocial issues  Description: Describe feelings about living with diabetes; identify support needed and support network  3/25/2022 0952 by Mariaelena Montaño RN  Outcome: Resolved/Met  3/25/2022 0845 by Mariaelena Montaño RN  Outcome: Progressing Towards Goal  Goal: *Insulin pump training  3/25/2022 0952 by Mariaelena Montaño RN  Outcome: Resolved/Met  3/25/2022 0845 by Mariaelena Montaño RN  Outcome: Progressing Towards Goal  Goal: *Sick day guidelines  3/25/2022 0952 by Mariaelena Montaño RN  Outcome: Resolved/Met  3/25/2022 0845 by Mariaelena Montaño RN  Outcome: Progressing Towards Goal  Goal: *Patient Specific Goal (EDIT GOAL, INSERT TEXT)  3/25/2022 0952 by Mariaelena Montaño RN  Outcome: Resolved/Met  3/25/2022 0845 by Mariaelena Montaño RN  Outcome: Progressing Towards Goal     Problem: Patient Education: Go to Patient Education Activity  Goal: Patient/Family Education  3/25/2022 7463 by Mariaelena Montaño RN  Outcome: Resolved/Met  3/25/2022 0845 by Mariaelena Montaño RN  Outcome: Progressing Towards Goal     Problem: Falls - Risk of  Goal: *Absence of Falls  Description: Document Ernie Garzon Fall Risk and appropriate interventions in the flowsheet.   3/25/2022 7377 by Mariaelena Montaño RN  Outcome: Resolved/Met  Note: Fall Risk Interventions:  Mobility Interventions: Communicate number of staff needed for ambulation/transfer,Patient to call before getting OOB    Mentation Interventions: Adequate sleep, hydration, pain control    Medication Interventions: Patient to call before getting OOB    Elimination Interventions: Patient to call for help with toileting needs           3/25/2022 0845 by Reagan Braxton RN  Outcome: Progressing Towards Goal  Note: Fall Risk Interventions:  Mobility Interventions: Communicate number of staff needed for ambulation/transfer,Patient to call before getting OOB    Mentation Interventions: Adequate sleep, hydration, pain control    Medication Interventions: Patient to call before getting OOB    Elimination Interventions: Patient to call for help with toileting needs              Problem: Patient Education: Go to Patient Education Activity  Goal: Patient/Family Education  3/25/2022 0952 by Reagan Braxton RN  Outcome: Resolved/Met  3/25/2022 0845 by Reagan Braxton RN  Outcome: Progressing Towards Goal     Problem: Patient Education: Go to Patient Education Activity  Goal: Patient/Family Education  3/25/2022 0952 by Reagan Braxton RN  Outcome: Resolved/Met  3/25/2022 0845 by Reagan Braxton RN  Outcome: Progressing Towards Goal     Problem: Patient Education: Go to Patient Education Activity  Goal: Patient/Family Education  3/25/2022 0952 by Reagan Braxton RN  Outcome: Resolved/Met  3/25/2022 0845 by Reagan Braxton RN  Outcome: Progressing Towards Goal

## 2022-03-25 NOTE — PROGRESS NOTES
Problem: Diabetes Self-Management  Goal: *Disease process and treatment process  Description: Define diabetes and identify own type of diabetes; list 3 options for treating diabetes. Outcome: Progressing Towards Goal  Goal: *Incorporating nutritional management into lifestyle  Description: Describe effect of type, amount and timing of food on blood glucose; list 3 methods for planning meals. Outcome: Progressing Towards Goal  Goal: *Incorporating physical activity into lifestyle  Description: State effect of exercise on blood glucose levels. Outcome: Progressing Towards Goal  Goal: *Developing strategies to promote health/change behavior  Description: Define the ABC's of diabetes; identify appropriate screenings, schedule and personal plan for screenings. Outcome: Progressing Towards Goal  Goal: *Using medications safely  Description: State effect of diabetes medications on diabetes; name diabetes medication taking, action and side effects. Outcome: Progressing Towards Goal  Goal: *Monitoring blood glucose, interpreting and using results  Description: Identify recommended blood glucose targets  and personal targets. Outcome: Progressing Towards Goal  Goal: *Prevention, detection, treatment of acute complications  Description: List symptoms of hyper- and hypoglycemia; describe how to treat low blood sugar and actions for lowering  high blood glucose level. Outcome: Progressing Towards Goal  Goal: *Prevention, detection and treatment of chronic complications  Description: Define the natural course of diabetes and describe the relationship of blood glucose levels to long term complications of diabetes.   Outcome: Progressing Towards Goal  Goal: *Developing strategies to address psychosocial issues  Description: Describe feelings about living with diabetes; identify support needed and support network  Outcome: Progressing Towards Goal  Goal: *Insulin pump training  Outcome: Progressing Towards Goal  Goal: *Sick day guidelines  Outcome: Progressing Towards Goal  Goal: *Patient Specific Goal (EDIT GOAL, INSERT TEXT)  Outcome: Progressing Towards Goal     Problem: Patient Education: Go to Patient Education Activity  Goal: Patient/Family Education  Outcome: Progressing Towards Goal     Problem: Falls - Risk of  Goal: *Absence of Falls  Description: Document Heidi Litter Fall Risk and appropriate interventions in the flowsheet.   Outcome: Progressing Towards Goal  Note: Fall Risk Interventions:  Mobility Interventions: Communicate number of staff needed for ambulation/transfer,Patient to call before getting OOB    Mentation Interventions: Adequate sleep, hydration, pain control    Medication Interventions: Patient to call before getting OOB    Elimination Interventions: Patient to call for help with toileting needs              Problem: Patient Education: Go to Patient Education Activity  Goal: Patient/Family Education  Outcome: Progressing Towards Goal     Problem: Patient Education: Go to Patient Education Activity  Goal: Patient/Family Education  Outcome: Progressing Towards Goal     Problem: Patient Education: Go to Patient Education Activity  Goal: Patient/Family Education  Outcome: Progressing Towards Goal

## 2022-03-25 NOTE — PROGRESS NOTES
Orthopedic Joint Progress Note    2022  Admit Date: 3/24/2022  Admit Diagnosis: Primary osteoarthritis of right knee [M17.11]    1 Day Post-Op    Subjective:     Latonia Karrie awake and alert    Review of Systems: Pertinent items are noted in HPI. Objective:     PT/OT:     PATIENT MOBILITY    Bed Mobility  Supine to Sit: Minimum assistance  Scooting: Stand-by assistance  Transfers  Sit to Stand: Contact guard assistance  Stand to Sit: Contact guard assistance  Bed to Chair: Contact guard assistance      Gait  Base of Support: Shift to left,Widened  Speed/Leah: Pace decreased (<100 feet/min)  Step Length: Left shortened  Stance: Right decreased  Gait Abnormalities: Antalgic  Ambulation - Level of Assistance: Stand-by assistance  Distance (ft): 300 Feet (ft)  Assistive Device: Walker, rolling  Rail Use: Left  (and cane in contralateral UE)  Stairs - Level of Assistance: Contact guard assistance  Number of Stairs Trained: 4  Interventions: Manual cues,Verbal cues,Safety awareness training,Visual/Demos   Weight Bearing Status  Right Side Weight Bearing: As tolerated  Left Side Weight Bearing: As tolerated        Vital Signs:    Blood pressure 113/77, pulse 66, temperature 98.5 °F (36.9 °C), resp. rate 17, weight 236 lb 9.6 oz (107.3 kg), SpO2 96 %.   Temp (24hrs), Av.8 °F (36.6 °C), Min:97.4 °F (36.3 °C), Max:98.5 °F (36.9 °C)      Pain Control:   Pain Assessment  Pain Scale 1: Numeric (0 - 10)  Pain Intensity 1: 1  Pain Onset 1: walking  Pain Location 1: Knee  Pain Orientation 1: Right  Pain Description 1: Aching  Pain Intervention(s) 1: Ice,Rest    Meds:  Current Facility-Administered Medications   Medication Dose Route Frequency    albuterol (PROVENTIL VENTOLIN) nebulizer solution 2.5 mg  2.5 mg Inhalation Q6H PRN    atorvastatin (LIPITOR) tablet 40 mg  40 mg Oral QHS    latanoprost (XALATAN) 0.005 % ophthalmic solution 1 Drop  1 Drop Both Eyes QPM    lisinopriL (PRINIVIL, ZESTRIL) tablet 5 mg  5 mg Oral BID    metFORMIN (GLUCOPHAGE) tablet 500 mg  500 mg Oral BID WITH MEALS    methocarbamoL (ROBAXIN) tablet 750 mg  750 mg Oral QID PRN    tiotropium-olodateroL (STIOLTO RESPIMAT) 2.5-2.5 mcg/actuation inhaler 2 Puff  2 Puff Inhalation DAILY    alcohol 62% (NOZIN) nasal  1 Ampule  1 Ampule Topical Q12H    lactated Ringers infusion  100 mL/hr IntraVENous CONTINUOUS    sodium chloride (NS) flush 5-40 mL  5-40 mL IntraVENous Q8H    sodium chloride (NS) flush 5-40 mL  5-40 mL IntraVENous PRN    acetaminophen (TYLENOL) tablet 1,000 mg  1,000 mg Oral Q6H    celecoxib (CELEBREX) capsule 200 mg  200 mg Oral Q12H    oxyCODONE IR (ROXICODONE) tablet 5-10 mg  5-10 mg Oral Q4H PRN    naloxone (NARCAN) injection 0.2-0.4 mg  0.2-0.4 mg IntraVENous Q10MIN PRN    dexamethasone (DECADRON) 10 mg/mL injection 10 mg  10 mg IntraVENous ONCE    promethazine (PHENERGAN) tablet 25 mg  25 mg Oral Q6H PRN    diphenhydrAMINE (BENADRYL) capsule 25 mg  25 mg Oral Q4H PRN    senna-docusate (PERICOLACE) 8.6-50 mg per tablet 2 Tablet  2 Tablet Oral DAILY    aspirin delayed-release tablet 81 mg  81 mg Oral Q12H        LAB:    Lab Results   Component Value Date/Time    INR 1.0 03/17/2022 09:41 AM    INR 1.1 11/11/2021 02:23 PM    INR 1.0 09/13/2021 12:14 PM     Lab Results   Component Value Date/Time    HGB 14.2 03/24/2022 07:34 PM    HGB 15.6 03/17/2022 09:41 AM    HGB 14.3 11/11/2021 02:23 PM       Incision 11/18/21 Knee Left (Active)   Number of days: 127       Incision 03/24/22 Leg Right (Active)   Dressing Status Clean;Dry 03/24/22 1930   Drainage Amount None 03/24/22 1930   Number of days: 1       Incision 03/24/22 Knee Right (Active)   Number of days: 1         Physical Exam:  Calves soft/ neuro intact      Assessment:      Principal Problem:    Status post total knee replacement, right (3/25/2022)         Plan:     Continue PT/OT/Rehab  Consult: Rehab team including PT, OT, recreational therapy, and    DC held yesterday secondary to O2 need - home today if back to baseline pulm function    Patient Expects to be Discharged to[de-identified] Home with home health

## 2022-03-25 NOTE — PROGRESS NOTES
Patient resting comfortably in bed. Walked to the bathroom standby assist. Oxygen saturation 90-93% on 4 lpm. Patient currently offers no complaints. Will continue to monitor.

## 2022-03-25 NOTE — PROGRESS NOTES
Pt and spouse educated on discharge instructions. Stated understanding. IV removed and pt escorted down to car in wheelchair.

## 2022-03-25 NOTE — PROGRESS NOTES
Problem: Mobility Impaired (Adult and Pediatric)  Goal: *Acute Goals and Plan of Care (Insert Text)  Outcome: Progressing Towards Goal  Note: GOALS (1-4 days):  (1.)Mr. Bella Jacobsen will move from supine to sit and sit to supine  in bed with MODIFIED INDEPENDENCE. (2.)Mr. Bella Jacobsen will transfer from bed to chair and chair to bed with MODIFIED INDEPENDENCE using the least restrictive device. (3.)Mr. Bella Jacobsen will ambulate with MODIFIED INDEPENDENCE for 400 feet with the least restrictive device. (4.)Mr. Bella Jacobsen will ambulate up/down 2 steps with left railing with MODIFIED INDEPENDENCE with cane. (5.)Mr. Bella Jacobsen will increase right knee ROM to 0°-90°.  ________________________________________________________________________________________________      PHYSICAL THERAPY JOINT CAMP TKA: Daily Note 3/25/2022  OUTPATIENT SURGERY: Hospital Day: 2  Payor: Narciso Fischer / Plan: [address] / Product Type: [employer] Medicare /      NAME/AGE/GENDER: Chai Maravilla is a 74 y.o. male   PRIMARY DIAGNOSIS:  Primary osteoarthritis of right knee [M17.11]   Procedure(s) and Anesthesia Type:     * RIGHT KNEE ARTHROPLASTY TOTAL/ DEPUY**SDD** - Spinal (Right)  ICD-10: Treatment Diagnosis:    · Pain in Right Knee (M25.561)  · Stiffness of Right Knee, Not elsewhere classified (M25.661)  · Difficulty in walking, Not elsewhere classified (R26.2)      ASSESSMENT:     Mr. Bella Jacobsen now POD #1 s/p R TKA. Pt required overnight stay due to desaturation on RA, requiring up to 2 lpm to maintain SpO2 >88%. Pt required ongoing 1 lpm during initial gait training bout of 300', but was able to repeat ambulation on room air by end of session, with SpO2 remaining >91%. He completed all therex with good form and minimal cues. He is cleared to return home with HHPT and assist from wife. This section established at most recent assessment   PROBLEM LIST (Impairments causing functional limitations):  1.  Decreased Strength  2. Decreased ADL/Functional Activities  3. Decreased Transfer Abilities  4. Decreased Ambulation Ability/Technique  5. Decreased Balance  6. Increased Pain  7. Decreased Activity Tolerance  8. Decreased Flexibility/Joint Mobility  9. Edema/Girth  10. Decreased Knowledge of Precautions  11. Decreased Liberty with Home Exercise Program   INTERVENTIONS PLANNED: (Benefits and precautions of physical therapy have been discussed with the patient.)  1. Bed Mobility  2. Cold  3. Gait Training  4. Home Exercise Program (HEP)  5. Range of Motion (ROM)  6. Therapeutic Activites  7. Therapeutic Exercise/Strengthening  8. Transfer Training     TREATMENT PLAN: Frequency/Duration: Follow patient BID for duration of hospital stay to address above goals. Rehabilitation Potential For Stated Goals: Good     RECOMMENDED REHABILITATION/EQUIPMENT: (at time of discharge pending progress): Continue Skilled Therapy and Home Health: Physical Therapy. HISTORY:   History of Present Injury/Illness (Reason for Referral):  S/p R TKA  Past Medical History/Comorbidities:   Mr. 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 Mr. Kevin Guillen  has a past surgical history that includes hx prostatectomy; hx hernia repair (1985); hx wisdom teeth extraction; hx coronary stent placement (1995); hx orthopaedic (Right, 09/2021); and hx knee replacement (Left, 11/18/2021).   Social History/Living Environment:   Home Environment: Private residence  # Steps to Enter: 3  Rails to Enter: Yes  Hand Rails : Left  One/Two Story Residence: One story  Living Alone: No  Support Systems: Spouse/Significant Other  Patient Expects to be Discharged to[de-identified] Home with home health  Current DME Used/Available at Home: Elnoria Curtis, rolling; Shower chair  Tub or Shower Type: Shower    Prior Level of Function/Work/Activity:  Pt was independent for all mobility. Number of Personal Factors/Comorbidities that affect the Plan of Care: 1-2: MODERATE COMPLEXITY   EXAMINATION:   Most Recent Physical Functioning:                            Bed Mobility  Supine to Sit: Stand-by assistance  Scooting: Stand-by assistance    Transfers  Sit to Stand: Stand-by assistance  Stand to Sit: Stand-by assistance  Bed to Chair: Stand-by assistance    Balance  Sitting: Intact  Standing: With support         Gait Training: Yes    Weight Bearing Status  Right Side Weight Bearing: As tolerated  Left Side Weight Bearing: As tolerated  Distance (ft): 300 Feet (ft) (x 2 bouts)  Ambulation - Level of Assistance: Stand-by assistance  Assistive Device: Walker, rolling  Base of Support: Narrowed  Speed/Leah: Pace decreased (<100 feet/min)  Step Length: Right shortened  Stance: Right decreased  Gait Abnormalities: Antalgic        Braces/Orthotics: None           Body Structures Involved:  1. Bones  2. Joints  3. Muscles  4. Ligaments Body Functions Affected:  1. Neuromusculoskeletal  2. Movement Related Activities and Participation Affected:  1. Mobility  2. Self Care  3. Community, Social and [place]morton Stewart   Number of elements that affect the Plan of Care: 1-2: LOW COMPLEXITY   CLINICAL PRESENTATION:   Presentation: Stable and uncomplicated: LOW COMPLEXITY   CLINICAL DECISION MAKIN [facility] Mobility Inpatient Short Form  How much difficulty does the patient currently have. .. Unable A Lot A Little None   1.   Turning over in bed (including adjusting bedclothes, sheets and blankets)? [] 1   [] 2   [x] 3   [] 4   2. Sitting down on and standing up from a chair with arms ( e.g., wheelchair, bedside commode, etc.)   [] 1   [] 2   [x] 3   [] 4   3. Moving from lying on back to sitting on the side of the bed? [] 1   [] 2   [x] 3   [] 4   How much help from another person does the patient currently need. .. Total A Lot A Little None   4. Moving to and from a bed to a chair (including a wheelchair)? [] 1   [] 2   [x] 3   [] 4   5. Need to walk in hospital room? [] 1   [] 2   [x] 3   [] 4   6. Climbing 3-5 steps with a railing? [] 1   [] 2   [x] 3   [] 4   © 2007, Trustees of [address] [place] [address], under license to [employer]. All rights reserved     Score:  Initial: 18 Most Recent: X (Date: -- )    Interpretation of Tool:  Represents activities that are increasingly more difficult (i.e. Bed mobility, Transfers, Gait). Medical Necessity:     · Skilled intervention continues to be required due to limited functional mobility and above imprairments. Reason for Services/Other Comments:  · Patient continues to require skilled intervention due to limited functional mobility and above impairments. Use of outcome tool(s) and clinical judgement create a POC that gives a: Clear prediction of patient's progress: LOW COMPLEXITY            TREATMENT:   (In addition to Assessment/Re-Assessment sessions the following treatments were rendered)     Pre-treatment Symptoms/Complaints:  Pt complaining of R knee pain  Pain Initial:   Pain Intensity 1: 3  Pain Location 1: Knee  Pain Orientation 1: Right  Pain Intervention(s) 1: Ice,Position,Repositioned  Post Session:  6/10 pain. RN aware. Therapeutic Activity: (  38 minutes): Therapeutic activities including Bed transfers, Chair transfers, education, and ambulation training to improve mobility, strength, balance and coordination.   Required minimal   to promote static and dynamic balance in standing, promote coordination of right and promote motor control of right. Provided cues for paced, pursed lip breathing throughout activities, as well as energy conservation techniques. Also re-educated pt regarding PT plan of care, HHPT, and need to monitor SpO2 regularly at home. Therapeutic Exercise: (15 Minutes):  Exercises per grid below to improve mobility, strength, balance and coordination. Required minimal visual, verbal and manual cues to promote proper body alignment, promote proper body posture and promote proper body mechanics. Date:  3/24/2022 Date:  3/25/2022 Date:     ACTIVITY/EXERCISE AM PM AM PM AM PM   GROUP THERAPY  []  []  [x]  []  []  []   Ankle Pumps  10 20      Quad Sets  10 20      Gluteal Sets  10 20      Hip ABd/ADduction  10 20      Straight Leg Raises  10 20      Knee Slides  10 20      Short Arc Quads  10 20      Long Arc Quads  10 20      Chair Slides  10 20               B = bilateral; AA = active assistive; A = active; P = passive      Treatment/Session Assessment:     Response to Treatment:  Pt now ambulating 300' on RA without desaturation. Able to increase therex repetition and achieve improved ROM today. Education:  [x] Home Exercises  [x] Fall Precautions  [x] Use of Cold Therapy Unit [x] D/C Instruction Review  [x] Knee Prosthesis Review  [x] Walker Management/Safety [x] Adaptive Equipment as Needed       Interdisciplinary Collaboration:   o Physical Therapist  o Registered Nurse    After treatment position/precautions:   o Up in chair  o Bed/Chair-wheels locked  o Call light within reach    Compliance with Program/Exercises: Will assess as treatment progresses. Recommendations/Intent for next treatment session:  Treatment next visit will focus on increasing Mr. Haylee Irizarry independence with bed mobility, transfers, gait training, strength/ROM exercises, modalities for pain, and patient education.       Total Treatment Duration:  PT Patient Time In/Time Out  Time In: 0845  Time Out: Bela 21, PT

## 2022-03-26 ENCOUNTER — HOME CARE VISIT (OUTPATIENT)
Dept: SCHEDULING | Facility: HOME HEALTH | Age: 72
End: 2022-03-26
Payer: MEDICARE

## 2022-03-26 PROCEDURE — G0151 HHCP-SERV OF PT,EA 15 MIN: HCPCS

## 2022-03-26 PROCEDURE — 400013 HH SOC

## 2022-03-27 VITALS
TEMPERATURE: 97.8 F | RESPIRATION RATE: 19 BRPM | SYSTOLIC BLOOD PRESSURE: 128 MMHG | OXYGEN SATURATION: 92 % | HEART RATE: 68 BPM | DIASTOLIC BLOOD PRESSURE: 78 MMHG

## 2022-03-28 ENCOUNTER — HOME CARE VISIT (OUTPATIENT)
Dept: SCHEDULING | Facility: HOME HEALTH | Age: 72
End: 2022-03-28
Payer: MEDICARE

## 2022-03-28 VITALS
RESPIRATION RATE: 17 BRPM | TEMPERATURE: 97.8 F | DIASTOLIC BLOOD PRESSURE: 70 MMHG | OXYGEN SATURATION: 94 % | HEART RATE: 78 BPM | SYSTOLIC BLOOD PRESSURE: 138 MMHG

## 2022-03-28 PROCEDURE — G0157 HHC PT ASSISTANT EA 15: HCPCS

## 2022-03-30 ENCOUNTER — HOME CARE VISIT (OUTPATIENT)
Dept: SCHEDULING | Facility: HOME HEALTH | Age: 72
End: 2022-03-30
Payer: MEDICARE

## 2022-03-30 VITALS
TEMPERATURE: 98.2 F | HEART RATE: 74 BPM | SYSTOLIC BLOOD PRESSURE: 120 MMHG | OXYGEN SATURATION: 92 % | RESPIRATION RATE: 16 BRPM | DIASTOLIC BLOOD PRESSURE: 60 MMHG

## 2022-03-30 PROCEDURE — G0157 HHC PT ASSISTANT EA 15: HCPCS

## 2022-04-01 ENCOUNTER — HOME CARE VISIT (OUTPATIENT)
Dept: SCHEDULING | Facility: HOME HEALTH | Age: 72
End: 2022-04-01
Payer: MEDICARE

## 2022-04-01 VITALS
SYSTOLIC BLOOD PRESSURE: 122 MMHG | RESPIRATION RATE: 16 BRPM | TEMPERATURE: 98.4 F | HEART RATE: 90 BPM | DIASTOLIC BLOOD PRESSURE: 68 MMHG

## 2022-04-01 PROCEDURE — G0157 HHC PT ASSISTANT EA 15: HCPCS

## 2022-04-04 ENCOUNTER — HOME CARE VISIT (OUTPATIENT)
Dept: SCHEDULING | Facility: HOME HEALTH | Age: 72
End: 2022-04-04
Payer: MEDICARE

## 2022-04-04 VITALS
DIASTOLIC BLOOD PRESSURE: 80 MMHG | SYSTOLIC BLOOD PRESSURE: 136 MMHG | HEART RATE: 81 BPM | TEMPERATURE: 98.2 F | OXYGEN SATURATION: 94 %

## 2022-04-04 PROCEDURE — G0157 HHC PT ASSISTANT EA 15: HCPCS

## 2022-04-07 ENCOUNTER — HOME CARE VISIT (OUTPATIENT)
Dept: SCHEDULING | Facility: HOME HEALTH | Age: 72
End: 2022-04-07
Payer: MEDICARE

## 2022-04-07 VITALS
RESPIRATION RATE: 16 BRPM | TEMPERATURE: 98.2 F | HEART RATE: 72 BPM | DIASTOLIC BLOOD PRESSURE: 70 MMHG | OXYGEN SATURATION: 94 % | SYSTOLIC BLOOD PRESSURE: 135 MMHG

## 2022-04-07 PROCEDURE — G0157 HHC PT ASSISTANT EA 15: HCPCS

## 2022-04-12 ENCOUNTER — HOME CARE VISIT (OUTPATIENT)
Dept: SCHEDULING | Facility: HOME HEALTH | Age: 72
End: 2022-04-12
Payer: MEDICARE

## 2022-04-12 VITALS
SYSTOLIC BLOOD PRESSURE: 138 MMHG | RESPIRATION RATE: 18 BRPM | HEART RATE: 78 BPM | DIASTOLIC BLOOD PRESSURE: 70 MMHG | TEMPERATURE: 98 F | OXYGEN SATURATION: 95 %

## 2022-04-12 PROCEDURE — G0157 HHC PT ASSISTANT EA 15: HCPCS

## 2022-04-15 ENCOUNTER — HOME CARE VISIT (OUTPATIENT)
Dept: SCHEDULING | Facility: HOME HEALTH | Age: 72
End: 2022-04-15
Payer: MEDICARE

## 2022-04-15 VITALS
SYSTOLIC BLOOD PRESSURE: 128 MMHG | RESPIRATION RATE: 18 BRPM | TEMPERATURE: 97.5 F | DIASTOLIC BLOOD PRESSURE: 62 MMHG | OXYGEN SATURATION: 94 % | HEART RATE: 76 BPM

## 2022-04-15 PROCEDURE — G0151 HHCP-SERV OF PT,EA 15 MIN: HCPCS

## 2022-04-20 ENCOUNTER — HOSPITAL ENCOUNTER (OUTPATIENT)
Dept: PHYSICAL THERAPY | Age: 72
Discharge: HOME | End: 2022-04-20
Payer: MEDICARE

## 2022-04-20 DIAGNOSIS — Z96.651 STATUS POST TOTAL KNEE REPLACEMENT, RIGHT: ICD-10-CM

## 2022-04-20 PROCEDURE — 97162 PT EVAL MOD COMPLEX 30 MIN: CPT

## 2022-04-20 NOTE — PROGRESS NOTES
Fausto Fuetnes  : 1950  Primary: Zak Scott Medicare Complete  Secondary:  Lu Beaver @ [place] [address].  [phone]   [phone]      OUTPATIENT PHYSICAL THERAPY: Daily Treatment Note  2022   ICD-10: Treatment Diagnosis: Pain in right knee (M25.561) and Stiffness of right knee, not elsewhere classified (M25.661) and Muscle weakness (generalized) (M62.81) and Other abnormalities of gait and mobility (R26.89)     R TKA 3/25/22  Effective Dates: 2022 TO 2022 (90 days). Frequency/Duration: 2-3 times a week for 90 Days  GOALS: (Goals have been discussed and agreed upon with patient.)  Short-Term Functional Goals: Time Frame: 4 weeks  1. Pt to report compliance with HEP  2. Pt to restore 110-10 AROM to prevent manipulation  3. Pt to normalize gait mechanics level surfaces  Discharge Goals: Time Frame: 12 weeks  1. Pt to restore AROM to 120 flex for normal function without compensation  2. Pt to increase strength for sit to stand x 30 reps  3. Pt to increase strength for reciprocal gait on stairs  4. Pt to increase SLS > 20 sec B for safe amb all surfaces  _______________________________________________________________________________  Pre-treatment Symptoms/Complaints:  Mr. Jim Ortiz presents with R knee pain, stiffness, weakness, decreased balance, altered gait following TKA. Pain: Initial: 8/10 Post Session:  4/10   Medications Last Reviewed:  2022    Updated Objective Findings:      Observation/Orthostatic Postural Assessment:          Pt holds R knee in flex, gasps and holds breath with all movements;   He  table and arches back holding breath to bend knee  ROM:         AROM  Knee flex-ext L 110-10, R 86-13 before, 95-10 after therapist stretching  Strength:         5/5 LE myotomes B;  Poor quad set R  Functional Mobility:         Gait/Ambulation:  Antalgic with straight cane        Transfers:  Keeps R foot in front, uses UE's and trunk momentum        Stairs:  Step-to pattern  Balance:          8 sec L, 4 sec R    KOOS:  raw score 16, interval score 47.487    See evaluation note from today     TREATMENT:   THERAPEUTIC ACTIVITY: ( see below for minutes): Therapeutic activities per grid below to improve mobility. Required moderate verbal and manual cues to improve functional mobility . THERAPEUTIC EXERCISE: (see below for minutes):  Exercises per grid below to improve strength. Required moderate verbal and manual cues to promote proper body alignment, promote proper body posture, promote proper body mechanics and promote proper body breathing techniques. Progressed resistance, range, repetitions and complexity of movement as indicated. MANUAL THERAPY: (see below for minutes): Joint mobilization and Soft tissue mobilization was utilized and necessary because of the patient's restricted joint motion, painful spasm, loss of articular motion and restricted motion of soft tissue. MODALITIES: (see below for minutes):      for pain modulation    AQUATIC THERAPY (see below for minutes): Aquatic treatment performed per flow grid for Decreased muscle strength, Decreased endurance, Decreased static/dynamic balance and reactive control, Decreased activity endurance, Decompression, Ease of movement and Low impact and reduced weight bearing activity.     Date: 4/20/22       Modalities: Not billable       Game ready R knee  seated               Manual Therapy:                        Aquatics Activities:        GAIT (F/B/S/M)        SLR gait        Hamstring Curl gait         Rockettes        Squats        Calf raises        Hamstring stretch B        Piriformis stretch B        Step ups ant B        SLS B        Deep well                        Therapeutic Exercises: Not billable       Pt education, postural education, HEP, functional breathing        Quad set R        Prone lying for ext ROM        PKF with overpressure Seated flex stretch by therapist                HEP: see hand out    Whatever Portal  Treatment/Session Summary:    · Response to Treatment:  Pt had good understanding and atul to information presented. · Communication/Consultation:  None today  · Equipment provided today:  None today  · Recommendations/Intent for next treatment session: Next visit will focus on stretching, strengthening, modalities as indicated.     Total Treatment Billable Duration:  20 mins  PT Patient Time In/Time Out  Time In:   Time Out: 930    Jony Saini, PT    Future Appointments   Date Time Provider Susana Victoria   2022  1:10 PM Imani Calderon MD [facility]   2022 10:15 AM Monet, Rebecca Sara, PT [facility]   2022  4:00 PM Nolan, Elza Chime, PTA [facility]   5/3/2022  4:00 PM Nolan, Yue L, PTA [facility]   2022  4:00 PM [place], Rebecca Sara, PT [facility]   5/10/2022  9:30 AM [place], Rebecca Sara, PT [facility]   2022  1:30 PM Monet, Rebecca Sara, PT [facility]   2022  1:30 PM Monet, Rebecca Sara, PT [facility]   2022  1:30 PM Nolan, Elza Chime, PTA [facility]   2022  4:00 PM Nolan, Elza Chime, PTA [facility]   2022  1:30 PM Monet, Rebecca Sara, PT [facility]   2022  8:45 AM Radha Sullivan, Ollis Marrow, DO [facility]   3/3/2023 10:50 AM Francine Flores MD [facility]   3/14/2023  9:93 AM SFD CT 59 SLICE UNIT 1 [facility]

## 2022-04-20 NOTE — THERAPY EVALUATION
Dejuan Augustin  : 1950 [facility]  [address], [place].  [phone]   [phone]        OUTPATIENT PHYSICAL THERAPY:Initial Assessment 2022         ICD-10: Treatment Diagnosis: Pain in right knee (M25.561) and Stiffness of right knee, not elsewhere classified (M25.661) and Muscle weakness (generalized) (M62.81) and Other abnormalities of gait and mobility (R26.89)  Precautions/Allergies:   Shellfish derived   Fall Risk Score:     Ambulatory/Rehab Services H2 Model Falls Risk Assessment    Risk Factors:       (1)  Gender [Male] Ability to Rise from Chair:       (1)  Pushes up, successful in one attempt    Falls Prevention Plan:       No modifications necessary   Total: (5 or greater = High Risk): 2     [facility] of [employer]. All Rights Reserved. [facility] [employer] Patent #[id number]. Federal Law prohibits the replication, distribution or use without written permission from [employer]     MD Orders: eval and treat MEDICAL/REFERRING DIAGNOSIS:  Status post total knee replacement, right [Z96.651]   DATE OF ONSET: 3/25/22  REFERRING PHYSICIAN: Esperanza Pardo MD  RETURN PHYSICIAN APPOINTMENT: 22     INITIAL ASSESSMENT:  Mr. Maxine Kaba presents with R knee pain, stiffness, weakness, decreased balance, altered gait following TKA. He will benefit from skilled PT to address these deficits to return pt to normal function without ROM and strength limitations. PROBLEM LIST (Impacting functional limitations):  1. Decreased Strength  2. Decreased ADL/Functional Activities  3. Decreased Transfer Abilities  4. Decreased Ambulation Ability/Technique  5. Decreased Balance  6. Increased Pain  7. Decreased Activity Tolerance  8. Decreased Flexibility/Joint Mobility  9. Decreased [place] with Home Exercise Program INTERVENTIONS PLANNED:  1. Balance Exercise  2. Cryotherapy  3.  Home Exercise Program (HEP)  4. Range of Motion (ROM)  5. Therapeutic Activites  6. Therapeutic Exercise/Strengthening  7. aquatics    TREATMENT PLAN:  Effective Dates: 4/20/2022 TO 7/19/2022 (90 days). Frequency/Duration: 2-3 times a week for 90 Days  GOALS: (Goals have been discussed and agreed upon with patient.)  Short-Term Functional Goals: Time Frame: 4 weeks  1. Pt to report compliance with HEP  2. Pt to restore 110-10 AROM to prevent manipulation  3. Pt to normalize gait mechanics level surfaces  Discharge Goals: Time Frame: 12 weeks  1. Pt to restore AROM to 120 flex for normal function without compensation  2. Pt to increase strength for sit to stand x 30 reps  3. Pt to increase strength for reciprocal gait on stairs  4. Pt to increase SLS > 20 sec B for safe amb all surfaces  Rehabilitation Potential For Stated Goals: Good  Regarding Racheal Hauser's therapy, I certify that the treatment plan above will be carried out by a therapist or under their direction. Thank you for this referral,  Mikayla Mcbride, PT       Referring Physician Signature: Juaquin Do MD              Date                      HISTORY:   History of Present Injury/Illness (Reason for Referral):  Pt states this knee is harder to get ROM than the last one. He is frustrated. He isn't doing the ex's he learned here last time. He stopped working on his other one after he stopped therapy. He wants to resume doing stairs normally, walk comfortably.   Past Medical History/Comorbidities:   Mr. 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 Mr. Karly Walters  has a past surgical history that includes hx prostatectomy; hx hernia repair (1985); hx wisdom teeth extraction; hx coronary stent placement (1995); hx orthopaedic (Right, 09/2021); and hx knee replacement (Left, 11/18/2021). Social History/Living Environment:     pt lives in single story home with spouse  Prior Level of Function/Work/Activity:  Works [employer]  Dominant Side:         RIGHT  Current Medications:    Current Outpatient Medications:     oxyCODONE IR (ROXICODONE) 5 mg immediate release tablet, Take 1-2 Tablets by mouth every four (4) hours as needed for Pain for up to 7 days. Max Daily Amount: 60 mg. Indications: pain, Disp: 60 Tablet, Rfl: 0    acetaminophen (TYLENOL) 500 mg tablet, Take 1,000 mg by mouth every six (6) hours as needed (breakthrough pain). , Disp: , Rfl:     polyethylene glycol (MIRALAX) 17 gram packet, Take 17 g by mouth daily. , Disp: , Rfl:     atorvastatin (LIPITOR) 40 mg tablet, Take 1 Tablet by mouth every evening., Disp: 90 Tablet, Rfl: 3    enalapril (VASOTEC) 5 mg tablet, TAKE 1 TABLET BY MOUTH  TWICE DAILY, Disp: 180 Tablet, Rfl: 3    aspirin delayed-release 81 mg tablet, Take 1 Tablet by mouth every twelve (12) hours every twelve (12) hours for 30 days. , Disp: 60 Tablet, Rfl: 0    celecoxib (CELEBREX) 200 mg capsule, Take 1 Capsule by mouth daily for 30 days. , Disp: 30 Capsule, Rfl: 1    promethazine (PHENERGAN) 25 mg tablet, Take 1 Tablet by mouth every six (6) hours as needed for Nausea., Disp: 30 Tablet, Rfl: 1    methocarbamoL (Robaxin-750) 750 mg tablet, Take 1 Tablet by mouth four (4) times daily. , Disp: 60 Tablet, Rfl: 1    albuterol (PROVENTIL HFA, VENTOLIN HFA, PROAIR HFA) 90 mcg/actuation inhaler, Take 2 Puffs by inhalation every six (6) hours as needed for Shortness of Breath., Disp: 18 g, Rfl: 5    metFORMIN (GLUCOPHAGE) 500 mg tablet, TAKE 1 TABLET BY MOUTH  TWICE DAILY WITH MEALS, Disp: 180 Tablet, Rfl: 3    methocarbamoL (ROBAXIN) 750 mg tablet, Take 1 Tablet by mouth four (4) times daily as needed for Muscle Spasm(s). (Patient not taking: Reported on 3/11/2022), Disp: 40 Tablet, Rfl: 0    cpap machine kit, by Does Not Apply route., Disp: , Rfl:     bimatoprost (Lumigan) 0.01 % ophthalmic drops, Administer 1 Drop to both eyes every evening., Disp: , Rfl:     tiotropium-olodateroL (Stiolto Respimat) 2.5-2.5 mcg/actuation inhaler, Take 2 Puffs by inhalation daily. , Disp: 3 Inhaler, Rfl: 3    nitroglycerin (NITROSTAT) 0.4 mg SL tablet, 1 Tab by SubLINGual route every five (5) minutes as needed for Chest Pain. (Patient not taking: Reported on 3/27/2022), Disp: 1 Bottle, Rfl: 3   Date Last Reviewed:  4/20/2022   # of Personal Factors/Comorbidities that affect the Plan of Care: 1-2: MODERATE COMPLEXITY   EXAMINATION:   Observation/Orthostatic Postural Assessment:          Pt holds R knee in flex, gasps and holds breath with all movements; He  table and arches back holding breath to bend knee  Palpation:          Good patellar mobility  ROM:         AROM  Knee flex-ext L 110-10, R 86-13 before, 95-10 after therapist stretching  Strength:         5/5 LE myotomes B;  Poor quad set R  Neurological Screen:        unremarkable  Functional Mobility:         Gait/Ambulation:  Antalgic with straight cane        Transfers:  Keeps R foot in front, uses UE's and trunk momentum        Stairs:  Step-to pattern  Balance:          8 sec L, 4 sec R   Body Structures Involved:  1. Joints  2. Muscles Body Functions Affected:  1. Sensory/Pain  2. Movement Related Activities and Participation Affected:  1. General Tasks and Demands  2. Mobility  3. Self Care  4. Domestic Life  5.  Community, Social and [place]   # of elements that affect the Plan of Care: 3: MODERATE COMPLEXITY   CLINICAL PRESENTATION:   Presentation: Evolving clinical presentation with changing clinical characteristics: MODERATE COMPLEXITY   CLINICAL DECISION MAKING:   KOOS:  raw score 16, interval score 47.487    Medical Necessity:   · Patient demonstrates good rehab potential due to higher previous functional level. Reason for Services/Other Comments:  · Patient continues to require present interventions due to patient's inability to function normally with ADL's.    Use of outcome tool(s) and clinical judgement create a POC that gives a: Questionable prediction of patient's progress: MODERATE COMPLEXITY     Total Treatment Duration:        Merlin Healy, PT

## 2022-04-21 ENCOUNTER — HOSPITAL ENCOUNTER (OUTPATIENT)
Dept: PHYSICAL THERAPY | Age: 72
Discharge: HOME | End: 2022-04-21
Payer: MEDICARE

## 2022-04-21 PROCEDURE — 97016 VASOPNEUMATIC DEVICE THERAPY: CPT

## 2022-04-21 PROCEDURE — 97113 AQUATIC THERAPY/EXERCISES: CPT

## 2022-04-21 PROCEDURE — 97110 THERAPEUTIC EXERCISES: CPT

## 2022-04-21 NOTE — PROGRESS NOTES
Victor Manuel Bishop  : 1950  Primary: Mikhail Yoon [place] Medicare Complete  Secondary:  [address] @ [address]  [phone]   [phone]      OUTPATIENT PHYSICAL THERAPY: Daily Treatment Note  2022   ICD-10: Treatment Diagnosis: Pain in right knee (M25.561) and Stiffness of right knee, not elsewhere classified (M25.661) and Muscle weakness (generalized) (M62.81) and Other abnormalities of gait and mobility (R26.89)     R TKA 3/25/22  Effective Dates: 2022 TO 2022 (90 days). Frequency/Duration: 2-3 times a week for 90 Days  GOALS: (Goals have been discussed and agreed upon with patient.)  Short-Term Functional Goals: Time Frame: 4 weeks  1. Pt to report compliance with HEP  2. Pt to restore 110-10 AROM to prevent manipulation  3. Pt to normalize gait mechanics level surfaces  Discharge Goals: Time Frame: 12 weeks  1. Pt to restore AROM to 120 flex for normal function without compensation  2. Pt to increase strength for sit to stand x 30 reps  3. Pt to increase strength for reciprocal gait on stairs  4. Pt to increase SLS > 20 sec B for safe amb all surfaces  _______________________________________________________________________________  Pre-treatment Symptoms/Complaints: I stretched many times yesterday and it hurt bad. I got better rest last night though. Pain: Initial: 5/10 Post Session:  No increased pain after ex's. Medications Last Reviewed:  2022    Updated Objective Findings:      Observation/Orthostatic Postural Assessment:          Pt holds R knee in flex, gasps and holds breath with all movements;   He  table and arches back holding breath to bend knee  ROM:         AROM  Knee flex-ext L 110-10, R 86-5 before, 95-5 after 22  Strength:         5/5 LE myotomes B;  good quad set R 22  Functional Mobility:         Gait/Ambulation:  Antalgic with straight cane        Transfers:  Keeps R foot in front, uses UE's and trunk momentum        Stairs:  Step-to pattern  Balance:          8 sec L, 4 sec R    KOOS:  raw score 16, interval score 47.487     TREATMENT:   THERAPEUTIC ACTIVITY: ( see below for minutes): Therapeutic activities per grid below to improve mobility. Required moderate verbal and manual cues to improve functional mobility . THERAPEUTIC EXERCISE: (see below for minutes):  Exercises per grid below to improve strength. Required moderate verbal and manual cues to promote proper body alignment, promote proper body posture, promote proper body mechanics and promote proper body breathing techniques. Progressed resistance, range, repetitions and complexity of movement as indicated. MANUAL THERAPY: (see below for minutes): Joint mobilization and Soft tissue mobilization was utilized and necessary because of the patient's restricted joint motion, painful spasm, loss of articular motion and restricted motion of soft tissue. MODALITIES: (see below for minutes):      for pain modulation    AQUATIC THERAPY (see below for minutes): Aquatic treatment performed per flow grid for Decreased muscle strength, Decreased endurance, Decreased static/dynamic balance and reactive control, Decreased activity endurance, Decompression, Ease of movement and Low impact and reduced weight bearing activity.     Date: 4/20/22 4/21/22  Visit 2         Modalities: Not billable 15 mins         Game ready R knee  seated seated                    Manual Therapy:                                 Aquatics Activities:  55 mins         GAIT (F/B/S/M)  3 laps         SLR gait  3 laps         Hamstring Curl gait   3 laps         Rockettes  3 laps         Squats  X 30   On step         Calf raises  X 30  On step         Hamstring stretch B  3 x 15 sec         Flex stretch at rail   5 x 30 sec         Step ups ant B           SLS B           Deep well                                 Therapeutic Exercises: Not billable 10 mins Pt education, postural education, HEP, functional breathing           Bike for ROM  10 mins         HEP: see hand out    [employer] Portal  Treatment/Session Summary:    · Response to Treatment:  Extension much improved. No change in flex but achieved the 10 degrees after stretching as he did yesterday. Compliant with HEP. Pt has lost functional motion and strength in L LE since completing therapy. · Communication/Consultation:  None today  · Equipment provided today:  None today  · Recommendations/Intent for next treatment session: Next visit will focus on stretching, strengthening, modalities as indicated.     Total Treatment Billable Duration:  80 mins  PT Patient Time In/Time Out  Time In: 1030  Time Out: 1200    Cl Toribio, JOYCE    Future Appointments   Date Time Provider Susana Victoria   4/22/2022  9:30 AM Amrit Healy, PT [facility]   4/25/2022  1:10 PM Jose Daniel Ramsey MD [facility]   4/26/2022 10:15 AM Amrit Healy, PT [facility]   4/28/2022  4:00 PM Laura Francisco, PTA [facility]   4/29/2022  4:00 PM MonetAmrit garcia, PT [facility]   5/3/2022  4:00 PM NolanLaura, PTA [facility]   5/5/2022  4:00 PM MonetAmrit garcia, PT [facility]   5/10/2022  9:30 AM Amrit Healy, PT [facility]   5/12/2022  1:30 PM Amrit Healy, PT [facility]   5/17/2022  1:30 PM MonetAmrit garcia, PT [facility]   5/19/2022  1:30 PM NolanLaura, PTA [facility]   5/24/2022  4:00 PM NolanLaura lozoya, PTA [facility]   5/26/2022  1:30 PM MonetAmrit garcia, PT [facility]   7/11/2022  8:45 AM Miri Brennan,  [facility]   3/3/2023 10:50 AM Lukasz Page MD [facility]   3/14/2023  2:61 AM SFD CT 59 SLICE UNIT 1 [facility]

## 2022-04-22 ENCOUNTER — HOSPITAL ENCOUNTER (OUTPATIENT)
Dept: PHYSICAL THERAPY | Age: 72
Discharge: HOME | End: 2022-04-22
Payer: MEDICARE

## 2022-04-22 PROCEDURE — 97110 THERAPEUTIC EXERCISES: CPT

## 2022-04-22 PROCEDURE — 97113 AQUATIC THERAPY/EXERCISES: CPT

## 2022-04-22 PROCEDURE — 97016 VASOPNEUMATIC DEVICE THERAPY: CPT

## 2022-04-22 NOTE — PROGRESS NOTES
Rosanna Alston  : 1950  Primary: Donta Scott Medicare Complete  Secondary:  [address] [facility] @ [address], [address] [place] [address]  [phone]   [phone]      OUTPATIENT PHYSICAL THERAPY: Daily Treatment Note  2022   ICD-10: Treatment Diagnosis: Pain in right knee (M25.561) and Stiffness of right knee, not elsewhere classified (M25.661) and Muscle weakness (generalized) (M62.81) and Other abnormalities of gait and mobility (R26.89)     R TKA 3/25/22  Effective Dates: 2022 TO 2022 (90 days). Frequency/Duration: 2-3 times a week for 90 Days  GOALS: (Goals have been discussed and agreed upon with patient.)  Short-Term Functional Goals: Time Frame: 4 weeks  1. Pt to report compliance with HEP  2. Pt to restore 110-10 AROM to prevent manipulation  3. Pt to normalize gait mechanics level surfaces  Discharge Goals: Time Frame: 12 weeks  1. Pt to restore AROM to 120 flex for normal function without compensation  2. Pt to increase strength for sit to stand x 30 reps  3. Pt to increase strength for reciprocal gait on stairs  4. Pt to increase SLS > 20 sec B for safe amb all surfaces  _______________________________________________________________________________  Pre-treatment Symptoms/Complaints: I have been working on it. I didn't sleep so well last night. Pain: Initial: 5/10 Post Session:  No increased pain after ex's. Medications Last Reviewed:  2022    Updated Objective Findings:      Observation/Orthostatic Postural Assessment:          Pt holds R knee in flex, gasps and holds breath with all movements;   He  table and arches back holding breath to bend knee  ROM:         AROM  Knee flex-ext L 110-10, R 86-5 before, 100-5 after 22  Functional Mobility:         Gait/Ambulation:  Antalgic with straight cane        Transfers:  Keeps R foot in front, uses UE's and trunk momentum        Stairs:  Step-to pattern  Balance: 8 sec L, 4 sec R    KOOS:  raw score 16, interval score 47.487     TREATMENT:   THERAPEUTIC ACTIVITY: ( see below for minutes): Therapeutic activities per grid below to improve mobility. Required moderate verbal and manual cues to improve functional mobility . THERAPEUTIC EXERCISE: (see below for minutes):  Exercises per grid below to improve strength. Required moderate verbal and manual cues to promote proper body alignment, promote proper body posture, promote proper body mechanics and promote proper body breathing techniques. Progressed resistance, range, repetitions and complexity of movement as indicated. MANUAL THERAPY: (see below for minutes): Joint mobilization and Soft tissue mobilization was utilized and necessary because of the patient's restricted joint motion, painful spasm, loss of articular motion and restricted motion of soft tissue. MODALITIES: (see below for minutes):      for pain modulation    AQUATIC THERAPY (see below for minutes): Aquatic treatment performed per flow grid for Decreased muscle strength, Decreased endurance, Decreased static/dynamic balance and reactive control, Decreased activity endurance, Decompression, Ease of movement and Low impact and reduced weight bearing activity.     Date: 4/20/22 4/21/22  Visit 2 4/22/22  Visit 3        Modalities: Not billable 15 mins 15 mins        Game ready R knee  seated seated seated                   Manual Therapy:                                 Aquatics Activities:  55 mins 60 mins        GAIT (F/B/S/M)  3 laps 4 laps        SLR gait  3 laps 4 laps        Hamstring Curl gait   3 laps 4 laps        Rockettes  3 laps 4 laps        Squats  X 30   On step X 30  On step        Calf raises  X 30  On step 30   On step        Hamstring stretch B  3 x 15 sec 3 x 15 sec        Flex stretch at rail   5 x 30 sec 3 x 15 sec        Step ups ant B   X 10        SLS B           Deep well   5 mins                              Therapeutic Exercises: Not billable 10 mins 15 mins        Pt education, postural education, HEP, functional breathing           H/K rocking back stretch   X 5        Bike for ROM  10 mins 10 mins        HEP: see hand out    [employer] Portal  Treatment/Session Summary:    · Response to Treatment: Pt continues to have flex stiffness that is concerning as it has not changed from initial visit. Pt gains motion in therapy but does not maintain it. PFK with overpressure is not allowing enough force for stretching so pt will perform H/K rocking back to allow more force. He has maintained ext nicely. · Communication/Consultation:  None today  · Equipment provided today:  None today  · Recommendations/Intent for next treatment session: Next visit will focus on stretching, strengthening, modalities as indicated.     Total Treatment Billable Duration:  90 mins  PT Patient Time In/Time Out  Time In: 0920  Time Out: [address], PT    Future Appointments   Date Time Provider Susana Victoria   4/25/2022  1:10 PM Luana Graham MD [facility]   4/26/2022 10:15 AM Laverne Healy, PT [facility]   4/28/2022  4:00 PM Nolan, Erchristiana Lee, PTA [facility]   4/29/2022  4:00 PM Laverne Healy, PT [facility]   5/3/2022  4:00 PM Nolan, Erle Lee, PTA [facility]   5/5/2022  4:00 PM Laverne Healy, PT [facility]   5/10/2022  9:30 AM Laverne Healy, PT [facility]   5/12/2022  1:30 PM Laverne Healy, PT [facility]   5/17/2022  1:30 PM Laveren Healy, PT [facility]   5/19/2022  1:30 PM Nolan, Erle Lee, PTA [facility]   5/24/2022  4:00 PM Nolan, Erle Lee, PTA [facility]   5/26/2022  1:30 PM Laverne Healy, PT [facility]   7/11/2022  8:45 AM Deshawn Neville, DO [facility]   3/3/2023 10:50 AM Candelario Goldmann, MD [facility]   3/14/2023  2:79 AM SFD CT 59 SLICE UNIT 1 [facility]

## 2022-04-26 ENCOUNTER — HOSPITAL ENCOUNTER (OUTPATIENT)
Dept: PHYSICAL THERAPY | Age: 72
Discharge: HOME | End: 2022-04-26
Payer: MEDICARE

## 2022-04-26 PROCEDURE — 97110 THERAPEUTIC EXERCISES: CPT

## 2022-04-26 PROCEDURE — 97016 VASOPNEUMATIC DEVICE THERAPY: CPT

## 2022-04-26 PROCEDURE — 97113 AQUATIC THERAPY/EXERCISES: CPT

## 2022-04-26 NOTE — PROGRESS NOTES
Davis Givens  : 1950  Primary: Lilysonny Crockettcourtney Medicare Complete  Secondary:  [address] @ [facility]  [address] [facility]  [phone]   [phone]      OUTPATIENT PHYSICAL THERAPY: Daily Treatment Note  2022   ICD-10: Treatment Diagnosis: Pain in right knee (M25.561) and Stiffness of right knee, not elsewhere classified (M25.661) and Muscle weakness (generalized) (M62.81) and Other abnormalities of gait and mobility (R26.89)     R TKA 3/25/22  Effective Dates: 2022 TO 2022 (90 days). Frequency/Duration: 2-3 times a week for 90 Days  GOALS: (Goals have been discussed and agreed upon with patient.)  Short-Term Functional Goals: Time Frame: 4 weeks  1. Pt to report compliance with HEP  2. Pt to restore 110-10 AROM to prevent manipulation  3. Pt to normalize gait mechanics level surfaces  Discharge Goals: Time Frame: 12 weeks  1. Pt to restore AROM to 120 flex for normal function without compensation  2. Pt to increase strength for sit to stand x 30 reps  3. Pt to increase strength for reciprocal gait on stairs  4. Pt to increase SLS > 20 sec B for safe amb all surfaces  _______________________________________________________________________________  Pre-treatment Symptoms/Complaints:   I was so sore I couldn't do anything Sat and Sun. I did it hard yesterday though. Pain: Initial: 5/10 Post Session:  No increased pain after ex's. Medications Last Reviewed:  2022    Updated Objective Findings:      Observation/Orthostatic Postural Assessment:          Pt holds R knee in flex, gasps and holds breath with all movements;   He  table and arches back holding breath to bend knee  ROM:         AROM  Knee flex-ext L 110-10, R 90-5 before, 100-5 after 22  Functional Mobility:         Gait/Ambulation:  Antalgic with straight cane        Transfers:  Keeps R foot in front, uses UE's and trunk momentum        Stairs:  Step-to pattern  Balance:          8 sec L, 4 sec R    KOOS:  raw score 16, interval score 47.487     TREATMENT:   THERAPEUTIC ACTIVITY: ( see below for minutes): Therapeutic activities per grid below to improve mobility. Required moderate verbal and manual cues to improve functional mobility . THERAPEUTIC EXERCISE: (see below for minutes):  Exercises per grid below to improve strength. Required moderate verbal and manual cues to promote proper body alignment, promote proper body posture, promote proper body mechanics and promote proper body breathing techniques. Progressed resistance, range, repetitions and complexity of movement as indicated. MANUAL THERAPY: (see below for minutes): Joint mobilization and Soft tissue mobilization was utilized and necessary because of the patient's restricted joint motion, painful spasm, loss of articular motion and restricted motion of soft tissue. MODALITIES: (see below for minutes):      for pain modulation    AQUATIC THERAPY (see below for minutes): Aquatic treatment performed per flow grid for Decreased muscle strength, Decreased endurance, Decreased static/dynamic balance and reactive control, Decreased activity endurance, Decompression, Ease of movement and Low impact and reduced weight bearing activity.     Date: 4/20/22 4/21/22  Visit 2 4/22/22  Visit 3 4/26/22  Visit 4       Modalities: Not billable 15 mins 15 mins 15 mins       Game ready R knee  seated seated seated seated                  Manual Therapy:                                 Aquatics Activities:  55 mins 60 mins 60 mins       GAIT (F/B/S/M)  3 laps 4 laps 4 laps       SLR gait  3 laps 4 laps 4 laps       Hamstring Curl gait   3 laps 4 laps 4 laps       Rockettes  3 laps 4 laps 4 laps       Squats  X 30   On step X 30  On step X 30  On step       Calf raises  X 30  On step 30   On step X 30  On step       Hamstring stretch B  3 x 15 sec 3 x 15 sec 3 x 15 sec       Flex stretch at rail   5 x 30 sec 3 x 15 sec 3 x 15 sec       Step ups ant B   X 10 X 20       SLS B    X 3       Deep well   5 mins 5 mins                             Therapeutic Exercises: Not billable 10 mins 15 mins 15 mins       Pt education, postural education, HEP, functional breathing           H/K rocking back stretch   X 5        Bike for ROM  10 mins 10 mins 15 mins       HEP: see hand out    [employer] Portal  Treatment/Session Summary:    · Response to Treatment: Pt stays in hip flexed posture unless cued to stand more erect. Significant weakness B LE's. Remains stiff with knee flex R > L.  · Communication/Consultation:  None today  · Equipment provided today:  None today  · Recommendations/Intent for next treatment session: Next visit will focus on stretching, strengthening, modalities as indicated.     Total Treatment Billable Duration:  90 mins  PT Patient Time In/Time Out  Time In: 1010  Time Out: [address] Carlos Adhikari, PT    Future Appointments   Date Time Provider Susana Victoria   4/28/2022  4:00 PM Shireen Lopez, PTA [facility]   4/29/2022  4:00 PM Quincy Healy, PT [facility]   5/3/2022  4:00 PM [facility]   5/5/2022  4:00 PM [facility]aly[facility]   5/10/2022  9:30 AM Quincy Healy, PT [facility]   5/12/2022  1:30 PM Quincy Healy, PT [facility]   5/17/2022  1:30 PM Quincy Healy, PT [facility]   5/19/2022  1:30 PM Oma Francisco, PTA [facility]   5/24/2022  4:00 PM Oma Francisco, PTA [facility]   5/26/2022  1:30 PM Quincy Healy, PT [facility]   7/11/2022  8:45 AM Jeramie Jackson DO [facility] TR[facility] TRIM   3/3/2023 10:50 AM Jose Daniel Silva MD [facility] PP PP   3/14/2023  0:40 AM SFD CT 59 SLICE UNIT 1 [facility]

## 2022-04-28 ENCOUNTER — HOSPITAL ENCOUNTER (OUTPATIENT)
Dept: PHYSICAL THERAPY | Age: 72
Discharge: HOME | End: 2022-04-28
Payer: MEDICARE

## 2022-04-28 PROCEDURE — 97016 VASOPNEUMATIC DEVICE THERAPY: CPT

## 2022-04-28 PROCEDURE — 97113 AQUATIC THERAPY/EXERCISES: CPT

## 2022-04-28 PROCEDURE — 97110 THERAPEUTIC EXERCISES: CPT

## 2022-04-28 NOTE — PROGRESS NOTES
Devin aPul  : 1950  Primary: Angela Scott Medicare Complete  Secondary:  [address]  [phone]   [phone]      OUTPATIENT PHYSICAL THERAPY: Daily Treatment Note  2022   ICD-10: Treatment Diagnosis: Pain in right knee (M25.561) and Stiffness of right knee, not elsewhere classified (M25.661) and Muscle weakness (generalized) (M62.81) and Other abnormalities of gait and mobility (R26.89)     R TKA 3/25/22  Effective Dates: 2022 TO 2022 (90 days). Frequency/Duration: 2-3 times a week for 90 Days  GOALS: (Goals have been discussed and agreed upon with patient.)  Short-Term Functional Goals: Time Frame: 4 weeks  1. Pt to report compliance with HEP  2. Pt to restore 110-10 AROM to prevent manipulation  3. Pt to normalize gait mechanics level surfaces  Discharge Goals: Time Frame: 12 weeks  1. Pt to restore AROM to 120 flex for normal function without compensation  2. Pt to increase strength for sit to stand x 30 reps  3. Pt to increase strength for reciprocal gait on stairs  4. Pt to increase SLS > 20 sec B for safe amb all surfaces  _______________________________________________________________________________  Pre-treatment Symptoms/Complaints:   Practice makes perfect. Pain: Initial: 5/10 Post Session:  No increased pain after ex's. Medications Last Reviewed:  2022    Updated Objective Findings:      Observation/Orthostatic Postural Assessment:          Pt holds R knee in flex, gasps and holds breath with all movements;   He  table and arches back holding breath to bend knee  ROM:         AROM  Knee flex-ext L 110-10, R 90-5 before, 100-5 after 22  Functional Mobility:         Gait/Ambulation:  Antalgic with straight cane        Transfers:  Keeps R foot in front, uses UE's and trunk momentum        Stairs:  Step-to pattern  Balance:          8 sec L, 4 sec R    KOOS:  raw score 16, interval score 47.487     TREATMENT:   THERAPEUTIC ACTIVITY: ( see below for minutes): Therapeutic activities per grid below to improve mobility. Required moderate verbal and manual cues to improve functional mobility . THERAPEUTIC EXERCISE: (see below for minutes):  Exercises per grid below to improve strength. Required moderate verbal and manual cues to promote proper body alignment, promote proper body posture, promote proper body mechanics and promote proper body breathing techniques. Progressed resistance, range, repetitions and complexity of movement as indicated. MANUAL THERAPY: (see below for minutes): Joint mobilization and Soft tissue mobilization was utilized and necessary because of the patient's restricted joint motion, painful spasm, loss of articular motion and restricted motion of soft tissue. MODALITIES: (see below for minutes):      for pain modulation    AQUATIC THERAPY (see below for minutes): Aquatic treatment performed per flow grid for Decreased muscle strength, Decreased endurance, Decreased static/dynamic balance and reactive control, Decreased activity endurance, Decompression, Ease of movement and Low impact and reduced weight bearing activity.     Date: 4/20/22 4/21/22  Visit 2 4/22/22  Visit 3 4/26/22  Visit 4 4/28/22 Visit 5      Modalities: Not billable 15 mins 15 mins 15 mins 15 mins      Game ready R knee  seated seated seated seated seated                 Manual Therapy:                                 Aquatics Activities:  55 mins 60 mins 60 mins 60 mins      GAIT (F/B/S/M)  3 laps 4 laps 4 laps 4 laps      SLR gait  3 laps 4 laps 4 laps 4 laps      Hamstring Curl gait   3 laps 4 laps 4 laps 4 laps      Rockettes  3 laps 4 laps 4 laps 4 laps      Squats  X 30   On step X 30  On step X 30  On step X 30 on step      Calf raises  X 30  On step 30   On step X 30  On step X 30 on step      Hamstring stretch B  3 x 15 sec 3 x 15 sec 3 x 15 sec 3 x 15 sec      Flex stretch at rail   5 x 30 sec 3 x 15 sec 3 x 15 sec 3 x 15 sec      Step ups ant B   X 10 X 20 X 30      SLS B    X 3 X 3      Deep well   5 mins 5 mins 5 mins                            Therapeutic Exercises: Not billable 10 mins 15 mins 15 mins 15 mins      Pt education, postural education, HEP, functional breathing           H/K rocking back stretch   X 5        Bike for ROM  10 mins 10 mins 15 mins 15 mins      HEP: see hand out    [employer] Portal  Treatment/Session Summary:    · Response to Treatment: Pt stays in hip flexed posture unless cued to stand more erect. Cues during squats for foot placement and to reduce hip abduction. Cont POC. · Communication/Consultation:  None today  · Equipment provided today:  None today  · Recommendations/Intent for next treatment session: Next visit will focus on stretching, strengthening, modalities as indicated.     Total Treatment Billable Duration:  90 mins  PT Patient Time In/Time Out  Time In: 0035  Time Out: [address] [place] [address], [facility]    Future Appointments   Date Time Provider Susana Victoria   4/29/2022  4:00 PM Amrit Healy, PT [facility]   5/3/2022  4:00 PM Laura Francisco, PTA [facility]   5/5/2022  4:00 PM Amrit Healy, PT [facility]   5/10/2022  9:30 AM Amrit Healy, PT [facility]   5/12/2022  1:30 PM Amrit Healy, PT [facility]   5/17/2022  1:30 PM Amrit Healy, PT [facility]   5/19/2022  1:30 PM Laura Francisco, PTA [facility]   5/24/2022  4:00 PM Laura Francisco, PTA [facility]   5/26/2022  1:30 PM Amrit Healy, PT [facility]   7/11/2022  8:45 AM Miir Brennan DO [facility] TRIM TRIM   3/3/2023 10:50 AM Lukasz Page MD [facility] PP PP   3/14/2023  8:40 AM SFD CT 59 SLICE UNIT 1 [facility]

## 2022-04-29 ENCOUNTER — HOSPITAL ENCOUNTER (OUTPATIENT)
Dept: PHYSICAL THERAPY | Age: 72
Discharge: HOME | End: 2022-04-29
Payer: MEDICARE

## 2022-04-29 PROCEDURE — 97110 THERAPEUTIC EXERCISES: CPT

## 2022-04-29 PROCEDURE — 97113 AQUATIC THERAPY/EXERCISES: CPT

## 2022-04-29 PROCEDURE — 97016 VASOPNEUMATIC DEVICE THERAPY: CPT

## 2022-04-29 NOTE — PROGRESS NOTES
Viola Downing  : 1950  Primary: Zenon Valderrama Aarp Medicare Complete  Secondary:  [address] @ [address], [facility] [place].  [phone]   [phone]      OUTPATIENT PHYSICAL THERAPY: Daily Treatment Note  2022   ICD-10: Treatment Diagnosis: Pain in right knee (M25.561) and Stiffness of right knee, not elsewhere classified (M25.661) and Muscle weakness (generalized) (M62.81) and Other abnormalities of gait and mobility (R26.89)     R TKA 3/25/22  Effective Dates: 2022 TO 2022 (90 days). Frequency/Duration: 2-3 times a week for 90 Days  GOALS: (Goals have been discussed and agreed upon with patient.)  Short-Term Functional Goals: Time Frame: 4 weeks  1. Pt to report compliance with HEP  2. Pt to restore 110-10 AROM to prevent manipulation  3. Pt to normalize gait mechanics level surfaces  Discharge Goals: Time Frame: 12 weeks  1. Pt to restore AROM to 120 flex for normal function without compensation  2. Pt to increase strength for sit to stand x 30 reps  3. Pt to increase strength for reciprocal gait on stairs  4. Pt to increase SLS > 20 sec B for safe amb all surfaces  _______________________________________________________________________________  Pre-treatment Symptoms/Complaints:    I have been making it hurt if that counts. Pain: Initial: 5/10 Post Session:  No increased pain after ex's. Medications Last Reviewed:  2022    Updated Objective Findings:      Observation/Orthostatic Postural Assessment:          Pt holds R knee in flex, gasps and holds breath with all movements;   He  table and arches back holding breath to bend knee  ROM:         AROM  Knee flex-ext L 110-10, R 100-15 before, 100-10 after 22  Functional Mobility:         Gait/Ambulation:  Antalgic with straight cane        Transfers:  Keeps R foot in front, uses UE's and trunk momentum        Stairs:  Step-to pattern  Balance:          8 sec L, 4 sec R    KOOS:  raw score 16, interval score 47.487     TREATMENT:   THERAPEUTIC ACTIVITY: ( see below for minutes): Therapeutic activities per grid below to improve mobility. Required moderate verbal and manual cues to improve functional mobility . THERAPEUTIC EXERCISE: (see below for minutes):  Exercises per grid below to improve strength. Required moderate verbal and manual cues to promote proper body alignment, promote proper body posture, promote proper body mechanics and promote proper body breathing techniques. Progressed resistance, range, repetitions and complexity of movement as indicated. MANUAL THERAPY: (see below for minutes): Joint mobilization and Soft tissue mobilization was utilized and necessary because of the patient's restricted joint motion, painful spasm, loss of articular motion and restricted motion of soft tissue. MODALITIES: (see below for minutes):      for pain modulation    AQUATIC THERAPY (see below for minutes): Aquatic treatment performed per flow grid for Decreased muscle strength, Decreased endurance, Decreased static/dynamic balance and reactive control, Decreased activity endurance, Decompression, Ease of movement and Low impact and reduced weight bearing activity.     Date: 4/20/22 4/21/22  Visit 2 4/22/22  Visit 3 4/26/22  Visit 4 4/28/22 Visit 5 4/29/22  Visit 6     Modalities: Not billable 15 mins 15 mins 15 mins 15 mins 15 mins     Game ready R knee  seated seated seated seated seated seated                Manual Therapy:                                 Aquatics Activities:  55 mins 60 mins 60 mins 60 mins 60 mins     GAIT (F/B/S/M)  3 laps 4 laps 4 laps 4 laps 4 laps     SLR gait  3 laps 4 laps 4 laps 4 laps 4 laps     Hamstring Curl gait   3 laps 4 laps 4 laps 4 laps 4 laps     Rockettes  3 laps 4 laps 4 laps 4 laps 4 laps     Squats  X 30   On step X 30  On step X 30  On step X 30 on step X 30  On step     Calf raises  X 30  On step 30   On step X 30  On step X 30 on step X 30  On step     Hamstring stretch B  3 x 15 sec 3 x 15 sec 3 x 15 sec 3 x 15 sec 3 x 15 sec     Flex stretch at rail   5 x 30 sec 3 x 15 sec 3 x 15 sec 3 x 15 sec 3 x 15 sec     Step ups ant B   X 10 X 20 X 30 X 30     SLS B    X 3 X 3 X 3     Deep well   5 mins 5 mins 5 mins 5 mins                           Therapeutic Exercises: Not billable 10 mins 15 mins 15 mins 15 mins 10 mins     Pt education, postural education, HEP, functional breathing           H/K rocking back stretch   X 5        Bike for ROM  10 mins 10 mins 15 mins 15 mins 10 mins     HEP: see hand out    [employer] Portal  Treatment/Session Summary:    · Response to Treatment: Pt unable to gain motion with stretching on the bike today. Showing improved gait. Pain still limits ROM/stretching atul but pt reports compliance with home stretching hourly. · Communication/Consultation:  None today  · Equipment provided today:  None today  · Recommendations/Intent for next treatment session: Next visit will focus on stretching, strengthening, modalities as indicated.     Total Treatment Billable Duration:  85 mins  PT Patient Time In/Time Out  Time In: 9968  Time Out: [address], PT    Future Appointments   Date Time Provider Susana Victoria   5/3/2022  4:00 PM Valentin El PTA [facility]   5/5/2022  4:00 PM Jarrett Healy, PT [facility]   5/10/2022  9:30 AM Jarrett Healy, PT [facility]   5/12/2022  1:30 PM Jarrett Healy, PT [facility]   5/17/2022  1:30 PM Jarrett Healy, PT [facility]   5/19/2022  1:30 PM Josefina Francisco, PTA [facility]   5/24/2022  4:00 PM [facility], [facility]   5/26/2022  1:30 PM [facility]aly, PT [facility]   7/11/2022  8:45 AM Lisa Hearn,  [facility] TRIM TRIM   3/3/2023 10:50 AM Giovanni Holcomb MD [facility] PP PP   3/14/2023  9:38 AM SFD CT 59 SLICE UNIT 1 MARGIECT NEHA

## 2022-05-02 ENCOUNTER — HOSPITAL ENCOUNTER (OUTPATIENT)
Dept: PHYSICAL THERAPY | Age: 72
Setting detail: RECURRING SERIES
Discharge: HOME | End: 2022-05-05
Payer: MEDICARE

## 2022-05-03 ENCOUNTER — HOSPITAL ENCOUNTER (OUTPATIENT)
Dept: PHYSICAL THERAPY | Age: 72
Discharge: HOME | End: 2022-05-03
Payer: MEDICARE

## 2022-05-03 PROCEDURE — 97110 THERAPEUTIC EXERCISES: CPT

## 2022-05-03 PROCEDURE — 97113 AQUATIC THERAPY/EXERCISES: CPT

## 2022-05-03 PROCEDURE — 97016 VASOPNEUMATIC DEVICE THERAPY: CPT

## 2022-05-03 NOTE — PROGRESS NOTES
Willy Sensing  : 1950  Primary: Peggy Scott Medicare Complete  Secondary:  [address] [facility] @ [address], [address] [place] [address]  [phone]   [phone]      OUTPATIENT PHYSICAL THERAPY: Daily Treatment Note  5/3/2022   ICD-10: Treatment Diagnosis: Pain in right knee (M25.561) and Stiffness of right knee, not elsewhere classified (M25.661) and Muscle weakness (generalized) (M62.81) and Other abnormalities of gait and mobility (R26.89)     R TKA 3/25/22  Effective Dates: 2022 TO 2022 (90 days). Frequency/Duration: 2-3 times a week for 90 Days  GOALS: (Goals have been discussed and agreed upon with patient.)  Short-Term Functional Goals: Time Frame: 4 weeks  1. Pt to report compliance with HEP  2. Pt to restore 110-10 AROM to prevent manipulation  3. Pt to normalize gait mechanics level surfaces  Discharge Goals: Time Frame: 12 weeks  1. Pt to restore AROM to 120 flex for normal function without compensation  2. Pt to increase strength for sit to stand x 30 reps  3. Pt to increase strength for reciprocal gait on stairs  4. Pt to increase SLS > 20 sec B for safe amb all surfaces  _______________________________________________________________________________  Pre-treatment Symptoms/Complaints:   Still stretching. Got my first full night sleep last night so that is progress. Pain: Initial: 4/10 Post Session:  No increased pain after ex's. Medications Last Reviewed:  5/3/2022    Updated Objective Findings:      Observation/Orthostatic Postural Assessment:          Pt holds R knee in flex, gasps and holds breath with all movements;   He  table and arches back holding breath to bend knee  ROM:         AROM  Knee flex-ext L 110-10, R 100-15 before, 105-10 after 5/3/22  Functional Mobility:         Gait/Ambulation:  Antalgic with straight cane        Transfers:  Keeps R foot in front, uses UE's and trunk momentum        Stairs:  Step-to pattern  Balance:          8 sec L, 4 sec R    KOOS:  raw score 16, interval score 47.487     TREATMENT:   THERAPEUTIC ACTIVITY: ( see below for minutes): Therapeutic activities per grid below to improve mobility. Required moderate verbal and manual cues to improve functional mobility . THERAPEUTIC EXERCISE: (see below for minutes):  Exercises per grid below to improve strength. Required moderate verbal and manual cues to promote proper body alignment, promote proper body posture, promote proper body mechanics and promote proper body breathing techniques. Progressed resistance, range, repetitions and complexity of movement as indicated. MANUAL THERAPY: (see below for minutes): Joint mobilization and Soft tissue mobilization was utilized and necessary because of the patient's restricted joint motion, painful spasm, loss of articular motion and restricted motion of soft tissue. MODALITIES: (see below for minutes):      for pain modulation    AQUATIC THERAPY (see below for minutes): Aquatic treatment performed per flow grid for Decreased muscle strength, Decreased endurance, Decreased static/dynamic balance and reactive control, Decreased activity endurance, Decompression, Ease of movement and Low impact and reduced weight bearing activity.     Date: 4/20/22 4/21/22  Visit 2 4/22/22  Visit 3 4/26/22  Visit 4 4/28/22 Visit 5 4/29/22  Visit 6 5/3/22 Visit 7    Modalities: Not billable 15 mins 15 mins 15 mins 15 mins 15 mins 15 mins    Game ready R knee  seated seated seated seated seated seated seated               Manual Therapy:                                 Aquatics Activities:  55 mins 60 mins 60 mins 60 mins 60 mins 60 mins    GAIT (F/B/S/M)  3 laps 4 laps 4 laps 4 laps 4 laps 4 laps    SLR gait  3 laps 4 laps 4 laps 4 laps 4 laps 4 laps    Hamstring Curl gait   3 laps 4 laps 4 laps 4 laps 4 laps 4 laps    Rockettes  3 laps 4 laps 4 laps 4 laps 4 laps 4 laps    Squats  X 30   On step X 30  On step X 30  On step X 30 on step X 30  On step X 30 on step    Calf raises  X 30  On step 30   On step X 30  On step X 30 on step X 30  On step X 30 on step    Hamstring stretch B  3 x 15 sec 3 x 15 sec 3 x 15 sec 3 x 15 sec 3 x 15 sec 3 x 15 sec    Flex stretch at rail   5 x 30 sec 3 x 15 sec 3 x 15 sec 3 x 15 sec 3 x 15 sec 3 x 15 sec    Step ups ant B   X 10 X 20 X 30 X 30 X 30     SLS B    X 3 X 3 X 3 X 3    Deep well   5 mins 5 mins 5 mins 5 mins 5 mins                          Therapeutic Exercises: Not billable 10 mins 15 mins 15 mins 15 mins 10 mins 10 mins    Pt education, postural education, HEP, functional breathing           H/K rocking back stretch   X 5        Bike for ROM  10 mins 10 mins 15 mins 15 mins 10 mins 10 mins    HEP: see hand out    [employer] Portal  Treatment/Session Summary:    · Response to Treatment: Motion hovering in same range though pt insists he is working on it hourly at home. Cont aquatics without increased pain. Pt reports improved sleeping patterns. Cont POC. · Communication/Consultation:  None today  · Equipment provided today:  None today  · Recommendations/Intent for next treatment session: Next visit will focus on stretching, strengthening, modalities as indicated.     Total Treatment Billable Duration:  85 mins  PT Patient Time In/Time Out  Time In: 1022  Time Out: [address] [place] [address], [facility]    Future Appointments   Date Time Provider Susana Victoria   5/5/2022  4:00 PM Ivania Healy, PT [facility]   5/10/2022  9:30 AM Ivania Healy, PT [facility]   5/12/2022  1:30 PM Ivania Healy, PT [facility]   5/17/2022  1:30 PM Ivania Healy, PT [facility]   5/19/2022  1:30 PM Cuba Francisco, PTA [facility]   5/24/2022  4:00 PM Cuba Francisco, PTA [facility]   5/26/2022  1:30 PM Ivania Healy, PT [facility]   7/11/2022  8:45 AM Mario Gonzales DO [facility] TRIM TRIM   3/3/2023 10:50 AM Guillermo Riley MD [facility] PP PP   3/14/2023  9:30 STEPHANIE SOLOMON CT 59 SLICE UNIT 1 [facility]

## 2022-05-05 ENCOUNTER — HOSPITAL ENCOUNTER (OUTPATIENT)
Dept: PHYSICAL THERAPY | Age: 72
Discharge: HOME | End: 2022-05-05
Payer: MEDICARE

## 2022-05-05 PROCEDURE — 97016 VASOPNEUMATIC DEVICE THERAPY: CPT

## 2022-05-05 PROCEDURE — 97113 AQUATIC THERAPY/EXERCISES: CPT

## 2022-05-05 PROCEDURE — 97110 THERAPEUTIC EXERCISES: CPT

## 2022-05-05 NOTE — PROGRESS NOTES
Fausto Fuentes  : 1950  Primary: Zak Scott Medicare Complete  Secondary:  Lu Beaver @ [address], [place].  [phone]   [phone]      OUTPATIENT PHYSICAL THERAPY: Daily Treatment Note and Progress Note 2022   ICD-10: Treatment Diagnosis: Pain in right knee (M25.561) and Stiffness of right knee, not elsewhere classified (M25.661) and Muscle weakness (generalized) (M62.81) and Other abnormalities of gait and mobility (R26.89)     R TKA 3/25/22  Effective Dates: 2022 TO 2022 (90 days). Frequency/Duration: 2-3 times a week for 90 Days  GOALS: (Goals have been discussed and agreed upon with patient.)  Short-Term Functional Goals: Time Frame: 4 weeks  1. Pt to report compliance with HEP - MET  2. Pt to restore 110-10 AROM to prevent manipulation - ongoing  3. Pt to normalize gait mechanics level surfaces - ongoing  Discharge Goals: Time Frame: 12 weeks  1. Pt to restore AROM to 120 flex for normal function without compensation - ongoing  2. Pt to increase strength for sit to stand x 30 reps - ongoing with correct alignment  3. Pt to increase strength for reciprocal gait on stairs - ongoing  4. Pt to increase SLS > 20 sec B for safe amb all surfaces - ongoing  _______________________________________________________________________________  Pre-treatment Symptoms/Complaints:   I am stretching every hour so I think it is better. Pain: Initial: 4/10 Post Session:  No increased pain after ex's.    Medications Last Reviewed:  2022    Updated Objective Findings:  22    ROM:         AROM  Knee flex-ext L 110-10, R 100-15 before, 110-10 after   Functional Mobility:         Gait/Ambulation:  Antalgic with lat trunk motion        Transfers:  can perform without UE assist but abducts R hip and doesn't have control to the chair        Stairs:  Step-to pattern  Balance:          20 sec L, 6 sec R    KOOS:  raw score 16, interval score 47.487  5/5/22:  Raw score 10, interval score 61.583     TREATMENT:   THERAPEUTIC ACTIVITY: ( see below for minutes): Therapeutic activities per grid below to improve mobility. Required moderate verbal and manual cues to improve functional mobility . THERAPEUTIC EXERCISE: (see below for minutes):  Exercises per grid below to improve strength. Required moderate verbal and manual cues to promote proper body alignment, promote proper body posture, promote proper body mechanics and promote proper body breathing techniques. Progressed resistance, range, repetitions and complexity of movement as indicated. MANUAL THERAPY: (see below for minutes): Joint mobilization and Soft tissue mobilization was utilized and necessary because of the patient's restricted joint motion, painful spasm, loss of articular motion and restricted motion of soft tissue. MODALITIES: (see below for minutes):      for pain modulation    AQUATIC THERAPY (see below for minutes): Aquatic treatment performed per flow grid for Decreased muscle strength, Decreased endurance, Decreased static/dynamic balance and reactive control, Decreased activity endurance, Decompression, Ease of movement and Low impact and reduced weight bearing activity.     Date: 4/20/22 4/21/22  Visit 2 4/22/22  Visit 3 4/26/22  Visit 4 4/28/22 Visit 5 4/29/22  Visit 6 5/3/22 Visit 7 5/5/22  Visit 8  PN   Modalities: Not billable 15 mins 15 mins 15 mins 15 mins 15 mins 15 mins 15 mins   Game ready R knee  seated seated seated seated seated seated seated seated              Manual Therapy:                                 Aquatics Activities:  55 mins 60 mins 60 mins 60 mins 60 mins 60 mins 60 mins   GAIT (F/B/S/M)  3 laps 4 laps 4 laps 4 laps 4 laps 4 laps 4 laps   SLR gait  3 laps 4 laps 4 laps 4 laps 4 laps 4 laps 4 laps   Hamstring Curl gait   3 laps 4 laps 4 laps 4 laps 4 laps 4 laps 4 laps   Rockettes  3 laps 4 laps 4 laps 4 laps 4 laps 4 laps 4 laps Squats  X 30   On step X 30  On step X 30  On step X 30 on step X 30  On step X 30 on step X 30  On step   Calf raises  X 30  On step 30   On step X 30  On step X 30 on step X 30  On step X 30 on step X 30  On step   Hamstring stretch B  3 x 15 sec 3 x 15 sec 3 x 15 sec 3 x 15 sec 3 x 15 sec 3 x 15 sec 3 x 15 sec   Flex stretch at rail   5 x 30 sec 3 x 15 sec 3 x 15 sec 3 x 15 sec 3 x 15 sec 3 x 15 sec 3 x 15 sec   Step ups ant B   X 10 X 20 X 30 X 30 X 30  X 30   SLS B    X 3 X 3 X 3 X 3 X 3   Deep well   5 mins 5 mins 5 mins 5 mins 5 mins 5 mins                         Therapeutic Exercises: Not billable 10 mins 15 mins 15 mins 15 mins 10 mins 10 mins 10 mins   Pt education, postural education, HEP, functional breathing           PROM prone, seated         10 mins   H/K rocking back stretch   X 5        Bike for ROM  10 mins 10 mins 15 mins 15 mins 10 mins 10 mins    HEP: see hand out    [employer] Portal  Treatment/Session Summary:    · Response to Treatment: Pt continues to have marked stiffness that is unchanged each visit. More aggressive passive stretching today added more post stretching motion. Gait remains antalgic, balance is deficit. Pt will benefit from continued skilled care to address these deficits. · Communication/Consultation:  None today  · Equipment provided today:  None today  · Recommendations/Intent for next treatment session: Next visit will focus on stretching, strengthening, modalities as indicated.     Total Treatment Billable Duration:  85 mins  PT Patient Time In/Time Out  Time In: 7527  Time Out: [address], PT    Future Appointments   Date Time Provider Susana Victoria   5/10/2022  9:30 AM Amrit Healy PT [facility]   5/12/2022  1:30 PM Amrit Healy PT [facility]   5/17/2022  1:30 PM Amrit Healy PT [facility]   5/19/2022  1:30 PM Laura Francisco PTA [facility]   5/24/2022  4:00 PM [facility] 5/26/2022  1:30 PM Mir Healy, PT [facility]   7/11/2022  8:45 AM Shantanu Gonzales DO [facility] TRIM TRIM   3/3/2023 10:50 AM Sherri Russ MD [facility] PP PP   3/14/2023  7:33 AM SFD CT 59 SLICE UNIT 1 [facility]DR[facility]

## 2022-05-10 ENCOUNTER — HOSPITAL ENCOUNTER (OUTPATIENT)
Dept: PHYSICAL THERAPY | Age: 72
Discharge: HOME | End: 2022-05-10
Payer: MEDICARE

## 2022-05-10 PROCEDURE — 97016 VASOPNEUMATIC DEVICE THERAPY: CPT

## 2022-05-10 PROCEDURE — 97113 AQUATIC THERAPY/EXERCISES: CPT

## 2022-05-10 PROCEDURE — 97110 THERAPEUTIC EXERCISES: CPT

## 2022-05-10 NOTE — PROGRESS NOTES
Jodie Jefferson  : 1950  Primary: Merle Clubs [place]arp Medicare Complete  Secondary:  Yi Olivas @ [address], [address] [place] [address]  [phone]   [phone]      OUTPATIENT PHYSICAL THERAPY: Daily Treatment Note 5/10/2022   ICD-10: Treatment Diagnosis: Pain in right knee (M25.561) and Stiffness of right knee, not elsewhere classified (M25.661) and Muscle weakness (generalized) (M62.81) and Other abnormalities of gait and mobility (R26.89)     R TKA 3/25/22  Effective Dates: 2022 TO 2022 (90 days). Frequency/Duration: 2-3 times a week for 90 Days  GOALS: (Goals have been discussed and agreed upon with patient.)  Short-Term Functional Goals: Time Frame: 4 weeks  1. Pt to report compliance with HEP - MET  2. Pt to restore 110-10 AROM to prevent manipulation - ongoing  3. Pt to normalize gait mechanics level surfaces - ongoing  Discharge Goals: Time Frame: 12 weeks  1. Pt to restore AROM to 120 flex for normal function without compensation - ongoing  2. Pt to increase strength for sit to stand x 30 reps - ongoing with correct alignment  3. Pt to increase strength for reciprocal gait on stairs - ongoing  4. Pt to increase SLS > 20 sec B for safe amb all surfaces - ongoing  _______________________________________________________________________________  Pre-treatment Symptoms/Complaints:  I think it is better but I can never really tell. Pain: Initial: 4/10 Post Session:  No increased pain after ex's.    Medications Last Reviewed:  5/10/2022    Updated Objective Findings:  22    ROM:         AROM  Knee flex-ext L 110-10, R 105-15 before, 113-10 after 5/10/22  Functional Mobility:         Gait/Ambulation:  Antalgic with lat trunk motion        Transfers:  can perform without UE assist but abducts R hip and doesn't have control to the chair        Stairs:  Step-to pattern  Balance:          20 sec L, 6 sec R    KOOS:  raw score 16, interval score 47.487  5/5/22:  Raw score 10, interval score 61.583     TREATMENT:   THERAPEUTIC ACTIVITY: ( see below for minutes): Therapeutic activities per grid below to improve mobility. Required moderate verbal and manual cues to improve functional mobility . THERAPEUTIC EXERCISE: (see below for minutes):  Exercises per grid below to improve strength. Required moderate verbal and manual cues to promote proper body alignment, promote proper body posture, promote proper body mechanics and promote proper body breathing techniques. Progressed resistance, range, repetitions and complexity of movement as indicated. MANUAL THERAPY: (see below for minutes): Joint mobilization and Soft tissue mobilization was utilized and necessary because of the patient's restricted joint motion, painful spasm, loss of articular motion and restricted motion of soft tissue. MODALITIES: (see below for minutes):      for pain modulation    AQUATIC THERAPY (see below for minutes): Aquatic treatment performed per flow grid for Decreased muscle strength, Decreased endurance, Decreased static/dynamic balance and reactive control, Decreased activity endurance, Decompression, Ease of movement and Low impact and reduced weight bearing activity.     Date: 5/5/22  Visit 8  PN 5/10/22  Visit 9         Modalities: 15 mins 15 mins         Game ready R knee  seated seated                    Manual Therapy:                                 Aquatics Activities: 60 mins 60 mins         GAIT (F/B/S/M) 4 laps 4 laps         SLR gait 4 laps 4 laps         Hamstring Curl gait  4 laps 4 laps         Rockettes 4 laps 4 laps         Squats X 30  On step X 30  On step         Calf raises X 30  On step X 30  On step         Hamstring stretch B 3 x 15 sec 3 x 15 sec         Flex stretch at rail  3 x 15 sec 3 x 15 sec         Step ups ant B X 30 X 30         SLS B X 3 X 3         Deep well 5 mins 5 mins                               Therapeutic Exercises: 10 mins 15 mins Pt education, postural education, HEP, functional breathing           PROM prone, seated  10 mins          H/K rocking back stretch           Bike for ROM  15 mins         HEP: see hand out    [employer] Portal  Treatment/Session Summary:    · Response to Treatment: 5 degree improvement in beginning flex AROM. Pt reports consistency with home stretching but pain atul limits stretching ability. · Communication/Consultation:  None today  · Equipment provided today:  None today  · Recommendations/Intent for next treatment session: Next visit will focus on stretching, strengthening, modalities as indicated.     Total Treatment Billable Duration:  90 mins  PT Patient Time In/Time Out  Time In: 0920  Time Out: [address] [address], PT    Future Appointments   Date Time Provider Susana Victoria   5/12/2022  1:30 PM Dav Healy, PT [facility]   5/17/2022  1:30 PM [facility]   5/19/2022  1:30 PM Christina Francisco, PTA [facility]   5/24/2022  4:00 PM Christina Francisco, PTA [facility]   5/26/2022  1:30 PM Dav [facility]   3/14/2023  4:46 AM SFD CT 59 SLICE UNIT 1 [facility]DR[facility] SFD

## 2022-05-12 ENCOUNTER — HOSPITAL ENCOUNTER (OUTPATIENT)
Dept: PHYSICAL THERAPY | Age: 72
Discharge: HOME | End: 2022-05-12
Payer: MEDICARE

## 2022-05-12 PROCEDURE — 97016 VASOPNEUMATIC DEVICE THERAPY: CPT

## 2022-05-12 PROCEDURE — 97113 AQUATIC THERAPY/EXERCISES: CPT

## 2022-05-12 PROCEDURE — 97110 THERAPEUTIC EXERCISES: CPT

## 2022-05-12 NOTE — PROGRESS NOTES
Shahnaz Monson  : 1950  Primary: Dominik Scott Medicare Complete  Secondary:  [facility] @ [address], [address] [place] [address]  [phone]   [phone]      OUTPATIENT PHYSICAL THERAPY: Daily Treatment Note 2022   ICD-10: Treatment Diagnosis: Pain in right knee (M25.561) and Stiffness of right knee, not elsewhere classified (M25.661) and Muscle weakness (generalized) (M62.81) and Other abnormalities of gait and mobility (R26.89)     R TKA 3/25/22  Effective Dates: 2022 TO 2022 (90 days). Frequency/Duration: 2-3 times a week for 90 Days  GOALS: (Goals have been discussed and agreed upon with patient.)  Short-Term Functional Goals: Time Frame: 4 weeks  1. Pt to report compliance with HEP - MET  2. Pt to restore 110-10 AROM to prevent manipulation - ongoing  3. Pt to normalize gait mechanics level surfaces - ongoing  Discharge Goals: Time Frame: 12 weeks  1. Pt to restore AROM to 120 flex for normal function without compensation - ongoing  2. Pt to increase strength for sit to stand x 30 reps - ongoing with correct alignment  3. Pt to increase strength for reciprocal gait on stairs - ongoing  4. Pt to increase SLS > 20 sec B for safe amb all surfaces - ongoing  _______________________________________________________________________________  Pre-treatment Symptoms/Complaints: I am still working on it and it seems to do okay. I can't tell though. Pain: Initial: 4/10 Post Session:  No increased pain after ex's.    Medications Last Reviewed:  2022    Updated Objective Findings:  22    ROM:         AROM  Knee flex-ext L 110-10, R 105-15 before, 110-10 after 22  Functional Mobility:         Gait/Ambulation:  Antalgic with lat trunk motion        Transfers:  can perform without UE assist but abducts R hip and doesn't have control to the chair        Stairs:  Step-to pattern  Balance:          20 sec L, 6 sec R    KOOS:  raw score 16, interval score 47.487  5/5/22:  Raw score 10, interval score 61.583     TREATMENT:   THERAPEUTIC ACTIVITY: ( see below for minutes): Therapeutic activities per grid below to improve mobility. Required moderate verbal and manual cues to improve functional mobility . THERAPEUTIC EXERCISE: (see below for minutes):  Exercises per grid below to improve strength. Required moderate verbal and manual cues to promote proper body alignment, promote proper body posture, promote proper body mechanics and promote proper body breathing techniques. Progressed resistance, range, repetitions and complexity of movement as indicated. MANUAL THERAPY: (see below for minutes): Joint mobilization and Soft tissue mobilization was utilized and necessary because of the patient's restricted joint motion, painful spasm, loss of articular motion and restricted motion of soft tissue. MODALITIES: (see below for minutes):      for pain modulation    AQUATIC THERAPY (see below for minutes): Aquatic treatment performed per flow grid for Decreased muscle strength, Decreased endurance, Decreased static/dynamic balance and reactive control, Decreased activity endurance, Decompression, Ease of movement and Low impact and reduced weight bearing activity.     Date: 5/5/22  Visit 8  PN 5/10/22  Visit 9 5/12/22  Visit 10        Modalities: 15 mins 15 mins 15 mins        Game ready R knee  seated seated seated                   Manual Therapy:                                 Aquatics Activities: 60 mins 60 mins 60 mins        GAIT (F/B/S/M) 4 laps 4 laps 4 laps        SLR gait 4 laps 4 laps 4 laps        Hamstring Curl gait  4 laps 4 laps 4 laps        Rockettes 4 laps 4 laps 4 laps        Squats X 30  On step X 30  On step X 30  On step        Calf raises X 30  On step X 30  On step X 30  On step        Hamstring stretch B 3 x 15 sec 3 x 15 sec 3 x 15 sec        Flex stretch at rail  3 x 15 sec 3 x 15 sec 3 x 15 sec        Step ups ant B X 30 X 30 X 30        SLS B X 3 X 3 X 3        Deep well 5 mins 5 mins 5 mins                              Therapeutic Exercises: 10 mins 15 mins 15 mins        Pt education, postural education, HEP, functional breathing           PROM prone, seated  10 mins          H/K rocking back stretch           Bike for ROM  15 mins 15 mins        HEP: see hand out    [employer] Portal  Treatment/Session Summary:    · Response to Treatment: stiffness persists with pain limiting stretching atlu. Weakness in hip extensors and abductors along with quads continues to impact gait quality and activity atul. · Communication/Consultation:  None today  · Equipment provided today:  None today  · Recommendations/Intent for next treatment session: Next visit will focus on stretching, strengthening, modalities as indicated.     Total Treatment Billable Duration:  90 mins  PT Patient Time In/Time Out  Time In: 0130  Time Out: [address], PT    Future Appointments   Date Time Provider Susana Victoria   5/17/2022  1:30 PM Khang Healy, PT [facility]   5/19/2022  1:30 PM Khang Healy, PT [facility]   5/24/2022  4:00 PM Madison [facility]   5/26/2022  1:30 PM Khang Healy, PT [facility]   3/14/2023  4:00 AM SFD CT 59 SLICE UNIT 1 [facility] SFD

## 2022-05-17 ENCOUNTER — HOSPITAL ENCOUNTER (OUTPATIENT)
Dept: PHYSICAL THERAPY | Age: 72
Discharge: HOME | End: 2022-05-17
Payer: MEDICARE

## 2022-05-17 PROCEDURE — 97110 THERAPEUTIC EXERCISES: CPT

## 2022-05-17 PROCEDURE — 97016 VASOPNEUMATIC DEVICE THERAPY: CPT

## 2022-05-17 PROCEDURE — 97113 AQUATIC THERAPY/EXERCISES: CPT

## 2022-05-19 ENCOUNTER — HOSPITAL ENCOUNTER (OUTPATIENT)
Dept: PHYSICAL THERAPY | Age: 72
Discharge: HOME | End: 2022-05-19
Payer: MEDICARE

## 2022-05-19 PROCEDURE — 97110 THERAPEUTIC EXERCISES: CPT

## 2022-05-19 PROCEDURE — 97016 VASOPNEUMATIC DEVICE THERAPY: CPT

## 2022-05-19 PROCEDURE — 97113 AQUATIC THERAPY/EXERCISES: CPT

## 2022-05-19 NOTE — PROGRESS NOTES
Arian Calls  : 1950  Primary: Jerrica Scott Medicare Complete  Secondary:  [address] @ [address], [place].  [phone]   [phone]      OUTPATIENT PHYSICAL THERAPY: Daily Treatment Note 2022   ICD-10: Treatment Diagnosis: Pain in right knee (M25.561) and Stiffness of right knee, not elsewhere classified (M25.661) and Muscle weakness (generalized) (M62.81) and Other abnormalities of gait and mobility (R26.89)     R TKA 3/25/22  Effective Dates: 2022 TO 2022 (90 days). Frequency/Duration: 2-3 times a week for 90 Days  GOALS: (Goals have been discussed and agreed upon with patient.)  Short-Term Functional Goals: Time Frame: 4 weeks  1. Pt to report compliance with HEP - MET  2. Pt to restore 110-10 AROM to prevent manipulation - ongoing  3. Pt to normalize gait mechanics level surfaces - ongoing  Discharge Goals: Time Frame: 12 weeks  1. Pt to restore AROM to 120 flex for normal function without compensation - ongoing  2. Pt to increase strength for sit to stand x 30 reps - ongoing with correct alignment  3. Pt to increase strength for reciprocal gait on stairs - ongoing  4. Pt to increase SLS > 20 sec B for safe amb all surfaces - ongoing  _______________________________________________________________________________  Pre-treatment Symptoms/Complaints: We will see how it is doing today. Things are starting to feel better and more normal finally. Pain: Initial: 2/10 Post Session:  No increased pain after ex's.    Medications Last Reviewed:  2022    Updated Objective Findings:  22    ROM:         AROM  Knee flex-ext L 110-10, R 110-15 before, 115-10 after 22  Functional Mobility:         Gait/Ambulation:  Antalgic with lat trunk motion        Transfers:  can perform without UE assist but abducts R hip and doesn't have control to the chair        Stairs:  Step-to pattern  Balance:          20 sec L, 6 sec R    KOOS:  raw score 16, interval score 47.487  5/5/22:  Raw score 10, interval score 61.583     TREATMENT:   THERAPEUTIC ACTIVITY: ( see below for minutes): Therapeutic activities per grid below to improve mobility. Required moderate verbal and manual cues to improve functional mobility . THERAPEUTIC EXERCISE: (see below for minutes):  Exercises per grid below to improve strength. Required moderate verbal and manual cues to promote proper body alignment, promote proper body posture, promote proper body mechanics and promote proper body breathing techniques. Progressed resistance, range, repetitions and complexity of movement as indicated. MANUAL THERAPY: (see below for minutes): Joint mobilization and Soft tissue mobilization was utilized and necessary because of the patient's restricted joint motion, painful spasm, loss of articular motion and restricted motion of soft tissue. MODALITIES: (see below for minutes):      for pain modulation    AQUATIC THERAPY (see below for minutes): Aquatic treatment performed per flow grid for Decreased muscle strength, Decreased endurance, Decreased static/dynamic balance and reactive control, Decreased activity endurance, Decompression, Ease of movement and Low impact and reduced weight bearing activity.     Date: 5/5/22  Visit 8  PN 5/10/22  Visit 9 5/12/22  Visit 10 5/17/22  Visit 11 5/19/22  Visit 12      Modalities: 15 mins 15 mins 15 mins 15 mins 15 mins      Game ready R knee  seated seated seated seated seated                 Manual Therapy:                                 Aquatics Activities: 60 mins 60 mins 60 mins 45 mins 45 mins      GAIT (F/B/S/M) 4 laps 4 laps 4 laps 4 laps 4 laps      SLR gait 4 laps 4 laps 4 laps 4 laps 4 laps      Hamstring Curl gait  4 laps 4 laps 4 laps 4 laps 4 laps      Rockettes 4 laps 4 laps 4 laps 4 laps 4 laps      Squats X 30  On step X 30  On step X 30  On step X 30  On step X 30  On step      Calf raises X 30  On step X 30  On step X 30  On step X 30  On step X 30  On step      Hamstring stretch B 3 x 15 sec 3 x 15 sec 3 x 15 sec 3 x 15 sec 3 x 15 sec      Flex stretch at rail  3 x 15 sec 3 x 15 sec 3 x 15 sec 3 x 15 sec 3 x 15 sec      Step ups ant B X 30 X 30 X 30 X 30 X 30      SLS B X 3 X 3 X 3 X 3 X 3      Deep well 5 mins 5 mins 5 mins 5 mins 5 mins                            Therapeutic Exercises: 10 mins 15 mins 15 mins 15 mins 15 mins      Pt education, postural education, HEP, functional breathing           Bike for ROM  15 mins 15 mins 15 mins 15 mins      HEP: see hand out    [employer] Portal  Treatment/Session Summary:    · Response to Treatment: Pt continues to work hard in therapy. He continues with stiffness but slowly gaining functional motion. · Communication/Consultation:  None today  · Equipment provided today:  None today  · Recommendations/Intent for next treatment session: Next visit will focus on stretching, strengthening, modalities as indicated.     Total Treatment Billable Duration:  75 mins  PT Patient Time In/Time Out  Time In: 0130  Time Out: 0300    Artemio Barney PT    Future Appointments   Date Time Provider Susana Victoria   5/24/2022  4:00 PM Aileen Healy PT [facility]   5/26/2022  1:30 PM Aileen Healy PT [facility]   3/14/2023  5:90 AM SFD CT 59 SLICE UNIT 1 [facility] SFD

## 2022-05-23 NOTE — PROGRESS NOTES
Conni Skiff  : 1950  Primary: [facility] Medicare Complete  Secondary:  [address] @ [address] [employer]  [address] [address]  Phone: [phone]  Fax: [phone] No data recorded  No data recorded    PT Visit Info:    No data recorded    OUTPATIENT PHYSICAL THERAPY:OP NOTE TYPE: Treatment Note 2022     Appt Desk   Episode      Treatment Diagnosis:  Pain in Right Knee (M25.561)  Stiffness of Right Knee, Not elsewhere classified (M25.661)  Other abnormalities of gait and mobility (R26.89)  Generalized Muscle Weakness (M62.81)     Effective Dates: 2022 TO 2022 (90 days). Frequency/Duration: 2-3 times a week for 90 Days  GOALS: (Goals have been discussed and agreed upon with patient.)  Short-Term Functional Goals: Time Frame: 4 weeks  1. Pt to report compliance with HEP - MET  2. Pt to restore 110-10 AROM to prevent manipulation - ongoing  3. Pt to normalize gait mechanics level surfaces - ongoing  Discharge Goals: Time Frame: 12 weeks  1. Pt to restore AROM to 120 flex for normal function without compensation - ongoing  2. Pt to increase strength for sit to stand x 30 reps - ongoing with correct alignment  3. Pt to increase strength for reciprocal gait on stairs - ongoing  4. Pt to increase SLS > 20 sec B for safe amb all surfaces - ongoing    Medical/Referring Diagnosis:  Status post total knee replacement, right [A04.816]  Referring Physician:  Cinthya Matthews MD MD Orders:  PT Eval and Treat   Date of Onset:  3/25/22 surgery  Allergies:  Patient has no allergy information on record. Restrictions/Precautions:    No data recordedNo data recorded      Interventions Planned (Treatment may consist of any combination of the following):    1. Balance Exercise  2. Cryotherapy  3. Home Exercise Program (HEP)  4. Range of Motion (ROM)  5. Therapeutic Activites  6. Therapeutic Exercise/Strengthening  7. Aquatics    Subjective Comments: It is doing okay.   It still feels stiff. I am working on it at home. Initial: 3/10 Post Session: no increased pain with ex's  Medications Last Reviewed:  5/24/2022     Updated Objective Findings:      ROM:         AROM  Knee flex-ext L 110-10, R 105-15 before, 112-10 after 5/24/22  Functional Mobility:         Gait/Ambulation:  Antalgic with lat trunk motion        Transfers:  can perform without UE assist but abducts R hip and doesn't have control to the chair        Stairs:  Step-to pattern  Balance:          20 sec L, 6 sec R    KOOS:  raw score 16, interval score 47.487  5/5/22:  Raw score 10, interval score 61.583  Treatment   THERAPEUTIC ACTIVITY: ( see below for minutes): Therapeutic activities per grid below to improve mobility. Required moderate verbal and manual cues to improve functional mobility . THERAPEUTIC EXERCISE: (see below for minutes):  Exercises per grid below to improve strength. Required moderate verbal and manual cues to promote proper body alignment, promote proper body posture, promote proper body mechanics and promote proper body breathing techniques. Progressed resistance, range, repetitions and complexity of movement as indicated. MANUAL THERAPY: (see below for minutes): Joint mobilization and Soft tissue mobilization was utilized and necessary because of the patient's restricted joint motion, painful spasm, loss of articular motion and restricted motion of soft tissue. MODALITIES: (see below for minutes):      for pain modulation    AQUATIC THERAPY (see below for minutes): Aquatic treatment performed per flow grid for Decreased muscle strength, Decreased endurance, Decreased static/dynamic balance and reactive control, Decreased activity endurance, Decompression, Ease of movement and Low impact and reduced weight bearing activity.     Date: 5/24/22  Visit 13       Modalities: 15 mins       Game ready R knee seated               Manual Therapy:                        Aquatics Activities: 55 mins GAIT (F/B/S/M) 4 laps       SLR gait 4 laps       Hamstring Curl gait  4 laps       Rockettes 4 laps       Squats X 30  On step       Calf raises X 30  On step       Hamstring stretch B 3 x 15 sec       Flex stretch at rail X 5       Step ups ant B X 30       SLS B X 3       Deep well 5 mins                       Therapeutic Exercises: 15 mins       Pt education, postural education, HEP, functional breathing        Bike for ROM 15 mins       HEP: see hand out    Treatment/Session Summary:    · Treatment Assessment:   Pt remains stiff with altered gait, decreased balance, and functional weakness. He will benefit from continued therapy to address ROM deficits, restore normal gait on level surfaces and stairs, improve balance for safe amb. · Communication/Consultation:  None today  · Equipment provided today:  None  · Recommendations/Intent for next treatment session: Next visit will focus on stretching, strengthening, modalities as indicated.     Total Treatment Billable Duration:  85 minutes  Time In: 3224  Time Out: 1860    Fred Richmond, PT       Post Session Pain  Charge Capture  [employer] Portal  MD Guidelines  Scanned Media  Benefits  MyChart

## 2022-05-24 ENCOUNTER — HOSPITAL ENCOUNTER (OUTPATIENT)
Dept: PHYSICAL THERAPY | Age: 72
Setting detail: RECURRING SERIES
Discharge: HOME | End: 2022-05-27
Payer: MEDICARE

## 2022-05-24 ENCOUNTER — APPOINTMENT (OUTPATIENT)
Dept: PHYSICAL THERAPY | Age: 72
End: 2022-05-24
Payer: MEDICARE

## 2022-05-24 PROCEDURE — 97113 AQUATIC THERAPY/EXERCISES: CPT

## 2022-05-24 PROCEDURE — 97016 VASOPNEUMATIC DEVICE THERAPY: CPT

## 2022-05-24 PROCEDURE — 97110 THERAPEUTIC EXERCISES: CPT

## 2022-05-26 ENCOUNTER — HOSPITAL ENCOUNTER (OUTPATIENT)
Dept: PHYSICAL THERAPY | Age: 72
Setting detail: RECURRING SERIES
Discharge: HOME | End: 2022-05-29
Payer: MEDICARE

## 2022-05-26 ENCOUNTER — APPOINTMENT (OUTPATIENT)
Dept: PHYSICAL THERAPY | Age: 72
End: 2022-05-26
Payer: MEDICARE

## 2022-05-26 PROCEDURE — 97113 AQUATIC THERAPY/EXERCISES: CPT

## 2022-05-26 PROCEDURE — 97016 VASOPNEUMATIC DEVICE THERAPY: CPT

## 2022-05-26 PROCEDURE — 97110 THERAPEUTIC EXERCISES: CPT

## 2022-05-26 NOTE — PROGRESS NOTES
[place]  : 1950  Primary: [facility] Medicare Complete  Secondary:  [address] @ [address] [facility] [address]  Phone: [phone]  Fax: [phone] No data recorded  Plan of Care/Certification Expiration Date: 22      PT Visit Info: Total # of Visits Approved: 19      OUTPATIENT PHYSICAL THERAPY:OP NOTE TYPE: Treatment Note 2022     Appt Desk   Episode      Treatment Diagnosis:  Pain in Right Knee (M25.561)  Stiffness of Right Knee, Not elsewhere classified (M25.661)  Other abnormalities of gait and mobility (R26.89)  Generalized Muscle Weakness (M62.81)     Effective Dates: 2022 TO 2022 (90 days). Frequency/Duration: 2-3 times a week for 90 Days  GOALS: (Goals have been discussed and agreed upon with patient.)  Short-Term Functional Goals: Time Frame: 4 weeks  1. Pt to report compliance with HEP - MET  2. Pt to restore 110-10 AROM to prevent manipulation - ongoing  3. Pt to normalize gait mechanics level surfaces - ongoing  Discharge Goals: Time Frame: 12 weeks  1. Pt to restore AROM to 120 flex for normal function without compensation - ongoing  2. Pt to increase strength for sit to stand x 30 reps - ongoing with correct alignment  3. Pt to increase strength for reciprocal gait on stairs - ongoing  4. Pt to increase SLS > 20 sec B for safe amb all surfaces - ongoing    Medical/Referring Diagnosis:  Status post total knee replacement, right [F63.583]  Referring Physician:  Cinthia Barrera MD MD Orders:  PT Eval and Treat   Date of Onset:  3/25/22 surgery  Allergies:  Patient has no allergy information on record. Restrictions/Precautions:    No data recordedNo data recorded      Interventions Planned (Treatment may consist of any combination of the following):    1. Balance Exercise  2. Cryotherapy  3. Home Exercise Program (HEP)  4. Range of Motion (ROM)  5. Therapeutic Activites  6.  Therapeutic Exercise/Strengthening  7. Aquatics    Subjective Comments: It is pretty good. Let's see what you think. Initial: 3/10 Post Session: no increased pain with ex's  Medications Last Reviewed:  5/26/2022     Updated Objective Findings:      ROM:         AROM  Knee flex-ext L 110-10, R 105-15 before, 110-10 after 5/26/22  Functional Mobility:         Gait/Ambulation:  Antalgic with lat trunk motion        Transfers:  can perform without UE assist but abducts R hip and doesn't have control to the chair        Stairs:  Step-to pattern  Balance:          20 sec L, 6 sec R    KOOS:  raw score 16, interval score 47.487  5/5/22:  Raw score 10, interval score 61.583  Treatment   THERAPEUTIC ACTIVITY: ( see below for minutes): Therapeutic activities per grid below to improve mobility. Required moderate verbal and manual cues to improve functional mobility . THERAPEUTIC EXERCISE: (see below for minutes):  Exercises per grid below to improve strength. Required moderate verbal and manual cues to promote proper body alignment, promote proper body posture, promote proper body mechanics and promote proper body breathing techniques. Progressed resistance, range, repetitions and complexity of movement as indicated. MANUAL THERAPY: (see below for minutes): Joint mobilization and Soft tissue mobilization was utilized and necessary because of the patient's restricted joint motion, painful spasm, loss of articular motion and restricted motion of soft tissue. MODALITIES: (see below for minutes):      for pain modulation    AQUATIC THERAPY (see below for minutes): Aquatic treatment performed per flow grid for Decreased muscle strength, Decreased endurance, Decreased static/dynamic balance and reactive control, Decreased activity endurance, Decompression, Ease of movement and Low impact and reduced weight bearing activity.     Date: 5/24/22  Visit 13 5/26/22  Visit 14      Modalities: 15 mins 15 mins      Game ready R knee seated seated              Manual Therapy:                        Aquatics Activities: 55 mins 55 mins      GAIT (F/B/S/M) 4 laps 4 laps      SLR gait 4 laps 4 laps      Hamstring Curl gait  4 laps 4 laps      Rockettes 4 laps 4 laps      Squats X 30  On step X 30  On step      Calf raises X 30  On step X 30  On step      Hamstring stretch B 3 x 15 sec 3 x 15 sec      Flex stretch at rail X 5 X 5      Step ups ant B X 30 X 30      SLS B X 3 X 3      Deep well 5 mins 5 mins                      Therapeutic Exercises: 15 mins 15 mins      Pt education, postural education, HEP, functional breathing        Bike for ROM 15 mins 15 mins      HEP: see hand out    Treatment/Session Summary:    · Treatment Assessment:   Stiffness remains significant. Pt is compliant with home stretching. · Communication/Consultation:  None today  · Equipment provided today:  None  · Recommendations/Intent for next treatment session: Next visit will focus on stretching, strengthening, modalities as indicated.     Total Treatment Billable Duration:  85 minutes  Time In: 0130  Time Out: 0300    Pretty Humphreys PT       Post Session Pain  Charge Capture  [employer] Portal  MD Guidelines  Scanned Media  Benefits  MyChart

## 2022-05-31 ENCOUNTER — HOSPITAL ENCOUNTER (OUTPATIENT)
Dept: PHYSICAL THERAPY | Age: 72
Setting detail: RECURRING SERIES
Discharge: HOME | End: 2022-06-03
Payer: MEDICARE

## 2022-05-31 PROCEDURE — 97113 AQUATIC THERAPY/EXERCISES: CPT

## 2022-05-31 PROCEDURE — 97110 THERAPEUTIC EXERCISES: CPT

## 2022-05-31 PROCEDURE — 97016 VASOPNEUMATIC DEVICE THERAPY: CPT

## 2022-05-31 NOTE — PROGRESS NOTES
Elease Proper  : 1950  Primary: [facility] Medicare Complete  Secondary:  [address] @ [address] [facility] [address]  Phone: [phone]  Fax: [phone] No data recorded  Plan of Care/Certification Expiration Date: 22      PT Visit Info: Total # of Visits Approved: 19      OUTPATIENT PHYSICAL THERAPY:OP NOTE TYPE: Treatment Note 2022     Appt Desk   Episode      Treatment Diagnosis:  Pain in Right Knee (M25.561)  Stiffness of Right Knee, Not elsewhere classified (M25.661)  Other abnormalities of gait and mobility (R26.89)  Generalized Muscle Weakness (M62.81)     Effective Dates: 2022 TO 2022 (90 days). Frequency/Duration: 2-3 times a week for 90 Days  GOALS: (Goals have been discussed and agreed upon with patient.)  Short-Term Functional Goals: Time Frame: 4 weeks  1. Pt to report compliance with HEP - MET  2. Pt to restore 110-10 AROM to prevent manipulation - ongoing  3. Pt to normalize gait mechanics level surfaces - ongoing  Discharge Goals: Time Frame: 12 weeks  1. Pt to restore AROM to 120 flex for normal function without compensation - ongoing  2. Pt to increase strength for sit to stand x 30 reps - ongoing with correct alignment  3. Pt to increase strength for reciprocal gait on stairs - ongoing  4. Pt to increase SLS > 20 sec B for safe amb all surfaces - ongoing    Medical/Referring Diagnosis:  No admission diagnoses are documented for this encounter. Referring Physician:  Tate Hayward MD MD Orders:  PT Eval and Treat   Date of Onset:  3/25/22 surgery  Allergies:  Patient has no allergy information on record. Restrictions/Precautions:    No data recordedNo data recorded      Interventions Planned (Treatment may consist of any combination of the following):    1. Balance Exercise  2. Cryotherapy  3. Home Exercise Program (HEP)  4. Range of Motion (ROM)  5. Therapeutic Activites  6.  Therapeutic Exercise/Strengthening  7. Aquatics    Subjective Comments:  I am still working it. Initial: 3/10 Post Session: no increased pain with ex's  Medications Last Reviewed:  5/31/2022     Updated Objective Findings:      ROM:         AROM  Knee flex-ext L 110-10, R 105-15 before, 110-10 after 5/31/22  Functional Mobility:         Gait/Ambulation:  Antalgic with lat trunk motion        Transfers:  can perform without UE assist but abducts R hip and doesn't have control to the chair        Stairs:  Step-to pattern  Balance:          20 sec L, 6 sec R    KOOS:  raw score 16, interval score 47.487  5/5/22:  Raw score 10, interval score 61.583  Treatment   THERAPEUTIC ACTIVITY: ( see below for minutes): Therapeutic activities per grid below to improve mobility. Required moderate verbal and manual cues to improve functional mobility . THERAPEUTIC EXERCISE: (see below for minutes):  Exercises per grid below to improve strength. Required moderate verbal and manual cues to promote proper body alignment, promote proper body posture, promote proper body mechanics and promote proper body breathing techniques. Progressed resistance, range, repetitions and complexity of movement as indicated. MANUAL THERAPY: (see below for minutes): Joint mobilization and Soft tissue mobilization was utilized and necessary because of the patient's restricted joint motion, painful spasm, loss of articular motion and restricted motion of soft tissue. MODALITIES: (see below for minutes):      for pain modulation    AQUATIC THERAPY (see below for minutes): Aquatic treatment performed per flow grid for Decreased muscle strength, Decreased endurance, Decreased static/dynamic balance and reactive control, Decreased activity endurance, Decompression, Ease of movement and Low impact and reduced weight bearing activity.     Date: 5/24/22  Visit 13 5/26/22  Visit 14 5/31/22 Visit 15      Modalities: 15 mins 15 mins 15 mins     Game ready R knee seated seated seated             Manual Therapy:                        Aquatics Activities: 55 mins 55 mins 55 mins     GAIT (F/B/S/M) 4 laps 4 laps 4 laps     SLR gait 4 laps 4 laps 4 laps     Hamstring Curl gait  4 laps 4 laps 4 laps     Rockettes 4 laps 4 laps 4 laps     Squats X 30  On step X 30  On step X 30 on step     Calf raises X 30  On step X 30  On step X 30 on step     Hamstring stretch B 3 x 15 sec 3 x 15 sec 3 x 15 sec     Flex stretch at rail X 5 X 5 X 5     Step ups ant B X 30 X 30 X 30     SLS B X 3 X 3 X 3     Deep well 5 mins 5 mins 5 mins                     Therapeutic Exercises: 15 mins 15 mins 15 mins     Pt education, postural education, HEP, functional breathing        Bike for ROM 15 mins 15 mins 15 mins     HEP: see hand out    Treatment/Session Summary:    · Treatment Assessment:   Stiffness remains significant. Pt reports compliance with home stretching. Good effort during all exercises. · Communication/Consultation:  None today  · Equipment provided today:  None  · Recommendations/Intent for next treatment session: Next visit will focus on stretching, strengthening, modalities as indicated.     Total Treatment Billable Duration:  85 minutes  Time In: 0130  Time Out: 3272    Wood Webb PTA       Post Session Pain  Charge Capture  [employer] Portal  MD Guidelines  Scanned Media  Benefits  MyChart

## 2022-06-02 ENCOUNTER — HOSPITAL ENCOUNTER (OUTPATIENT)
Dept: PHYSICAL THERAPY | Age: 72
Setting detail: RECURRING SERIES
Discharge: HOME | End: 2022-06-05
Payer: MEDICARE

## 2022-06-02 PROCEDURE — 97113 AQUATIC THERAPY/EXERCISES: CPT

## 2022-06-02 PROCEDURE — 97016 VASOPNEUMATIC DEVICE THERAPY: CPT

## 2022-06-02 PROCEDURE — 97110 THERAPEUTIC EXERCISES: CPT

## 2022-06-02 NOTE — PROGRESS NOTES
hCaitanya Rios  : 1950  Primary: [facility] Medicare Complete  Secondary:  [address] @ [address] [facility] [address]  Phone: [phone]  Fax: [phone] No data recorded  Plan of Care/Certification Expiration Date: 22      PT Visit Info: Total # of Visits Approved: 19      OUTPATIENT PHYSICAL THERAPY:OP NOTE TYPE: Treatment Note 2022     Appt Desk   Episode      Treatment Diagnosis:  Pain in Right Knee (M25.561)  Stiffness of Right Knee, Not elsewhere classified (M25.661)  Other abnormalities of gait and mobility (R26.89)  Generalized Muscle Weakness (M62.81)     Effective Dates: 2022 TO 2022 (90 days). Frequency/Duration: 2-3 times a week for 90 Days  GOALS: (Goals have been discussed and agreed upon with patient.)  Short-Term Functional Goals: Time Frame: 4 weeks  1. Pt to report compliance with HEP - MET  2. Pt to restore 110-10 AROM to prevent manipulation - ongoing  3. Pt to normalize gait mechanics level surfaces - ongoing  Discharge Goals: Time Frame: 12 weeks  1. Pt to restore AROM to 120 flex for normal function without compensation - ongoing  2. Pt to increase strength for sit to stand x 30 reps - ongoing with correct alignment  3. Pt to increase strength for reciprocal gait on stairs - ongoing  4. Pt to increase SLS > 20 sec B for safe amb all surfaces - ongoing    Medical/Referring Diagnosis:  No admission diagnoses are documented for this encounter. Referring Physician:  Julianne Fang MD MD Orders:  PT Eval and Treat   Date of Onset:  3/25/22 surgery  Allergies:  Patient has no allergy information on record. Restrictions/Precautions:    No data recordedNo data recorded      Interventions Planned (Treatment may consist of any combination of the following):    1. Balance Exercise  2. Cryotherapy  3. Home Exercise Program (HEP)  4. Range of Motion (ROM)  5. Therapeutic Activites  6.  Therapeutic Exercise/Strengthening  7. Aquatics    Subjective Comments:  Something needs to change. It is not moving. Initial: 3/10 Post Session: no increased pain with ex's  Medications Last Reviewed:  6/2/2022     Updated Objective Findings:  6/2/22 PN    ROM:    Octavio Breath  Knee flex-ext L 110-10, R 105-15 before, 108-10 after 6/2/22  Functional Mobility:         Gait/Ambulation:  Antalgic with lat trunk motion, able to correct some with cues        Transfers:  can perform without UE assist but abducts R hip and doesn't have control to the chair        Stairs: Reciprocal with use of handrail, lack of control  Balance:          20 sec L, 10 sec R    KOOS:  raw score 16, interval score 47.487  5/5/22:  Raw score 10, interval score 61.583  6/2/22: Raw score 8, interval score 63.776  Treatment   THERAPEUTIC ACTIVITY: ( see below for minutes): Therapeutic activities per grid below to improve mobility. Required moderate verbal and manual cues to improve functional mobility . THERAPEUTIC EXERCISE: (see below for minutes):  Exercises per grid below to improve strength. Required moderate verbal and manual cues to promote proper body alignment, promote proper body posture, promote proper body mechanics and promote proper body breathing techniques. Progressed resistance, range, repetitions and complexity of movement as indicated. MANUAL THERAPY: (see below for minutes): Joint mobilization and Soft tissue mobilization was utilized and necessary because of the patient's restricted joint motion, painful spasm, loss of articular motion and restricted motion of soft tissue. MODALITIES: (see below for minutes):      for pain modulation    AQUATIC THERAPY (see below for minutes):  Aquatic treatment performed per flow grid for Decreased muscle strength, Decreased endurance, Decreased static/dynamic balance and reactive control, Decreased activity endurance, Decompression, Ease of movement and Low impact and reduced weight bearing activity. Date: 5/24/22  Visit 13 5/26/22  Visit 14 5/31/22 Visit 15  6/2/22 Visit 16 PN    Modalities: 15 mins 15 mins 15 mins 15 mins    Game ready R knee seated seated seated seated            Manual Therapy:                        Aquatics Activities: 55 mins 55 mins 55 mins 60 mins    GAIT (F/B/S/M) 4 laps 4 laps 4 laps 4 laps    SLR gait 4 laps 4 laps 4 laps 4 laps    Hamstring Curl gait  4 laps 4 laps 4 laps 4 laps    Rockettes 4 laps 4 laps 4 laps 4 laps    Squats X 30  On step X 30  On step X 30 on step X 30 on step    Calf raises X 30  On step X 30  On step X 30 on step X 30 on step    Hamstring stretch B 3 x 15 sec 3 x 15 sec 3 x 15 sec 3 x 15 sec    Flex stretch at rail X 5 X 5 X 5 X 5    Step ups ant B X 30 X 30 X 30 X 30    SLS B X 3 X 3 X 3 X 3    Deep well 5 mins 5 mins 5 mins 5 mins                    Therapeutic Exercises: 15 mins 15 mins 15 mins 15 mins    Pt education, postural education, HEP, functional breathing    review    Bike for ROM 15 mins 15 mins 15 mins 15 mins    HEP: see hand out    Treatment/Session Summary:    · Treatment Assessment:   PN written today. Flexion worse. Reviewed stretches with pt and instructed him to hold stretch for longer duration. No change in extension but in prone heels are even. He still holds breath, tightens back and stomach during exertion. Improved KOOS score. Flexion remains primary concern during remaining visits. · Communication/Consultation:  None today  · Equipment provided today:  None  · Recommendations/Intent for next treatment session: Next visit will focus on stretching, strengthening, modalities as indicated.     Total Treatment Billable Duration:  90 minutes  Time In: 0300  Time Out: 0430    Camilo John, PTA       Post Session Pain  Charge Capture  [employer] Portal  MD Guidelines  Scanned Media  Benefits  MyChart

## 2022-06-06 ENCOUNTER — HOSPITAL ENCOUNTER (OUTPATIENT)
Dept: PHYSICAL THERAPY | Age: 72
Setting detail: RECURRING SERIES
Discharge: HOME | End: 2022-06-09
Payer: MEDICARE

## 2022-06-06 PROCEDURE — 97113 AQUATIC THERAPY/EXERCISES: CPT

## 2022-06-06 PROCEDURE — 97016 VASOPNEUMATIC DEVICE THERAPY: CPT

## 2022-06-07 ENCOUNTER — APPOINTMENT (OUTPATIENT)
Dept: PHYSICAL THERAPY | Age: 72
End: 2022-06-07
Payer: MEDICARE

## 2022-06-09 ENCOUNTER — HOSPITAL ENCOUNTER (OUTPATIENT)
Dept: PHYSICAL THERAPY | Age: 72
Setting detail: RECURRING SERIES
Discharge: HOME | End: 2022-06-12
Payer: MEDICARE

## 2022-06-09 PROCEDURE — 97110 THERAPEUTIC EXERCISES: CPT

## 2022-06-09 PROCEDURE — 97016 VASOPNEUMATIC DEVICE THERAPY: CPT

## 2022-06-09 PROCEDURE — 97113 AQUATIC THERAPY/EXERCISES: CPT

## 2022-06-09 NOTE — PROGRESS NOTES
Viji Faster  : 1950  Primary: [facility] Medicare Complete  Secondary:  [address] @ [address] [facility] [address]  Phone: [phone]  Fax: [phone] No data recorded  Plan of Care/Certification Expiration Date: 22      PT Visit Info: Total # of Visits Approved: 19      OUTPATIENT PHYSICAL THERAPY:OP NOTE TYPE: Treatment Note 2022     Appt Desk   Episode      Treatment Diagnosis:  Pain in Right Knee (M25.561)  Stiffness of Right Knee, Not elsewhere classified (M25.661)  Other abnormalities of gait and mobility (R26.89)  Generalized Muscle Weakness (M62.81)     Effective Dates: 2022 TO 2022 (90 days). Frequency/Duration: 2-3 times a week for 90 Days  GOALS: (Goals have been discussed and agreed upon with patient.)  Short-Term Functional Goals: Time Frame: 4 weeks  1. Pt to report compliance with HEP - MET  2. Pt to restore 110-10 AROM to prevent manipulation - ongoing  3. Pt to normalize gait mechanics level surfaces - ongoing  Discharge Goals: Time Frame: 12 weeks  1. Pt to restore AROM to 120 flex for normal function without compensation - ongoing  2. Pt to increase strength for sit to stand x 30 reps - ongoing with correct alignment  3. Pt to increase strength for reciprocal gait on stairs - ongoing  4. Pt to increase SLS > 20 sec B for safe amb all surfaces - ongoing    Medical/Referring Diagnosis:  No admission diagnoses are documented for this encounter. Referring Physician:  Po Martinez MD MD Orders:  PT Eval and Treat   Date of Onset:  3/25/22 surgery  Allergies:  Patient has no allergy information on record. Restrictions/Precautions:    No data recordedNo data recorded      Interventions Planned (Treatment may consist of any combination of the following):    1. Balance Exercise  2. Cryotherapy  3. Home Exercise Program (HEP)  4. Range of Motion (ROM)  5. Therapeutic Activites  6.  Therapeutic Exercise/Strengthening  7. Aquatics    Subjective Comments:  I am getting very frustrated. Initial: 3/10        Post Session: no increased pain with ex's  Medications Last Reviewed:  6/9/2022     Updated Objective Findings:  6/2/22 PN    ROM:    Luz Elena Aleman  Knee flex-ext L 110-10, R 100-12 before, 110-10 after 6/9/22  Functional Mobility:         Gait/Ambulation:  Antalgic with lat trunk motion, able to correct some with cues        Transfers:  can perform without UE assist but abducts R hip and doesn't have control to the chair        Stairs: Reciprocal with use of handrail, lack of control  Balance:          20 sec L, 10 sec R    KOOS:  raw score 16, interval score 47.487  5/5/22:  Raw score 10, interval score 61.583  6/2/22: Raw score 8, interval score 63.776  Treatment   THERAPEUTIC ACTIVITY: ( see below for minutes): Therapeutic activities per grid below to improve mobility. Required moderate verbal and manual cues to improve functional mobility . THERAPEUTIC EXERCISE: (see below for minutes):  Exercises per grid below to improve strength. Required moderate verbal and manual cues to promote proper body alignment, promote proper body posture, promote proper body mechanics and promote proper body breathing techniques. Progressed resistance, range, repetitions and complexity of movement as indicated. MANUAL THERAPY: (see below for minutes): Joint mobilization and Soft tissue mobilization was utilized and necessary because of the patient's restricted joint motion, painful spasm, loss of articular motion and restricted motion of soft tissue. MODALITIES: (see below for minutes):      for pain modulation    AQUATIC THERAPY (see below for minutes):  Aquatic treatment performed per flow grid for Decreased muscle strength, Decreased endurance, Decreased static/dynamic balance and reactive control, Decreased activity endurance, Decompression, Ease of movement and Low impact and reduced weight bearing

## 2022-06-14 ENCOUNTER — APPOINTMENT (OUTPATIENT)
Dept: PHYSICAL THERAPY | Age: 72
End: 2022-06-14
Payer: MEDICARE

## 2022-06-17 ENCOUNTER — HOSPITAL ENCOUNTER (OUTPATIENT)
Dept: PHYSICAL THERAPY | Age: 72
Setting detail: RECURRING SERIES
Discharge: HOME | End: 2022-06-20
Payer: MEDICARE

## 2022-06-17 PROCEDURE — 97016 VASOPNEUMATIC DEVICE THERAPY: CPT

## 2022-06-17 PROCEDURE — 97110 THERAPEUTIC EXERCISES: CPT

## 2022-06-17 NOTE — PROGRESS NOTES
Chaitanya Rios  : 1950  Primary: [facility] Medicare Complete  Secondary:  [address] @ [address] [facility] [address]  Phone: [phone]  Fax: [phone] No data recorded  Plan of Care/Certification Expiration Date: 22      PT Visit Info: Total # of Visits Approved: 19      OUTPATIENT PHYSICAL THERAPY:OP NOTE TYPE: Treatment Note and Progress Note 2022     Appt Desk   Episode      Treatment Diagnosis:  Pain in Right Knee (M25.561)  Stiffness of Right Knee, Not elsewhere classified (M25.661)  Other abnormalities of gait and mobility (R26.89)  Generalized Muscle Weakness (M62.81)     Effective Dates: 2022 TO 2022 (90 days). Frequency/Duration: 2-3 times a week for 90 Days  GOALS: (Goals have been discussed and agreed upon with patient.)  Short-Term Functional Goals: Time Frame: 4 weeks  1. Pt to report compliance with HEP - MET  2. Pt to restore 110-10 AROM to prevent manipulation - MET  3. Pt to normalize gait mechanics level surfaces - ongoing  Discharge Goals: Time Frame: 12 weeks  1. Pt to restore AROM to 120 flex for normal function without compensation - ongoing  2. Pt to increase strength for sit to stand x 30 reps - ongoing with correct alignment  3. Pt to increase strength for reciprocal gait on stairs - ongoing  4. Pt to increase SLS > 20 sec B for safe amb all surfaces - ongoing    Medical/Referring Diagnosis:  No admission diagnoses are documented for this encounter. Referring Physician:  Julianne Fang MD MD Orders:  PT Eval and Treat   Date of Onset:  3/25/22 surgery  Allergies:  Patient has no allergy information on record. Restrictions/Precautions:    No data recordedNo data recorded      Interventions Planned (Treatment may consist of any combination of the following):    1. Balance Exercise  2. Cryotherapy  3. Home Exercise Program (HEP)  4. Range of Motion (ROM)  5. Therapeutic Activites  6.  Therapeutic Exercise/Strengthening  7. Aquatics    Subjective Comments: It was really painful the first few days but the last 3 days have been better. It is just so stiff. Initial: 3/10        Post Session: this is the most progress we have made in a session and it feels good  Medications Last Reviewed:  6/17/2022     Updated Objective Findings:  6/17/22 PN    ROM:         AROM  Knee flex-ext L 110-10, R 100-12 before, 115-10 after   Functional Mobility:         Gait/Ambulation:  Antalgic with lat trunk motion, able to correct some with cues        Transfers:  can perform without UE assist but abducts R hip and doesn't have control to the chair        Stairs: Reciprocal with use of handrail, lack of control  Balance:          20 sec L, 10 sec R    KOOS:  raw score 16, interval score 47.487  5/5/22:  Raw score 10, interval score 61.583  6/2/22: Raw score 8, interval score 63.776  6/17/22:  Raw score 6, interval score 70.704  Treatment   THERAPEUTIC ACTIVITY: ( see below for minutes): Therapeutic activities per grid below to improve mobility. Required moderate verbal and manual cues to improve functional mobility . THERAPEUTIC EXERCISE: (see below for minutes):  Exercises per grid below to improve strength. Required moderate verbal and manual cues to promote proper body alignment, promote proper body posture, promote proper body mechanics and promote proper body breathing techniques. Progressed resistance, range, repetitions and complexity of movement as indicated. MANUAL THERAPY: (see below for minutes): Joint mobilization and Soft tissue mobilization was utilized and necessary because of the patient's restricted joint motion, painful spasm, loss of articular motion and restricted motion of soft tissue. MODALITIES: (see below for minutes):      for pain modulation    AQUATIC THERAPY (see below for minutes):  Aquatic treatment performed per flow grid for Decreased muscle strength, Decreased endurance, Decreased static/dynamic balance and reactive control, Decreased activity endurance, Decompression, Ease of movement and Low impact and reduced weight bearing activity. Date: 5/26/22  Visit 14 5/31/22 Visit 15  6/2/22 Visit 16 PN 6/6/22  Visit 17 6/9/22 Visit 18 6/17/22  Visit 19  PN   Modalities: 15 mins 15 mins 15 mins 15 mins 15 mins  15 mins   Game ready R knee seated seated seated seated seated seated            Manual Therapy:                           Aquatics Activities: 55 mins 55 mins 60 mins 60 mins 55  mins    GAIT (F/B/S/M) 4 laps 4 laps 4 laps 4 laps 4 laps    SLR gait 4 laps 4 laps 4 laps 4 laps 4 laps    Hamstring Curl gait  4 laps 4 laps 4 laps 4 laps 4 laps    Rockettes 4 laps 4 laps 4 laps 4 laps 4 laps    Squats X 30  On step X 30 on step X 30 on step X 30  On step X 30 on step    Calf raises X 30  On step X 30 on step X 30 on step X 30  On step X 30 on step    Hamstring stretch B 3 x 15 sec 3 x 15 sec 3 x 15 sec 3 x 15 sec 3 x 15 sec    Flex stretch at rail X 5 X 5 X 5 X 5 X 5    Step ups ant B X 30 X 30 X 30 X 30 X 30    SLS B X 3 X 3 X 3 X 3 X 3    Deep well 5 mins 5 mins 5 mins 5 mins              H/k rocking back stretch    10 x 20 sec 10 x 20 sec    Therapeutic Exercises: 15 mins 15 mins 15 mins  10 mins 30 mins   Pt education, postural education, HEP, functional breathing   review      Bike for ROM 15 mins 15 mins 15 mins  10 mins 20 mins   PROM R knee       Supine, seated   HEP: see hand out    Treatment/Session Summary:    · Treatment Assessment:  Pt remains extremely stiff with only 100 degrees flex functionally. Session was devoted to ROM and pt achieved 115 degrees afterwards. He continues to have an antalgic gait and difficulty with sit to stand/squatting down, stairs due to weakness and lack of knee flex ROM. He will benefit from additional therapy to address this to allow return to normal function without compensations.  Pt is compliant with HEP but has tightness that is difficult for him to stretch without external force/leverage. · Communication/Consultation:  None today  · Equipment provided today:  None  · Recommendations/Intent for next treatment session: Next visit will focus on stretching, strengthening, modalities as indicated.     Total Treatment Billable Duration:  45 minutes  Time In: 0400  Time Out: 2200    Clotilde Aguilar PT       Post Session Pain  Charge Capture  [employer] Portal  MD Guidelines  Scanned Media  Benefits  MyChart

## 2022-06-20 ENCOUNTER — HOSPITAL ENCOUNTER (OUTPATIENT)
Dept: PHYSICAL THERAPY | Age: 72
Setting detail: RECURRING SERIES
End: 2022-06-20
Payer: MEDICARE

## 2022-06-21 ENCOUNTER — APPOINTMENT (OUTPATIENT)
Dept: PHYSICAL THERAPY | Age: 72
End: 2022-06-21
Payer: MEDICARE

## 2022-06-23 ENCOUNTER — APPOINTMENT (OUTPATIENT)
Dept: PHYSICAL THERAPY | Age: 72
End: 2022-06-23
Payer: MEDICARE

## 2022-06-24 ENCOUNTER — HOSPITAL ENCOUNTER (OUTPATIENT)
Dept: PHYSICAL THERAPY | Age: 72
Setting detail: RECURRING SERIES
Discharge: HOME | End: 2022-06-27
Payer: MEDICARE

## 2022-06-24 PROCEDURE — 97113 AQUATIC THERAPY/EXERCISES: CPT

## 2022-06-24 PROCEDURE — 97110 THERAPEUTIC EXERCISES: CPT

## 2022-06-24 NOTE — PROGRESS NOTES
Shahrzad Estevez  : 1950  Primary: [facility] Medicare Complete  Secondary:  [address] @ [address] [facility] [address]  Phone: [phone]  Fax: [phone] No data recorded  Plan of Care/Certification Expiration Date: 22      PT Visit Info: Total # of Visits Approved: 23      OUTPATIENT PHYSICAL THERAPY:OP NOTE TYPE: Treatment Note 2022     Appt Desk   Episode      Treatment Diagnosis:  Pain in Right Knee (M25.561)  Stiffness of Right Knee, Not elsewhere classified (M25.661)  Other abnormalities of gait and mobility (R26.89)  Generalized Muscle Weakness (M62.81)     Effective Dates: 2022 TO 2022 (90 days). Frequency/Duration: 2-3 times a week for 90 Days  GOALS: (Goals have been discussed and agreed upon with patient.)  Short-Term Functional Goals: Time Frame: 4 weeks  1. Pt to report compliance with HEP - MET  2. Pt to restore 110-10 AROM to prevent manipulation - MET  3. Pt to normalize gait mechanics level surfaces - ongoing  Discharge Goals: Time Frame: 12 weeks  1. Pt to restore AROM to 120 flex for normal function without compensation - ongoing  2. Pt to increase strength for sit to stand x 30 reps - ongoing with correct alignment  3. Pt to increase strength for reciprocal gait on stairs - ongoing  4. Pt to increase SLS > 20 sec B for safe amb all surfaces - ongoing    Medical/Referring Diagnosis:  No admission diagnoses are documented for this encounter. Referring Physician:  Obed Gordon MD MD Orders:  PT Eval and Treat   Date of Onset:  3/25/22 surgery  Allergies:  Patient has no allergy information on record. Restrictions/Precautions:    No data recordedNo data recorded      Interventions Planned (Treatment may consist of any combination of the following):    1. Balance Exercise  2. Cryotherapy  3. Home Exercise Program (HEP)  4. Range of Motion (ROM)  5. Therapeutic Activites  6.  Therapeutic Exercise/Strengthening  7. Aquatics    Subjective Comments: I feel like the hands/knees stretch at home really works. I guess we will see. Initial: 2/10               Post Session: no pain with ex's  Medications Last Reviewed:  6/24/2022     Updated Objective Findings:  6/17/22     ROM:         AROM  Knee flex-ext L 110-10, R 100-12 before, 115-10 after 6/24/22  Functional Mobility:         Gait/Ambulation:  Antalgic with lat trunk motion, able to correct some with cues        Transfers:  can perform without UE assist but abducts R hip and doesn't have control to the chair        Stairs: Reciprocal with use of handrail, lack of control  Balance:          20 sec L, 10 sec R    KOOS:  raw score 16, interval score 47.487  5/5/22:  Raw score 10, interval score 61.583  6/2/22: Raw score 8, interval score 63.776  6/17/22:  Raw score 6, interval score 70.704  Treatment   THERAPEUTIC ACTIVITY: ( see below for minutes): Therapeutic activities per grid below to improve mobility. Required moderate verbal and manual cues to improve functional mobility . THERAPEUTIC EXERCISE: (see below for minutes):  Exercises per grid below to improve strength. Required moderate verbal and manual cues to promote proper body alignment, promote proper body posture, promote proper body mechanics and promote proper body breathing techniques. Progressed resistance, range, repetitions and complexity of movement as indicated. MANUAL THERAPY: (see below for minutes): Joint mobilization and Soft tissue mobilization was utilized and necessary because of the patient's restricted joint motion, painful spasm, loss of articular motion and restricted motion of soft tissue. MODALITIES: (see below for minutes):      for pain modulation    AQUATIC THERAPY (see below for minutes):  Aquatic treatment performed per flow grid for Decreased muscle strength, Decreased endurance, Decreased static/dynamic balance and reactive control, Decreased activity endurance, Decompression, Ease of movement and Low impact and reduced weight bearing activity. Date: 6/9/22 Visit 18 6/17/22  Visit 19  PN 6/24/22  Visit 20      Modalities: 15 mins  15 mins       Game ready R knee seated seated declined               Manual Therapy:                           Aquatics Activities: 55  mins  45 mins      GAIT (F/B/S/M) 4 laps        SLR gait 4 laps        Hamstring Curl gait  4 laps        Rockettes 4 laps        Squats X 30 on step  X 30  On step      Calf raises X 30 on step  X 30  On step      Hamstring stretch B 3 x 15 sec  3 x 15 sec      Flex stretch at rail X 5  X 20      Step ups ant B X 30  X 30      SLS B X 3  X 3               H/k rocking back stretch 10 x 20 sec        Therapeutic Exercises: 10 mins 30 mins 25 mins      Pt education, postural education, HEP, functional breathing         Bike for ROM 10 mins 20 mins 15 mins      PROM R knee   Supine, seated prone      HEP: see hand out    Treatment/Session Summary:    · Treatment Assessment:  Pt remains very stiff but he is working extremely hard to gain motion. He achieved 115 after aggressive stretching. · Communication/Consultation:  None today  · Equipment provided today:  None  · Recommendations/Intent for next treatment session: Next visit will focus on stretching, strengthening, modalities as indicated.     Total Treatment Billable Duration:  70 minutes  Time In: 0300  Time Out: 0420    Cloitlde Aguilar PT       Post Session Pain  Charge Capture  "[employer] Portal  MD Guidelines  Scanned Media  Benefits  MyChart

## 2022-06-27 ENCOUNTER — HOSPITAL ENCOUNTER (OUTPATIENT)
Dept: PHYSICAL THERAPY | Age: 72
Setting detail: RECURRING SERIES
Discharge: HOME | End: 2022-06-30
Payer: MEDICARE

## 2022-06-27 PROCEDURE — 97110 THERAPEUTIC EXERCISES: CPT

## 2022-06-27 PROCEDURE — 97016 VASOPNEUMATIC DEVICE THERAPY: CPT

## 2022-06-27 PROCEDURE — 97113 AQUATIC THERAPY/EXERCISES: CPT

## 2022-06-27 NOTE — PROGRESS NOTES
Po Tijerina  : 1950  Primary: [facility] Medicare Complete  Secondary:  [address] @ [address] [facility] [address]  Phone: [phone]  Fax: [phone] No data recorded  Plan of Care/Certification Expiration Date: 22      PT Visit Info: Total # of Visits Approved: 23      OUTPATIENT PHYSICAL THERAPY:OP NOTE TYPE: Treatment Note 2022     Appt Desk   Episode      Treatment Diagnosis:  Pain in Right Knee (M25.561)  Stiffness of Right Knee, Not elsewhere classified (M25.661)  Other abnormalities of gait and mobility (R26.89)  Generalized Muscle Weakness (M62.81)     Effective Dates: 2022 TO 2022 (90 days). Frequency/Duration: 2-3 times a week for 90 Days  GOALS: (Goals have been discussed and agreed upon with patient.)  Short-Term Functional Goals: Time Frame: 4 weeks  1. Pt to report compliance with HEP - MET  2. Pt to restore 110-10 AROM to prevent manipulation - MET  3. Pt to normalize gait mechanics level surfaces - ongoing  Discharge Goals: Time Frame: 12 weeks  1. Pt to restore AROM to 120 flex for normal function without compensation - ongoing  2. Pt to increase strength for sit to stand x 30 reps - ongoing with correct alignment  3. Pt to increase strength for reciprocal gait on stairs - ongoing  4. Pt to increase SLS > 20 sec B for safe amb all surfaces - ongoing    Medical/Referring Diagnosis:  No admission diagnoses are documented for this encounter. Referring Physician:  No ref. provider found  MD Orders:  PT Eval and Treat   Date of Onset:  3/25/22 surgery  Allergies:  Patient has no allergy information on record. Restrictions/Precautions:    No data recordedNo data recorded      Interventions Planned (Treatment may consist of any combination of the following):    1. Balance Exercise  2. Cryotherapy  3. Home Exercise Program (HEP)  4. Range of Motion (ROM)  5. Therapeutic Activites  6.  Therapeutic Exercise/Strengthening  7. Aquatics    Subjective Comments: It feels better but I guess we will see. Initial: 2/10               Post Session: no pain with ex's  Medications Last Reviewed:  6/27/2022     Updated Objective Findings:  6/17/22     ROM:         AROM  Knee flex-ext L 110-10, R 105-12 before, 115-10 after 6/27/22  Functional Mobility:         Gait/Ambulation:  Antalgic with lat trunk motion, able to correct some with cues        Transfers:  can perform without UE assist but abducts R hip and doesn't have control to the chair        Stairs: Reciprocal with use of handrail, lack of control  Balance:          20 sec L, 10 sec R    KOOS:  raw score 16, interval score 47.487  5/5/22:  Raw score 10, interval score 61.583  6/2/22: Raw score 8, interval score 63.776  6/17/22:  Raw score 6, interval score 70.704  Treatment   THERAPEUTIC ACTIVITY: ( see below for minutes): Therapeutic activities per grid below to improve mobility. Required moderate verbal and manual cues to improve functional mobility . THERAPEUTIC EXERCISE: (see below for minutes):  Exercises per grid below to improve strength. Required moderate verbal and manual cues to promote proper body alignment, promote proper body posture, promote proper body mechanics and promote proper body breathing techniques. Progressed resistance, range, repetitions and complexity of movement as indicated. MANUAL THERAPY: (see below for minutes): Joint mobilization and Soft tissue mobilization was utilized and necessary because of the patient's restricted joint motion, painful spasm, loss of articular motion and restricted motion of soft tissue. MODALITIES: (see below for minutes):      for pain modulation    AQUATIC THERAPY (see below for minutes):  Aquatic treatment performed per flow grid for Decreased muscle strength, Decreased endurance, Decreased static/dynamic balance and reactive control, Decreased activity endurance, Decompression, Ease of movement and Low impact and reduced weight bearing activity. Date: 6/9/22 Visit 18 6/17/22  Visit 19  PN 6/24/22  Visit 20 6/27/22  Visit 21     Modalities: 15 mins  15 mins  15 mins     Game ready R knee seated seated declined seated              Manual Therapy:                           Aquatics Activities: 55  mins  45 mins 60 mins     GAIT (F/B/S/M) 4 laps   4 laps     SLR gait 4 laps   4 laps     Hamstring Curl gait  4 laps   4 laps     Rockettes 4 laps   4 laps     Squats X 30 on step  X 30  On step X 30  On step     Calf raises X 30 on step  X 30  On step X 30  On step     Hamstring stretch B 3 x 15 sec  3 x 15 sec 3 x 15 sec     Flex stretch at rail X 5  X 20      Step ups ant B X 30  X 30 X 30     SLS B X 3  X 3 X 3              H/k rocking back stretch 10 x 20 sec        Therapeutic Exercises: 10 mins 30 mins 25 mins 15 mins     Pt education, postural education, HEP, functional breathing         Bike for ROM 10 mins 20 mins 15 mins 15 mins     PROM R knee   Supine, seated prone      HEP: see hand out    Treatment/Session Summary:    · Treatment Assessment: Pt began at 105 and achieve 115 flex after aggressive stretching,  Good effort and compliance. · Communication/Consultation:  None today  · Equipment provided today:  None  · Recommendations/Intent for next treatment session: Next visit will focus on stretching, strengthening, modalities as indicated.     Total Treatment Billable Duration:  90 minutes  Time In: 0215  Time Out: 0350    Raymon Almonte PT       Post Session Pain  Charge Capture  [employer] Portal  MD Guidelines  Scanned Media  Benefits  MyChart

## 2022-06-28 ENCOUNTER — APPOINTMENT (OUTPATIENT)
Dept: PHYSICAL THERAPY | Age: 72
End: 2022-06-28
Payer: MEDICARE

## 2022-06-30 ENCOUNTER — HOSPITAL ENCOUNTER (OUTPATIENT)
Dept: PHYSICAL THERAPY | Age: 72
Setting detail: RECURRING SERIES
Discharge: HOME | End: 2022-07-03
Payer: MEDICARE

## 2022-06-30 PROCEDURE — 97110 THERAPEUTIC EXERCISES: CPT

## 2022-06-30 PROCEDURE — 97113 AQUATIC THERAPY/EXERCISES: CPT

## 2022-06-30 PROCEDURE — 97016 VASOPNEUMATIC DEVICE THERAPY: CPT

## 2022-06-30 NOTE — PROGRESS NOTES
Toni Jay  : 1950  Primary: [facility] Medicare Complete  Secondary:  [address] @ [address] [place] [address]  Phone: [phone]  Fax: [phone] No data recorded  Plan of Care/Certification Expiration Date: 22      PT Visit Info: Total # of Visits Approved: 23      OUTPATIENT PHYSICAL THERAPY:OP NOTE TYPE: Treatment Note 2022     Appt Desk   Episode      Treatment Diagnosis:  Pain in Right Knee (M25.561)  Stiffness of Right Knee, Not elsewhere classified (M25.661)  Other abnormalities of gait and mobility (R26.89)  Generalized Muscle Weakness (M62.81)     Effective Dates: 2022 TO 2022 (90 days). Frequency/Duration: 2-3 times a week for 90 Days  GOALS: (Goals have been discussed and agreed upon with patient.)  Short-Term Functional Goals: Time Frame: 4 weeks  1. Pt to report compliance with HEP - MET  2. Pt to restore 110-10 AROM to prevent manipulation - MET  3. Pt to normalize gait mechanics level surfaces - ongoing  Discharge Goals: Time Frame: 12 weeks  1. Pt to restore AROM to 120 flex for normal function without compensation - ongoing  2. Pt to increase strength for sit to stand x 30 reps - ongoing with correct alignment  3. Pt to increase strength for reciprocal gait on stairs - ongoing  4. Pt to increase SLS > 20 sec B for safe amb all surfaces - ongoing    Medical/Referring Diagnosis:  No admission diagnoses are documented for this encounter. Referring Physician:  Juan Carlos Bowling MD MD Orders:  PT Eval and Treat   Date of Onset:  3/25/22 surgery  Allergies:  Patient has no allergy information on record. Restrictions/Precautions:    No data recordedNo data recorded      Interventions Planned (Treatment may consist of any combination of the following):    1. Balance Exercise  2. Cryotherapy  3. Home Exercise Program (HEP)  4. Range of Motion (ROM)  5. Therapeutic Activites  6.  Therapeutic Exercise/Strengthening  7. Aquatics    Subjective Comments: It feels like it might be okay. Initial: 2/10               Post Session: no pain with ex's  Medications Last Reviewed:  6/30/2022     Updated Objective Findings:  6/17/22     ROM:         AROM  Knee flex-ext L 110-10, R 103-12 before, 115-10 after 6/30/22  Functional Mobility:         Gait/Ambulation:  Antalgic with lat trunk motion, able to correct some with cues        Transfers:  can perform without UE assist but abducts R hip and doesn't have control to the chair        Stairs: Reciprocal with use of handrail, lack of control  Balance:          20 sec L, 10 sec R    KOOS:  raw score 16, interval score 47.487  5/5/22:  Raw score 10, interval score 61.583  6/2/22: Raw score 8, interval score 63.776  6/17/22:  Raw score 6, interval score 70.704  Treatment   THERAPEUTIC ACTIVITY: ( see below for minutes): Therapeutic activities per grid below to improve mobility. Required moderate verbal and manual cues to improve functional mobility . THERAPEUTIC EXERCISE: (see below for minutes):  Exercises per grid below to improve strength. Required moderate verbal and manual cues to promote proper body alignment, promote proper body posture, promote proper body mechanics and promote proper body breathing techniques. Progressed resistance, range, repetitions and complexity of movement as indicated. MANUAL THERAPY: (see below for minutes): Joint mobilization and Soft tissue mobilization was utilized and necessary because of the patient's restricted joint motion, painful spasm, loss of articular motion and restricted motion of soft tissue. MODALITIES: (see below for minutes):      for pain modulation    AQUATIC THERAPY (see below for minutes):  Aquatic treatment performed per flow grid for Decreased muscle strength, Decreased endurance, Decreased static/dynamic balance and reactive control, Decreased activity endurance, Decompression, Ease of movement and Low impact and reduced weight bearing activity. Date: 6/9/22 Visit 18 6/17/22  Visit 19  PN 6/24/22  Visit 20 6/27/22  Visit 21 6/30/22  Visit 22    Modalities: 15 mins  15 mins  15 mins 15 mins    Game ready R knee seated seated declined seated seated             Manual Therapy:                           Aquatics Activities: 55  mins  45 mins 60 mins 60 mins    GAIT (F/B/S/M) 4 laps   4 laps 4 laps    SLR gait 4 laps   4 laps 4 laps    Hamstring Curl gait  4 laps   4 laps 4 laps    Rockettes 4 laps   4 laps 4 laps    Squats X 30 on step  X 30  On step X 30  On step X 30  On step    Calf raises X 30 on step  X 30  On step X 30  On step X 30  On step    Hamstring stretch B 3 x 15 sec  3 x 15 sec 3 x 15 sec 3 x 15 sec    Flex stretch at rail X 5  X 20  X 20    Step ups ant B X 30  X 30 X 30 X 30    SLS B X 3  X 3 X 3 X 3             H/k rocking back stretch 10 x 20 sec        Therapeutic Exercises: 10 mins 30 mins 25 mins 15 mins 15 mins    Pt education, postural education, HEP, functional breathing         Bike for ROM 10 mins 20 mins 15 mins 15 mins 15 mins    PROM R knee   Supine, seated prone      HEP: see hand out    Treatment/Session Summary:    · Treatment Assessment: Pt had to work harder to get past 95 degrees flex to begin and struggled to end at 115 degrees flex. He is compliant with home stretching but remains extremely tight. · Communication/Consultation:  None today  · Equipment provided today:  None  · Recommendations/Intent for next treatment session: Next visit will focus on stretching, strengthening, modalities as indicated.     Total Treatment Billable Duration:  90 minutes  Time In: 0250  Time Out: 0430    Shilpa Davis PT       Post Session Pain  Charge Capture  [employer] Portal  MD Guidelines  Scanned Media  Benefits  MyChart

## 2022-07-06 ENCOUNTER — HOSPITAL ENCOUNTER (OUTPATIENT)
Dept: PHYSICAL THERAPY | Age: 72
Setting detail: RECURRING SERIES
Discharge: HOME | End: 2022-07-09
Payer: MEDICARE

## 2022-07-06 PROCEDURE — 97110 THERAPEUTIC EXERCISES: CPT

## 2022-07-06 PROCEDURE — 97016 VASOPNEUMATIC DEVICE THERAPY: CPT

## 2022-07-06 PROCEDURE — 97113 AQUATIC THERAPY/EXERCISES: CPT

## 2022-07-06 NOTE — DISCHARGE SUMMARY
Edith Barraza  : 1950  Primary: [facility] Medicare Complete  Secondary:  [address] @ [address] [facility] [address]  Phone: [phone]  Fax: [phone] No data recorded  Plan of Care/Certification Expiration Date: 22      PT Visit Info: Total # of Visits Approved: 23      OUTPATIENT PHYSICAL THERAPY:OP NOTE TYPE: Treatment Note and Discharge Summary 2022     Appt Desk   Episode      Treatment Diagnosis:  Pain in Right Knee (M25.561)  Stiffness of Right Knee, Not elsewhere classified (M25.661)  Other abnormalities of gait and mobility (R26.89)  Generalized Muscle Weakness (M62.81)     Effective Dates: 2022 TO 2022 (90 days). Frequency/Duration: 2-3 times a week for 90 Days  GOALS: (Goals have been discussed and agreed upon with patient.)  Short-Term Functional Goals: Time Frame: 4 weeks  1. Pt to report compliance with HEP - MET  2. Pt to restore 110-10 AROM to prevent manipulation - MET  3. Pt to normalize gait mechanics level surfaces - ongoing  Discharge Goals: Time Frame: 12 weeks  1. Pt to restore AROM to 120 flex for normal function without compensation - ongoing  2. Pt to increase strength for sit to stand x 30 reps - ongoing with correct alignment  3. Pt to increase strength for reciprocal gait on stairs - ongoing  4. Pt to increase SLS > 20 sec B for safe amb all surfaces - ongoing    Medical/Referring Diagnosis:  No admission diagnoses are documented for this encounter. Referring Physician:  Lin Lyles MD MD Orders:  PT Eval and Treat   Date of Onset:  3/25/22 surgery  Allergies:  Patient has no allergy information on record. Restrictions/Precautions:    No data recordedNo data recorded      Interventions Planned (Treatment may consist of any combination of the following):    1. Balance Exercise  2. Cryotherapy  3. Home Exercise Program (HEP)  4. Range of Motion (ROM)  5. Therapeutic Activites  6.  Therapeutic Exercise/Strengthening  7. Aquatics    Subjective Comments:  I have tried the kneeling stretch a little more and it is hard to tell if it is working. Initial: 2/10                       Post Session: no pain with ex's  Medications Last Reviewed:  7/6/2022     Updated Objective Findings:  6/17/22     ROM:         AROM  Knee flex-ext L 110-10, R 107-12 before, 115-10 after 7/6/22  Functional Mobility:         Gait/Ambulation:  Antalgic with lat trunk motion, able to correct some with cues        Transfers:  can perform without UE assist but abducts R hip and doesn't have control to the chair        Stairs: Reciprocal with use of handrail, lack of control  Balance:          20 sec L, 10 sec R    KOOS:  raw score 16, interval score 47.487  5/5/22:  Raw score 10, interval score 61.583  6/2/22: Raw score 8, interval score 63.776  6/17/22:  Raw score 6, interval score 70.704  Treatment   THERAPEUTIC ACTIVITY: ( see below for minutes): Therapeutic activities per grid below to improve mobility. Required moderate verbal and manual cues to improve functional mobility . THERAPEUTIC EXERCISE: (see below for minutes):  Exercises per grid below to improve strength. Required moderate verbal and manual cues to promote proper body alignment, promote proper body posture, promote proper body mechanics and promote proper body breathing techniques. Progressed resistance, range, repetitions and complexity of movement as indicated. MANUAL THERAPY: (see below for minutes): Joint mobilization and Soft tissue mobilization was utilized and necessary because of the patient's restricted joint motion, painful spasm, loss of articular motion and restricted motion of soft tissue. MODALITIES: (see below for minutes):      for pain modulation    AQUATIC THERAPY (see below for minutes):  Aquatic treatment performed per flow grid for Decreased muscle strength, Decreased endurance, Decreased static/dynamic balance and reactive control, Decreased activity endurance, Decompression, Ease of movement and Low impact and reduced weight bearing activity. Date: 6/9/22 Visit 18 6/17/22  Visit 19  PN 6/24/22  Visit 20 6/27/22  Visit 21 6/30/22  Visit 22 7/6/22  Visit 23   Modalities: 15 mins  15 mins  15 mins 15 mins 15 mins   Game ready R knee seated seated declined seated seated seated            Manual Therapy:                           Aquatics Activities: 55  mins  45 mins 60 mins 60 mins 60 mins   GAIT (F/B/S/M) 4 laps   4 laps 4 laps 4 laps   SLR gait 4 laps   4 laps 4 laps 4 laps   Hamstring Curl gait  4 laps   4 laps 4 laps 4 laps   Rockettes 4 laps   4 laps 4 laps 4 laps   Squats X 30 on step  X 30  On step X 30  On step X 30  On step X 30  On step   Calf raises X 30 on step  X 30  On step X 30  On step X 30  On step X 30  On step   Hamstring stretch B 3 x 15 sec  3 x 15 sec 3 x 15 sec 3 x 15 sec 3 x 15 sec   Flex stretch at rail X 5  X 20  X 20 X 20   Step ups ant B X 30  X 30 X 30 X 30 X 30   SLS B X 3  X 3 X 3 X 3 X 3            H/k rocking back stretch 10 x 20 sec        Therapeutic Exercises: 10 mins 30 mins 25 mins 15 mins 15 mins 15 mins   Pt education, postural education, HEP, functional breathing         Bike for ROM 10 mins 20 mins 15 mins 15 mins 15 mins 15 mins   PROM R knee   Supine, seated prone      HEP: see hand out    Treatment/Session Summary:    · Treatment Assessment: Pt had better beginning flex ROM today and achieved 115 after stretching. Pt has worked very hard with therapy. He will continue with his HEP and call with any questions.   · Communication/Consultation:  None today  · Equipment provided today:  None    Total Treatment Billable Duration:  90 minutes  Time In: 0300  Time Out: 0440    Sherryle Pringle, PT       Post Session Pain  Charge Capture  [employer] Portal  MD Guidelines  Scanned Media  Benefits  MyChart

## 2022-07-08 ENCOUNTER — APPOINTMENT (OUTPATIENT)
Dept: PHYSICAL THERAPY | Age: 72
End: 2022-07-08
Payer: MEDICARE

## 2022-07-11 ENCOUNTER — APPOINTMENT (OUTPATIENT)
Dept: PHYSICAL THERAPY | Age: 72
End: 2022-07-11
Payer: MEDICARE

## 2022-07-11 ENCOUNTER — OFFICE VISIT (OUTPATIENT)
Dept: INTERNAL MEDICINE CLINIC | Facility: CLINIC | Age: 72
End: 2022-07-11
Payer: MEDICARE

## 2022-07-11 VITALS
DIASTOLIC BLOOD PRESSURE: 62 MMHG | SYSTOLIC BLOOD PRESSURE: 112 MMHG | HEART RATE: 74 BPM | OXYGEN SATURATION: 94 % | BODY MASS INDEX: 34.07 KG/M2 | HEIGHT: 70 IN | WEIGHT: 238 LBS

## 2022-07-11 DIAGNOSIS — I10 HTN (HYPERTENSION), BENIGN: ICD-10-CM

## 2022-07-11 DIAGNOSIS — E11.21 TYPE 2 DIABETES WITH NEPHROPATHY (HCC): Primary | ICD-10-CM

## 2022-07-11 DIAGNOSIS — J44.9 CHRONIC OBSTRUCTIVE PULMONARY DISEASE, UNSPECIFIED COPD TYPE (HCC): ICD-10-CM

## 2022-07-11 PROCEDURE — 3023F SPIROM DOC REV: CPT | Performed by: FAMILY MEDICINE

## 2022-07-11 PROCEDURE — 3044F HG A1C LEVEL LT 7.0%: CPT | Performed by: FAMILY MEDICINE

## 2022-07-11 PROCEDURE — G8417 CALC BMI ABV UP PARAM F/U: HCPCS | Performed by: FAMILY MEDICINE

## 2022-07-11 PROCEDURE — 1036F TOBACCO NON-USER: CPT | Performed by: FAMILY MEDICINE

## 2022-07-11 PROCEDURE — 2022F DILAT RTA XM EVC RTNOPTHY: CPT | Performed by: FAMILY MEDICINE

## 2022-07-11 PROCEDURE — G8427 DOCREV CUR MEDS BY ELIG CLIN: HCPCS | Performed by: FAMILY MEDICINE

## 2022-07-11 PROCEDURE — 1123F ACP DISCUSS/DSCN MKR DOCD: CPT | Performed by: FAMILY MEDICINE

## 2022-07-11 PROCEDURE — 99214 OFFICE O/P EST MOD 30 MIN: CPT | Performed by: FAMILY MEDICINE

## 2022-07-11 PROCEDURE — 3017F COLORECTAL CA SCREEN DOC REV: CPT | Performed by: FAMILY MEDICINE

## 2022-07-11 RX ORDER — ASPIRIN 81 MG/1
81 TABLET, CHEWABLE ORAL DAILY
COMMUNITY

## 2022-07-11 RX ORDER — PRAVASTATIN SODIUM 40 MG
40 TABLET ORAL DAILY
COMMUNITY
End: 2022-07-11

## 2022-07-11 ASSESSMENT — PATIENT HEALTH QUESTIONNAIRE - PHQ9
SUM OF ALL RESPONSES TO PHQ9 QUESTIONS 1 & 2: 0
SUM OF ALL RESPONSES TO PHQ QUESTIONS 1-9: 0
1. LITTLE INTEREST OR PLEASURE IN DOING THINGS: 0
2. FEELING DOWN, DEPRESSED OR HOPELESS: 0
SUM OF ALL RESPONSES TO PHQ QUESTIONS 1-9: 0

## 2022-07-11 NOTE — PROGRESS NOTES
Mary Phillips DO  Diplomate of the [employer] of [address] [place] [address] Internal Medicine      Carlos Andujar (: 1950) is a 74 y.o. male, here for evaluation of the following chief complaint(s):  Diabetes (6 month follow up )       ASSESSMENT/PLAN:  1. Type 2 diabetes with nephropathy (HCC)  -     Hemoglobin A1C; Future  2. HTN (hypertension), benign  3. Chronic obstructive pulmonary disease, unspecified COPD type ([facility]     -  Tolerating medication well and achieving desired therapeutic response. Will continue with:   Key Antihyperglycemic Medications          metFORMIN (GLUCOPHAGE) 500 MG tablet (Taking)    Class: Historical Med      . A1c levels reviewed--will recheck. Encourage routine foot care. Recommend routine eye exams. Encourage diet and exercise and medication adherence. -  Tolerating medication well for HTN. Achieving desired therapeutic response with   Key Anti-Hypertensive Meds          enalapril (VASOTEC) 10 MG tablet (Taking)    Class: Historical Med       --will continue. Will periodically review and adjust if needed. Encourage home monitoring.   -Will continue with Stiolto as providing good clinical benefit and tolerating well. -Advised to get age appropriate flu vaccine.  -Advised COVID booster when available. SUBJECTIVE/OBJECTIVE:  HPI:  -Diabetes:  Describes symptoms as none. Course to date has been well controlled. Home glucose monitoring: no. Patient denies polyuria and polydipsia. No wounds in lower limbs. -HTN: Home BP Monitoring: yes. taking medications as instructed, no medication side effects noted. Lisinopril increased by nephrology for proteinuria. BP stable. -The patient is being seen for follow up of COPD. Oxygen: he currently is not on home oxygen therapy. Symptoms: chronic dyspnea---stable. ROS negative except as noted above today.          Social History     Tobacco Use    Smoking status: Former Smoker     Packs/day: 1.00     Years: 40.00     Pack years: 40.00     Types: Cigarettes    Smokeless tobacco: Never Used   Vaping Use    Vaping Use: Never used   Substance Use Topics    Alcohol use: Not Currently     Comment: occ    Drug use: Not Currently     Vitals:    07/11/22 0857   BP: 112/62   Site: Left Upper Arm   Position: Sitting   Cuff Size: Large Adult   Pulse: 74   SpO2: 94%   Weight: 238 lb (108 kg)   Height: 5' 10\" (1.778 m)      Body mass index is 34.15 kg/m². Physical Exam  Vitals reviewed. Cardiovascular:      Rate and Rhythm: Normal rate and regular rhythm. Pulmonary:      Effort: Pulmonary effort is normal.      Breath sounds: Normal breath sounds. Skin:     General: Skin is warm and dry. Neurological:      Mental Status: He is alert. An electronic signature was used to authenticate this note.   Grant Rivera DO

## 2022-07-15 ENCOUNTER — NURSE ONLY (OUTPATIENT)
Dept: INTERNAL MEDICINE CLINIC | Facility: CLINIC | Age: 72
End: 2022-07-15

## 2022-07-15 ENCOUNTER — APPOINTMENT (OUTPATIENT)
Dept: PHYSICAL THERAPY | Age: 72
End: 2022-07-15
Payer: MEDICARE

## 2022-07-15 DIAGNOSIS — E11.21 TYPE 2 DIABETES WITH NEPHROPATHY (HCC): ICD-10-CM

## 2022-07-15 LAB
EST. AVERAGE GLUCOSE BLD GHB EST-MCNC: 151 MG/DL
HBA1C MFR BLD: 6.9 % (ref 4.8–5.6)

## 2022-07-18 ENCOUNTER — APPOINTMENT (OUTPATIENT)
Dept: PHYSICAL THERAPY | Age: 72
End: 2022-07-18
Payer: MEDICARE

## 2022-07-22 ENCOUNTER — APPOINTMENT (OUTPATIENT)
Dept: PHYSICAL THERAPY | Age: 72
End: 2022-07-22
Payer: MEDICARE

## 2022-07-25 ENCOUNTER — APPOINTMENT (OUTPATIENT)
Dept: PHYSICAL THERAPY | Age: 72
End: 2022-07-25
Payer: MEDICARE

## 2022-07-27 ENCOUNTER — OFFICE VISIT (OUTPATIENT)
Dept: ORTHOPEDIC SURGERY | Age: 72
End: 2022-07-27
Payer: MEDICARE

## 2022-07-27 DIAGNOSIS — Z09 FOLLOW-UP EXAMINATION: Primary | ICD-10-CM

## 2022-07-27 DIAGNOSIS — M54.50 LOW BACK PAIN AT MULTIPLE SITES: ICD-10-CM

## 2022-07-27 DIAGNOSIS — Z96.651 HISTORY OF TOTAL KNEE ARTHROPLASTY, RIGHT: ICD-10-CM

## 2022-07-27 PROCEDURE — 1123F ACP DISCUSS/DSCN MKR DOCD: CPT | Performed by: ORTHOPAEDIC SURGERY

## 2022-07-27 PROCEDURE — G8417 CALC BMI ABV UP PARAM F/U: HCPCS | Performed by: ORTHOPAEDIC SURGERY

## 2022-07-27 PROCEDURE — 1036F TOBACCO NON-USER: CPT | Performed by: ORTHOPAEDIC SURGERY

## 2022-07-27 PROCEDURE — 3017F COLORECTAL CA SCREEN DOC REV: CPT | Performed by: ORTHOPAEDIC SURGERY

## 2022-07-27 PROCEDURE — G8427 DOCREV CUR MEDS BY ELIG CLIN: HCPCS | Performed by: ORTHOPAEDIC SURGERY

## 2022-07-27 PROCEDURE — 99213 OFFICE O/P EST LOW 20 MIN: CPT | Performed by: ORTHOPAEDIC SURGERY

## 2022-07-29 ENCOUNTER — APPOINTMENT (OUTPATIENT)
Dept: PHYSICAL THERAPY | Age: 72
End: 2022-07-29
Payer: MEDICARE

## 2022-08-01 ENCOUNTER — TELEPHONE (OUTPATIENT)
Dept: PHARMACY | Facility: CLINIC | Age: 72
End: 2022-08-01

## 2022-08-01 NOTE — TELEPHONE ENCOUNTER
[facility] CLINICAL PHARMACY: ADHERENCE REVIEW  Identified care gap per [employer] fills at [facility]: ACE/ARB, Diabetes, and Statin adherence    Last Visit: 7/11/22    ASSESSMENT  ACE/ARB ADHERENCE    Insurance Records claims through 7/23/22 (Prior Year Andre Conde =  100%; YTD Andre Conde =  88%; Potential Fail Date: 9/10/22 ):   Enalapril 5 mg tab last filled on 3/27/22 for 90 day supply (180 tabs). Next refill due: 6/30/22    Per Practice Assist 8/1/22 enalapril 10 mg tab filled 7/29/22 x 90 ds (180 tabs) by prescriber Janice Pak (filled by [facility])    Per 2021 adherence report: [place] 100%; last claim 10/8/21 x 90ds, next refill due date 1/6/22 max refill due date 2/2/22    Per Reconciled Dispense Report:  ENALAPRIL MALEATE  10 MG TABS 07/29/2022 90 180 tablet [facility]CH[facility]   ENALAPRIL MALEATE  5 MG TABS 03/27/2022 90 180 tablet [facility] [employer]   ENALAPRIL MALEATE  5 MG TABS 01/01/2022 90 180 tablet [facility] [employer]     BP Readings from Last 3 Encounters:   07/11/22 112/62   03/28/22 138/70   03/26/22 128/78     Estimated Creatinine Clearance: 86 mL/min (based on SCr of 0.93 mg/dL). DIABETES ADHERENCE    Insurance Records claims through 7/23/22 (Prior Year Andre Conde =  100%; YTD Andre Conde =  91%; Potential Fail Date: 9/14/22 ):   Metformin 500 mg tab last filled on 3/30/22 for 90 day supply (180 tab).  Next refill due: 7/6/22    Per 2021 adherence report: [place] 100%; last claim 10/14/21 x 90ds, next refill due date 1/12/22 max refill due date 2/13/22    Per Reconciled Dispense Report:  METFORMIN HYDROCHLORIDE  500 MG TABS 03/30/2022 90 180 tablet [facility]   METFORMIN HYDROCHLORIDE  500 MG TABS 01/07/2022 90 180 tablet LEOLA EAST [facility]   Last Rx 12/9/21 x 1 yr supply - believe should have refill on file at pharmacy    Lab Results   Component Value Date    LABA1C 6.9 (H) 07/15/2022    LABA1C 6.7 (H) 03/17/2022    LABA1C 6.7 (H) 11/11/2021     NOTE A1c <9%    STATIN ADHERENCE    Insurance Records claims through 7/23/22 (Prior Year [facility]C =  100%; YTD Andre Conde = Filled only once; Potential Fail Date: 8/8/22 ):   Atorvastatin 40 mg tab last filled on 3/12/22 for 90 day supply. Next refill due: 6/10/22    Per 2021 adherence report: [place] 100%; last claim 12/17/21 x 90ds, next refill due date 3/14/22; max refill due date 4/24/22  - adjusted refill due date @7/23/22    Per Reconciled Dispense Report:  ATORVASTATIN CALCIUM  40 MG TABS 03/12/2022 90 90 tablet DEANNAKALLIELEOLA [facility] [employer] [employer]   Last Rx 4/9/22 x 1 yr supply - should have Rx on profile    Lab Results   Component Value Date    CHOL 120 01/12/2022    TRIG 195 (H) 01/12/2022    HDL 43 01/12/2022    LDLCALC 45 01/12/2022     ALT   Date Value Ref Range Status   06/28/2021 41 0 - 44 IU/L Final     AST   Date Value Ref Range Status   06/28/2021 23 0 - 40 IU/L Final     The ASCVD Risk score (Ceasar Stallworth, et al., 2013) failed to calculate for the following reasons: The valid total cholesterol range is 130 to 320 mg/dL     PLAN  The following are interventions that have been identified:   - Patient overdue refilling atorvastatin, metformin and active on home medication list.   Using [facility] home delivery pharmacy, believe should have refills on file  Of note, possible he may be using up some 2021 surplus of each (adjusted refill due date atorvastatin @7/23/22, metformin @8/12/22? )  - enalapril recently filled for 10 mg tabs, prescriber Betsy Peters    Reached patient to review. Pt was not specifically near bottles but states he manages medications pretty well and refills when he is low, believes he is even on auto refill for some of his Rxs. He reports he is not having any issues and taking medications as prescribed everyday. States he will review all of his meds/supply again and reorder if needed. Expressed appreciation for follow up.     Future Appointments   Date Time Provider Department Center   2023  8:45 AM Henrry Bullard DO TRIM [facility] AMB   3/3/2023 10:50 AM Moody Lopez MD PPS [facility] AMB   3/14/2023  9:67 AM SFD CT [address] SLICE UNIT 1 SFDRCT SFD       Ino Jane, PharmD, [facility]  Department, toll free: [phone] option 2    For Pharmacy [address] [facility] in place:  No  Recommendation Provided To: Patient/Caregiver: 2 via Telephone  Intervention Detail: Adherence Monitorin  Gap Closed?: No   Intervention Accepted By: Patient/Caregiver: 2  Time Spent (min): 15

## 2022-08-31 ENCOUNTER — OFFICE VISIT (OUTPATIENT)
Dept: INTERNAL MEDICINE CLINIC | Facility: CLINIC | Age: 72
End: 2022-08-31
Payer: MEDICARE

## 2022-08-31 VITALS
HEIGHT: 70 IN | RESPIRATION RATE: 17 BRPM | OXYGEN SATURATION: 93 % | SYSTOLIC BLOOD PRESSURE: 136 MMHG | BODY MASS INDEX: 33.79 KG/M2 | HEART RATE: 88 BPM | WEIGHT: 236 LBS | DIASTOLIC BLOOD PRESSURE: 70 MMHG

## 2022-08-31 DIAGNOSIS — M25.561 CHRONIC PAIN OF RIGHT KNEE: ICD-10-CM

## 2022-08-31 DIAGNOSIS — Z00.00 MEDICARE ANNUAL WELLNESS VISIT, SUBSEQUENT: ICD-10-CM

## 2022-08-31 DIAGNOSIS — E11.21 TYPE 2 DIABETES WITH NEPHROPATHY (HCC): ICD-10-CM

## 2022-08-31 DIAGNOSIS — R60.0 BILATERAL LEG EDEMA: ICD-10-CM

## 2022-08-31 DIAGNOSIS — I10 HTN (HYPERTENSION), BENIGN: ICD-10-CM

## 2022-08-31 DIAGNOSIS — R80.8 OTHER PROTEINURIA: Primary | ICD-10-CM

## 2022-08-31 DIAGNOSIS — G89.29 CHRONIC PAIN OF RIGHT KNEE: ICD-10-CM

## 2022-08-31 PROCEDURE — 3044F HG A1C LEVEL LT 7.0%: CPT | Performed by: FAMILY MEDICINE

## 2022-08-31 PROCEDURE — G8417 CALC BMI ABV UP PARAM F/U: HCPCS | Performed by: FAMILY MEDICINE

## 2022-08-31 PROCEDURE — 2022F DILAT RTA XM EVC RTNOPTHY: CPT | Performed by: FAMILY MEDICINE

## 2022-08-31 PROCEDURE — G0439 PPPS, SUBSEQ VISIT: HCPCS | Performed by: FAMILY MEDICINE

## 2022-08-31 PROCEDURE — 3017F COLORECTAL CA SCREEN DOC REV: CPT | Performed by: FAMILY MEDICINE

## 2022-08-31 PROCEDURE — 1036F TOBACCO NON-USER: CPT | Performed by: FAMILY MEDICINE

## 2022-08-31 PROCEDURE — 1123F ACP DISCUSS/DSCN MKR DOCD: CPT | Performed by: FAMILY MEDICINE

## 2022-08-31 PROCEDURE — G8427 DOCREV CUR MEDS BY ELIG CLIN: HCPCS | Performed by: FAMILY MEDICINE

## 2022-08-31 PROCEDURE — 99214 OFFICE O/P EST MOD 30 MIN: CPT | Performed by: FAMILY MEDICINE

## 2022-08-31 RX ORDER — ENALAPRIL MALEATE 20 MG/1
20 TABLET ORAL 2 TIMES DAILY
Qty: 180 TABLET | Refills: 1 | Status: SHIPPED | OUTPATIENT
Start: 2022-08-31

## 2022-08-31 RX ORDER — FUROSEMIDE 20 MG/1
20 TABLET ORAL DAILY PRN
Qty: 90 TABLET | Refills: 1 | Status: SHIPPED | OUTPATIENT
Start: 2022-08-31

## 2022-08-31 RX ORDER — ENALAPRIL MALEATE 10 MG/1
10 TABLET ORAL 2 TIMES DAILY
Qty: 180 TABLET | Refills: 1
Start: 2022-08-31 | End: 2022-09-01 | Stop reason: DRUGHIGH

## 2022-08-31 ASSESSMENT — PATIENT HEALTH QUESTIONNAIRE - PHQ9
SUM OF ALL RESPONSES TO PHQ QUESTIONS 1-9: 0
SUM OF ALL RESPONSES TO PHQ9 QUESTIONS 1 & 2: 0
2. FEELING DOWN, DEPRESSED OR HOPELESS: 0
SUM OF ALL RESPONSES TO PHQ9 QUESTIONS 1 & 2: 0
SUM OF ALL RESPONSES TO PHQ QUESTIONS 1-9: 0
1. LITTLE INTEREST OR PLEASURE IN DOING THINGS: 0
2. FEELING DOWN, DEPRESSED OR HOPELESS: 0
SUM OF ALL RESPONSES TO PHQ QUESTIONS 1-9: 0
1. LITTLE INTEREST OR PLEASURE IN DOING THINGS: 0
SUM OF ALL RESPONSES TO PHQ QUESTIONS 1-9: 0

## 2022-08-31 ASSESSMENT — LIFESTYLE VARIABLES
HOW MANY STANDARD DRINKS CONTAINING ALCOHOL DO YOU HAVE ON A TYPICAL DAY: PATIENT DOES NOT DRINK
HOW OFTEN DO YOU HAVE A DRINK CONTAINING ALCOHOL: NEVER

## 2022-08-31 NOTE — PROGRESS NOTES
Dotty Thompson DO  Diplomate of the [employer] of [address] [place] [address] Internal Medicine      Jeevan Levi (: 1950) is a 74 y.o. male, here for evaluation of the following chief complaint(s): Other (Discuss nephrology referral ), Leg Swelling, and Medicare AWV       ASSESSMENT/PLAN:  1. Other proteinuria  -     enalapril (VASOTEC) 20 MG tablet; Take 1 tablet by mouth 2 times daily, Disp-180 tablet, R-1Normal  2. Type 2 diabetes with nephropathy (HCC)  -     metFORMIN (GLUCOPHAGE) 500 MG tablet; Take 1 tablet by mouth 2 times daily (with meals) TAKE 1 TABLET BY MOUTH TWICE DAILY WITH MEALS, Disp-180 tablet, R-1NO PRINT  3. HTN (hypertension), benign  4. Chronic pain of right knee  5. Bilateral leg edema  -     furosemide (LASIX) 20 MG tablet; Take 1 tablet by mouth daily as needed (Leg edema), Disp-90 tablet, R-1Normal     -Reviewed nephrology consult notes. -Will increase enalapril to 40mg and monitor blood pressure.  -Pt currently in donut hole so cannot afford Yudelka Fernando. Will continue with metformin until first of year and then will switch over to Slayden Fernando or similar. Encourage dietary adherence and regular checking of blood sugars.  -Encouraged follow up with Ortho as scheduled. -Start lasix prn for edema. Encourage salt restriction and support hose. SUBJECTIVE/OBJECTIVE:  HPI:  -Patient has questions regarding his proteinuria. He has been seeing nephrology. They have talked about increasing his enalapril to 40 mg but he wanted to discuss this with me first.  Additionally they had talked about placing him on farxiga as well. He denies any urinary complaints-no dysuria or hematuria. -Diabetes:  Describes symptoms as none. Course to date has been well controlled. Patient denies hypoglycemia  and polyuria and polydipsia. No wounds in lower limbs. -HTN: taking medications as instructed, no medication side effects noted.  He denies chest pain, palpitations, exertional pain or pressure.  -Continues to have persistent pain in R knee, especially with ambulation since knee replacement-almost 6 mos ago. No recent trauma.  -Continues to have bilateral LE edema. Has had for quite some time. Typically gets worse by the end of the day. Does wear support hose at times. ROS negative except as noted above today. Social History     Tobacco Use    Smoking status: Former     Packs/day: 1.00     Years: 40.00     Pack years: 40.00     Types: Cigarettes    Smokeless tobacco: Never   Vaping Use    Vaping Use: Never used   Substance Use Topics    Alcohol use: Not Currently     Comment: occ    Drug use: Not Currently     Vitals:    08/31/22 1020   BP: 136/70   Site: Left Upper Arm   Position: Sitting   Pulse: 88   Resp: 17   SpO2: 93%   Weight: 236 lb (107 kg)   Height: 5' 10\" (1.778 m)      Body mass index is 33.86 kg/m². Physical Exam  Vitals reviewed. Cardiovascular:      Rate and Rhythm: Normal rate and regular rhythm. Comments: Tr-1+ pitting edema BLE  Pulmonary:      Effort: Pulmonary effort is normal.      Breath sounds: Normal breath sounds. Skin:     General: Skin is warm and dry. Neurological:      Mental Status: He is alert. An electronic signature was used to authenticate this note.   Dora Main DO

## 2022-09-01 NOTE — PROGRESS NOTES
Medicare Annual Wellness Visit    Sonali Strauss is here for Other (Discuss nephrology referral ), Leg Swelling, and Medicare AWV    Assessment & Plan   Medicare annual wellness visit, subsequent    Recommendations for Preventive Services Due: see orders and patient instructions/AVS.  Recommended screening schedule for the next 5-10 years is provided to the patient in written form: see Patient Instructions/AVS.     Return for Medicare Annual Wellness Visit in 1 year. Subjective   Patient's complete Health Risk Assessment and screening values have been reviewed and are found in Flowsheets. The following problems were reviewed today and where indicated follow up appointments were made and/or referrals ordered. Positive Risk Factor Screenings with Interventions:             General Health and ACP:  General  In general, how would you say your health is?: Very Good  In the past 7 days, have you experienced any of the following: New or Increased Pain, New or Increased Fatigue, Loneliness, Social Isolation, Stress or Anger?: No  Do you get the social and emotional support that you need?: Yes  Do you have a Living Will?: (!) No    Advance Directives       Power of  Living Will ACP-Advance Directive ACP-Power of     Not on File Not on File Not on File Not on File          General Health Risk Interventions:  Encourage living will planning    Health Habits/Nutrition:  Physical Activity: Inactive    Days of Exercise per Week: 0 days    Minutes of Exercise per Session: 0 min     Have you lost any weight without trying in the past 3 months?: No  Body mass index: (!) 33.86  Have you seen the dentist within the past year?: Yes  Health Habits/Nutrition Interventions:  Encourage healthy eating, exercise. Suggest decreasing carbohydrate intake and increasing fresh vegetables. Will continue to periodically follow progress of weight loss.              Objective   Vitals:    08/31/22 1020   BP: 136/70 Site: Left Upper Arm   Position: Sitting   Pulse: 88   Resp: 17   SpO2: 93%   Weight: 236 lb (107 kg)   Height: 5' 10\" (1.778 m)      Body mass index is 33.86 kg/m². Allergies   Allergen Reactions    Shellfish-Derived Products Hives     Shrimp only       Prior to Visit Medications    Medication Sig Taking?  Authorizing Provider   metFORMIN (GLUCOPHAGE) 500 MG tablet Take 1 tablet by mouth 2 times daily (with meals) TAKE 1 TABLET BY MOUTH TWICE DAILY WITH MEALS Yes Elba Varela, DO   enalapril (VASOTEC) 20 MG tablet Take 1 tablet by mouth 2 times daily Yes Elba Calverters, DO   furosemide (LASIX) 20 MG tablet Take 1 tablet by mouth daily as needed (Leg edema) Yes Venancio Varela, DO   CPAP Machine MISC by Other route Yes Historical Provider, MD   aspirin 81 MG chewable tablet Take 81 mg by mouth daily Yes Historical Provider, MD   acetaminophen (TYLENOL) 500 MG tablet Take 1,000 mg by mouth every 6 hours as needed Yes Ar Automatic Reconciliation   albuterol sulfate  (90 Base) MCG/ACT inhaler Inhale 2 puffs into the lungs every 6 hours as needed Yes Ar Automatic Reconciliation   atorvastatin (LIPITOR) 40 MG tablet Take 40 mg by mouth every evening Yes Ar Automatic Reconciliation   bimatoprost (LUMIGAN) 0.01 % SOLN ophthalmic drops Apply 1 drop to eye every evening Yes Ar Automatic Reconciliation   nitroGLYCERIN (NITROSTAT) 0.4 MG SL tablet Place 0.4 mg under the tongue Yes Ar Automatic Reconciliation   tiotropium-olodaterol (STIOLTO) 2.5-2.5 MCG/ACT AERS Inhale 2 puffs into the lungs daily Yes Ar Automatic Reconciliation       CareTeam (Including outside providers/suppliers regularly involved in providing care):   Patient Care Team:  Tato Alexander DO as PCP - General  Tato Alexander DO as PCP - [facility] Empaneled Provider     Reviewed and updated this visit:  Tobacco  Allergies  Meds  Problems  Med Hx  Surg Hx  Soc Hx  Fam Hx

## 2022-09-01 NOTE — PATIENT INSTRUCTIONS
Personalized Preventive Plan for Edith Barraza - 8/31/2022  Medicare offers a range of preventive health benefits. Some of the tests and screenings are paid in full while other may be subject to a deductible, co-insurance, and/or copay. Some of these benefits include a comprehensive review of your medical history including lifestyle, illnesses that may run in your family, and various assessments and screenings as appropriate. After reviewing your medical record and screening and assessments performed today your provider may have ordered immunizations, labs, imaging, and/or referrals for you. A list of these orders (if applicable) as well as your Preventive Care list are included within your After Visit Summary for your review. Other Preventive Recommendations:    A preventive eye exam performed by an eye specialist is recommended every 1-2 years to screen for glaucoma; cataracts, macular degeneration, and other eye disorders. A preventive dental visit is recommended every 6 months. Try to get at least 150 minutes of exercise per week or 10,000 steps per day on a pedometer . Order or download the FREE \"Exercise & Physical Activity: Your Everyday Guide\" from [employer]. Call 2-109.737.4859 or search [employer] online. You need 1587-2878 mg of calcium and 4466-0698 IU of vitamin D per day. It is possible to meet your calcium requirement with diet alone, but a vitamin D supplement is usually necessary to meet this goal.  When exposed to the sun, use a sunscreen that protects against both UVA and UVB radiation with an SPF of 30 or greater. Reapply every 2 to 3 hours or after sweating, drying off with a towel, or swimming. Always wear a seat belt when traveling in a car. Always wear a helmet when riding a bicycle or motorcycle.

## 2022-09-16 ENCOUNTER — TELEPHONE (OUTPATIENT)
Dept: PULMONOLOGY | Age: 72
End: 2022-09-16

## 2022-09-16 NOTE — TELEPHONE ENCOUNTER
Patient states he is in the [employer] [place] and his Bradly Wright is going to cost him over $400. I have sent him the paperwork for the [address] [place] [address] Patient Assistance Program to see if he can qualify. In the meantime he has requested a lower cost inhaler. Can we e-scribe Air[facility]o to [facility] in [place] with a GoodRx Card?

## 2022-09-28 RX ORDER — TIOTROPIUM BROMIDE AND OLODATEROL 3.124; 2.736 UG/1; UG/1
SPRAY, METERED RESPIRATORY (INHALATION)
Qty: 12 G | Refills: 3 | Status: SHIPPED | OUTPATIENT
Start: 2022-09-28

## 2022-10-03 NOTE — TELEPHONE ENCOUNTER
Received fax from [facility], patient doesn't qualify for the Patient Assistance program due to Income exceeds current program eligibility limits.

## 2022-10-24 ENCOUNTER — PATIENT MESSAGE (OUTPATIENT)
Dept: INTERNAL MEDICINE CLINIC | Facility: CLINIC | Age: 72
End: 2022-10-24

## 2022-10-24 DIAGNOSIS — J11.1 INFLUENZA: Primary | ICD-10-CM

## 2022-10-25 RX ORDER — OSELTAMIVIR PHOSPHATE 75 MG/1
75 CAPSULE ORAL 2 TIMES DAILY
Qty: 10 CAPSULE | Refills: 0 | Status: SHIPPED | OUTPATIENT
Start: 2022-10-25 | End: 2022-10-30

## 2022-10-25 NOTE — TELEPHONE ENCOUNTER
From: Cornelio Joyce  To: Dr. Workman Philip Brothers  Sent: 10/24/2022 6:07 PM EDT  Subject: Flu Symptoms    Dr. Tylor Parker:  My grandson has a confirmed case of the flu and I have the symptoms now. Am I able to get a Rx for Tamiflu? I am told this really helps with the duration and the severity of the symptoms.   Damian Roa

## 2022-11-17 ENCOUNTER — APPOINTMENT (OUTPATIENT)
Dept: CT IMAGING | Age: 72
DRG: 193 | End: 2022-11-17
Payer: MEDICARE

## 2022-11-17 ENCOUNTER — HOSPITAL ENCOUNTER (INPATIENT)
Age: 72
LOS: 5 days | Discharge: HOME | DRG: 193 | End: 2022-11-22
Attending: EMERGENCY MEDICINE | Admitting: FAMILY MEDICINE
Payer: MEDICARE

## 2022-11-17 ENCOUNTER — APPOINTMENT (OUTPATIENT)
Dept: GENERAL RADIOLOGY | Age: 72
DRG: 193 | End: 2022-11-17
Payer: MEDICARE

## 2022-11-17 DIAGNOSIS — I48.91 ATRIAL FIBRILLATION WITH RVR (HCC): ICD-10-CM

## 2022-11-17 DIAGNOSIS — J44.1 COPD EXACERBATION (HCC): Primary | ICD-10-CM

## 2022-11-17 DIAGNOSIS — R09.02 HYPOXIA: ICD-10-CM

## 2022-11-17 DIAGNOSIS — R80.8 OTHER PROTEINURIA: ICD-10-CM

## 2022-11-17 PROBLEM — E11.21 TYPE 2 DIABETES WITH NEPHROPATHY (HCC): Status: ACTIVE | Noted: 2020-06-01

## 2022-11-17 PROBLEM — E78.2 MIXED HYPERLIPIDEMIA: Status: ACTIVE | Noted: 2018-08-06

## 2022-11-17 PROBLEM — Z95.5 HISTORY OF CORONARY ARTERY STENT PLACEMENT: Status: ACTIVE | Noted: 2019-02-04

## 2022-11-17 PROBLEM — J96.00 ACUTE RESPIRATORY FAILURE (HCC): Status: ACTIVE | Noted: 2022-11-17

## 2022-11-17 PROBLEM — J44.9 CHRONIC OBSTRUCTIVE PULMONARY DISEASE (HCC): Status: ACTIVE | Noted: 2018-08-06

## 2022-11-17 PROBLEM — I10 HTN (HYPERTENSION), BENIGN: Status: ACTIVE | Noted: 2018-08-06

## 2022-11-17 PROBLEM — Z99.89 OSA ON CPAP: Status: ACTIVE | Noted: 2022-02-17

## 2022-11-17 PROBLEM — J18.9 LEFT LOWER LOBE PNEUMONIA: Status: ACTIVE | Noted: 2022-11-17

## 2022-11-17 PROBLEM — I73.9 PVD (PERIPHERAL VASCULAR DISEASE) (HCC): Status: ACTIVE | Noted: 2020-09-17

## 2022-11-17 PROBLEM — G47.33 OSA ON CPAP: Status: ACTIVE | Noted: 2022-02-17

## 2022-11-17 LAB
ANION GAP SERPL CALC-SCNC: 4 MMOL/L (ref 2–11)
BASE EXCESS BLDV CALC-SCNC: 3.2 MMOL/L
BASOPHILS # BLD: 0 K/UL (ref 0–0.2)
BASOPHILS NFR BLD: 0 % (ref 0–2)
BUN SERPL-MCNC: 13 MG/DL (ref 8–23)
CALCIUM SERPL-MCNC: 10 MG/DL (ref 8.3–10.4)
CHLORIDE SERPL-SCNC: 103 MMOL/L (ref 101–110)
CO2 BLD-SCNC: 29 MMOL/L (ref 13–23)
CO2 SERPL-SCNC: 28 MMOL/L (ref 21–32)
CREAT SERPL-MCNC: 1.1 MG/DL (ref 0.8–1.5)
D DIMER PPP FEU-MCNC: 10.16 UG/ML(FEU)
DIFFERENTIAL METHOD BLD: ABNORMAL
EOSINOPHIL # BLD: 0.1 K/UL (ref 0–0.8)
EOSINOPHIL NFR BLD: 2 % (ref 0.5–7.8)
ERYTHROCYTE [DISTWIDTH] IN BLOOD BY AUTOMATED COUNT: 13.9 % (ref 11.9–14.6)
FLUAV AG NPH QL IA: NEGATIVE
FLUBV AG NPH QL IA: NEGATIVE
GAS FLOW.O2 O2 DELIVERY SYS: ABNORMAL L/MIN
GLUCOSE BLD STRIP.AUTO-MCNC: 209 MG/DL (ref 65–100)
GLUCOSE SERPL-MCNC: 189 MG/DL (ref 65–100)
HCO3 BLDV-SCNC: 29.8 MMOL/L (ref 23–28)
HCT VFR BLD AUTO: 47.8 % (ref 41.1–50.3)
HGB BLD-MCNC: 16.1 G/DL (ref 13.6–17.2)
IMM GRANULOCYTES # BLD AUTO: 0 K/UL (ref 0–0.5)
IMM GRANULOCYTES NFR BLD AUTO: 0 % (ref 0–5)
LYMPHOCYTES # BLD: 0.3 K/UL (ref 0.5–4.6)
LYMPHOCYTES NFR BLD: 5 % (ref 13–44)
MCH RBC QN AUTO: 33.5 PG (ref 26.1–32.9)
MCHC RBC AUTO-ENTMCNC: 33.7 G/DL (ref 31.4–35)
MCV RBC AUTO: 99.6 FL (ref 82–102)
MONOCYTES # BLD: 0.2 K/UL (ref 0.1–1.3)
MONOCYTES NFR BLD: 4 % (ref 4–12)
NEUTS SEG # BLD: 5.3 K/UL (ref 1.7–8.2)
NEUTS SEG NFR BLD: 89 % (ref 43–78)
NRBC # BLD: 0 K/UL (ref 0–0.2)
NT PRO BNP: 121 PG/ML (ref 5–125)
PCO2 BLDV: 51 MMHG (ref 41–51)
PH BLDV: 7.37 [PH] (ref 7.32–7.42)
PLATELET # BLD AUTO: 215 K/UL (ref 150–450)
PMV BLD AUTO: 9.9 FL (ref 9.4–12.3)
PO2 BLDV: 18 MMHG
POTASSIUM SERPL-SCNC: 4.3 MMOL/L (ref 3.5–5.1)
RBC # BLD AUTO: 4.8 M/UL (ref 4.23–5.6)
SAO2 % BLDV: 24.3 % (ref 65–88)
SARS-COV-2 RDRP RESP QL NAA+PROBE: NOT DETECTED
SERVICE CMNT-IMP: ABNORMAL
SERVICE CMNT-IMP: ABNORMAL
SODIUM SERPL-SCNC: 135 MMOL/L (ref 133–143)
SOURCE: NORMAL
SPECIMEN SOURCE: NORMAL
SPECIMEN TYPE: ABNORMAL
TROPONIN I SERPL HS-MCNC: 13.6 PG/ML (ref 0–14)
WBC # BLD AUTO: 5.9 K/UL (ref 4.3–11.1)

## 2022-11-17 PROCEDURE — 87040 BLOOD CULTURE FOR BACTERIA: CPT

## 2022-11-17 PROCEDURE — 2580000003 HC RX 258: Performed by: FAMILY MEDICINE

## 2022-11-17 PROCEDURE — 6360000002 HC RX W HCPCS: Performed by: FAMILY MEDICINE

## 2022-11-17 PROCEDURE — 94664 DEMO&/EVAL PT USE INHALER: CPT

## 2022-11-17 PROCEDURE — 96374 THER/PROPH/DIAG INJ IV PUSH: CPT | Performed by: EMERGENCY MEDICINE

## 2022-11-17 PROCEDURE — 87635 SARS-COV-2 COVID-19 AMP PRB: CPT

## 2022-11-17 PROCEDURE — 85025 COMPLETE CBC W/AUTO DIFF WBC: CPT

## 2022-11-17 PROCEDURE — 6370000000 HC RX 637 (ALT 250 FOR IP): Performed by: EMERGENCY MEDICINE

## 2022-11-17 PROCEDURE — 82962 GLUCOSE BLOOD TEST: CPT

## 2022-11-17 PROCEDURE — 80048 BASIC METABOLIC PNL TOTAL CA: CPT

## 2022-11-17 PROCEDURE — 83880 ASSAY OF NATRIURETIC PEPTIDE: CPT

## 2022-11-17 PROCEDURE — 84484 ASSAY OF TROPONIN QUANT: CPT

## 2022-11-17 PROCEDURE — 85379 FIBRIN DEGRADATION QUANT: CPT

## 2022-11-17 PROCEDURE — 6360000002 HC RX W HCPCS: Performed by: EMERGENCY MEDICINE

## 2022-11-17 PROCEDURE — 99285 EMERGENCY DEPT VISIT HI MDM: CPT | Performed by: EMERGENCY MEDICINE

## 2022-11-17 PROCEDURE — 71046 X-RAY EXAM CHEST 2 VIEWS: CPT

## 2022-11-17 PROCEDURE — 0202U NFCT DS 22 TRGT SARS-COV-2: CPT

## 2022-11-17 PROCEDURE — 94640 AIRWAY INHALATION TREATMENT: CPT

## 2022-11-17 PROCEDURE — 6360000004 HC RX CONTRAST MEDICATION: Performed by: FAMILY MEDICINE

## 2022-11-17 PROCEDURE — 6370000000 HC RX 637 (ALT 250 FOR IP): Performed by: FAMILY MEDICINE

## 2022-11-17 PROCEDURE — 94760 N-INVAS EAR/PLS OXIMETRY 1: CPT

## 2022-11-17 PROCEDURE — 2580000003 HC RX 258: Performed by: EMERGENCY MEDICINE

## 2022-11-17 PROCEDURE — 71260 CT THORAX DX C+: CPT | Performed by: FAMILY MEDICINE

## 2022-11-17 PROCEDURE — 87804 INFLUENZA ASSAY W/OPTIC: CPT

## 2022-11-17 PROCEDURE — 82803 BLOOD GASES ANY COMBINATION: CPT

## 2022-11-17 PROCEDURE — 1100000000 HC RM PRIVATE

## 2022-11-17 PROCEDURE — 2500000003 HC RX 250 WO HCPCS: Performed by: HOSPITALIST

## 2022-11-17 RX ORDER — HEPARIN SODIUM 5000 [USP'U]/ML
5000 INJECTION, SOLUTION INTRAVENOUS; SUBCUTANEOUS EVERY 8 HOURS SCHEDULED
Status: DISCONTINUED | OUTPATIENT
Start: 2022-11-18 | End: 2022-11-18 | Stop reason: SDUPTHER

## 2022-11-17 RX ORDER — 0.9 % SODIUM CHLORIDE 0.9 %
100 INTRAVENOUS SOLUTION INTRAVENOUS
Status: COMPLETED | OUTPATIENT
Start: 2022-11-17 | End: 2022-11-18

## 2022-11-17 RX ORDER — METOPROLOL TARTRATE 5 MG/5ML
5 INJECTION INTRAVENOUS ONCE
Status: COMPLETED | OUTPATIENT
Start: 2022-11-17 | End: 2022-11-17

## 2022-11-17 RX ORDER — INSULIN LISPRO 100 [IU]/ML
0-4 INJECTION, SOLUTION INTRAVENOUS; SUBCUTANEOUS NIGHTLY
Status: DISCONTINUED | OUTPATIENT
Start: 2022-11-17 | End: 2022-11-22 | Stop reason: HOSPADM

## 2022-11-17 RX ORDER — ACETAMINOPHEN 325 MG/1
650 TABLET ORAL EVERY 6 HOURS PRN
Status: DISCONTINUED | OUTPATIENT
Start: 2022-11-17 | End: 2022-11-22 | Stop reason: HOSPADM

## 2022-11-17 RX ORDER — HEPARIN SODIUM 1000 [USP'U]/ML
40 INJECTION, SOLUTION INTRAVENOUS; SUBCUTANEOUS PRN
Status: DISCONTINUED | OUTPATIENT
Start: 2022-11-17 | End: 2022-11-17

## 2022-11-17 RX ORDER — AZITHROMYCIN 250 MG/1
500 TABLET, FILM COATED ORAL DAILY
Status: COMPLETED | OUTPATIENT
Start: 2022-11-18 | End: 2022-11-19

## 2022-11-17 RX ORDER — SODIUM CHLORIDE 0.9 % (FLUSH) 0.9 %
5-40 SYRINGE (ML) INJECTION EVERY 12 HOURS SCHEDULED
Status: DISCONTINUED | OUTPATIENT
Start: 2022-11-17 | End: 2022-11-22 | Stop reason: HOSPADM

## 2022-11-17 RX ORDER — ACETAMINOPHEN 650 MG/1
650 SUPPOSITORY RECTAL EVERY 6 HOURS PRN
Status: DISCONTINUED | OUTPATIENT
Start: 2022-11-17 | End: 2022-11-22 | Stop reason: HOSPADM

## 2022-11-17 RX ORDER — SODIUM CHLORIDE 9 MG/ML
INJECTION, SOLUTION INTRAVENOUS PRN
Status: DISCONTINUED | OUTPATIENT
Start: 2022-11-17 | End: 2022-11-22 | Stop reason: HOSPADM

## 2022-11-17 RX ORDER — SODIUM CHLORIDE 0.9 % (FLUSH) 0.9 %
5-40 SYRINGE (ML) INJECTION PRN
Status: DISCONTINUED | OUTPATIENT
Start: 2022-11-17 | End: 2022-11-22 | Stop reason: HOSPADM

## 2022-11-17 RX ORDER — LISINOPRIL 20 MG/1
20 TABLET ORAL 2 TIMES DAILY
Status: DISCONTINUED | OUTPATIENT
Start: 2022-11-17 | End: 2022-11-21

## 2022-11-17 RX ORDER — DILTIAZEM HYDROCHLORIDE 5 MG/ML
5 INJECTION INTRAVENOUS ONCE
Status: COMPLETED | OUTPATIENT
Start: 2022-11-18 | End: 2022-11-18

## 2022-11-17 RX ORDER — HEPARIN SODIUM 10000 [USP'U]/100ML
5-30 INJECTION, SOLUTION INTRAVENOUS CONTINUOUS
Status: DISCONTINUED | OUTPATIENT
Start: 2022-11-17 | End: 2022-11-17

## 2022-11-17 RX ORDER — ONDANSETRON 4 MG/1
4 TABLET, ORALLY DISINTEGRATING ORAL EVERY 8 HOURS PRN
Status: DISCONTINUED | OUTPATIENT
Start: 2022-11-17 | End: 2022-11-22 | Stop reason: HOSPADM

## 2022-11-17 RX ORDER — IPRATROPIUM BROMIDE AND ALBUTEROL SULFATE 2.5; .5 MG/3ML; MG/3ML
1 SOLUTION RESPIRATORY (INHALATION) EVERY 6 HOURS
Status: DISCONTINUED | OUTPATIENT
Start: 2022-11-17 | End: 2022-11-19

## 2022-11-17 RX ORDER — ENALAPRIL MALEATE 20 MG/1
20 TABLET ORAL 2 TIMES DAILY
Status: DISCONTINUED | OUTPATIENT
Start: 2022-11-17 | End: 2022-11-17

## 2022-11-17 RX ORDER — GUAIFENESIN 600 MG/1
600 TABLET, EXTENDED RELEASE ORAL 2 TIMES DAILY
Status: DISCONTINUED | OUTPATIENT
Start: 2022-11-17 | End: 2022-11-22 | Stop reason: HOSPADM

## 2022-11-17 RX ORDER — DEXTROSE MONOHYDRATE 100 MG/ML
INJECTION, SOLUTION INTRAVENOUS CONTINUOUS PRN
Status: DISCONTINUED | OUTPATIENT
Start: 2022-11-17 | End: 2022-11-22 | Stop reason: HOSPADM

## 2022-11-17 RX ORDER — ASPIRIN 81 MG/1
81 TABLET, CHEWABLE ORAL DAILY
Status: DISCONTINUED | OUTPATIENT
Start: 2022-11-18 | End: 2022-11-22 | Stop reason: HOSPADM

## 2022-11-17 RX ORDER — ENOXAPARIN SODIUM 100 MG/ML
30 INJECTION SUBCUTANEOUS 2 TIMES DAILY
Status: DISCONTINUED | OUTPATIENT
Start: 2022-11-17 | End: 2022-11-17

## 2022-11-17 RX ORDER — METHYLPREDNISOLONE SODIUM SUCCINATE 125 MG/2ML
125 INJECTION, POWDER, LYOPHILIZED, FOR SOLUTION INTRAMUSCULAR; INTRAVENOUS
Status: COMPLETED | OUTPATIENT
Start: 2022-11-17 | End: 2022-11-17

## 2022-11-17 RX ORDER — HEPARIN SODIUM 1000 [USP'U]/ML
80 INJECTION, SOLUTION INTRAVENOUS; SUBCUTANEOUS ONCE
Status: DISCONTINUED | OUTPATIENT
Start: 2022-11-17 | End: 2022-11-17

## 2022-11-17 RX ORDER — HEPARIN SODIUM 1000 [USP'U]/ML
80 INJECTION, SOLUTION INTRAVENOUS; SUBCUTANEOUS PRN
Status: DISCONTINUED | OUTPATIENT
Start: 2022-11-17 | End: 2022-11-17

## 2022-11-17 RX ORDER — BUDESONIDE 0.5 MG/2ML
0.5 INHALANT ORAL 2 TIMES DAILY
Status: DISCONTINUED | OUTPATIENT
Start: 2022-11-17 | End: 2022-11-21

## 2022-11-17 RX ORDER — LATANOPROST 50 UG/ML
1 SOLUTION/ DROPS OPHTHALMIC NIGHTLY
Status: DISCONTINUED | OUTPATIENT
Start: 2022-11-18 | End: 2022-11-22 | Stop reason: HOSPADM

## 2022-11-17 RX ORDER — SODIUM CHLORIDE 0.9 % (FLUSH) 0.9 %
10 SYRINGE (ML) INJECTION
Status: COMPLETED | OUTPATIENT
Start: 2022-11-17 | End: 2022-11-17

## 2022-11-17 RX ORDER — FUROSEMIDE 20 MG/1
20 TABLET ORAL DAILY PRN
Status: DISCONTINUED | OUTPATIENT
Start: 2022-11-17 | End: 2022-11-22 | Stop reason: HOSPADM

## 2022-11-17 RX ORDER — BENZONATATE 100 MG/1
100 CAPSULE ORAL 3 TIMES DAILY PRN
Status: DISCONTINUED | OUTPATIENT
Start: 2022-11-17 | End: 2022-11-22 | Stop reason: HOSPADM

## 2022-11-17 RX ORDER — INSULIN LISPRO 100 [IU]/ML
0-8 INJECTION, SOLUTION INTRAVENOUS; SUBCUTANEOUS
Status: DISCONTINUED | OUTPATIENT
Start: 2022-11-17 | End: 2022-11-22 | Stop reason: HOSPADM

## 2022-11-17 RX ORDER — ATORVASTATIN CALCIUM 40 MG/1
40 TABLET, FILM COATED ORAL EVERY EVENING
Status: DISCONTINUED | OUTPATIENT
Start: 2022-11-18 | End: 2022-11-22 | Stop reason: HOSPADM

## 2022-11-17 RX ORDER — IPRATROPIUM BROMIDE AND ALBUTEROL SULFATE 2.5; .5 MG/3ML; MG/3ML
1 SOLUTION RESPIRATORY (INHALATION)
Status: COMPLETED | OUTPATIENT
Start: 2022-11-17 | End: 2022-11-17

## 2022-11-17 RX ORDER — ONDANSETRON 2 MG/ML
4 INJECTION INTRAMUSCULAR; INTRAVENOUS EVERY 6 HOURS PRN
Status: DISCONTINUED | OUTPATIENT
Start: 2022-11-17 | End: 2022-11-22 | Stop reason: HOSPADM

## 2022-11-17 RX ORDER — POLYETHYLENE GLYCOL 3350 17 G/17G
17 POWDER, FOR SOLUTION ORAL DAILY PRN
Status: DISCONTINUED | OUTPATIENT
Start: 2022-11-17 | End: 2022-11-22 | Stop reason: HOSPADM

## 2022-11-17 RX ORDER — METHYLPREDNISOLONE SODIUM SUCCINATE 125 MG/2ML
60 INJECTION, POWDER, LYOPHILIZED, FOR SOLUTION INTRAMUSCULAR; INTRAVENOUS DAILY
Status: DISCONTINUED | OUTPATIENT
Start: 2022-11-18 | End: 2022-11-18

## 2022-11-17 RX ADMIN — GUAIFENESIN 600 MG: 600 TABLET, EXTENDED RELEASE ORAL at 19:35

## 2022-11-17 RX ADMIN — CEFTRIAXONE 1000 MG: 1 INJECTION, POWDER, FOR SOLUTION INTRAMUSCULAR; INTRAVENOUS at 19:33

## 2022-11-17 RX ADMIN — IPRATROPIUM BROMIDE AND ALBUTEROL SULFATE 1 AMPULE: 2.5; .5 SOLUTION RESPIRATORY (INHALATION) at 20:15

## 2022-11-17 RX ADMIN — IOPAMIDOL 75 ML: 755 INJECTION, SOLUTION INTRAVENOUS at 21:43

## 2022-11-17 RX ADMIN — AZITHROMYCIN DIHYDRATE 500 MG: 500 INJECTION, POWDER, LYOPHILIZED, FOR SOLUTION INTRAVENOUS at 19:34

## 2022-11-17 RX ADMIN — HEPARIN SODIUM 18 UNITS/KG/HR: 10000 INJECTION, SOLUTION INTRAVENOUS at 22:28

## 2022-11-17 RX ADMIN — IPRATROPIUM BROMIDE AND ALBUTEROL SULFATE 1 AMPULE: .5; 3 SOLUTION RESPIRATORY (INHALATION) at 17:21

## 2022-11-17 RX ADMIN — METHYLPREDNISOLONE SODIUM SUCCINATE 125 MG: 125 INJECTION, POWDER, FOR SOLUTION INTRAMUSCULAR; INTRAVENOUS at 17:11

## 2022-11-17 RX ADMIN — SODIUM CHLORIDE, PRESERVATIVE FREE 10 ML: 5 INJECTION INTRAVENOUS at 21:44

## 2022-11-17 RX ADMIN — SODIUM CHLORIDE 100 ML: 9 INJECTION, SOLUTION INTRAVENOUS at 21:44

## 2022-11-17 RX ADMIN — METOPROLOL TARTRATE 5 MG: 5 INJECTION INTRAVENOUS at 22:23

## 2022-11-17 RX ADMIN — SODIUM CHLORIDE, PRESERVATIVE FREE 10 ML: 5 INJECTION INTRAVENOUS at 23:06

## 2022-11-17 RX ADMIN — LISINOPRIL 20 MG: 20 TABLET ORAL at 19:35

## 2022-11-17 RX ADMIN — BUDESONIDE 500 MCG: 0.5 INHALANT RESPIRATORY (INHALATION) at 20:15

## 2022-11-17 ASSESSMENT — ENCOUNTER SYMPTOMS
COUGH: 0
RHINORRHEA: 0
ABDOMINAL PAIN: 0
SINUS PRESSURE: 0
VOMITING: 0
WHEEZING: 0
NAUSEA: 0
SORE THROAT: 0
CHEST TIGHTNESS: 0
SHORTNESS OF BREATH: 1
BACK PAIN: 0

## 2022-11-17 ASSESSMENT — PAIN - FUNCTIONAL ASSESSMENT: PAIN_FUNCTIONAL_ASSESSMENT: NONE - DENIES PAIN

## 2022-11-17 NOTE — ACP (ADVANCE CARE PLANNING)
[employer] [facility]  At the heart of better care     Advance Care Planning   Admit Date:  2022  3:57 PM   Name:  Shoaib Peterson   Age:  74 y.o. Sex:  male  :  1950   MRN:  [id number]   Room:  [address] is able to make his own decisions:   Yes    Patient / surrogate decision-maker directed code status:  FULL    Patient or surrogate consented to discussion of the current conditions, workup, management plans, prognosis, and the risk for further deterioration. Time spent: 17 minutes in direct discussion.       Signed:  Kely Mcleod DO

## 2022-11-17 NOTE — ED PROVIDER NOTES
[facility] Emergency Department Provider Note                   PCP:                Valentino Ink, DO               Age: 74 y.o. Sex: male       Consuelo Cooper is a 74 y.o. male who presents to the Emergency Department with chief complaint of    Chief Complaint   Patient presents with    Shortness of Breath      Patient states that he has COPD and over the past 2 days he has been having worsening shortness of breath. He states that it occurs with exertion but with rest as well. Patient does use CPAP at night which normally helps his breathing but has not the past few days. Patient has been taking his rescue inhaler which has helped for several hours. Patient states his oxygen level has been 86 to 88% which is normally 92 to 93%. Patient has not been having any chest pain, fever, or cough. Patient has chronic swelling to his bilateral legs which is no worse than normal.    The history is provided by the patient. All other systems reviewed and are negative. Review of Systems   Constitutional:  Negative for chills and fever. HENT:  Negative for congestion, rhinorrhea, sinus pressure and sore throat. Eyes:  Negative for visual disturbance. Respiratory:  Positive for shortness of breath. Negative for cough, chest tightness and wheezing. Cardiovascular:  Positive for leg swelling. Negative for chest pain. Gastrointestinal:  Negative for abdominal pain, nausea and vomiting. Genitourinary:  Negative for decreased urine volume and dysuria. Musculoskeletal:  Negative for back pain, myalgias and neck pain. Skin:  Negative for rash. Neurological:  Negative for dizziness and headaches. Past Medical History:   Diagnosis Date    Diabetes mellitus, type II ([address]         Past Surgical History:   Procedure Laterality Date    HAND SURGERY      screw in hand    JOINT REPLACEMENT Bilateral     knee        History reviewed. No pertinent family history.      Social History     Socioeconomic History    Marital status:      Spouse name: None    Number of children: None    Years of education: None    Highest education level: None   Tobacco Use    Smoking status: Former     Packs/day: 1.00     Years: 40.00     Pack years: 40.00     Types: Cigarettes    Smokeless tobacco: Never   Vaping Use    Vaping Use: Never used   Substance and Sexual Activity    Alcohol use: Not Currently     Comment: occ    Drug use: Not Currently    Sexual activity: Not Currently     Social Determinants of Health     Physical Activity: Inactive    Days of Exercise per Week: 0 days    Minutes of Exercise per Session: 0 min        Allergies: Shellfish-derived products    Previous Medications    ALBUTEROL SULFATE  (90 BASE) MCG/ACT INHALER    Inhale 2 puffs into the lungs every 6 hours as needed    ASPIRIN 81 MG CHEWABLE TABLET    Take 81 mg by mouth daily    ATORVASTATIN (LIPITOR) 40 MG TABLET    Take 40 mg by mouth every evening    BIMATOPROST (LUMIGAN) 0.01 % SOLN OPHTHALMIC DROPS    Apply 1 drop to eye every evening    CPAP MACHINE MISC    by Other route    ENALAPRIL (VASOTEC) 20 MG TABLET    Take 1 tablet by mouth 2 times daily    FUROSEMIDE (LASIX) 20 MG TABLET    Take 1 tablet by mouth daily as needed (Leg edema)    METFORMIN (GLUCOPHAGE) 500 MG TABLET    Take 1 tablet by mouth 2 times daily (with meals) TAKE 1 TABLET BY MOUTH TWICE DAILY WITH MEALS    NITROGLYCERIN (NITROSTAT) 0.4 MG SL TABLET    Place 0.4 mg under the tongue    STIOLTO RESPIMAT 2.5-2.5 MCG/ACT AERS    USE 2 INHALATIONS BY MOUTH  DAILY        Vitals signs and nursing note reviewed. Patient Vitals for the past 4 hrs:   Temp Pulse Resp BP SpO2   11/17/22 1455 97.9 °F (36.6 °C) (!) 101 24 (!) 161/89 92 %          Physical Exam  Vitals and nursing note reviewed. Constitutional:       Appearance: Normal appearance. HENT:      Head: Normocephalic and atraumatic. Cardiovascular:      Rate and Rhythm: Normal rate and regular rhythm.       Pulses: Normal pulses. Heart sounds: Normal heart sounds. Pulmonary:      Effort: Pulmonary effort is normal.      Breath sounds: Examination of the right-upper field reveals rhonchi. Examination of the right-middle field reveals rhonchi. Examination of the right-lower field reveals rhonchi. Rhonchi present. Chest:      Chest wall: No tenderness. Abdominal:      General: Abdomen is flat. Bowel sounds are normal.      Palpations: Abdomen is soft. Tenderness: There is no abdominal tenderness. Musculoskeletal:         General: No swelling or tenderness. Cervical back: Normal range of motion and neck supple. No tenderness. Right lower leg: No tenderness. 2+ Edema present. Left lower leg: No tenderness. 2+ Edema present. Skin:     General: Skin is warm and dry. Neurological:      General: No focal deficit present. Mental Status: He is alert and oriented to person, place, and time. Psychiatric:         Behavior: Behavior normal.        Orders Placed This Encounter   Procedures    Culture, Blood 1    Culture, Blood 1    COVID-19, Rapid    Rapid influenza A/B antigens    Culture, Respiratory    XR CHEST (2 VW)    CBC with Auto Differential    Basic Metabolic Panel    Troponin    Blood Gas, Venous    Brain Natriuretic Peptide    Basic Metabolic Panel w/ Reflex to MG    CBC with Auto Differential    D-Dimer, Quantitative    ADULT DIET; Regular; 4 carb choices (60 gm/meal);  Low Fat/Low Chol/High Fiber/2 gm Na    Cardiac Monitor - ED Only    Continuous Pulse Oximetry    Vital signs per unit routine    Up with assistance    HYPOGLYCEMIA TREATMENT: blood glucose less than 70 mg/dL and patient ALERT and TOLERATING PO    HYPOGLYCEMIA TREATMENT: blood glucose less than 70 mg/dL and patient NOT ALERT or NPO    Incentive spirometry nursing    Full code    Droplet Plus Isolation    Initiate Oxygen Therapy Protocol    Home BIPAP or CPAP    Venous Blood Gas, POC    POCT Glucose    EKG 12 Lead    Saline lock IV    ADMIT TO INPATIENT       Procedures    ED EKG Interpretation  EKG was interpreted in the absence of a cardiologist.    Rate: Rate: Normal  EKG Interpretation: EKG Interpretation: sinus rhythm  ST Segments: Nonspecific ST segments - NO STEMI      Results Include:    Recent Results (from the past 24 hour(s))   CBC with Auto Differential    Collection Time: 11/17/22  3:26 PM   Result Value Ref Range    WBC 5.9 4.3 - 11.1 K/uL    RBC 4.80 4.23 - 5.6 M/uL    Hemoglobin 16.1 13.6 - 17.2 g/dL    Hematocrit 47.8 41.1 - 50.3 %    MCV 99.6 82 - 102 FL    MCH 33.5 (H) 26.1 - 32.9 PG    MCHC 33.7 31.4 - 35.0 g/dL    RDW 13.9 11.9 - 14.6 %    Platelets 442 533 - 902 K/uL    MPV 9.9 9.4 - 12.3 FL    nRBC 0.00 0.0 - 0.2 K/uL    Differential Type AUTOMATED      Seg Neutrophils 89 (H) 43 - 78 %    Lymphocytes 5 (L) 13 - 44 %    Monocytes 4 4.0 - 12.0 %    Eosinophils % 2 0.5 - 7.8 %    Basophils 0 0.0 - 2.0 %    Immature Granulocytes 0 0.0 - 5.0 %    Segs Absolute 5.3 1.7 - 8.2 K/UL    Absolute Lymph # 0.3 (L) 0.5 - 4.6 K/UL    Absolute Mono # 0.2 0.1 - 1.3 K/UL    Absolute Eos # 0.1 0.0 - 0.8 K/UL    Basophils Absolute 0.0 0.0 - 0.2 K/UL    Absolute Immature Granulocyte 0.0 0.0 - 0.5 K/UL   Basic Metabolic Panel    Collection Time: 11/17/22  3:26 PM   Result Value Ref Range    Sodium 135 133 - 143 mmol/L    Potassium 4.3 3.5 - 5.1 mmol/L    Chloride 103 101 - 110 mmol/L    CO2 28 21 - 32 mmol/L    Anion Gap 4 2 - 11 mmol/L    Glucose 189 (H) 65 - 100 mg/dL    BUN 13 8 - 23 MG/DL    Creatinine 1.10 0.8 - 1.5 MG/DL    Est, Glom Filt Rate >60 >60 ml/min/1.73m2    Calcium 10.0 8.3 - 10.4 MG/DL   Troponin    Collection Time: 11/17/22  3:26 PM   Result Value Ref Range    Troponin, High Sensitivity 13.6 0 - 14 pg/mL   Brain Natriuretic Peptide    Collection Time: 11/17/22  3:26 PM   Result Value Ref Range    NT Pro- 5 - 125 PG/ML   Venous Blood Gas, POC    Collection Time: 11/17/22  5:28 PM   Result Value Ref Range DEVICE ROOM AIR      PH, VENOUS (POC) 7.37 7.32 - 7.42      PCO2, Melcroft, POC 51.0 41 - 51 MMHG    PO2, VENOUS (POC) 18 (LL) mmHg    HCO3, Venous 29.8 (H) 23 - 28 MMOL/L    SO2, VENOUS (POC) 24.3 (L) 65 - 88 %    BASE EXCESS, VENOUS (POC) 3.2 mmol/L    Specimen type: VENOUS BLOOD      Performed by: Jai     POC TCO2 29 (H) 13 - 23 MMOL/L          XR CHEST (2 VW)   Final Result   Left lower lobe airspace disease reflecting atelectasis or infiltrate. ED Course as of 11/17/22 1818   Thu Nov 17, 2022   1710 Nursing attempted to ambulate patient but his oxygen saturation dropped to 85%. Patient was reassessed and he was tachypneic but not in respiratory distress. Patient will be placed on supplemental oxygen. Results and plan explained to patient and he is agreeable to admission. [CW]      ED Course User Index  [CW] Rossy Doll MD       MDM  Number of Diagnoses or Management Options  Diagnosis management comments: Patient with a history of COPD presents for worsening shortness of breath and hypoxia. Patient presented with tachypnea and rhonchi but not in respiratory distress. Patient's oxygen saturation was in the low 90s. Patient was given steroids and DuoNeb. Patient's chest x-ray showed small area in the left side which could be atelectasis versus infiltrate. Patient's white count is normal.  Patient's troponin and BNP are normal as well. I did start patient on broad-spectrum antibiotics in case his chest x-ray findings are an infiltrate. We ambulated patient and his pulse ox dropped to 85%. Patient was placed on supplemental oxygen and hospitalist was consulted for admission. Patient was not in respiratory distress and did not need BiPAP or intubation        Explanation: The diagnosis and plan as well as the results of any testing and treatments today in the department were communicated to the patient and/or their family/caregiver (if present).   The patient/caregiver verbalized understanding and realize that they are being admitted to the hospital for further evaluation and treatment. All questions were answered at bedside. ICD-10-CM    1. COPD exacerbation ([facility] [address]  J44.1       2. Hypoxia  R09.02           DISPOSITION Admitted 11/17/2022 05:47:47 PM       Voice dictation software was used during the making of this note. This software is not perfect and grammatical and other typographical errors may be present. This note has not been completely proofread for errors.      Rossy Doll MD  11/17/22 [id number]

## 2022-11-17 NOTE — ED TRIAGE NOTES
Pt ambulatory to triage for reports of shortness of breath x 2 days. Pt states he has history of COPD, uses cpap at night but does not normally wear oxygen at home. Pt states he has had recent contact with grandson who tested positive for RSV. Pt reports worsening shortness of breath with exertion or lying flat. Pt denies pain, a&ox4.

## 2022-11-17 NOTE — H&P
Hospitalist Admission History and Physical         NAME:            Nancy Davila    Age:                74 y.o.    :               1950    MRN:              [id number]    PCP: Marcia Libman, DO    Consulting MD:    Treatment Team: Attending Provider: Jose David Patel DO; Registered Nurse: Lonzie Skiff, RN         Chief Complaint   Patient presents with    Shortness of Breath   HPI:    Patient is a 74 y.o. male who presented to the ED for cc SOB for the past two days after coming into contact with grandson who had RSV. During ambulation, was found to sat in the mid 80s. Hx of DM type II, NELLY on CPAP, leg edema, COPD. , RR 24         Past Medical History:   Diagnosis Date    Diabetes mellitus, type II ([address]           Chest x ray - Left lower lobe airspace disease reflecting atelectasis or infiltrate. Viral panel pending. Past Surgical History:   Procedure Laterality Date    HAND SURGERY      screw in hand    JOINT REPLACEMENT Bilateral     knee            History reviewed. No pertinent family history. Family history reviewed and negative except as noted above. Social History     Social History Narrative    Not on file            Social History     Tobacco Use    Smoking status: Former     Packs/day: 1.00     Years: 40.00     Pack years: 40.00     Types: Cigarettes    Smokeless tobacco: Never   Substance Use Topics    Alcohol use: Not Currently     Comment: occ            Social History     Substance and Sexual Activity   Drug Use Not Currently                 Allergies   Allergen Reactions    Shellfish-Derived Products Hives     Shrimp only              Prior to Admission medications    Medication Sig Start Date End Date Taking?  Authorizing Provider   STIOLTO RESPIMAT 2.5-2.5 MCG/ACT AERS USE 2 INHALATIONS BY MOUTH  DAILY 22   Ferny Varela DO   metFORMIN (GLUCOPHAGE) 500 MG tablet Take 1 tablet by mouth 2 times daily (with meals) TAKE 1 TABLET BY MOUTH TWICE DAILY WITH MEALS 8/31/22   Rosalio [employer], DO   enalapril (VASOTEC) 20 MG tablet Take 1 tablet by mouth 2 times daily 8/31/22   Rosalio [employer], DO   furosemide (LASIX) 20 MG tablet Take 1 tablet by mouth daily as needed (Leg edema) 8/31/22   Temple Spurling, DO   CPAP Machine MISC by Other route    Historical Provider, MD   aspirin 81 MG chewable tablet Take 81 mg by mouth daily    Historical Provider, MD   albuterol sulfate  (90 Base) MCG/ACT inhaler Inhale 2 puffs into the lungs every 6 hours as needed 2/17/22   Ar Automatic Reconciliation   atorvastatin (LIPITOR) 40 MG tablet Take 40 mg by mouth every evening 4/9/22   Ar Automatic Reconciliation   bimatoprost (LUMIGAN) 0.01 % SOLN ophthalmic drops Apply 1 drop to eye every evening    Ar Automatic Reconciliation   nitroGLYCERIN (NITROSTAT) 0.4 MG SL tablet Place 0.4 mg under the tongue 10/20/20   Ar Automatic Reconciliation                      Review of Systems         Constitutional: NAD    Eyes:  no change in visual acuity, no photophobia    Ears, nose, mouth, throat, and face: no  Odynphagia, dysphagia, no thrush or exudate, negative for chronic sinus congestion, recurrent headaches    Respiratory: SOB, cough    Cardiovascular: negative for CP, palpitations, or PND    Gastrointestinal: negative for abdominal pain, no hematemesis, hematochezia or BRBPR    Genitourinary: no urgency, frequency, or dysuria, no nocturia    Integument/breast: negative for skin rash or skin lesions    Hematologic/lymphatic: negative for known bleeding disorder    Musculoskeletal:negative for joint pain or joint tenderness    Neurological: negative for lightheadedness, syncope or presyncopal events, no seizure or CVA history    Behavioral/Psych: negative for depression or chronic anxiety,    Endocrine: negative for polydyspia, polyuria or intolerance to heat or cold    Allergic/Immunologic: negative for chronic allergic rhinitis, or known connective tissue disorder Objective:         Patient Vitals for the past 24 hrs:   Temp Pulse Resp BP SpO2   11/17/22 1455 97.9 °F (36.6 °C) (!) 101 24 (!) 161/89 92 %            No intake/output data recorded. No intake/output data recorded.          Data Review:   Recent Results (from the past 24 hour(s))   CBC with Auto Differential    Collection Time: 11/17/22  3:26 PM   Result Value Ref Range    WBC 5.9 4.3 - 11.1 K/uL    RBC 4.80 4.23 - 5.6 M/uL    Hemoglobin 16.1 13.6 - 17.2 g/dL    Hematocrit 47.8 41.1 - 50.3 %    MCV 99.6 82 - 102 FL    MCH 33.5 (H) 26.1 - 32.9 PG    MCHC 33.7 31.4 - 35.0 g/dL    RDW 13.9 11.9 - 14.6 %    Platelets 602 855 - 556 K/uL    MPV 9.9 9.4 - 12.3 FL    nRBC 0.00 0.0 - 0.2 K/uL    Differential Type AUTOMATED      Seg Neutrophils 89 (H) 43 - 78 %    Lymphocytes 5 (L) 13 - 44 %    Monocytes 4 4.0 - 12.0 %    Eosinophils % 2 0.5 - 7.8 %    Basophils 0 0.0 - 2.0 %    Immature Granulocytes 0 0.0 - 5.0 %    Segs Absolute 5.3 1.7 - 8.2 K/UL    Absolute Lymph # 0.3 (L) 0.5 - 4.6 K/UL    Absolute Mono # 0.2 0.1 - 1.3 K/UL    Absolute Eos # 0.1 0.0 - 0.8 K/UL    Basophils Absolute 0.0 0.0 - 0.2 K/UL    Absolute Immature Granulocyte 0.0 0.0 - 0.5 K/UL   Basic Metabolic Panel    Collection Time: 11/17/22  3:26 PM   Result Value Ref Range    Sodium 135 133 - 143 mmol/L    Potassium 4.3 3.5 - 5.1 mmol/L    Chloride 103 101 - 110 mmol/L    CO2 28 21 - 32 mmol/L    Anion Gap 4 2 - 11 mmol/L    Glucose 189 (H) 65 - 100 mg/dL    BUN 13 8 - 23 MG/DL    Creatinine 1.10 0.8 - 1.5 MG/DL    Est, Glom Filt Rate >60 >60 ml/min/1.73m2    Calcium 10.0 8.3 - 10.4 MG/DL   Troponin    Collection Time: 11/17/22  3:26 PM   Result Value Ref Range    Troponin, High Sensitivity 13.6 0 - 14 pg/mL   Brain Natriuretic Peptide    Collection Time: 11/17/22  3:26 PM   Result Value Ref Range    NT Pro- 5 - 125 PG/ML   Venous Blood Gas, POC    Collection Time: 11/17/22  5:28 PM   Result Value Ref Range    DEVICE ROOM AIR      PH, VENOUS (POC) 7.37 7.32 - 7.42      PCO2, Hyun, POC 51.0 41 - 51 MMHG    PO2, VENOUS (POC) 18 (LL) mmHg    HCO3, Venous 29.8 (H) 23 - 28 MMOL/L    SO2, VENOUS (POC) 24.3 (L) 65 - 88 %    BASE EXCESS, VENOUS (POC) 3.2 mmol/L    Specimen type: VENOUS BLOOD      Performed by: Jai     POC TCO2 29 (H) 13 - 23 MMOL/L            Physical Exam:         General:    Alert, cooperative, increased work of breathing noted. Using accessory muscles to help with breathing. Pink Puffer     Eyes:    Conjunctivae/corneas clear. PERRL    Ears:    Normal    Nose:    Nares normal.     Mouth/Throat:    Lips, mucosa, and tongue normal. Teeth and gums normal.    Neck:     no JVD. Back:     Symmetric, no curvature. ROM normal. No CVA tenderness. Lungs:     Mild RUL wheezing, limited breath sounds to LLL. Heart:    Regular rate and rhythm, S1, S2 normal    Abdomen:     Soft, non-tender. Bowel sounds normal. No masses,  No organomegaly. Extremities:    Chronic edema to LE bilaterally     Skin:    Skin color, texture, turgor normal. No rashes or lesions    Neurologic:    CNII-XII intact. Assessment and Plan         Principal Problem:    Acute respiratory failure (HCC)  Active Problems:    Left lower lobe pneumonia    History of coronary artery stent placement    HTN (hypertension), benign    Mixed hyperlipidemia    Type 2 diabetes with nephropathy (HCC)    NELLY on CPAP    Chronic obstructive pulmonary disease (HCC)    PVD (peripheral vascular disease) ([facility]  Resolved Problems:    * No resolved hospital problems. *    Acute respiratory failure - viral panel pending. Beverly pulmicort, solumedrol. Sputum culture. Continuous pulse ox since we have no beds and waiting in waiting room. Possible left LL pneumonia - Ceftriaxone/Azithromycin. IS. Low pO2 on venous blood gas - Order d dimer since this O2 level is significantly low. If elevated, will order CT chest with contrast to ensure no PE.  No suspicion of ARDS on chest x ray    DM type II - SS    HTN - ACE    Chronic leg edema - PRN lasix    NELLY - CPAP at night     ASA/statin    Deidra@[email or web address] - D dimer >10. Start heparin drip with bolus. Order CT chest PE protocol. We are trying to get in a room --still in waiting room.  Check CRP     DVT prophylaxis - Lovenox  Signed By:    Anita Torres DO    November 17, 2022

## 2022-11-18 PROBLEM — I48.91 ATRIAL FIBRILLATION WITH RVR (HCC): Status: ACTIVE | Noted: 2022-11-18

## 2022-11-18 PROBLEM — J44.1 COPD EXACERBATION (HCC): Status: ACTIVE | Noted: 2022-11-18

## 2022-11-18 LAB
ANION GAP SERPL CALC-SCNC: 7 MMOL/L (ref 2–11)
APTT PPP: 31.8 SEC (ref 24.5–34.2)
APTT PPP: 34.2 SEC (ref 24.5–34.2)
B PERT DNA SPEC QL NAA+PROBE: NOT DETECTED
BASOPHILS # BLD: 0 K/UL (ref 0–0.2)
BASOPHILS NFR BLD: 0 % (ref 0–2)
BORDETELLA PARAPERTUSSIS BY PCR: NOT DETECTED
BUN SERPL-MCNC: 13 MG/DL (ref 8–23)
C PNEUM DNA SPEC QL NAA+PROBE: NOT DETECTED
CALCIUM SERPL-MCNC: 9.7 MG/DL (ref 8.3–10.4)
CHLORIDE SERPL-SCNC: 109 MMOL/L (ref 101–110)
CO2 SERPL-SCNC: 24 MMOL/L (ref 21–32)
CREAT SERPL-MCNC: 1 MG/DL (ref 0.8–1.5)
CRP SERPL-MCNC: 4.7 MG/DL (ref 0–0.9)
DIFFERENTIAL METHOD BLD: ABNORMAL
EKG ATRIAL RATE: 187 BPM
EKG DIAGNOSIS: NORMAL
EKG Q-T INTERVAL: 322 MS
EKG QRS DURATION: 94 MS
EKG QTC CALCULATION (BAZETT): 452 MS
EKG R AXIS: 47 DEGREES
EKG T AXIS: 179 DEGREES
EKG VENTRICULAR RATE: 119 BPM
EOSINOPHIL # BLD: 0 K/UL (ref 0–0.8)
EOSINOPHIL NFR BLD: 0 % (ref 0.5–7.8)
ERYTHROCYTE [DISTWIDTH] IN BLOOD BY AUTOMATED COUNT: 13.9 % (ref 11.9–14.6)
ERYTHROCYTE [DISTWIDTH] IN BLOOD BY AUTOMATED COUNT: 14.1 % (ref 11.9–14.6)
FLUAV SUBTYP SPEC NAA+PROBE: NOT DETECTED
FLUBV RNA SPEC QL NAA+PROBE: NOT DETECTED
GLUCOSE BLD STRIP.AUTO-MCNC: 156 MG/DL (ref 65–100)
GLUCOSE BLD STRIP.AUTO-MCNC: 186 MG/DL (ref 65–100)
GLUCOSE BLD STRIP.AUTO-MCNC: 205 MG/DL (ref 65–100)
GLUCOSE BLD STRIP.AUTO-MCNC: 209 MG/DL (ref 65–100)
GLUCOSE SERPL-MCNC: 256 MG/DL (ref 65–100)
HADV DNA SPEC QL NAA+PROBE: NOT DETECTED
HCOV 229E RNA SPEC QL NAA+PROBE: NOT DETECTED
HCOV HKU1 RNA SPEC QL NAA+PROBE: NOT DETECTED
HCOV NL63 RNA SPEC QL NAA+PROBE: NOT DETECTED
HCOV OC43 RNA SPEC QL NAA+PROBE: NOT DETECTED
HCT VFR BLD AUTO: 44.7 % (ref 41.1–50.3)
HCT VFR BLD AUTO: 44.8 % (ref 41.1–50.3)
HGB BLD-MCNC: 14.7 G/DL (ref 13.6–17.2)
HGB BLD-MCNC: 14.8 G/DL (ref 13.6–17.2)
HMPV RNA SPEC QL NAA+PROBE: NOT DETECTED
HPIV1 RNA SPEC QL NAA+PROBE: NOT DETECTED
HPIV2 RNA SPEC QL NAA+PROBE: NOT DETECTED
HPIV3 RNA SPEC QL NAA+PROBE: NOT DETECTED
HPIV4 RNA SPEC QL NAA+PROBE: NOT DETECTED
IMM GRANULOCYTES # BLD AUTO: 0 K/UL (ref 0–0.5)
IMM GRANULOCYTES NFR BLD AUTO: 0 % (ref 0–5)
INR PPP: 1.1
INR PPP: 1.1
LYMPHOCYTES # BLD: 0.3 K/UL (ref 0.5–4.6)
LYMPHOCYTES NFR BLD: 7 % (ref 13–44)
M PNEUMO DNA SPEC QL NAA+PROBE: NOT DETECTED
MCH RBC QN AUTO: 32.8 PG (ref 26.1–32.9)
MCH RBC QN AUTO: 33.4 PG (ref 26.1–32.9)
MCHC RBC AUTO-ENTMCNC: 32.8 G/DL (ref 31.4–35)
MCHC RBC AUTO-ENTMCNC: 33.1 G/DL (ref 31.4–35)
MCV RBC AUTO: 100 FL (ref 82–102)
MCV RBC AUTO: 100.9 FL (ref 82–102)
MONOCYTES # BLD: 0.1 K/UL (ref 0.1–1.3)
MONOCYTES NFR BLD: 1 % (ref 4–12)
NEUTS SEG # BLD: 4.1 K/UL (ref 1.7–8.2)
NEUTS SEG NFR BLD: 92 % (ref 43–78)
NRBC # BLD: 0 K/UL (ref 0–0.2)
NRBC # BLD: 0 K/UL (ref 0–0.2)
PLATELET # BLD AUTO: 225 K/UL (ref 150–450)
PLATELET # BLD AUTO: 246 K/UL (ref 150–450)
PMV BLD AUTO: 10 FL (ref 9.4–12.3)
PMV BLD AUTO: 10.2 FL (ref 9.4–12.3)
POTASSIUM SERPL-SCNC: 3.9 MMOL/L (ref 3.5–5.1)
PROTHROMBIN TIME: 14.6 SEC (ref 12.6–14.3)
PROTHROMBIN TIME: 14.7 SEC (ref 12.6–14.3)
RBC # BLD AUTO: 4.43 M/UL (ref 4.23–5.6)
RBC # BLD AUTO: 4.48 M/UL (ref 4.23–5.6)
RSV RNA SPEC QL NAA+PROBE: NOT DETECTED
RV+EV RNA SPEC QL NAA+PROBE: NOT DETECTED
SARS-COV-2 RNA RESP QL NAA+PROBE: NOT DETECTED
SERVICE CMNT-IMP: ABNORMAL
SODIUM SERPL-SCNC: 140 MMOL/L (ref 133–143)
WBC # BLD AUTO: 4.5 K/UL (ref 4.3–11.1)
WBC # BLD AUTO: 9.5 K/UL (ref 4.3–11.1)

## 2022-11-18 PROCEDURE — 2500000003 HC RX 250 WO HCPCS: Performed by: HOSPITALIST

## 2022-11-18 PROCEDURE — 36415 COLL VENOUS BLD VENIPUNCTURE: CPT

## 2022-11-18 PROCEDURE — 94760 N-INVAS EAR/PLS OXIMETRY 1: CPT

## 2022-11-18 PROCEDURE — 85730 THROMBOPLASTIN TIME PARTIAL: CPT

## 2022-11-18 PROCEDURE — 98960 EDU&TRN PT SELF-MGMT NQHP 1: CPT

## 2022-11-18 PROCEDURE — 85027 COMPLETE CBC AUTOMATED: CPT

## 2022-11-18 PROCEDURE — 99223 1ST HOSP IP/OBS HIGH 75: CPT | Performed by: INTERNAL MEDICINE

## 2022-11-18 PROCEDURE — 93005 ELECTROCARDIOGRAM TRACING: CPT | Performed by: INTERNAL MEDICINE

## 2022-11-18 PROCEDURE — 1100000003 HC PRIVATE W/ TELEMETRY

## 2022-11-18 PROCEDURE — 6360000002 HC RX W HCPCS: Performed by: FAMILY MEDICINE

## 2022-11-18 PROCEDURE — 2500000003 HC RX 250 WO HCPCS: Performed by: INTERNAL MEDICINE

## 2022-11-18 PROCEDURE — 2700000000 HC OXYGEN THERAPY PER DAY

## 2022-11-18 PROCEDURE — 2580000003 HC RX 258: Performed by: INTERNAL MEDICINE

## 2022-11-18 PROCEDURE — 85025 COMPLETE CBC W/AUTO DIFF WBC: CPT

## 2022-11-18 PROCEDURE — 6370000000 HC RX 637 (ALT 250 FOR IP): Performed by: FAMILY MEDICINE

## 2022-11-18 PROCEDURE — 94640 AIRWAY INHALATION TREATMENT: CPT

## 2022-11-18 PROCEDURE — 2500000003 HC RX 250 WO HCPCS: Performed by: FAMILY MEDICINE

## 2022-11-18 PROCEDURE — 6370000000 HC RX 637 (ALT 250 FOR IP): Performed by: PHYSICIAN ASSISTANT

## 2022-11-18 PROCEDURE — 2580000003 HC RX 258: Performed by: FAMILY MEDICINE

## 2022-11-18 PROCEDURE — 86140 C-REACTIVE PROTEIN: CPT

## 2022-11-18 PROCEDURE — 82962 GLUCOSE BLOOD TEST: CPT

## 2022-11-18 PROCEDURE — 80048 BASIC METABOLIC PNL TOTAL CA: CPT

## 2022-11-18 PROCEDURE — 85610 PROTHROMBIN TIME: CPT

## 2022-11-18 RX ORDER — HEPARIN SODIUM 1000 [USP'U]/ML
4000 INJECTION, SOLUTION INTRAVENOUS; SUBCUTANEOUS PRN
Status: DISCONTINUED | OUTPATIENT
Start: 2022-11-18 | End: 2022-11-20

## 2022-11-18 RX ORDER — METOPROLOL TARTRATE 5 MG/5ML
5 INJECTION INTRAVENOUS EVERY 5 MIN PRN
Status: DISCONTINUED | OUTPATIENT
Start: 2022-11-18 | End: 2022-11-22 | Stop reason: HOSPADM

## 2022-11-18 RX ORDER — HEPARIN SODIUM 1000 [USP'U]/ML
4000 INJECTION, SOLUTION INTRAVENOUS; SUBCUTANEOUS ONCE
Status: DISCONTINUED | OUTPATIENT
Start: 2022-11-18 | End: 2022-11-18 | Stop reason: SDUPTHER

## 2022-11-18 RX ORDER — HEPARIN SODIUM 1000 [USP'U]/ML
2000 INJECTION, SOLUTION INTRAVENOUS; SUBCUTANEOUS PRN
Status: DISCONTINUED | OUTPATIENT
Start: 2022-11-18 | End: 2022-11-20

## 2022-11-18 RX ORDER — DILTIAZEM HYDROCHLORIDE 5 MG/ML
10 INJECTION INTRAVENOUS ONCE
Status: COMPLETED | OUTPATIENT
Start: 2022-11-18 | End: 2022-11-18

## 2022-11-18 RX ORDER — HEPARIN SODIUM 10000 [USP'U]/100ML
5-30 INJECTION, SOLUTION INTRAVENOUS CONTINUOUS
Status: DISCONTINUED | OUTPATIENT
Start: 2022-11-18 | End: 2022-11-18

## 2022-11-18 RX ORDER — METOPROLOL TARTRATE 5 MG/5ML
10 INJECTION INTRAVENOUS ONCE
Status: COMPLETED | OUTPATIENT
Start: 2022-11-18 | End: 2022-11-18

## 2022-11-18 RX ADMIN — GUAIFENESIN 600 MG: 600 TABLET, EXTENDED RELEASE ORAL at 20:19

## 2022-11-18 RX ADMIN — AZITHROMYCIN MONOHYDRATE 500 MG: 250 TABLET ORAL at 17:33

## 2022-11-18 RX ADMIN — APIXABAN 5 MG: 5 TABLET, FILM COATED ORAL at 17:32

## 2022-11-18 RX ADMIN — ASPIRIN 81 MG 81 MG: 81 TABLET ORAL at 09:58

## 2022-11-18 RX ADMIN — BUDESONIDE 500 MCG: 0.5 INHALANT RESPIRATORY (INHALATION) at 07:37

## 2022-11-18 RX ADMIN — IPRATROPIUM BROMIDE AND ALBUTEROL SULFATE 1 AMPULE: 2.5; .5 SOLUTION RESPIRATORY (INHALATION) at 14:32

## 2022-11-18 RX ADMIN — METOPROLOL TARTRATE 10 MG: 5 INJECTION INTRAVENOUS at 04:42

## 2022-11-18 RX ADMIN — INSULIN LISPRO 2 UNITS: 100 INJECTION, SOLUTION INTRAVENOUS; SUBCUTANEOUS at 13:57

## 2022-11-18 RX ADMIN — HEPARIN SODIUM 5000 UNITS: 5000 INJECTION INTRAVENOUS; SUBCUTANEOUS at 06:00

## 2022-11-18 RX ADMIN — SODIUM CHLORIDE, PRESERVATIVE FREE 5 ML: 5 INJECTION INTRAVENOUS at 14:06

## 2022-11-18 RX ADMIN — IPRATROPIUM BROMIDE AND ALBUTEROL SULFATE 1 AMPULE: 2.5; .5 SOLUTION RESPIRATORY (INHALATION) at 01:53

## 2022-11-18 RX ADMIN — DILTIAZEM HYDROCHLORIDE 10 MG: 5 INJECTION INTRAVENOUS at 14:02

## 2022-11-18 RX ADMIN — SODIUM CHLORIDE, PRESERVATIVE FREE 10 ML: 5 INJECTION INTRAVENOUS at 20:20

## 2022-11-18 RX ADMIN — HEPARIN SODIUM 5000 UNITS: 5000 INJECTION INTRAVENOUS; SUBCUTANEOUS at 14:00

## 2022-11-18 RX ADMIN — BUDESONIDE 500 MCG: 0.5 INHALANT RESPIRATORY (INHALATION) at 20:28

## 2022-11-18 RX ADMIN — INSULIN LISPRO 2 UNITS: 100 INJECTION, SOLUTION INTRAVENOUS; SUBCUTANEOUS at 08:57

## 2022-11-18 RX ADMIN — ATORVASTATIN CALCIUM 40 MG: 40 TABLET, FILM COATED ORAL at 17:32

## 2022-11-18 RX ADMIN — IPRATROPIUM BROMIDE AND ALBUTEROL SULFATE 1 AMPULE: 2.5; .5 SOLUTION RESPIRATORY (INHALATION) at 20:28

## 2022-11-18 RX ADMIN — SODIUM CHLORIDE 5 MG/HR: 900 INJECTION, SOLUTION INTRAVENOUS at 14:06

## 2022-11-18 RX ADMIN — LATANOPROST 1 DROP: 50 SOLUTION OPHTHALMIC at 20:20

## 2022-11-18 RX ADMIN — DILTIAZEM HYDROCHLORIDE 5 MG: 5 INJECTION INTRAVENOUS at 00:14

## 2022-11-18 RX ADMIN — METOPROLOL TARTRATE 5 MG: 5 INJECTION INTRAVENOUS at 09:58

## 2022-11-18 RX ADMIN — IPRATROPIUM BROMIDE AND ALBUTEROL SULFATE 1 AMPULE: 2.5; .5 SOLUTION RESPIRATORY (INHALATION) at 07:37

## 2022-11-18 RX ADMIN — CEFTRIAXONE 1000 MG: 1 INJECTION, POWDER, FOR SOLUTION INTRAMUSCULAR; INTRAVENOUS at 17:34

## 2022-11-18 RX ADMIN — GUAIFENESIN 600 MG: 600 TABLET, EXTENDED RELEASE ORAL at 09:59

## 2022-11-18 NOTE — PROGRESS NOTES
One time dose Cardizem IV 5 mg. Heart rate responding and sustaining afib in the 120s. Will continue to monitor.

## 2022-11-18 NOTE — PROGRESS NOTES
Pt heart rate sustaining afib in the 140s and 150s. Dr. Eliezer Damico MD notified. One time dose of Lopressor 5 mL ordered. Will continue to monitor.

## 2022-11-18 NOTE — H&P
[facility] Cardiology Initial Cardiac Evaluation      Date of  Admission: 11/17/2022  3:57 PM     Primary Care Physician: Jay Blackburn DO  Primary Cardiologist: Dr John Simms  Referring Physician: Dr Jaquan Redd  Supervising Physician: Dr Aura Michaud    CC/Reason for evaluation: new onset atrial fibrillation with RVR    HPI:  Pino Mckeon is a 74 y.o. male with prior history of CAD s/p PCI RCA ~1994, subsequent  cardiac catheterization done in 2002 with angioplasty of right coronary artery, following cardiac catheterization in 2016 with nonobstructive coronary disease, HTN, HLD, mild aortic stenosis, sinus bradycardia, DM, COPD, NELLY on CPAP and former smoker who presented to [facility] ED on 11/17 with complaint of 2 day history of worsening SOB. Was exposed to grandson with RSV. Patient has been taking his rescue inhaler which has helped for several hours. Patient states his oxygen level has been 86 to 88% which is normally 92 to 93%. During ambulation oxygen saturation in the mid 80%. In ED, /89 with . Labs showed WBC 5.9, H/H 16.1/47.8, Ptl 215, Na 135, K 4.3, BUN/Cr 13/1.10, pBNP 121, trop 13.6. Rapid COVID negative. Influenzae A/B negative. Respiratory panel negative. Chest x ray - Left lower lobe airspace disease reflecting atelectasis or infiltrate. Admitted to medicine team for acute respiratory failure with hypoxia. D-dimer 10. 16. CT chest negative for pulmonary embolism. Started on IV antibiotics. Overnight, patient flipped into atrial fibrillation with RVR. Was given IVP 5 mg lopressor followed by IVP 5 mg cardizem with improved heart rate. Heart rate then increased again into 130-140's therefore IVP lopressor 10 mg was given. Patient heart rate remained elevated therefore transferred to telemetry floor and started on cardizem gtt. Cardiology consulted for new onset atrial fibrillation.       Past Medical History:   Diagnosis Date    Diabetes mellitus, type II [facility])       Past Surgical History:   Procedure Laterality Date    HAND SURGERY      screw in hand    JOINT REPLACEMENT Bilateral     knee       Allergies   Allergen Reactions    Shellfish-Derived Products Hives     Shrimp only        Social History     Socioeconomic History    Marital status:      Spouse name: Not on file    Number of children: Not on file    Years of education: Not on file    Highest education level: Not on file   Occupational History    Not on file   Tobacco Use    Smoking status: Former     Packs/day: 1.00     Years: 40.00     Pack years: 40.00     Types: Cigarettes    Smokeless tobacco: Never   Vaping Use    Vaping Use: Never used   Substance and Sexual Activity    Alcohol use: Not Currently     Comment: occ    Drug use: Not Currently    Sexual activity: Not Currently   Other Topics Concern    Not on file   Social History Narrative    Not on file     Social Determinants of Health     Financial Resource Strain: Not on file   Food Insecurity: Not on file   Transportation Needs: Not on file   Physical Activity: Inactive    Days of Exercise per Week: 0 days    Minutes of Exercise per Session: 0 min   Stress: Not on file   Social Connections: Not on file   Intimate Partner Violence: Not on file   Housing Stability: Not on file     Social History       Tobacco History       Smoking Status  Former Smoking Frequency  1 pack/day for 40.00 years (40.00 pk-yrs) Smoking Tobacco Type  Cigarettes      Smokeless Tobacco Use  Never              Alcohol History       Alcohol Use Status  Not Currently Comment  occ              Drug Use       Drug Use Status  Not Currently              Sexual Activity       Sexually Active  Not Currently                    History reviewed. No pertinent family history.      Current Facility-Administered Medications   Medication Dose Route Frequency    [Held by provider] metoprolol tartrate (LOPRESSOR) tablet 25 mg  25 mg Oral Q6H    metoprolol (LOPRESSOR) injection 5 mg  5 mg IntraVENous Q5 Min PRN dilTIAZem injection 10 mg  10 mg IntraVENous Once    dilTIAZem 100 mg in sodium chloride 0.9 % 100 mL infusion ([facility])  2.5-15 mg/hr IntraVENous Continuous    sodium chloride flush 0.9 % injection 5-40 mL  5-40 mL IntraVENous 2 times per day    sodium chloride flush 0.9 % injection 5-40 mL  5-40 mL IntraVENous PRN    0.9 % sodium chloride infusion   IntraVENous PRN    ondansetron (ZOFRAN-ODT) disintegrating tablet 4 mg  4 mg Oral Q8H PRN    Or    ondansetron (ZOFRAN) injection 4 mg  4 mg IntraVENous Q6H PRN    polyethylene glycol (GLYCOLAX) packet 17 g  17 g Oral Daily PRN    acetaminophen (TYLENOL) tablet 650 mg  650 mg Oral Q6H PRN    Or    acetaminophen (TYLENOL) suppository 650 mg  650 mg Rectal Q6H PRN    aspirin chewable tablet 81 mg  81 mg Oral Daily    atorvastatin (LIPITOR) tablet 40 mg  40 mg Oral QPM    latanoprost (XALATAN) 0.005 % ophthalmic solution 1 drop  1 drop Both Eyes Nightly    furosemide (LASIX) tablet 20 mg  20 mg Oral Daily PRN    insulin lispro (HUMALOG) injection vial 0-8 Units  0-8 Units SubCUTAneous TID WC    insulin lispro (HUMALOG) injection vial 0-4 Units  0-4 Units SubCUTAneous Nightly    glucose chewable tablet 16 g  4 tablet Oral PRN    dextrose bolus 10% 125 mL  125 mL IntraVENous PRN    Or    dextrose bolus 10% 250 mL  250 mL IntraVENous PRN    glucagon (rDNA) injection 1 mg  1 mg SubCUTAneous PRN    dextrose 10 % infusion   IntraVENous Continuous PRN    cefTRIAXone (ROCEPHIN) 1,000 mg in sodium chloride 0.9 % 50 mL IVPB mini-bag  1,000 mg IntraVENous Q24H    azithromycin (ZITHROMAX) tablet 500 mg  500 mg Oral Daily    ipratropium-albuterol (DUONEB) nebulizer solution 1 ampule  1 ampule Inhalation Q6H    budesonide (PULMICORT) nebulizer suspension 500 mcg  0.5 mg Nebulization BID    guaiFENesin (MUCINEX) extended release tablet 600 mg  600 mg Oral BID    benzonatate (TESSALON) capsule 100 mg  100 mg Oral TID PRN    [Held by provider] lisinopril (PRINIVIL;ZESTRIL) tablet 20 mg  20 mg Oral BID    heparin (porcine) injection 5,000 Units  5,000 Units SubCUTAneous 3 times per day       Review of Symptoms:  General: No weight changes,  weakness, fever or chills  Skin: no rashes, lumps, or other skin changes  HEENT: no headache, dizziness, lightheadedness, vision changes, hearing changes, tinnitus, vertigo, sinus pressure/pain, bleeding gums, sore throat, or hoarseness  Neck: no swollen glands, goiter, pain or stiffness  Respiratory: Positive for dyspnea. No cough, sputum, hemoptysis, wheezing  Cardiovascular: + as per HPI  Gastrointestinal: no GERD, constipation, diarrhea, liver problems, or h/o GI bleed  Urinary: no frequency, urgency , hematuria, burning/pain with urination, recent flank pain, polyuria, nocturia, or difficulty urinating  Peripheral Vascular: no claudication, leg cramps, prior DVTs, swelling of calves, legs, or feet, color change, or swelling with redness or tenderness  Musculoskeletal: no muscle or joint pain/stiffness, joint swelling, erythema of joints, or back pain  Psychiatric: no depression or excessive stress  Neurological: no sensory or motor loss, seizures, syncope, tremors, numbness, no dementia  Hematologic: no anemia, easy bruising or bleeding  Endocrine: No thyroid problems, heat or cold intolerance, excessive sweating, polyuria, polydipsia,  diabetes.        Subjective:     Physical Exam:    Vitals:    11/18/22 0801 11/18/22 1008 11/18/22 1149 11/18/22 1320   BP: 125/66  132/88    Pulse: (!) 136 (!) 115 (!) 127    Resp:   20    Temp: 97.6 °F (36.4 °C)  97.5 °F (36.4 °C)    TempSrc: Oral  Oral Oral   SpO2: 93%  90%    Weight:    237 lb 4.8 oz (107.6 kg)   Height:    5' 11\" (1.803 m)     General: Well Developed, Well Nourished, No Acute Distress  HEENT: pupils equal and round, no abnormalities noted  Neck: supple, no JVD, no carotid bruits  Heart: IRR without murmurs or gallops  Lungs: Clear throughout auscultation bilaterally without adventitious sounds  Abd: soft, nontender, nondistended, with good bowel sounds  Ext: warm, no edema, calves supple/nontender, pulses 2+ bilaterally  Skin: warm and dry  Psychiatric: Normal mood and affect  Neurologic: Alert and oriented X 3    Cardiographics    Telemetry: AFIB  ECG: atrial fibrillation, rate 119  Echocardiogram: 12/14/21    TRANSTHORACIC ECHOCARDIOGRAM (TTE) COMPLETE (CONTRAST/BUBBLE/3D PRN) 12/15/2021  7:46 AM, 12/15/2021 12:00 AM (Final)    Narrative  This is a summary report. The complete report is available in the patient's medical record. If you cannot access the medical record, please contact the sending organization for a detailed fax or copy. Left Ventricle: Left ventricle size is normal. Normal wall thickness. Normal wall motion. Normal left ventricular systolic function with a visually estimated EF of 55 - 60%. Normal diastolic function. Normal left ventricular filling pressure. Aortic Valve: Valve structure is normal. Moderately sclerosed cusps. Moderate transvalvular regurgitation. Mild stenosis. AV mean gradient is 12 mmHg. AV peak gradient is 25 mmHg. AV mean velocity is 1.6 m/s. LVOT:AV VTI Index is 0.55. LVOT diameter is 2.1 cm. Mitral Valve: Mild transvalvular regurgitation. Interatrial Septum: Grade I Positive (1 to 9 bubbles). Agitated saline study was positive with provocation. Right to left shunt was noted. Aorta: Normal sized annulus and sinus of Valsalva. Mildly dilated ascending aorta. Technical qualifiers: Echo study was technically difficult with poor endocardial visualization and technically difficult due to patient's body habitus. Contrast used: Definity.     Signed by: Cristina Richard MD on 12/15/2021  7:46 AM, Signed by: Unknown Provider Result on 12/15/2021 12:00 AM    Labs:     Recent Labs     11/18/22  0454 11/17/22  1526   WBC 4.5 5.9   HGB 14.7 16.1   HCT 44.8 47.8   .0 99.6    215     Lab Results   Component Value Date    WBC 4.5 11/18/2022 HGB 14.7 11/18/2022    HCT 44.8 11/18/2022     11/18/2022    CHOL 120 01/12/2022    TRIG 195 (H) 01/12/2022    HDL 43 01/12/2022    ALT 41 06/28/2021    AST 23 06/28/2021     11/18/2022    K 3.9 11/18/2022     11/18/2022    CREATININE 1.00 11/18/2022    BUN 13 11/18/2022    CO2 24 11/18/2022    TSH 3.050 06/28/2021    PSA 0.5 01/12/2022    INR 1.1 11/18/2022    LABA1C 6.9 (H) 07/15/2022        Recent Labs     11/17/22 1913   DDIMER 10.16*     Pt has been seen and examined by Dr. Erick Dean. He agrees with the following assessment and plan. Assessment/Plan:       Principal Problem:    Acute respiratory failure ([facility]  - CXR- left lower lobe airspace disease reflecting atelectasis or infiltrate   - D-Dimer 10.16  - CT chest- no evidence of pulmonary embolism  - per primary team     Active Problems:      Atrial fibrillation with RVR ([facility]  - appears new onset; no prior documentation   - asymptomatic   - continue cardizem gtt   - start Eliquis for CVA protection       Left lower lobe pneumonia  - antibiotics per primary team       CAD  - PCI RCA ~ 1994  - [address] [facility] 2016- with nonobstructive coronary artery disease       HTN (hypertension), benign  - lisinopril and metoprolol tartrate on hold   - monitor and titrate medication as needed       Mixed hyperlipidemia  - on atorvastatin       Type 2 diabetes with nephropathy ([facility]  - SSI  - per primary team       NELLY on CPAP  - per primary team       Chronic obstructive pulmonary disease ([facility]  - per primary team        Thank you for requesting cardiac evaluation and allowing us to participate in the care of this patient. We will continue to follow along with you.       Shania Bello PA-C  Supervising Physician: Dr Erick Dean

## 2022-11-18 NOTE — PLAN OF CARE
Problem: Discharge Planning  Goal: Discharge to home or other facility with appropriate resources  Outcome: Progressing     Problem: Skin/Tissue Integrity  Goal: Absence of new skin breakdown  Description: 1. Monitor for areas of redness and/or skin breakdown  2. Assess vascular access sites hourly  3. Every 4-6 hours minimum:  Change oxygen saturation probe site  4. Every 4-6 hours:  If on nasal continuous positive airway pressure, respiratory therapy assess nares and determine need for appliance change or resting period.   Outcome: Progressing     Problem: Safety - Adult  Goal: Free from fall injury  Outcome: Progressing     Problem: Pain  Goal: Verbalizes/displays adequate comfort level or baseline comfort level  Outcome: Progressing     Problem: Chronic Conditions and Co-morbidities  Goal: Patient's chronic conditions and co-morbidity symptoms are monitored and maintained or improved  Outcome: Progressing

## 2022-11-18 NOTE — PROGRESS NOTES
Pt resting in bed comfortably at this time. Pt family is a bedside. Respirations are even, quick and shallow, no signs or symptoms of distress noted. Pt is alert and oriented x4. No complaints of pain at this time. Call light in reach, safety measures in place, pt instructed to call with needs. Will continue to monitor.

## 2022-11-18 NOTE — PROGRESS NOTES
ISS given to patient from Respiratory therapist.  Instructions given and placed at bedside. Return demonstration given.

## 2022-11-18 NOTE — PROGRESS NOTES
Pt resting comfortably in bed at this time. Respirations are even and unlabored on room air, no signs or symptoms of distress noted. Pt is alert and oriented x4. No complaints of pain at this time. Call light in reach, safety measures in place, pt instructed to call with needs. Will continue to monitor and prepare to give report to oncoming shift.

## 2022-11-18 NOTE — PROGRESS NOTES
Spiritual Care Visit, initial visit. Advance Care Planning     Visited with patient and his wife at bedside. Spoke with him about advance Directive. He is very interested, but wants to review document.  left document with him for review. Prayed for patient's healing and health. Visit by Rolf Snell, Staff .  Yvonne., Th.B., B.A.

## 2022-11-18 NOTE — PROGRESS NOTES
Patient given two doses of IV Lopressor and 1 dose of IV Cardizem overnight for afib. IV Lopressor given again this morning and he remains in Afib RVR with rates in the 130s. He is relatively asymptomatic. BP is good. Will give Cardizem bolus followed by drip, heparin drip and transfer to [facility] with Cardiology consult. Patient and wife aware of the plan.  He follows with Dr. Vianca Gimenez MD

## 2022-11-18 NOTE — PROGRESS NOTES
Pt medicated with one time does of IV Lopressor 10 mg. Heart rate sustaining in the 110s and 120s. Will continue to monitor.

## 2022-11-18 NOTE — PROGRESS NOTES
TRANSFER - IN REPORT:    Verbal report received from Donovan Moya RN on Lina Diss being received from 8th floor for routine progression of care. Report consisted of patients Situation, Background, Assessment and Recommendations(SBAR). Information from the following report(s) SBAR, Kardex, Intake/Output, MAR, Recent Results, and Cardiac Rhythm Afib w/RVR  was reviewed. Opportunity for questions and clarification was provided. Assessment completed upon patients arrival to unit and care assumed. Patient received to room 315. Patient connected to monitor and assessment completed. Plan of care reviewed. Patient oriented to room and call light. Patient aware to use call light to communicate any chest pain or needs. Admission skin assessment completed with Breanna vasquez RN and reveals the following: Heels and sacrum are clean, dry, intact, and without redness. Pt AO x4, wife at bedside.

## 2022-11-18 NOTE — PROGRESS NOTES
TRANSFER - OUT REPORT:    Verbal report given to Raúl Hackett 42 on [place]  being transferred to 3rd floor telemetry for urgent transfer       Report consisted of patient's Situation, Background, Assessment and   Recommendations(SBAR). Information from the following report(s) Nurse Handoff Report was reviewed with the receiving nurse. [place] Assessment: Presents to emergency department  because of falls (Syncope, seizure, or loss of consciousness): No, Age > 79: Yes, Altered Mental Status, Intoxication with alcohol or substance confusion (Disorientation, impaired judgment, poor safety awaremess, or inability to follow instructions): No, Impaired Mobility: Ambulates or transfers with assistive devices or assistance; Unable to ambulate or transer.: No  Lines:   Peripheral IV 11/17/22 Right Antecubital (Active)   Site Assessment Clean, dry & intact 11/18/22 0530   Line Status Flushed 11/18/22 0530   Line Care Cap changed; Connections checked and tightened 11/18/22 0530   Phlebitis Assessment No symptoms 11/18/22 0530   Infiltration Assessment 0 11/18/22 0530   Alcohol Cap Used Yes 11/18/22 0530   Dressing Status Clean, dry & intact 11/18/22 0530   Dressing Type Transparent 11/18/22 0530       Peripheral IV 11/17/22 Left Forearm (Active)   Site Assessment Clean, dry & intact 11/18/22 0530   Line Status Infusing 11/18/22 0530   Line Care Connections checked and tightened 11/18/22 0530   Phlebitis Assessment No symptoms 11/18/22 0530   Infiltration Assessment 0 11/18/22 0530   Alcohol Cap Used Yes 11/18/22 0530   Dressing Status Clean, dry & intact 11/18/22 0530   Dressing Type Transparent 11/18/22 0530        Opportunity for questions and clarification was provided.       Patient transported with:  Registered Nurse

## 2022-11-18 NOTE — PROGRESS NOTES
TRANSFER - IN REPORT:    Verbal report received from [facility] on Rajat Lerma  being received from ED for routine progression of patient care      Report consisted of patient's Situation, Background, Assessment and   Recommendations(SBAR). Information from the following report(s) ED Encounter Summary, ED SBAR, Adult Overview, Recent Results, and Event Log was reviewed with the receiving nurse. Opportunity for questions and clarification was provided. Assessment completed upon patient's arrival to unit and care assumed.

## 2022-11-18 NOTE — FLOWSHEET NOTE
HR irregular but has decreased to the 110s after giving IV Lopressor.      11/18/22 0801 11/18/22 1008   Vitals   Heart Rate (!) 136 (!) 115

## 2022-11-18 NOTE — PROGRESS NOTES
Heparin drip stopped per Dr. Babita Monge orders. Pt remains on remote tele, heart rate sustaining afib in the 130s. Will continue to monitor.

## 2022-11-18 NOTE — PROGRESS NOTES
Pt heart rate returned to the 130s and 140s. Kathi Fish MD notified. Lopressor 10 mg IV ordered. Will continue to monitor.

## 2022-11-18 NOTE — PROGRESS NOTES
Hospitalist Progress Note   Admit Date:  2022  3:57 PM   Name:  Aníbal Sykes   Age:  74 y.o. Sex:  male  :  1950   MRN:  [id number]   Room:  ER31/    Reason(s) for Admission: Acute respiratory failure [facility] Course & Interval History:   Mr. Deangelo medina is a 75 y/o WM with a h/o DM2, NELLY on CPAP and COPD who was admitted to our service on  with acute hypoxemic respiratory failure. Had exposure to grandson with RSV. Labs unremarkable. CXR showed LLL atelectasis vs infiltrate. He apparently reported that his O2 was low at home, 86-88%, but hypoxemia has not been documented since presentation and he is on RA. He was started on ceftriaxone and azithromycin. D-dimer was elevated so he was empirically started on a heparin drip. RVP is negative. CT chest is negative for PE, notable really only for apparent atelectasis at the LLL. Heparin drip was subsequently discontinued due to lack of PE on imaging. Subjective/24hr Events (22):  Awake, just got nebs, mild SOB at rest. RA O2 88%, now on 1L. HR on monitor 120s and appears irregular c/w afib, EKG pending. Assessment & Plan:   # Acute hypoxemic respiratory failure 2/2 suspected LLL CAP   - RA sat this AM 88%, now on 1L. Exposure to RSV but RVP is negative. He did take a course of Tamiflu within the last 1-2 weeks but was never diagnosed with flu. CXR with LLL infiltrate vs atelectasis. CT chest neg for PE, does have what looks to be mostly atelectasis at the LLL. Will con't ceftriaxone and azithromycin along with nebs and symptomatic management. # Atrial fibrillation with RVR   - Hold lisinopril and add oral metoprolol 25mg q6 and titrate based on response. EKG pending this morning. CHADSVASc is at least 3 so will discuss [address] assuming 12-lead confirms afib. # HTN   - Con't home medications    # DM2   - SSI while on steroids. Hold orals. # COPD   - Currently compensated, on RA O2.     # NELLY   - CPAP qhs    Discharge Planning: Home when able. Diet:  ADULT DIET; Regular; 4 carb choices (60 gm/meal); Low Fat/Low Chol/High Fiber/2 gm Na  DVT PPx: Heparin SQ  Code status: Full Code    Hospital Problems             Last Modified POA    * (Principal) Acute respiratory failure ([facility] 11/17/2022 Yes    Left lower lobe pneumonia 11/17/2022 Yes    Atrial fibrillation with RVR ([facility] 11/18/2022 Yes    History of coronary artery stent placement 11/17/2022 Yes    Overview Signed 3/19/2022  5:41 AM by Roland Echeverria     1996 RCA           HTN (hypertension), benign 11/17/2022 Yes    Mixed hyperlipidemia 11/17/2022 Yes    Type 2 diabetes with nephropathy ([facility] 11/17/2022 Yes    NELLY on CPAP 11/17/2022 Yes    Chronic obstructive pulmonary disease ([facility] 11/17/2022 Yes    PVD (peripheral vascular disease) ([facility] 11/17/2022 Yes       Objective:   Patient Vitals for the past 24 hrs:   Temp Pulse Resp BP SpO2   11/18/22 0801 97.6 °F (36.4 °C) -- -- 125/66 93 %   11/18/22 0738 -- -- -- -- 91 %   11/18/22 0737 -- -- -- -- (!) 88 %   11/18/22 0615 -- (!) 109 -- -- --   11/18/22 0543 -- (!) 101 -- -- --   11/18/22 0458 -- 98 -- -- --   11/18/22 0457 -- (!) 106 -- -- --   11/18/22 0456 -- (!) 105 -- -- --   11/18/22 0445 -- (!) 120 -- -- --   11/18/22 0300 -- (!) 122 26 128/82 --   11/18/22 0030 -- (!) 124 20 -- --   11/18/22 0019 -- (!) 124 23 110/87 --   11/17/22 2330 -- (!) 125 -- -- --   11/17/22 2300 -- (!) 124 -- -- --   11/17/22 2230 -- (!) 122 -- -- --   11/17/22 2227 -- (!) 127 -- -- --   11/17/22 2226 -- (!) 136 -- -- --   11/17/22 2215 -- (!) 150 -- -- --   11/17/22 2115 98.1 °F (36.7 °C) (!) 103 29 124/78 91 %   11/17/22 2015 -- 95 20 -- 94 %   11/17/22 1937 -- 99 22 126/83 90 %   11/17/22 1455 97.9 °F (36.6 °C) (!) 101 24 (!) 161/89 92 %       Estimated body mass index is 33.47 kg/m² as calculated from the following:    Height as of this encounter: 5' 11\" (1.803 m).     Weight as of this encounter: 240 lb (108.9 kg).    Intake/Output Summary (Last 24 hours) at 11/18/2022 0807  Last data filed at 11/18/2022 0529  Gross per 24 hour   Intake --   Output 2200 ml   Net -2200 ml         Physical Exam:   Blood pressure 125/66, pulse (!) 109, temperature 97.6 °F (36.4 °C), temperature source Oral, resp. rate 26, height 5' 11\" (1.803 m), weight 240 lb (108.9 kg), SpO2 93 %. General:    Well nourished. No overt distress. Obese, awake, pleasant and oriented x3. Head:  Normocephalic, atraumatic  Eyes:  Sclerae appear normal. Pupils equally round. ENT:  Nares appear normal, no drainage. Moist oral mucosa  Neck:  No restricted ROM. Trachea midline. CV:   Irreg irreg rhythm, tachy 120s mostly. No m/r/g. No jugular venous distension. Lungs:   Mild rales at the bases, no wheezes or rhonchi. 1L NC O2 with sats low 90s. Pursed lip breathing at rest.  Abdomen: Bowel sounds present. Soft, nontender, nondistended. Extremities: No cyanosis or clubbing. No edema. Skin:     No rashes and normal coloration. Warm and dry. Neuro:  CN II-XII grossly intact. Sensation intact. A&Ox3  Psych:  Normal mood and affect.       I have reviewed ordered lab tests and independently visualized imaging below:    Recent Labs:  Recent Results (from the past 48 hour(s))   CBC with Auto Differential    Collection Time: 11/17/22  3:26 PM   Result Value Ref Range    WBC 5.9 4.3 - 11.1 K/uL    RBC 4.80 4.23 - 5.6 M/uL    Hemoglobin 16.1 13.6 - 17.2 g/dL    Hematocrit 47.8 41.1 - 50.3 %    MCV 99.6 82 - 102 FL    MCH 33.5 (H) 26.1 - 32.9 PG    MCHC 33.7 31.4 - 35.0 g/dL    RDW 13.9 11.9 - 14.6 %    Platelets 157 020 - 514 K/uL    MPV 9.9 9.4 - 12.3 FL    nRBC 0.00 0.0 - 0.2 K/uL    Differential Type AUTOMATED      Seg Neutrophils 89 (H) 43 - 78 %    Lymphocytes 5 (L) 13 - 44 %    Monocytes 4 4.0 - 12.0 %    Eosinophils % 2 0.5 - 7.8 %    Basophils 0 0.0 - 2.0 %    Immature Granulocytes 0 0.0 - 5.0 %    Segs Absolute 5.3 1.7 - 8.2 K/UL    Absolute Lymph # 0.3 (L) 0.5 - 4.6 K/UL    Absolute Mono # 0.2 0.1 - 1.3 K/UL    Absolute Eos # 0.1 0.0 - 0.8 K/UL    Basophils Absolute 0.0 0.0 - 0.2 K/UL    Absolute Immature Granulocyte 0.0 0.0 - 0.5 K/UL   Basic Metabolic Panel    Collection Time: 11/17/22  3:26 PM   Result Value Ref Range    Sodium 135 133 - 143 mmol/L    Potassium 4.3 3.5 - 5.1 mmol/L    Chloride 103 101 - 110 mmol/L    CO2 28 21 - 32 mmol/L    Anion Gap 4 2 - 11 mmol/L    Glucose 189 (H) 65 - 100 mg/dL    BUN 13 8 - 23 MG/DL    Creatinine 1.10 0.8 - 1.5 MG/DL    Est, Glom Filt Rate >60 >60 ml/min/1.73m2    Calcium 10.0 8.3 - 10.4 MG/DL   Troponin    Collection Time: 11/17/22  3:26 PM   Result Value Ref Range    Troponin, High Sensitivity 13.6 0 - 14 pg/mL   Brain Natriuretic Peptide    Collection Time: 11/17/22  3:26 PM   Result Value Ref Range    NT Pro- 5 - 125 PG/ML   Venous Blood Gas, POC    Collection Time: 11/17/22  5:28 PM   Result Value Ref Range    DEVICE ROOM AIR      PH, VENOUS (POC) 7.37 7.32 - 7.42      PCO2, [place], POC 51.0 41 - 51 MMHG    PO2, VENOUS (POC) 18 (LL) mmHg    HCO3, Venous 29.8 (H) 23 - 28 MMOL/L    SO2, VENOUS (POC) 24.3 (L) 65 - 88 %    BASE EXCESS, VENOUS (POC) 3.2 mmol/L    Specimen type: VENOUS BLOOD      Performed by: Jai     POC TCO2 29 (H) 13 - 23 MMOL/L   Culture, Blood 1    Collection Time: 11/17/22  7:13 PM    Specimen: Blood   Result Value Ref Range    Special Requests NO SPECIAL REQUESTS  RIGHT  Antecubital        Culture NO GROWTH AFTER 11 HOURS     D-Dimer, Quantitative    Collection Time: 11/17/22  7:13 PM   Result Value Ref Range    D-Dimer, Quant 10.16 (H) <0.56 ug/ml(FEU)   Culture, Blood 1    Collection Time: 11/17/22  7:19 PM    Specimen: Blood   Result Value Ref Range    Special Requests LEFT  FOREARM        Culture NO GROWTH AFTER 11 HOURS     COVID-19, Rapid    Collection Time: 11/17/22  7:32 PM    Specimen: Nasopharyngeal   Result Value Ref Range    Source NASAL      SARS-CoV-2, Rapid Not detected NOTD     Rapid influenza A/B antigens    Collection Time: 11/17/22  7:32 PM    Specimen: Nasal Washing   Result Value Ref Range    Influenza A Ag Negative NEG      Influenza B Ag Negative NEG      Source Nasopharyngeal     POCT Glucose    Collection Time: 11/17/22  9:39 PM   Result Value Ref Range    POC Glucose 209 (H) 65 - 100 mg/dL    Performed by: Luishristy    Respiratory Panel, Molecular, with COVID-19 (Restricted: peds pts or suitable admitted adults)    Collection Time: 11/17/22 11:30 PM    Specimen: Nasopharyngeal   Result Value Ref Range    Adenovirus by PCR NOT DETECTED NOTDET      Coronavirus 229E by PCR NOT DETECTED NOTDET      Coronavirus HKU1 by PCR NOT DETECTED NOTDET      Coronavirus NL63 by PCR NOT DETECTED NOTDET      Coronavirus OC43 by PCR NOT DETECTED NOTDET      SARS-CoV-2, PCR NOT DETECTED NOTDET      Human Metapneumovirus by PCR NOT DETECTED NOTDET      Rhinovirus Enterovirus PCR NOT DETECTED NOTDET      Influenza A by PCR NOT DETECTED NOTDET      Influenza B PCR NOT DETECTED NOTDET      Parainfluenza 1 PCR NOT DETECTED NOTDET      Parainfluenza 2 PCR NOT DETECTED NOTDET      Parainfluenza 3 PCR NOT DETECTED NOTDET      Parainfluenza 4 PCR NOT DETECTED NOTDET      Respiratory Syncytial Virus by PCR NOT DETECTED NOTDET      Bordetella parapertussis by PCR NOT DETECTED NOTDET      Bordetella pertussis by PCR NOT DETECTED NOTDET      Chlamydophila Pneumonia PCR NOT DETECTED NOTDET      Mycoplasma pneumo by PCR NOT DETECTED NOTDET     Basic Metabolic Panel w/ Reflex to MG    Collection Time: 11/18/22  4:54 AM   Result Value Ref Range    Sodium 140 133 - 143 mmol/L    Potassium 3.9 3.5 - 5.1 mmol/L    Chloride 109 101 - 110 mmol/L    CO2 24 21 - 32 mmol/L    Anion Gap 7 2 - 11 mmol/L    Glucose 256 (H) 65 - 100 mg/dL    BUN 13 8 - 23 MG/DL    Creatinine 1.00 0.8 - 1.5 MG/DL    Est, Glom Filt Rate >60 >60 ml/min/1.73m2    Calcium 9.7 8.3 - 10.4 MG/DL   CBC with Auto Differential Collection Time: 11/18/22  4:54 AM   Result Value Ref Range    WBC 4.5 4.3 - 11.1 K/uL    RBC 4.48 4.23 - 5.6 M/uL    Hemoglobin 14.7 13.6 - 17.2 g/dL    Hematocrit 44.8 41.1 - 50.3 %    .0 82 - 102 FL    MCH 32.8 26.1 - 32.9 PG    MCHC 32.8 31.4 - 35.0 g/dL    RDW 13.9 11.9 - 14.6 %    Platelets 730 831 - 730 K/uL    MPV 10.2 9.4 - 12.3 FL    nRBC 0.00 0.0 - 0.2 K/uL    Differential Type AUTOMATED      Seg Neutrophils 92 (H) 43 - 78 %    Lymphocytes 7 (L) 13 - 44 %    Monocytes 1 (L) 4.0 - 12.0 %    Eosinophils % 0 (L) 0.5 - 7.8 %    Basophils 0 0.0 - 2.0 %    Immature Granulocytes 0 0.0 - 5.0 %    Segs Absolute 4.1 1.7 - 8.2 K/UL    Absolute Lymph # 0.3 (L) 0.5 - 4.6 K/UL    Absolute Mono # 0.1 0.1 - 1.3 K/UL    Absolute Eos # 0.0 0.0 - 0.8 K/UL    Basophils Absolute 0.0 0.0 - 0.2 K/UL    Absolute Immature Granulocyte 0.0 0.0 - 0.5 K/UL   APTT    Collection Time: 11/18/22  4:54 AM   Result Value Ref Range    PTT 34.2 24.5 - 34.2 SEC   Protime-INR    Collection Time: 11/18/22  4:54 AM   Result Value Ref Range    Protime 14.7 (H) 12.6 - 14.3 sec    INR 1.1     C-Reactive Protein    Collection Time: 11/18/22  4:54 AM   Result Value Ref Range    CRP 4.7 (H) 0.0 - 0.9 mg/dL         Other Studies:  XR CHEST (2 VW)    Result Date: 11/17/2022  EXAM: CHEST X-RAY, 2 VIEWS INDICATION: Shortness of breath COMPARISON: 2/7/2020 TECHNIQUE: Frontal and lateral views of the chest were obtained. FINDINGS: Lungs and pleural spaces: Mild airspace disease in the left lung base. No pneumothorax or pleural effusion. Cardiomediastinal silhouette: Normal. Osseous structures: Normal.     Left lower lobe airspace disease reflecting atelectasis or infiltrate. CT CHEST PULMONARY EMBOLISM W CONTRAST    Result Date: 11/17/2022  CT OF THE CHEST WITH INTRAVENOUS CONTRAST, 11/17/2022 Indication: Shortness of breath for 2 days. . Comparison: CT chest without contrast 2/28/2022 Technique:   2.5 mm axial scans from above the aortic arch to the lung bases following the uneventful administration of 70 mL of Isovue-370. Intravenous contrast was given to evaluate for pulmonary embolism. All CT scans performed at this facility use one or all of the following: Automated exposure control, adjustment of the mA and/or kVp according to patient's size, iterative reconstruction. Findings: The base of the neck is unremarkable in appearance. No axillary, mediastinal, or hilar lymphadenopathy is seen. The thoracic aorta is normal in caliber. Opacification of the pulmonary arteries is measures adequate although visually appears suboptimal.  No convincing filling defects are seen to suggest pulmonary embolism. Evaluation with lung windows demonstrates mild right, and moderate left basilar atelectatic appearing changes. No pleural effusion is seen. Lungs are expanded without evidence for pneumothorax. No acute osseous abnormality is seen. Limited evaluation of the upper abdomen demonstrates no acute changes. Stable benign-appearing cystic changes are seen in the subcapsular left lobe of the liver on axial image 97.     1.  No evidence for pulmonary embolism, or significant acute thoracic process otherwise to suggest a worrisome etiology for the patient's clinical symptoms. Only atelectatic appearing changes are seen most evident at the left lung base where moderate basilar atelectasis is seen.       Current Meds:  Current Facility-Administered Medications   Medication Dose Route Frequency    metoprolol tartrate (LOPRESSOR) tablet 25 mg  25 mg Oral Q6H    sodium chloride flush 0.9 % injection 5-40 mL  5-40 mL IntraVENous 2 times per day    sodium chloride flush 0.9 % injection 5-40 mL  5-40 mL IntraVENous PRN    0.9 % sodium chloride infusion   IntraVENous PRN    ondansetron (ZOFRAN-ODT) disintegrating tablet 4 mg  4 mg Oral Q8H PRN    Or    ondansetron (ZOFRAN) injection 4 mg  4 mg IntraVENous Q6H PRN    polyethylene glycol (GLYCOLAX) packet 17 g  17 g Oral Daily PRN    acetaminophen (TYLENOL) tablet 650 mg  650 mg Oral Q6H PRN    Or    acetaminophen (TYLENOL) suppository 650 mg  650 mg Rectal Q6H PRN    aspirin chewable tablet 81 mg  81 mg Oral Daily    atorvastatin (LIPITOR) tablet 40 mg  40 mg Oral QPM    latanoprost (XALATAN) 0.005 % ophthalmic solution 1 drop  1 drop Both Eyes Nightly    furosemide (LASIX) tablet 20 mg  20 mg Oral Daily PRN    insulin lispro (HUMALOG) injection vial 0-8 Units  0-8 Units SubCUTAneous TID WC    insulin lispro (HUMALOG) injection vial 0-4 Units  0-4 Units SubCUTAneous Nightly    glucose chewable tablet 16 g  4 tablet Oral PRN    dextrose bolus 10% 125 mL  125 mL IntraVENous PRN    Or    dextrose bolus 10% 250 mL  250 mL IntraVENous PRN    glucagon (rDNA) injection 1 mg  1 mg SubCUTAneous PRN    dextrose 10 % infusion   IntraVENous Continuous PRN    cefTRIAXone (ROCEPHIN) 1,000 mg in sodium chloride 0.9 % 50 mL IVPB mini-bag  1,000 mg IntraVENous Q24H    azithromycin (ZITHROMAX) tablet 500 mg  500 mg Oral Daily    ipratropium-albuterol (DUONEB) nebulizer solution 1 ampule  1 ampule Inhalation Q6H    budesonide (PULMICORT) nebulizer suspension 500 mcg  0.5 mg Nebulization BID    guaiFENesin (MUCINEX) extended release tablet 600 mg  600 mg Oral BID    benzonatate (TESSALON) capsule 100 mg  100 mg Oral TID PRN    [Held by provider] lisinopril (PRINIVIL;ZESTRIL) tablet 20 mg  20 mg Oral BID    heparin (porcine) injection 5,000 Units  5,000 Units SubCUTAneous 3 times per day     Current Outpatient Medications   Medication Sig    STIOLTO RESPIMAT 2.5-2.5 MCG/ACT AERS USE 2 INHALATIONS BY MOUTH  DAILY    metFORMIN (GLUCOPHAGE) 500 MG tablet Take 1 tablet by mouth 2 times daily (with meals) TAKE 1 TABLET BY MOUTH TWICE DAILY WITH MEALS    enalapril (VASOTEC) 20 MG tablet Take 1 tablet by mouth 2 times daily    furosemide (LASIX) 20 MG tablet Take 1 tablet by mouth daily as needed (Leg edema)    CPAP Machine MISC by Other route    aspirin 81 MG chewable tablet Take 81 mg by mouth daily    albuterol sulfate  (90 Base) MCG/ACT inhaler Inhale 2 puffs into the lungs every 6 hours as needed    atorvastatin (LIPITOR) 40 MG tablet Take 40 mg by mouth every evening    bimatoprost (LUMIGAN) 0.01 % SOLN ophthalmic drops Apply 1 drop to eye every evening    nitroGLYCERIN (NITROSTAT) 0.4 MG SL tablet Place 0.4 mg under the tongue       Signed:  Steve Roldan MD    Part of this note may have been written by using a voice dictation software. The note has been proof read but may still contain some grammatical/other typographical errors.

## 2022-11-19 ENCOUNTER — APPOINTMENT (OUTPATIENT)
Dept: NON INVASIVE DIAGNOSTICS | Age: 72
DRG: 193 | End: 2022-11-19
Payer: MEDICARE

## 2022-11-19 PROBLEM — I35.9 AORTIC VALVE DISEASE: Status: ACTIVE | Noted: 2022-11-19

## 2022-11-19 PROBLEM — I25.10 CAD IN NATIVE ARTERY: Status: ACTIVE | Noted: 2022-11-19

## 2022-11-19 LAB
ANION GAP SERPL CALC-SCNC: 5 MMOL/L (ref 2–11)
BUN SERPL-MCNC: 22 MG/DL (ref 8–23)
CALCIUM SERPL-MCNC: 10.1 MG/DL (ref 8.3–10.4)
CHLORIDE SERPL-SCNC: 109 MMOL/L (ref 101–110)
CO2 SERPL-SCNC: 27 MMOL/L (ref 21–32)
CREAT SERPL-MCNC: 1.1 MG/DL (ref 0.8–1.5)
ERYTHROCYTE [DISTWIDTH] IN BLOOD BY AUTOMATED COUNT: 14.3 % (ref 11.9–14.6)
GLUCOSE BLD STRIP.AUTO-MCNC: 145 MG/DL (ref 65–100)
GLUCOSE BLD STRIP.AUTO-MCNC: 171 MG/DL (ref 65–100)
GLUCOSE BLD STRIP.AUTO-MCNC: 194 MG/DL (ref 65–100)
GLUCOSE BLD STRIP.AUTO-MCNC: 198 MG/DL (ref 65–100)
GLUCOSE SERPL-MCNC: 166 MG/DL (ref 65–100)
HCT VFR BLD AUTO: 45.1 % (ref 41.1–50.3)
HGB BLD-MCNC: 14.6 G/DL (ref 13.6–17.2)
MAGNESIUM SERPL-MCNC: 2.3 MG/DL (ref 1.8–2.4)
MCH RBC QN AUTO: 33.5 PG (ref 26.1–32.9)
MCHC RBC AUTO-ENTMCNC: 32.4 G/DL (ref 31.4–35)
MCV RBC AUTO: 103.4 FL (ref 82–102)
NRBC # BLD: 0 K/UL (ref 0–0.2)
PLATELET # BLD AUTO: 233 K/UL (ref 150–450)
PMV BLD AUTO: 10.1 FL (ref 9.4–12.3)
POTASSIUM SERPL-SCNC: 4 MMOL/L (ref 3.5–5.1)
RBC # BLD AUTO: 4.36 M/UL (ref 4.23–5.6)
SERVICE CMNT-IMP: ABNORMAL
SODIUM SERPL-SCNC: 141 MMOL/L (ref 133–143)
WBC # BLD AUTO: 8.3 K/UL (ref 4.3–11.1)

## 2022-11-19 PROCEDURE — 80048 BASIC METABOLIC PNL TOTAL CA: CPT

## 2022-11-19 PROCEDURE — 6360000002 HC RX W HCPCS: Performed by: FAMILY MEDICINE

## 2022-11-19 PROCEDURE — 99233 SBSQ HOSP IP/OBS HIGH 50: CPT | Performed by: INTERNAL MEDICINE

## 2022-11-19 PROCEDURE — 83735 ASSAY OF MAGNESIUM: CPT

## 2022-11-19 PROCEDURE — 6370000000 HC RX 637 (ALT 250 FOR IP): Performed by: FAMILY MEDICINE

## 2022-11-19 PROCEDURE — 2500000003 HC RX 250 WO HCPCS: Performed by: INTERNAL MEDICINE

## 2022-11-19 PROCEDURE — 6370000000 HC RX 637 (ALT 250 FOR IP): Performed by: PHYSICIAN ASSISTANT

## 2022-11-19 PROCEDURE — 36415 COLL VENOUS BLD VENIPUNCTURE: CPT

## 2022-11-19 PROCEDURE — 2580000003 HC RX 258: Performed by: FAMILY MEDICINE

## 2022-11-19 PROCEDURE — 94761 N-INVAS EAR/PLS OXIMETRY MLT: CPT

## 2022-11-19 PROCEDURE — 2580000003 HC RX 258: Performed by: INTERNAL MEDICINE

## 2022-11-19 PROCEDURE — 1100000003 HC PRIVATE W/ TELEMETRY

## 2022-11-19 PROCEDURE — 82962 GLUCOSE BLOOD TEST: CPT

## 2022-11-19 PROCEDURE — 2700000000 HC OXYGEN THERAPY PER DAY

## 2022-11-19 PROCEDURE — 6360000002 HC RX W HCPCS: Performed by: INTERNAL MEDICINE

## 2022-11-19 PROCEDURE — 94640 AIRWAY INHALATION TREATMENT: CPT

## 2022-11-19 PROCEDURE — 85027 COMPLETE CBC AUTOMATED: CPT

## 2022-11-19 RX ORDER — FUROSEMIDE 10 MG/ML
40 INJECTION INTRAMUSCULAR; INTRAVENOUS ONCE
Status: COMPLETED | OUTPATIENT
Start: 2022-11-19 | End: 2022-11-19

## 2022-11-19 RX ORDER — ALBUTEROL SULFATE 2.5 MG/3ML
2.5 SOLUTION RESPIRATORY (INHALATION) EVERY 4 HOURS PRN
Status: DISCONTINUED | OUTPATIENT
Start: 2022-11-19 | End: 2022-11-22 | Stop reason: HOSPADM

## 2022-11-19 RX ADMIN — GUAIFENESIN 600 MG: 600 TABLET, EXTENDED RELEASE ORAL at 20:28

## 2022-11-19 RX ADMIN — CEFTRIAXONE 1000 MG: 1 INJECTION, POWDER, FOR SOLUTION INTRAMUSCULAR; INTRAVENOUS at 17:12

## 2022-11-19 RX ADMIN — BUDESONIDE 500 MCG: 0.5 INHALANT RESPIRATORY (INHALATION) at 07:45

## 2022-11-19 RX ADMIN — SODIUM CHLORIDE, PRESERVATIVE FREE 10 ML: 5 INJECTION INTRAVENOUS at 10:02

## 2022-11-19 RX ADMIN — IPRATROPIUM BROMIDE AND ALBUTEROL SULFATE 1 AMPULE: 2.5; .5 SOLUTION RESPIRATORY (INHALATION) at 07:45

## 2022-11-19 RX ADMIN — SODIUM CHLORIDE 7.5 MG/HR: 900 INJECTION, SOLUTION INTRAVENOUS at 09:51

## 2022-11-19 RX ADMIN — GUAIFENESIN 600 MG: 600 TABLET, EXTENDED RELEASE ORAL at 09:58

## 2022-11-19 RX ADMIN — SODIUM CHLORIDE, PRESERVATIVE FREE 10 ML: 5 INJECTION INTRAVENOUS at 20:30

## 2022-11-19 RX ADMIN — AZITHROMYCIN MONOHYDRATE 500 MG: 250 TABLET ORAL at 09:58

## 2022-11-19 RX ADMIN — ASPIRIN 81 MG 81 MG: 81 TABLET ORAL at 09:58

## 2022-11-19 RX ADMIN — SODIUM CHLORIDE 12.5 MG/HR: 900 INJECTION, SOLUTION INTRAVENOUS at 00:23

## 2022-11-19 RX ADMIN — LATANOPROST 1 DROP: 50 SOLUTION OPHTHALMIC at 20:28

## 2022-11-19 RX ADMIN — APIXABAN 5 MG: 5 TABLET, FILM COATED ORAL at 09:58

## 2022-11-19 RX ADMIN — SODIUM CHLORIDE 7.5 MG/HR: 900 INJECTION, SOLUTION INTRAVENOUS at 13:27

## 2022-11-19 RX ADMIN — SODIUM CHLORIDE 10 MG/HR: 900 INJECTION, SOLUTION INTRAVENOUS at 23:36

## 2022-11-19 RX ADMIN — APIXABAN 5 MG: 5 TABLET, FILM COATED ORAL at 20:28

## 2022-11-19 RX ADMIN — BUDESONIDE 500 MCG: 0.5 INHALANT RESPIRATORY (INHALATION) at 19:53

## 2022-11-19 RX ADMIN — FUROSEMIDE 40 MG: 10 INJECTION, SOLUTION INTRAMUSCULAR; INTRAVENOUS at 13:40

## 2022-11-19 RX ADMIN — ATORVASTATIN CALCIUM 40 MG: 40 TABLET, FILM COATED ORAL at 17:10

## 2022-11-19 NOTE — PROGRESS NOTES
Advance Care Planning   Advance Care Planning Note  Ambulatory Spiritual Care Services    Date:  11/17/2022    Received request from patient. Consultation conversation participants:   Patient who understands ACP conversation     Goals of ACP Conversation:  Discuss advance care planning documents    Health Care Decision Makers:      Primary Decision Maker: Erica Crain Spouse - [phone]    Secondary Decision Maker: Matthias Clrak - Child - [phone]   Summary:  Completed New Documents    Advance Care Planning Documents (Patient Wishes)  Currently on file:   Healthcare Power of /Advance Directive Appointment of Health Care Agent    Assessment:       Interventions:  Assisted in the completion of documents according to patient's wishes at this time    Care Preferences Communicated:   No    Outcomes:  ACP Discussion: Completed    Patient / Healthcare Decision Maker Instructions:  Return a copy of Advance Directive Form(s) to medical office.     Electronically signed by Chaplain Mariana on 11/19/2022 at 3:53 PM.

## 2022-11-19 NOTE — PROGRESS NOTES
Hospitalist Progress Note   Admit Date:  2022  3:57 PM   Name:  Juan Brunson   Age:  74 y.o. Sex:  male  :  1950   MRN:  [id number]   Room:  [facility]/    Reason(s) for Admission: Acute respiratory failure ([facility] [J96.00]  Hypoxia [R09.02]  COPD exacerbation [facility]) [J44.1]     Hospital Course & Interval History:   Mr. Jania Evans is a 73 y/o WM with a h/o DM2, NELLY on CPAP and COPD who was admitted to our service on  with acute hypoxemic respiratory failure. Had exposure to grandson with RSV. Labs unremarkable. CXR showed LLL atelectasis vs infiltrate. He apparently reported that his O2 was low at home, 86-88%, but hypoxemia has not been documented since presentation and he is on RA. He was started on ceftriaxone and azithromycin. D-dimer was elevated so he was empirically started on a heparin drip. RVP is negative. CT chest is negative for PE, notable really only for apparent atelectasis at the LLL. Heparin drip was subsequently discontinued due to lack of PE on imaging. Developed rapid atrial fibrillation that was not responsive to IV Lopressor or Cardizem. Transferred to 3rd floor and started on Cardizem drip and Eliquis and Cardiology consulted . Subjective/24hr Events (22):  Rates improved but still in atrial fib in low 110s-110s. Feels like he should have made more improvement by now. Some SOB, but no chest pain or N/V/D. Assessment & Plan:   # Atrial fibrillation with RVR              - Lisinopril held to give room for rate control. Con't Cardizem drip, currently at 7.5mg/hr. Eliquis BID. Echo pending. Will stop scheduled Duonebs and make albuterol PRN in hopes this will allow rates to improve. # Acute hypoxemic respiratory failure 2/2 suspected LLL CAP              - RA sat 88%, tolerating 1-2L NC. CT chest neg for PE or gross consolidation, mostly atelectasis LLL.  Con't IS, empiric abx, supp O2, wean as able, budesonide nebs with PRN albuterol (limit for rate control reasons). Give 1 dose IV Lasix 11/19 and monitor urine output. # HTN              - ACEi on hold to allow room for rate control. Adjust regimen as needed pending transition to oral rate control meds. # DM2              - SSI while on steroids. Hold orals. # COPD              - Currently compensated. # NELLY              - CPAP qhs     Discharge Planning: Home when able. Diet:  ADULT DIET; Regular; 4 carb choices (60 gm/meal);  Low Fat/Low Chol/High Fiber/2 gm Na  DVT PPx: Eliquis  Code status: Full Code    Hospital Problems             Last Modified POA    * (Principal) Acute respiratory failure ([facility] 11/17/2022 Yes    Left lower lobe pneumonia 11/17/2022 Yes    Atrial fibrillation with RVR ([facility] 11/18/2022 Yes    COPD exacerbation ([facility] 11/18/2022 Yes    Aortic valve disease 11/19/2022 Yes    CAD in native artery 11/19/2022 Yes    History of coronary artery stent placement 11/17/2022 Yes    Overview Signed 3/19/2022  5:41 AM by Juwan [email or web address]     1996 RCA           HTN (hypertension), benign 11/17/2022 Yes    Mixed hyperlipidemia 11/17/2022 Yes    Type 2 diabetes with nephropathy ([facility] 11/17/2022 Yes    NELLY on CPAP 11/17/2022 Yes    Chronic obstructive pulmonary disease ([address] 11/17/2022 Yes    PVD (peripheral vascular disease) ([facility] 11/17/2022 Yes       Objective:   Patient Vitals for the past 24 hrs:   Temp Pulse Resp BP SpO2   11/19/22 0901 97.5 °F (36.4 °C) (!) 124 18 (!) 141/66 93 %   11/19/22 0746 -- -- -- -- 93 %   11/19/22 0512 97.3 °F (36.3 °C) 89 20 105/72 99 %   11/19/22 0037 97.3 °F (36.3 °C) 90 20 107/72 94 %   11/18/22 2042 97.5 °F (36.4 °C) 100 20 132/80 96 %   11/18/22 2027 -- (!) 120 18 -- 92 %   11/18/22 2025 97.6 °F (36.4 °C) 100 20 134/80 92 %   11/18/22 1741 97.4 °F (36.3 °C) (!) 117 19 119/75 92 %   11/18/22 1432 -- -- -- -- 96 %   11/18/22 1320 97.7 °F (36.5 °C) (!) 145 20 117/86 92 %       Estimated body mass index is 32.79 kg/m² as calculated from the following: Height as of this encounter: 5' 11\" (1.803 m). Weight as of this encounter: 235 lb 1.6 oz (106.6 kg). Intake/Output Summary (Last 24 hours) at 11/19/2022 1232  Last data filed at 11/19/2022 0518  Gross per 24 hour   Intake 100 ml   Output 2150 ml   Net -2050 ml         Physical Exam:   Blood pressure (!) 141/66, pulse (!) 124, temperature 97.5 °F (36.4 °C), temperature source Axillary, resp. rate 18, height 5' 11\" (1.803 m), weight 235 lb 1.6 oz (106.6 kg), SpO2 93 %. General:    Well nourished. No overt distress. Head:  Normocephalic, atraumatic  Eyes:  Sclerae appear normal. Pupils equally round. ENT:  Nares appear normal, no drainage. Moist oral mucosa  Neck:  No restricted ROM. Trachea midline. CV:   RRR. No m/r/g. No jugular venous distension. Lungs:   BB rales, no wheezes or rhonchi. 2L NC O2. Normal effort. Abdomen: Bowel sounds present. Soft, nontender, nondistended. Extremities: No cyanosis or clubbing. Trace b/l LE edema. Skin:     No rashes and normal coloration. Warm and dry. Neuro:  CN II-XII grossly intact. Sensation intact. A&Ox3  Psych:  Normal mood and affect.       I have reviewed ordered lab tests and independently visualized imaging below:    Recent Labs:  Recent Results (from the past 48 hour(s))   CBC with Auto Differential    Collection Time: 11/17/22  3:26 PM   Result Value Ref Range    WBC 5.9 4.3 - 11.1 K/uL    RBC 4.80 4.23 - 5.6 M/uL    Hemoglobin 16.1 13.6 - 17.2 g/dL    Hematocrit 47.8 41.1 - 50.3 %    MCV 99.6 82 - 102 FL    MCH 33.5 (H) 26.1 - 32.9 PG    MCHC 33.7 31.4 - 35.0 g/dL    RDW 13.9 11.9 - 14.6 %    Platelets 144 847 - 234 K/uL    MPV 9.9 9.4 - 12.3 FL    nRBC 0.00 0.0 - 0.2 K/uL    Differential Type AUTOMATED      Seg Neutrophils 89 (H) 43 - 78 %    Lymphocytes 5 (L) 13 - 44 %    Monocytes 4 4.0 - 12.0 %    Eosinophils % 2 0.5 - 7.8 %    Basophils 0 0.0 - 2.0 %    Immature Granulocytes 0 0.0 - 5.0 %    Segs Absolute 5.3 1.7 - 8.2 K/UL    Absolute Lymph # 0.3 (L) 0.5 - 4.6 K/UL    Absolute Mono # 0.2 0.1 - 1.3 K/UL    Absolute Eos # 0.1 0.0 - 0.8 K/UL    Basophils Absolute 0.0 0.0 - 0.2 K/UL    Absolute Immature Granulocyte 0.0 0.0 - 0.5 K/UL   Basic Metabolic Panel    Collection Time: 11/17/22  3:26 PM   Result Value Ref Range    Sodium 135 133 - 143 mmol/L    Potassium 4.3 3.5 - 5.1 mmol/L    Chloride 103 101 - 110 mmol/L    CO2 28 21 - 32 mmol/L    Anion Gap 4 2 - 11 mmol/L    Glucose 189 (H) 65 - 100 mg/dL    BUN 13 8 - 23 MG/DL    Creatinine 1.10 0.8 - 1.5 MG/DL    Est, Glom Filt Rate >60 >60 ml/min/1.73m2    Calcium 10.0 8.3 - 10.4 MG/DL   Troponin    Collection Time: 11/17/22  3:26 PM   Result Value Ref Range    Troponin, High Sensitivity 13.6 0 - 14 pg/mL   Brain Natriuretic Peptide    Collection Time: 11/17/22  3:26 PM   Result Value Ref Range    NT Pro- 5 - 125 PG/ML   Venous Blood Gas, POC    Collection Time: 11/17/22  5:28 PM   Result Value Ref Range    DEVICE ROOM AIR      PH, VENOUS (POC) 7.37 7.32 - 7.42      PCO2, [place], POC 51.0 41 - 51 MMHG    PO2, VENOUS (POC) 18 (LL) mmHg    HCO3, Venous 29.8 (H) 23 - 28 MMOL/L    SO2, VENOUS (POC) 24.3 (L) 65 - 88 %    BASE EXCESS, VENOUS (POC) 3.2 mmol/L    Specimen type: VENOUS BLOOD      Performed by: Jai     POC TCO2 29 (H) 13 - 23 MMOL/L   Culture, Blood 1    Collection Time: 11/17/22  7:13 PM    Specimen: Blood   Result Value Ref Range    Special Requests NO SPECIAL REQUESTS  RIGHT  Antecubital        Culture NO GROWTH 2 DAYS     D-Dimer, Quantitative    Collection Time: 11/17/22  7:13 PM   Result Value Ref Range    D-Dimer, Quant 10.16 (H) <0.56 ug/ml(FEU)   Culture, Blood 1    Collection Time: 11/17/22  7:19 PM    Specimen: Blood   Result Value Ref Range    Special Requests LEFT  FOREARM        Culture NO GROWTH 2 DAYS     COVID-19, Rapid    Collection Time: 11/17/22  7:32 PM    Specimen: Nasopharyngeal   Result Value Ref Range    Source NASAL      SARS-CoV-2, Rapid Not detected NOTD Rapid influenza A/B antigens    Collection Time: 11/17/22  7:32 PM    Specimen: Nasal Washing   Result Value Ref Range    Influenza A Ag Negative NEG      Influenza B Ag Negative NEG      Source Nasopharyngeal     POCT Glucose    Collection Time: 11/17/22  9:39 PM   Result Value Ref Range    POC Glucose 209 (H) 65 - 100 mg/dL    Performed by: Luishristksenia    Respiratory Panel, Molecular, with COVID-19 (Restricted: peds pts or suitable admitted adults)    Collection Time: 11/17/22 11:30 PM    Specimen: Nasopharyngeal   Result Value Ref Range    Adenovirus by PCR NOT DETECTED NOTDET      Coronavirus 229E by PCR NOT DETECTED NOTDET      Coronavirus HKU1 by PCR NOT DETECTED NOTDET      Coronavirus NL63 by PCR NOT DETECTED NOTDET      Coronavirus OC43 by PCR NOT DETECTED NOTDET      SARS-CoV-2, PCR NOT DETECTED NOTDET      Human Metapneumovirus by PCR NOT DETECTED NOTDET      Rhinovirus Enterovirus PCR NOT DETECTED NOTDET      Influenza A by PCR NOT DETECTED NOTDET      Influenza B PCR NOT DETECTED NOTDET      Parainfluenza 1 PCR NOT DETECTED NOTDET      Parainfluenza 2 PCR NOT DETECTED NOTDET      Parainfluenza 3 PCR NOT DETECTED NOTDET      Parainfluenza 4 PCR NOT DETECTED NOTDET      Respiratory Syncytial Virus by PCR NOT DETECTED NOTDET      Bordetella parapertussis by PCR NOT DETECTED NOTDET      Bordetella pertussis by PCR NOT DETECTED NOTDET      Chlamydophila Pneumonia PCR NOT DETECTED NOTDET      Mycoplasma pneumo by PCR NOT DETECTED NOTDET     Basic Metabolic Panel w/ Reflex to MG    Collection Time: 11/18/22  4:54 AM   Result Value Ref Range    Sodium 140 133 - 143 mmol/L    Potassium 3.9 3.5 - 5.1 mmol/L    Chloride 109 101 - 110 mmol/L    CO2 24 21 - 32 mmol/L    Anion Gap 7 2 - 11 mmol/L    Glucose 256 (H) 65 - 100 mg/dL    BUN 13 8 - 23 MG/DL    Creatinine 1.00 0.8 - 1.5 MG/DL    Est, Glom Filt Rate >60 >60 ml/min/1.73m2    Calcium 9.7 8.3 - 10.4 MG/DL   CBC with Auto Differential    Collection Time: 11/18/22  4:54 AM   Result Value Ref Range    WBC 4.5 4.3 - 11.1 K/uL    RBC 4.48 4.23 - 5.6 M/uL    Hemoglobin 14.7 13.6 - 17.2 g/dL    Hematocrit 44.8 41.1 - 50.3 %    .0 82 - 102 FL    MCH 32.8 26.1 - 32.9 PG    MCHC 32.8 31.4 - 35.0 g/dL    RDW 13.9 11.9 - 14.6 %    Platelets 468 287 - 409 K/uL    MPV 10.2 9.4 - 12.3 FL    nRBC 0.00 0.0 - 0.2 K/uL    Differential Type AUTOMATED      Seg Neutrophils 92 (H) 43 - 78 %    Lymphocytes 7 (L) 13 - 44 %    Monocytes 1 (L) 4.0 - 12.0 %    Eosinophils % 0 (L) 0.5 - 7.8 %    Basophils 0 0.0 - 2.0 %    Immature Granulocytes 0 0.0 - 5.0 %    Segs Absolute 4.1 1.7 - 8.2 K/UL    Absolute Lymph # 0.3 (L) 0.5 - 4.6 K/UL    Absolute Mono # 0.1 0.1 - 1.3 K/UL    Absolute Eos # 0.0 0.0 - 0.8 K/UL    Basophils Absolute 0.0 0.0 - 0.2 K/UL    Absolute Immature Granulocyte 0.0 0.0 - 0.5 K/UL   APTT    Collection Time: 11/18/22  4:54 AM   Result Value Ref Range    PTT 34.2 24.5 - 34.2 SEC   Protime-INR    Collection Time: 11/18/22  4:54 AM   Result Value Ref Range    Protime 14.7 (H) 12.6 - 14.3 sec    INR 1.1     C-Reactive Protein    Collection Time: 11/18/22  4:54 AM   Result Value Ref Range    CRP 4.7 (H) 0.0 - 0.9 mg/dL   POCT Glucose    Collection Time: 11/18/22  8:11 AM   Result Value Ref Range    POC Glucose 205 (H) 65 - 100 mg/dL    Performed by: Floar    EKG 12 Lead    Collection Time: 11/18/22 10:21 AM   Result Value Ref Range    Ventricular Rate 119 BPM    Atrial Rate 187 BPM    QRS Duration 94 ms    Q-T Interval 322 ms    QTc Calculation (Bazett) 452 ms    R Axis 47 degrees    T Axis 179 degrees    Diagnosis       Atrial fibrillation with rapid ventricular response   POCT Glucose    Collection Time: 11/18/22 12:09 PM   Result Value Ref Range    POC Glucose 209 (H) 65 - 100 mg/dL    Performed by:  ELVI Green 38    CBC    Collection Time: 11/18/22  2:59 PM   Result Value Ref Range    WBC 9.5 4.3 - 11.1 K/uL    RBC 4.43 4.23 - 5.6 M/uL    Hemoglobin 14.8 13.6 - 17.2 g/dL    Hematocrit 44.7 41.1 - 50.3 %    .9 82 - 102 FL    MCH 33.4 (H) 26.1 - 32.9 PG    MCHC 33.1 31.4 - 35.0 g/dL    RDW 14.1 11.9 - 14.6 %    Platelets 501 539 - 317 K/uL    MPV 10.0 9.4 - 12.3 FL    nRBC 0.00 0.0 - 0.2 K/uL   APTT    Collection Time: 11/18/22  2:59 PM   Result Value Ref Range    PTT 31.8 24.5 - 34.2 SEC   Protime-INR    Collection Time: 11/18/22  2:59 PM   Result Value Ref Range    Protime 14.6 (H) 12.6 - 14.3 sec    INR 1.1     POCT Glucose    Collection Time: 11/18/22  5:46 PM   Result Value Ref Range    POC Glucose 156 (H) 65 - 100 mg/dL    Performed by: Julissa Espitia    POCT Glucose    Collection Time: 11/18/22  8:41 PM   Result Value Ref Range    POC Glucose 186 (H) 65 - 100 mg/dL    Performed by: Gianfracno    CBC    Collection Time: 11/19/22  5:24 AM   Result Value Ref Range    WBC 8.3 4.3 - 11.1 K/uL    RBC 4.36 4.23 - 5.6 M/uL    Hemoglobin 14.6 13.6 - 17.2 g/dL    Hematocrit 45.1 41.1 - 50.3 %    .4 (H) 82 - 102 FL    MCH 33.5 (H) 26.1 - 32.9 PG    MCHC 32.4 31.4 - 35.0 g/dL    RDW 14.3 11.9 - 14.6 %    Platelets 316 833 - 578 K/uL    MPV 10.1 9.4 - 12.3 FL    nRBC 0.00 0.0 - 0.2 K/uL   Basic Metabolic Panel    Collection Time: 11/19/22  5:24 AM   Result Value Ref Range    Sodium 141 133 - 143 mmol/L    Potassium 4.0 3.5 - 5.1 mmol/L    Chloride 109 101 - 110 mmol/L    CO2 27 21 - 32 mmol/L    Anion Gap 5 2 - 11 mmol/L    Glucose 166 (H) 65 - 100 mg/dL    BUN 22 8 - 23 MG/DL    Creatinine 1.10 0.8 - 1.5 MG/DL    Est, Glom Filt Rate >60 >60 ml/min/1.73m2    Calcium 10.1 8.3 - 10.4 MG/DL   Magnesium    Collection Time: 11/19/22  5:24 AM   Result Value Ref Range    Magnesium 2.3 1.8 - 2.4 mg/dL   POCT Glucose    Collection Time: 11/19/22  7:21 AM   Result Value Ref Range    POC Glucose 145 (H) 65 - 100 mg/dL    Performed by: Sheron          Other Studies:  XR CHEST (2 VW)    Result Date: 11/17/2022  EXAM: CHEST X-RAY, 2 VIEWS INDICATION: Shortness of breath COMPARISON: 2/7/2020 TECHNIQUE: Frontal and lateral views of the chest were obtained. FINDINGS: Lungs and pleural spaces: Mild airspace disease in the left lung base. No pneumothorax or pleural effusion. Cardiomediastinal silhouette: Normal. Osseous structures: Normal.     Left lower lobe airspace disease reflecting atelectasis or infiltrate. CT CHEST PULMONARY EMBOLISM W CONTRAST    Result Date: 11/17/2022  CT OF THE CHEST WITH INTRAVENOUS CONTRAST, 11/17/2022 Indication: Shortness of breath for 2 days. . Comparison: CT chest without contrast 2/28/2022 Technique:   2.5 mm axial scans from above the aortic arch to the lung bases following the uneventful administration of 70 mL of Isovue-370. Intravenous contrast was given to evaluate for pulmonary embolism. All CT scans performed at this facility use one or all of the following: Automated exposure control, adjustment of the mA and/or kVp according to patient's size, iterative reconstruction. Findings: The base of the neck is unremarkable in appearance. No axillary, mediastinal, or hilar lymphadenopathy is seen. The thoracic aorta is normal in caliber. Opacification of the pulmonary arteries is measures adequate although visually appears suboptimal.  No convincing filling defects are seen to suggest pulmonary embolism. Evaluation with lung windows demonstrates mild right, and moderate left basilar atelectatic appearing changes. No pleural effusion is seen. Lungs are expanded without evidence for pneumothorax. No acute osseous abnormality is seen. Limited evaluation of the upper abdomen demonstrates no acute changes. Stable benign-appearing cystic changes are seen in the subcapsular left lobe of the liver on axial image 97.     1.  No evidence for pulmonary embolism, or significant acute thoracic process otherwise to suggest a worrisome etiology for the patient's clinical symptoms.  Only atelectatic appearing changes are seen most evident at the left lung base where moderate basilar atelectasis is seen.       Current Meds:  Current Facility-Administered Medications   Medication Dose Route Frequency    furosemide (LASIX) injection 40 mg  40 mg IntraVENous Once    albuterol (PROVENTIL) nebulizer solution 2.5 mg  2.5 mg Nebulization Q4H PRN    [Held by provider] metoprolol tartrate (LOPRESSOR) tablet 25 mg  25 mg Oral Q6H    metoprolol (LOPRESSOR) injection 5 mg  5 mg IntraVENous Q5 Min PRN    dilTIAZem 100 mg in sodium chloride 0.9 % 100 mL infusion (ADD-Forman)  2.5-15 mg/hr IntraVENous Continuous    heparin (porcine) injection 4,000 Units  4,000 Units IntraVENous PRN    heparin (porcine) injection 2,000 Units  2,000 Units IntraVENous PRN    apixaban (ELIQUIS) tablet 5 mg  5 mg Oral BID    sodium chloride flush 0.9 % injection 5-40 mL  5-40 mL IntraVENous 2 times per day    sodium chloride flush 0.9 % injection 5-40 mL  5-40 mL IntraVENous PRN    0.9 % sodium chloride infusion   IntraVENous PRN    ondansetron (ZOFRAN-ODT) disintegrating tablet 4 mg  4 mg Oral Q8H PRN    Or    ondansetron (ZOFRAN) injection 4 mg  4 mg IntraVENous Q6H PRN    polyethylene glycol (GLYCOLAX) packet 17 g  17 g Oral Daily PRN    acetaminophen (TYLENOL) tablet 650 mg  650 mg Oral Q6H PRN    Or    acetaminophen (TYLENOL) suppository 650 mg  650 mg Rectal Q6H PRN    aspirin chewable tablet 81 mg  81 mg Oral Daily    atorvastatin (LIPITOR) tablet 40 mg  40 mg Oral QPM    latanoprost (XALATAN) 0.005 % ophthalmic solution 1 drop  1 drop Both Eyes Nightly    [Held by provider] furosemide (LASIX) tablet 20 mg  20 mg Oral Daily PRN    insulin lispro (HUMALOG) injection vial 0-8 Units  0-8 Units SubCUTAneous TID WC    insulin lispro (HUMALOG) injection vial 0-4 Units  0-4 Units SubCUTAneous Nightly    glucose chewable tablet 16 g  4 tablet Oral PRN    dextrose bolus 10% 125 mL  125 mL IntraVENous PRN    Or    dextrose bolus 10% 250 mL  250 mL IntraVENous PRN    glucagon (rDNA) injection 1 mg  1 mg SubCUTAneous PRN    dextrose 10 % infusion   IntraVENous Continuous PRN    cefTRIAXone (ROCEPHIN) 1,000 mg in sodium chloride 0.9 % 50 mL IVPB mini-bag  1,000 mg IntraVENous Q24H    budesonide (PULMICORT) nebulizer suspension 500 mcg  0.5 mg Nebulization BID    guaiFENesin (MUCINEX) extended release tablet 600 mg  600 mg Oral BID    benzonatate (TESSALON) capsule 100 mg  100 mg Oral TID PRN    [Held by provider] lisinopril (PRINIVIL;ZESTRIL) tablet 20 mg  20 mg Oral BID       Signed:  Serjio Metzger MD    Part of this note may have been written by using a voice dictation software. The note has been proof read but may still contain some grammatical/other typographical errors.

## 2022-11-19 NOTE — PROGRESS NOTES
[facility] CARDIOLOGY PROGRESS NOTE           11/19/2022 8:48 AM    Admit Date: 11/17/2022      Subjective: The patient is frustrated that his clinical status has not improved as quickly as he would like. He does not report angina, palpitations, or dyspnea. Afebrile overnight. He is on nasal cannula supplemental O2. ROS:  No obvious pertinent positives on review of systems except for what was outlined above. Objective:      Vitals:    11/18/22 2042 11/19/22 0037 11/19/22 0512 11/19/22 0746   BP: 132/80 107/72 105/72    Pulse: 100 90 89    Resp: 20 20 20    Temp: 97.5 °F (36.4 °C) 97.3 °F (36.3 °C) 97.3 °F (36.3 °C)    TempSrc: Oral Oral Oral    SpO2: 96% 94% 99% 93%   Weight:   235 lb 1.6 oz (106.6 kg)    Height:           Physical Exam:  General-No Acute Distress  Neck- supple, no JVD  CV-irregularly irregular, tachycardic, no RG  Lung- clear bilaterally  Abd- soft, nontender, nondistended  Ext- no edema bilaterally.   Skin- warm and dry    Data Review:   Recent Labs     11/18/22  0454 11/18/22  1459 11/19/22  0524     --  141   K 3.9  --  4.0   MG  --   --  2.3   BUN 13  --  22   WBC 4.5 9.5 8.3   HGB 14.7 14.8 14.6   HCT 44.8 44.7 45.1    246 233   INR 1.1 1.1  --        Lab Results   Component Value Date    CHOL 120 01/12/2022    CHOL 129 06/28/2021    CHOL 132 12/23/2020     Lab Results   Component Value Date    TRIG 195 (H) 01/12/2022    TRIG 191 (H) 06/28/2021    TRIG 205 (H) 12/23/2020     Lab Results   Component Value Date    HDL 43 01/12/2022    HDL 45 06/28/2021    HDL 46 12/23/2020     Lab Results   Component Value Date    LDLCALC 45 01/12/2022    LDLCALC 53 06/28/2021    LDLCALC 53 12/23/2020     Lab Results   Component Value Date    LABVLDL 31 09/23/2019    VLDL 32 01/12/2022    VLDL 31 06/28/2021    VLDL 33 12/23/2020     No results found for: [facility]     Lab Results   Component Value Date/Time     11/19/2022 05:24 AM     11/18/2022 04:54 AM     11/17/2022 03:26 PM    K 4.0 11/19/2022 05:24 AM    K 3.9 11/18/2022 04:54 AM    K 4.3 11/17/2022 03:26 PM     11/19/2022 05:24 AM     11/18/2022 04:54 AM     11/17/2022 03:26 PM    CO2 27 11/19/2022 05:24 AM    CO2 24 11/18/2022 04:54 AM    CO2 28 11/17/2022 03:26 PM    BUN 22 11/19/2022 05:24 AM    BUN 13 11/18/2022 04:54 AM    BUN 13 11/17/2022 03:26 PM    CREATININE 1.10 11/19/2022 05:24 AM    CREATININE 1.00 11/18/2022 04:54 AM    CREATININE 1.10 11/17/2022 03:26 PM    GLUCOSE 166 11/19/2022 05:24 AM    GLUCOSE 256 11/18/2022 04:54 AM    GLUCOSE 189 11/17/2022 03:26 PM    CALCIUM 10.1 11/19/2022 05:24 AM    CALCIUM 9.7 11/18/2022 04:54 AM    CALCIUM 10.0 11/17/2022 03:26 PM         Lab Results   Component Value Date    ALT 41 06/28/2021    ALT 49 (H) 12/23/2020    ALT 42 09/23/2019    AST 23 06/28/2021    AST 26 12/23/2020    AST 26 09/23/2019          Assessment/Plan:     1. CAD in native artery  2. Aortic valve disease  3. Atrial fibrillation with RVR  4. Pneumonia  5. COPD exacerbation    The patient is admitted with SOB. He is being treated for pneumonia. The patient developed AF with RVR during his admission and was started on Eliquis. Continue with aspirin and statin. He is receiving breathing treatments for a COPD exacerbation. He is on an IV diltiazem drip for rate control. The patient had a TTE in December 2021 that was noted to demonstrate a normal EF, moderate AI, mild MR, and mild AS.   Continue a rate control strategy with goal HF <110 BPM.    Sofia Velez MD

## 2022-11-20 ENCOUNTER — APPOINTMENT (OUTPATIENT)
Dept: GENERAL RADIOLOGY | Age: 72
DRG: 193 | End: 2022-11-20
Payer: MEDICARE

## 2022-11-20 ENCOUNTER — APPOINTMENT (OUTPATIENT)
Dept: NON INVASIVE DIAGNOSTICS | Age: 72
DRG: 193 | End: 2022-11-20
Payer: MEDICARE

## 2022-11-20 LAB
ANION GAP SERPL CALC-SCNC: 1 MMOL/L (ref 2–11)
BUN SERPL-MCNC: 19 MG/DL (ref 8–23)
CALCIUM SERPL-MCNC: 9.5 MG/DL (ref 8.3–10.4)
CHLORIDE SERPL-SCNC: 106 MMOL/L (ref 101–110)
CO2 SERPL-SCNC: 31 MMOL/L (ref 21–32)
CREAT SERPL-MCNC: 0.9 MG/DL (ref 0.8–1.5)
ECHO AO ASC DIAM: 3.9 CM
ECHO AO ASCENDING AORTA INDEX: 1.74 CM/M2
ECHO AO ROOT DIAM: 3.9 CM
ECHO AO ROOT INDEX: 1.74 CM/M2
ECHO AV AREA VTI: 2.2 CM2
ECHO AV AREA/BSA VTI: 1 CM2/M2
ECHO AV MEAN GRADIENT: 8 MMHG
ECHO AV MEAN VELOCITY: 1.3 M/S
ECHO AV PEAK GRADIENT: 13 MMHG
ECHO AV PEAK VELOCITY: 1.8 M/S
ECHO AV VELOCITY RATIO: 0.61
ECHO AV VTI: 29.2 CM
ECHO BSA: 2.32 M2
ECHO LA AREA 2C: 20.7 CM2
ECHO LA AREA 4C: 21.1 CM2
ECHO LA DIAMETER INDEX: 1.29 CM/M2
ECHO LA DIAMETER: 2.9 CM
ECHO LA MAJOR AXIS: 5.9 CM
ECHO LA MINOR AXIS: 5.5 CM
ECHO LA TO AORTIC ROOT RATIO: 0.74
ECHO LA VOL 2C: 63 ML (ref 18–58)
ECHO LA VOL 4C: 59 ML (ref 18–58)
ECHO LA VOL BP: 63 ML (ref 18–58)
ECHO LA VOL/BSA BIPLANE: 28 ML/M2 (ref 16–34)
ECHO LA VOLUME INDEX A2C: 28 ML/M2 (ref 16–34)
ECHO LA VOLUME INDEX A4C: 26 ML/M2 (ref 16–34)
ECHO LV E' LATERAL VELOCITY: 20 CM/S
ECHO LV E' SEPTAL VELOCITY: 10 CM/S
ECHO LV EDV A2C: 132 ML
ECHO LV EDV A4C: 109 ML
ECHO LV EDV INDEX A4C: 49 ML/M2
ECHO LV EDV NDEX A2C: 59 ML/M2
ECHO LV EJECTION FRACTION A4C: 51 %
ECHO LV EJECTION FRACTION BIPLANE: 51 % (ref 55–100)
ECHO LV ESV A2C: 59 ML
ECHO LV ESV A4C: 54 ML
ECHO LV ESV INDEX A2C: 26 ML/M2
ECHO LV ESV INDEX A4C: 24 ML/M2
ECHO LV FRACTIONAL SHORTENING: 32 % (ref 28–44)
ECHO LV INTERNAL DIMENSION DIASTOLE INDEX: 2.23 CM/M2
ECHO LV INTERNAL DIMENSION DIASTOLIC: 5 CM (ref 4.2–5.9)
ECHO LV INTERNAL DIMENSION SYSTOLIC INDEX: 1.52 CM/M2
ECHO LV INTERNAL DIMENSION SYSTOLIC: 3.4 CM
ECHO LV IVSD: 1.2 CM (ref 0.6–1)
ECHO LV MASS 2D: 220.3 G (ref 88–224)
ECHO LV MASS INDEX 2D: 98.3 G/M2 (ref 49–115)
ECHO LV POSTERIOR WALL DIASTOLIC: 1.1 CM (ref 0.6–1)
ECHO LV RELATIVE WALL THICKNESS RATIO: 0.44
ECHO LVOT AREA: 3.8 CM2
ECHO LVOT AV VTI INDEX: 0.58
ECHO LVOT DIAM: 2.2 CM
ECHO LVOT MEAN GRADIENT: 2 MMHG
ECHO LVOT PEAK GRADIENT: 5 MMHG
ECHO LVOT PEAK VELOCITY: 1.1 M/S
ECHO LVOT STROKE VOLUME INDEX: 28.8 ML/M2
ECHO LVOT SV: 64.6 ML
ECHO LVOT VTI: 17 CM
ECHO MV E DECELERATION TIME (DT): 205 MS
ECHO MV E VELOCITY: 1.08 M/S
ECHO MV E/E' LATERAL: 5.4
ECHO MV E/E' RATIO (AVERAGED): 8.1
ECHO MV E/E' SEPTAL: 10.8
ECHO PV ACCELERATION TIME (AT): 106 MS
ECHO PV MAX VELOCITY: 0.6 M/S
ECHO PV PEAK GRADIENT: 2 MMHG
ECHO RV BASAL DIMENSION: 3.8 CM
ECHO RV FREE WALL PEAK S': 11 CM/S
ECHO RV INTERNAL DIMENSION: 3.5 CM
ECHO RV TAPSE: 1.7 CM (ref 1.7–?)
GLUCOSE BLD STRIP.AUTO-MCNC: 156 MG/DL (ref 65–100)
GLUCOSE BLD STRIP.AUTO-MCNC: 157 MG/DL (ref 65–100)
GLUCOSE BLD STRIP.AUTO-MCNC: 160 MG/DL (ref 65–100)
GLUCOSE BLD STRIP.AUTO-MCNC: 169 MG/DL (ref 65–100)
GLUCOSE SERPL-MCNC: 177 MG/DL (ref 65–100)
LV EF: 53 %
LVEF MODALITY: ABNORMAL
MAGNESIUM SERPL-MCNC: 2.2 MG/DL (ref 1.8–2.4)
POTASSIUM SERPL-SCNC: 4.1 MMOL/L (ref 3.5–5.1)
SERVICE CMNT-IMP: ABNORMAL
SODIUM SERPL-SCNC: 138 MMOL/L (ref 133–143)

## 2022-11-20 PROCEDURE — 94761 N-INVAS EAR/PLS OXIMETRY MLT: CPT

## 2022-11-20 PROCEDURE — A4216 STERILE WATER/SALINE, 10 ML: HCPCS | Performed by: INTERNAL MEDICINE

## 2022-11-20 PROCEDURE — 6360000002 HC RX W HCPCS: Performed by: FAMILY MEDICINE

## 2022-11-20 PROCEDURE — 71045 X-RAY EXAM CHEST 1 VIEW: CPT

## 2022-11-20 PROCEDURE — 6370000000 HC RX 637 (ALT 250 FOR IP): Performed by: HOSPITALIST

## 2022-11-20 PROCEDURE — 2500000003 HC RX 250 WO HCPCS: Performed by: INTERNAL MEDICINE

## 2022-11-20 PROCEDURE — 94640 AIRWAY INHALATION TREATMENT: CPT

## 2022-11-20 PROCEDURE — 6370000000 HC RX 637 (ALT 250 FOR IP): Performed by: PHYSICIAN ASSISTANT

## 2022-11-20 PROCEDURE — 80048 BASIC METABOLIC PNL TOTAL CA: CPT

## 2022-11-20 PROCEDURE — 1100000003 HC PRIVATE W/ TELEMETRY

## 2022-11-20 PROCEDURE — 82962 GLUCOSE BLOOD TEST: CPT

## 2022-11-20 PROCEDURE — 99232 SBSQ HOSP IP/OBS MODERATE 35: CPT | Performed by: INTERNAL MEDICINE

## 2022-11-20 PROCEDURE — 2700000000 HC OXYGEN THERAPY PER DAY

## 2022-11-20 PROCEDURE — 36415 COLL VENOUS BLD VENIPUNCTURE: CPT

## 2022-11-20 PROCEDURE — 6370000000 HC RX 637 (ALT 250 FOR IP): Performed by: FAMILY MEDICINE

## 2022-11-20 PROCEDURE — 2580000003 HC RX 258: Performed by: INTERNAL MEDICINE

## 2022-11-20 PROCEDURE — 6360000004 HC RX CONTRAST MEDICATION: Performed by: INTERNAL MEDICINE

## 2022-11-20 PROCEDURE — 94760 N-INVAS EAR/PLS OXIMETRY 1: CPT

## 2022-11-20 PROCEDURE — 2580000003 HC RX 258: Performed by: FAMILY MEDICINE

## 2022-11-20 PROCEDURE — C8929 TTE W OR WO FOL WCON,DOPPLER: HCPCS

## 2022-11-20 PROCEDURE — 83735 ASSAY OF MAGNESIUM: CPT

## 2022-11-20 PROCEDURE — 93306 TTE W/DOPPLER COMPLETE: CPT | Performed by: INTERNAL MEDICINE

## 2022-11-20 RX ORDER — DIPHENHYDRAMINE HCL 25 MG
25 CAPSULE ORAL NIGHTLY PRN
Status: DISCONTINUED | OUTPATIENT
Start: 2022-11-20 | End: 2022-11-22 | Stop reason: HOSPADM

## 2022-11-20 RX ORDER — DIPHENHYDRAMINE HCL 25 MG
25 CAPSULE ORAL NIGHTLY PRN
Status: DISCONTINUED | OUTPATIENT
Start: 2022-11-21 | End: 2022-11-20

## 2022-11-20 RX ADMIN — DIPHENHYDRAMINE HYDROCHLORIDE 25 MG: 25 CAPSULE ORAL at 22:13

## 2022-11-20 RX ADMIN — CEFTRIAXONE 1000 MG: 1 INJECTION, POWDER, FOR SOLUTION INTRAMUSCULAR; INTRAVENOUS at 17:12

## 2022-11-20 RX ADMIN — SODIUM CHLORIDE 10 MG/HR: 900 INJECTION, SOLUTION INTRAVENOUS at 10:43

## 2022-11-20 RX ADMIN — SODIUM CHLORIDE, PRESERVATIVE FREE 10 ML: 5 INJECTION INTRAVENOUS at 20:57

## 2022-11-20 RX ADMIN — SODIUM CHLORIDE, PRESERVATIVE FREE 10 ML: 5 INJECTION INTRAVENOUS at 10:42

## 2022-11-20 RX ADMIN — ATORVASTATIN CALCIUM 40 MG: 40 TABLET, FILM COATED ORAL at 17:11

## 2022-11-20 RX ADMIN — SODIUM CHLORIDE 10 MG/HR: 900 INJECTION, SOLUTION INTRAVENOUS at 20:55

## 2022-11-20 RX ADMIN — APIXABAN 5 MG: 5 TABLET, FILM COATED ORAL at 10:42

## 2022-11-20 RX ADMIN — LATANOPROST 1 DROP: 50 SOLUTION OPHTHALMIC at 20:57

## 2022-11-20 RX ADMIN — BUDESONIDE 500 MCG: 0.5 INHALANT RESPIRATORY (INHALATION) at 07:32

## 2022-11-20 RX ADMIN — ASPIRIN 81 MG 81 MG: 81 TABLET ORAL at 10:42

## 2022-11-20 RX ADMIN — GUAIFENESIN 600 MG: 600 TABLET, EXTENDED RELEASE ORAL at 20:54

## 2022-11-20 RX ADMIN — BUDESONIDE 500 MCG: 0.5 INHALANT RESPIRATORY (INHALATION) at 19:16

## 2022-11-20 RX ADMIN — PERFLUTREN 0.45 ML: 6.52 INJECTION, SUSPENSION INTRAVENOUS at 10:56

## 2022-11-20 RX ADMIN — APIXABAN 5 MG: 5 TABLET, FILM COATED ORAL at 20:54

## 2022-11-20 RX ADMIN — GUAIFENESIN 600 MG: 600 TABLET, EXTENDED RELEASE ORAL at 10:42

## 2022-11-20 NOTE — PROGRESS NOTES
Hospitalist Progress Note   Admit Date:  2022  3:57 PM   Name:  Tracey Mckeon   Age:  74 y.o. Sex:  male  :  1950   MRN:  [id number]   Room:  [facility]/    Reason(s) for Admission: Acute respiratory failure ([facility] [address] [J96.00]  Hypoxia [R09.02]  COPD exacerbation [facility]) [J44.1]     Hospital Course & Interval History:    [facility] is a 75 y/o WM with a h/o DM2, NELLY on CPAP and COPD who was admitted to our service on  with acute hypoxemic respiratory failure. Had exposure to grandson with RSV. Labs unremarkable. CXR showed LLL atelectasis vs infiltrate. He apparently reported that his O2 was low at home, 86-88%, but hypoxemia has not been documented since presentation and he is on RA. He was started on ceftriaxone and azithromycin. D-dimer was elevated so he was empirically started on a heparin drip. RVP is negative. CT chest is negative for PE, notable really only for apparent atelectasis at the LLL. Heparin drip was subsequently discontinued due to lack of PE on imaging. Developed rapid atrial fibrillation that was not responsive to IV Lopressor or Cardizem. Transferred to 3rd floor and started on Cardizem drip and Eliquis and Cardiology consulted . Given a dose of IV Lasix on  for SOB with good urine output. Subjective/24hr Events (22):  Up to chair, still in Afib rates 90s to low 110s. Denies palpitations, SOB, N/V/D. Wife at bedside. Assessment & Plan:   # Atrial fibrillation with RVR              - Cardizem drip at 10mg/hr on . Con't Eliquis. Rates remain low 100s at rest. TTE is pending. Appreciate Cardiology help. # Acute hypoxemic respiratory failure 2/2 suspected LLL CAP              - RA sat 88%, tolerating 1-2L NC. CT chest neg for PE or gross consolidation, mostly atelectasis LLL. Con't IS, abx with EOT , wean O2 as able, budesonide nebs. RVP negative. - Gave 1 dose IV Lasix  with great uop, CXR  is unchanged.  Hopeful it will improve once afib improves. Will need a walk test prior to discharge. # HTN              - Well controlled overall. # DM2              - SSI. Hold orals. # COPD              - Currently compensated. # NELLY              - CPAP qhs    Discharge Planning: Home when able. Diet:  ADULT DIET; Regular; 4 carb choices (60 gm/meal); Low Fat/Low Chol/High Fiber/2 gm Na  DVT PPx: Eliquis  Code status: Full Code    Hospital Problems             Last Modified POA    * (Principal) Acute respiratory failure ([address] 11/17/2022 Yes    Left lower lobe pneumonia 11/17/2022 Yes    Atrial fibrillation ([address] 11/20/2022 Yes    COPD exacerbation ([address] 11/18/2022 Yes    Aortic valve disease 11/19/2022 Yes    CAD in native artery 11/19/2022 Yes    History of coronary artery stent placement 11/17/2022 Yes    Overview Signed 3/19/2022  5:41 AM by Juwan, [email or web address]     1994 RCA           HTN (hypertension), benign 11/17/2022 Yes    Mixed hyperlipidemia 11/17/2022 Yes    Type 2 diabetes with nephropathy ([address] 11/17/2022 Yes    NELLY on CPAP 11/17/2022 Yes    Chronic obstructive pulmonary disease ([address] 11/17/2022 Yes    PVD (peripheral vascular disease) ([facility] [address] 11/17/2022 Yes       Objective:   Patient Vitals for the past 24 hrs:   Temp Pulse Resp BP SpO2   11/20/22 1155 97.6 °F (36.4 °C) 95 14 123/88 95 %   11/20/22 0734 97.7 °F (36.5 °C) 96 15 128/77 96 %   11/20/22 0510 97.3 °F (36.3 °C) 82 18 124/77 95 %   11/20/22 0015 -- -- -- 119/77 --   11/20/22 0001 97.3 °F (36.3 °C) 89 18 123/80 93 %   11/19/22 2044 97.4 °F (36.3 °C) 88 18 138/87 96 %   11/19/22 1953 -- (!) 106 18 -- 95 %   11/19/22 1623 97.3 °F (36.3 °C) 65 17 (!) 131/57 93 %       Estimated body mass index is 32.09 kg/m² as calculated from the following:    Height as of this encounter: 5' 11\" (1.803 m). Weight as of this encounter: 230 lb 1.6 oz (104.4 kg).     Intake/Output Summary (Last 24 hours) at 11/20/2022 1422  Last data filed at 11/20/2022 0015  Gross per 24 hour   Intake -- Output 2800 ml   Net -2800 ml         Physical Exam:   Blood pressure 123/88, pulse 95, temperature 97.6 °F (36.4 °C), temperature source Oral, resp. rate 14, height 5' 11\" (1.803 m), weight 230 lb 1.6 oz (104.4 kg), SpO2 95 %. General:    Well nourished. No overt distress. Head:  Normocephalic, atraumatic  Eyes:  Sclerae appear normal. Pupils equally round. ENT:  Nares appear normal, no drainage. Moist oral mucosa  Neck:  No restricted ROM. Trachea midline. CV:   RRR. No m/r/g. No jugular venous distension. Lungs:   Rales L base. No wheezing, rhonchi. Respirations even, unlabored. 2L NC O2. Abdomen: Bowel sounds present. Soft, nontender, nondistended. Extremities: No cyanosis or clubbing. Trace b/l LE edema. Skin:     No rashes and normal coloration. Warm and dry. Neuro:  CN II-XII grossly intact. Sensation intact. A&Ox3  Psych:  Normal mood and affect.       I have reviewed ordered lab tests and independently visualized imaging below:    Recent Labs:  Recent Results (from the past 48 hour(s))   CBC    Collection Time: 11/18/22  2:59 PM   Result Value Ref Range    WBC 9.5 4.3 - 11.1 K/uL    RBC 4.43 4.23 - 5.6 M/uL    Hemoglobin 14.8 13.6 - 17.2 g/dL    Hematocrit 44.7 41.1 - 50.3 %    .9 82 - 102 FL    MCH 33.4 (H) 26.1 - 32.9 PG    MCHC 33.1 31.4 - 35.0 g/dL    RDW 14.1 11.9 - 14.6 %    Platelets 901 310 - 146 K/uL    MPV 10.0 9.4 - 12.3 FL    nRBC 0.00 0.0 - 0.2 K/uL   APTT    Collection Time: 11/18/22  2:59 PM   Result Value Ref Range    PTT 31.8 24.5 - 34.2 SEC   Protime-INR    Collection Time: 11/18/22  2:59 PM   Result Value Ref Range    Protime 14.6 (H) 12.6 - 14.3 sec    INR 1.1     POCT Glucose    Collection Time: 11/18/22  5:46 PM   Result Value Ref Range    POC Glucose 156 (H) 65 - 100 mg/dL    Performed by: Nasrin Ramirez    POCT Glucose    Collection Time: 11/18/22  8:41 PM   Result Value Ref Range    POC Glucose 186 (H) 65 - 100 mg/dL    Performed by: Gianfranco CARMONA Collection Time: 11/19/22  5:24 AM   Result Value Ref Range    WBC 8.3 4.3 - 11.1 K/uL    RBC 4.36 4.23 - 5.6 M/uL    Hemoglobin 14.6 13.6 - 17.2 g/dL    Hematocrit 45.1 41.1 - 50.3 %    .4 (H) 82 - 102 FL    MCH 33.5 (H) 26.1 - 32.9 PG    MCHC 32.4 31.4 - 35.0 g/dL    RDW 14.3 11.9 - 14.6 %    Platelets 305 415 - 411 K/uL    MPV 10.1 9.4 - 12.3 FL    nRBC 0.00 0.0 - 0.2 K/uL   Basic Metabolic Panel    Collection Time: 11/19/22  5:24 AM   Result Value Ref Range    Sodium 141 133 - 143 mmol/L    Potassium 4.0 3.5 - 5.1 mmol/L    Chloride 109 101 - 110 mmol/L    CO2 27 21 - 32 mmol/L    Anion Gap 5 2 - 11 mmol/L    Glucose 166 (H) 65 - 100 mg/dL    BUN 22 8 - 23 MG/DL    Creatinine 1.10 0.8 - 1.5 MG/DL    Est, Glom Filt Rate >60 >60 ml/min/1.73m2    Calcium 10.1 8.3 - 10.4 MG/DL   Magnesium    Collection Time: 11/19/22  5:24 AM   Result Value Ref Range    Magnesium 2.3 1.8 - 2.4 mg/dL   POCT Glucose    Collection Time: 11/19/22  7:21 AM   Result Value Ref Range    POC Glucose 145 (H) 65 - 100 mg/dL    Performed by: Sheron    POCT Glucose    Collection Time: 11/19/22  1:39 PM   Result Value Ref Range    POC Glucose 194 (H) 65 - 100 mg/dL    Performed by: Juno Beckwith    POCT Glucose    Collection Time: 11/19/22  5:02 PM   Result Value Ref Range    POC Glucose 198 (H) 65 - 100 mg/dL    Performed by: Kateryna Gupta    POCT Glucose    Collection Time: 11/19/22  8:57 PM   Result Value Ref Range    POC Glucose 171 (H) 65 - 100 mg/dL    Performed by: Rei    Basic Metabolic Panel w/ Reflex to MG    Collection Time: 11/20/22  3:30 AM   Result Value Ref Range    Sodium 138 133 - 143 mmol/L    Potassium 4.1 3.5 - 5.1 mmol/L    Chloride 106 101 - 110 mmol/L    CO2 31 21 - 32 mmol/L    Anion Gap 1 (L) 2 - 11 mmol/L    Glucose 177 (H) 65 - 100 mg/dL    BUN 19 8 - 23 MG/DL    Creatinine 0.90 0.8 - 1.5 MG/DL    Est, Glom Filt Rate >60 >60 ml/min/1.73m2    Calcium 9.5 8.3 - 10.4 MG/DL   Magnesium Collection Time: 11/20/22  3:30 AM   Result Value Ref Range    Magnesium 2.2 1.8 - 2.4 mg/dL   POCT Glucose    Collection Time: 11/20/22  8:22 AM   Result Value Ref Range    POC Glucose 156 (H) 65 - 100 mg/dL    Performed by: Charu Zuniga    Transthoracic echocardiogram (TTE) complete with contrast, bubble, strain, and 3D PRN    Collection Time: 11/20/22 10:56 AM   Result Value Ref Range    LV EDV A2C 132 mL    LV EDV A4C 109 mL    LV ESV A2C 59 mL    LV ESV A4C 54 mL    IVSd 1.2 (A) 0.6 - 1.0 cm    LVIDd 5.0 4.2 - 5.9 cm    LVIDs 3.4 cm    LVOT Diameter 2.0 cm    LVOT Mean Gradient 2 mmHg    LVOT VTI 17.0 cm    LVOT Peak Velocity 1.1 m/s    LVOT Peak Gradient 5 mmHg    LVPWd 1.1 (A) 0.6 - 1.0 cm    LV E' Lateral Velocity 20 cm/s    LV E' Septal Velocity 10 cm/s    LV Ejection Fraction A2C 56 %    LV Ejection Fraction A4C 51 %    EF BP 51 (A) 55 - 100 %    LVOT Area 3.1 cm2    LVOT SV 53.4 ml    LA Minor Axis 5.5 cm    LA Major Axis 5.9 cm    LA Area 2C 20.7 cm2    LA Area 4C 21.1 cm2    LA Volume 2C 63 (A) 18 - 58 mL    LA Volume 4C 59 (A) 18 - 58 mL    LA Volume BP 63 (A) 18 - 58 mL    LA Diameter 2.9 cm    AV Mean Velocity 1.3 m/s    AV Mean Gradient 8 mmHg    AV Mean Gradient 8 mmHg    AV VTI 29.2 cm    AV VTI 29.2 cm    AV Peak Velocity 1.8 m/s    AV Peak Gradient 13 mmHg    AV Area by VTI 1.8 cm2    AV Area by Peak Velocity 1.9 cm2    Aortic Root 3.9 cm    Ascending Aorta 3.9 cm    MV E Wave Deceleration Time 205.0 ms    MV E Velocity 1.08 m/s    PV .0 ms    PV Max Velocity 0.6 m/s    PV Peak Gradient 2 mmHg    RVIDd 3.5 cm    RV Basal Dimension 3.8 cm    RV Free Wall Peak S' 11 cm/s    TAPSE 1.7 1.7 cm    Body Surface Area 2.32 m2    Fractional Shortening 2D 32 28 - 44 %    LV ESV Index A4C 24 mL/m2    LV EDV Index A4C 49 mL/m2    LV ESV Index A2C 26 mL/m2    LV EDV Index A2C 59 mL/m2    LVIDd Index 2.23 cm/m2    LVIDs Index 1.52 cm/m2    LV RWT Ratio 0.44     LV Mass 2D 220.3 88 - 224 g    LV Mass 2D Index 98.3 49 - 115 g/m2    E/E' Ratio (Averaged) 8.10     E/E' Lateral 5.40     E/E' Septal 10.80     LA Volume Index BP 28 16 - 34 ml/m2    LVOT Stroke Volume Index 23.8 mL/m2    LA Volume Index 2C 28 16 - 34 mL/m2    LA Volume Index 4C 26 16 - 34 mL/m2    LA Size Index 1.29 cm/m2    LA/AO Root Ratio 0.74     Ao Root Index 1.74 cm/m2    Ascending Aorta Index 1.74 cm/m2    AV Velocity Ratio 0.61     ROSEMARY/BSA VTI 0.8 cm2/m2    ROSEMARY/BSA Peak Velocity 0.8 cm2/m2   POCT Glucose    Collection Time: 11/20/22 12:13 PM   Result Value Ref Range    POC Glucose 169 (H) 65 - 100 mg/dL    Performed by: Tamika Zuñiga          Other Studies:  XR CHEST 1 VIEW    Result Date: 11/20/2022  Portable chest: History: SOB, Pna Comparison: 11/17/2022 Findings: A single view of the chest was obtained at 1327 hours. There is a shallow inspiratory result. The cardiac silhouette is normal in size and configuration. There is mild left basilar atelectasis/infiltrate without significant change. There are no pleural effusions. The pulmonary vascularity is within normal limits. Mild left basilar atelectasis/infiltrate, unchanged.        Current Meds:  Current Facility-Administered Medications   Medication Dose Route Frequency    albuterol (PROVENTIL) nebulizer solution 2.5 mg  2.5 mg Nebulization Q4H PRN    [Held by provider] metoprolol tartrate (LOPRESSOR) tablet 25 mg  25 mg Oral Q6H    metoprolol (LOPRESSOR) injection 5 mg  5 mg IntraVENous Q5 Min PRN    dilTIAZem 100 mg in sodium chloride 0.9 % 100 mL infusion (ADD-Bennet)  2.5-15 mg/hr IntraVENous Continuous    heparin (porcine) injection 4,000 Units  4,000 Units IntraVENous PRN    heparin (porcine) injection 2,000 Units  2,000 Units IntraVENous PRN    apixaban (ELIQUIS) tablet 5 mg  5 mg Oral BID    sodium chloride flush 0.9 % injection 5-40 mL  5-40 mL IntraVENous 2 times per day    sodium chloride flush 0.9 % injection 5-40 mL  5-40 mL IntraVENous PRN    0.9 % sodium chloride infusion IntraVENous PRN    ondansetron (ZOFRAN-ODT) disintegrating tablet 4 mg  4 mg Oral Q8H PRN    Or    ondansetron (ZOFRAN) injection 4 mg  4 mg IntraVENous Q6H PRN    polyethylene glycol (GLYCOLAX) packet 17 g  17 g Oral Daily PRN    acetaminophen (TYLENOL) tablet 650 mg  650 mg Oral Q6H PRN    Or    acetaminophen (TYLENOL) suppository 650 mg  650 mg Rectal Q6H PRN    aspirin chewable tablet 81 mg  81 mg Oral Daily    atorvastatin (LIPITOR) tablet 40 mg  40 mg Oral QPM    latanoprost (XALATAN) 0.005 % ophthalmic solution 1 drop  1 drop Both Eyes Nightly    [Held by provider] furosemide (LASIX) tablet 20 mg  20 mg Oral Daily PRN    insulin lispro (HUMALOG) injection vial 0-8 Units  0-8 Units SubCUTAneous TID WC    insulin lispro (HUMALOG) injection vial 0-4 Units  0-4 Units SubCUTAneous Nightly    glucose chewable tablet 16 g  4 tablet Oral PRN    dextrose bolus 10% 125 mL  125 mL IntraVENous PRN    Or    dextrose bolus 10% 250 mL  250 mL IntraVENous PRN    glucagon (rDNA) injection 1 mg  1 mg SubCUTAneous PRN    dextrose 10 % infusion   IntraVENous Continuous PRN    cefTRIAXone (ROCEPHIN) 1,000 mg in sodium chloride 0.9 % 50 mL IVPB mini-bag  1,000 mg IntraVENous Q24H    budesonide (PULMICORT) nebulizer suspension 500 mcg  0.5 mg Nebulization BID    guaiFENesin (MUCINEX) extended release tablet 600 mg  600 mg Oral BID    benzonatate (TESSALON) capsule 100 mg  100 mg Oral TID PRN    [Held by provider] lisinopril (PRINIVIL;ZESTRIL) tablet 20 mg  20 mg Oral BID       Signed:  Valerio Sarkar MD    Part of this note may have been written by using a voice dictation software. The note has been proof read but may still contain some grammatical/other typographical errors.

## 2022-11-20 NOTE — PROGRESS NOTES
Patient had a 10 beat of V-tach while resting in bed and was asymptomatic. Notified Daryle Bake, MD, and received orders for STAT labs for potassium and magnesium. 0430 Potassium resulted at 4.1. Magnesium resulted 2.2.

## 2022-11-20 NOTE — PROGRESS NOTES
[facility] CARDIOLOGY PROGRESS NOTE           11/20/2022 8:41 AM    Admit Date: 11/17/2022      Subjective: The patient is bothered that his heart rate fluctuates. He does not report angina. Afebrile overnight. He does report the ability to cough up his \"congestion\". ROS:  No obvious pertinent positives on review of systems except for what was outlined above. Objective:      Vitals:    11/20/22 0015 11/20/22 0510 11/20/22 0515 11/20/22 0734   BP: 119/77 124/77     Pulse:  82  96   Resp:  18  15   Temp:  97.3 °F (36.3 °C)     TempSrc:  Oral     SpO2:  95%  96%   Weight:   230 lb 1.6 oz (104.4 kg)    Height:           Physical Exam:  General-No Acute Distress  Neck- supple, no JVD  CV-irregularly irregular no RG  Lung-bilateral crackles  Abd- soft, nontender, nondistended  Ext- no edema bilaterally.   Skin- warm and dry    Data Review:   Recent Labs     11/18/22  0454 11/18/22  1459 11/19/22  0524 11/20/22  0330     --  141 138   K 3.9  --  4.0 4.1   MG  --   --  2.3 2.2   BUN 13  --  22 19   WBC 4.5 9.5 8.3  --    HGB 14.7 14.8 14.6  --    HCT 44.8 44.7 45.1  --     246 233  --    INR 1.1 1.1  --   --        Lab Results   Component Value Date    CHOL 120 01/12/2022    CHOL 129 06/28/2021    CHOL 132 12/23/2020     Lab Results   Component Value Date    TRIG 195 (H) 01/12/2022    TRIG 191 (H) 06/28/2021    TRIG 205 (H) 12/23/2020     Lab Results   Component Value Date    HDL 43 01/12/2022    HDL 45 06/28/2021    HDL 46 12/23/2020     Lab Results   Component Value Date    LDLCALC 45 01/12/2022    LDLCALC 53 06/28/2021    LDLCALC 53 12/23/2020     Lab Results   Component Value Date    LABVLDL 31 09/23/2019    VLDL 32 01/12/2022    VLDL 31 06/28/2021    VLDL 33 12/23/2020     No results found for: [facility]     Lab Results   Component Value Date/Time     11/20/2022 03:30 AM     11/19/2022 05:24 AM     11/18/2022 04:54 AM    K 4.1 11/20/2022 03:30 AM    K 4.0 11/19/2022 05:24 AM    K 3.9 11/18/2022 04:54 AM     11/20/2022 03:30 AM     11/19/2022 05:24 AM     11/18/2022 04:54 AM    CO2 31 11/20/2022 03:30 AM    CO2 27 11/19/2022 05:24 AM    CO2 24 11/18/2022 04:54 AM    BUN 19 11/20/2022 03:30 AM    BUN 22 11/19/2022 05:24 AM    BUN 13 11/18/2022 04:54 AM    CREATININE 0.90 11/20/2022 03:30 AM    CREATININE 1.10 11/19/2022 05:24 AM    CREATININE 1.00 11/18/2022 04:54 AM    GLUCOSE 177 11/20/2022 03:30 AM    GLUCOSE 166 11/19/2022 05:24 AM    GLUCOSE 256 11/18/2022 04:54 AM    CALCIUM 9.5 11/20/2022 03:30 AM    CALCIUM 10.1 11/19/2022 05:24 AM    CALCIUM 9.7 11/18/2022 04:54 AM         Lab Results   Component Value Date    ALT 41 06/28/2021    ALT 49 (H) 12/23/2020    ALT 42 09/23/2019    AST 23 06/28/2021    AST 26 12/23/2020    AST 26 09/23/2019          Assessment/Plan:     1. CAD in native artery  2. Aortic valve disease  3. Atrial fibrillation, unspecified type  4. Pneumonia  5. COPD exacerbation     The patient is admitted with SOB. He is being treated for pneumonia. The patient developed AF with RVR during his admission and was started on Eliquis. Continue with aspirin and statin. He is on an IV diltiazem drip for rate control. The patient had a TTE in December 2021 that was noted to demonstrate a normal EF, moderate AI, mild MR, and mild AS. Continue a rate control strategy with goal HR <110 BPM.  Follow-up TTE. Consider rhythm control strategy with CHRIS +/- DCCV pending his clinical course, as his AF developed within the last 3 days.     Zonia Mejias MD

## 2022-11-21 LAB
ANION GAP SERPL CALC-SCNC: 3 MMOL/L (ref 2–11)
BUN SERPL-MCNC: 16 MG/DL (ref 8–23)
CALCIUM SERPL-MCNC: 9.9 MG/DL (ref 8.3–10.4)
CHLORIDE SERPL-SCNC: 105 MMOL/L (ref 101–110)
CO2 SERPL-SCNC: 29 MMOL/L (ref 21–32)
CREAT SERPL-MCNC: 0.9 MG/DL (ref 0.8–1.5)
GLUCOSE BLD STRIP.AUTO-MCNC: 130 MG/DL (ref 65–100)
GLUCOSE BLD STRIP.AUTO-MCNC: 157 MG/DL (ref 65–100)
GLUCOSE BLD STRIP.AUTO-MCNC: 160 MG/DL (ref 65–100)
GLUCOSE BLD STRIP.AUTO-MCNC: 167 MG/DL (ref 65–100)
GLUCOSE SERPL-MCNC: 162 MG/DL (ref 65–100)
MAGNESIUM SERPL-MCNC: 2.2 MG/DL (ref 1.8–2.4)
POTASSIUM SERPL-SCNC: 3.8 MMOL/L (ref 3.5–5.1)
SERVICE CMNT-IMP: ABNORMAL
SODIUM SERPL-SCNC: 137 MMOL/L (ref 133–143)

## 2022-11-21 PROCEDURE — 94640 AIRWAY INHALATION TREATMENT: CPT

## 2022-11-21 PROCEDURE — 2580000003 HC RX 258: Performed by: FAMILY MEDICINE

## 2022-11-21 PROCEDURE — 94761 N-INVAS EAR/PLS OXIMETRY MLT: CPT

## 2022-11-21 PROCEDURE — 6360000002 HC RX W HCPCS: Performed by: FAMILY MEDICINE

## 2022-11-21 PROCEDURE — 94760 N-INVAS EAR/PLS OXIMETRY 1: CPT

## 2022-11-21 PROCEDURE — 2580000003 HC RX 258: Performed by: INTERNAL MEDICINE

## 2022-11-21 PROCEDURE — 6370000000 HC RX 637 (ALT 250 FOR IP): Performed by: INTERNAL MEDICINE

## 2022-11-21 PROCEDURE — 6370000000 HC RX 637 (ALT 250 FOR IP): Performed by: FAMILY MEDICINE

## 2022-11-21 PROCEDURE — 83735 ASSAY OF MAGNESIUM: CPT

## 2022-11-21 PROCEDURE — 6370000000 HC RX 637 (ALT 250 FOR IP): Performed by: PHYSICIAN ASSISTANT

## 2022-11-21 PROCEDURE — 82962 GLUCOSE BLOOD TEST: CPT

## 2022-11-21 PROCEDURE — 2500000003 HC RX 250 WO HCPCS: Performed by: INTERNAL MEDICINE

## 2022-11-21 PROCEDURE — 2700000000 HC OXYGEN THERAPY PER DAY

## 2022-11-21 PROCEDURE — 36415 COLL VENOUS BLD VENIPUNCTURE: CPT

## 2022-11-21 PROCEDURE — 6370000000 HC RX 637 (ALT 250 FOR IP): Performed by: NURSE PRACTITIONER

## 2022-11-21 PROCEDURE — 80048 BASIC METABOLIC PNL TOTAL CA: CPT

## 2022-11-21 PROCEDURE — 99232 SBSQ HOSP IP/OBS MODERATE 35: CPT | Performed by: INTERNAL MEDICINE

## 2022-11-21 PROCEDURE — 1100000003 HC PRIVATE W/ TELEMETRY

## 2022-11-21 RX ORDER — DILTIAZEM HYDROCHLORIDE 180 MG/1
180 CAPSULE, COATED, EXTENDED RELEASE ORAL DAILY
Status: DISCONTINUED | OUTPATIENT
Start: 2022-11-21 | End: 2022-11-22

## 2022-11-21 RX ORDER — DILTIAZEM HYDROCHLORIDE 60 MG/1
60 TABLET, FILM COATED ORAL ONCE
Status: COMPLETED | OUTPATIENT
Start: 2022-11-21 | End: 2022-11-21

## 2022-11-21 RX ORDER — LISINOPRIL 20 MG/1
20 TABLET ORAL DAILY
Status: DISCONTINUED | OUTPATIENT
Start: 2022-11-22 | End: 2022-11-22 | Stop reason: HOSPADM

## 2022-11-21 RX ADMIN — GUAIFENESIN 600 MG: 600 TABLET, EXTENDED RELEASE ORAL at 08:00

## 2022-11-21 RX ADMIN — SODIUM CHLORIDE, PRESERVATIVE FREE 5 ML: 5 INJECTION INTRAVENOUS at 08:01

## 2022-11-21 RX ADMIN — APIXABAN 5 MG: 5 TABLET, FILM COATED ORAL at 08:00

## 2022-11-21 RX ADMIN — SODIUM CHLORIDE 10 MG/HR: 900 INJECTION, SOLUTION INTRAVENOUS at 05:26

## 2022-11-21 RX ADMIN — SODIUM CHLORIDE, PRESERVATIVE FREE 10 ML: 5 INJECTION INTRAVENOUS at 20:38

## 2022-11-21 RX ADMIN — DILTIAZEM HYDROCHLORIDE 180 MG: 180 CAPSULE, EXTENDED RELEASE ORAL at 08:00

## 2022-11-21 RX ADMIN — ATORVASTATIN CALCIUM 40 MG: 40 TABLET, FILM COATED ORAL at 17:33

## 2022-11-21 RX ADMIN — DILTIAZEM HYDROCHLORIDE 60 MG: 60 TABLET, FILM COATED ORAL at 22:42

## 2022-11-21 RX ADMIN — ASPIRIN 81 MG 81 MG: 81 TABLET ORAL at 08:00

## 2022-11-21 RX ADMIN — APIXABAN 5 MG: 5 TABLET, FILM COATED ORAL at 20:38

## 2022-11-21 RX ADMIN — CEFTRIAXONE 1000 MG: 1 INJECTION, POWDER, FOR SOLUTION INTRAMUSCULAR; INTRAVENOUS at 17:33

## 2022-11-21 RX ADMIN — LATANOPROST 1 DROP: 50 SOLUTION OPHTHALMIC at 20:38

## 2022-11-21 RX ADMIN — BUDESONIDE 500 MCG: 0.5 INHALANT RESPIRATORY (INHALATION) at 07:38

## 2022-11-21 RX ADMIN — GUAIFENESIN 600 MG: 600 TABLET, EXTENDED RELEASE ORAL at 20:38

## 2022-11-21 NOTE — PROGRESS NOTES
Physician Progress Note      PATIENT:               Marlena Murillo  CSN #:                  [id number]  :                       1950  ADMIT DATE:       2022 3:57 PM  [address] [place] DATE:  Tilda Face  PROVIDER #:        Patrick Boeck MD          QUERY TEXT:    Patient admitted with Acute respiratory failure, noted to have atrial   fibrillation and is maintained on eliquis. If possible, please document in   progress notes and discharge summary if you are evaluating and/or treating any   of the following: The medical record reflects the following:  Risk Factors: 74 y.o. male who presented to the ED for cc SOB for the past two   days after coming into contact with grandson who had RSV. During ambulation,   was found to sat in the mid 80s. Hx of DM type II, NELLY on CPAP, leg edema,   COPD. Clinical Indicators: Documented A Fib with RVR  Treatment: Eliquis      Thank you,  Julia Kitchen RN, BSN, CDI  [email or web address]  . Options provided:  -- Secondary hypercoagulable state in a patient with atrial fibrillation  -- Other - I will add my own diagnosis  -- Disagree - Not applicable / Not valid  -- Disagree - Clinically unable to determine / Unknown  -- Refer to Clinical Documentation Reviewer    PROVIDER RESPONSE TEXT:    This patient has secondary hypercoagulable state in a patient with atrial   fibrillation. Query created by: Juana Orellana on 2022 12:10 PM      Electronically signed by:   Patrick Boeck MD 2022 12:14 PM

## 2022-11-21 NOTE — PROGRESS NOTES
Oxygen Qualifier       Room air: SpO2 with O2 and liter flow   Resting SpO2  92%     Ambulating SpO2  90%      Pt ambulated on RA with mild SOB noted and maintained adequate SPO2 during exercise. Completed by:  DEONTE Weinstein

## 2022-11-21 NOTE — PROGRESS NOTES
Bedside and Verbal shift change report given to Jordis Soulier, RN (oncoming nurse) by self Anusha ellison). Report included the following information Nurse Handoff Report, Index, Intake/Output, MAR, Recent Results, and Cardiac Rhythm Afib .

## 2022-11-21 NOTE — PROGRESS NOTES
Bedside and Verbal shift change report given to self (oncoming nurse) by Arvind Tovar RN (offgoing nurse). Report included the following information Nurse Handoff Report, Index, Intake/Output, MAR, Recent Results, and Cardiac Rhythm Afib . Cardizem gtt verified per MAR.

## 2022-11-21 NOTE — PROGRESS NOTES
Physician Progress Note      PATIENT:               Courtney Mayer  CSN #:                  [id number]  :                       1950  ADMIT DATE:       2022 3:57 PM  [address] [place] DATE:  Abi Herman  PROVIDER #:        Rah Delcid MD          QUERY TEXT:    Patient admitted with acute respiratory failure with hypoxia, noted to have   atrial fibrillation. If possible, please document in progress notes and   discharge summary further specificity regarding the type of atrial   fibrillation: The medical record reflects the following:  Risk Factors: 74 y.o. male who presented to the ED for cc SOB for the past two   days after coming into contact with grandson who had RSV. During ambulation,   was found to sat in the mid 80s. Hx of DM type II, NELLY on CPAP, leg edema,   COPD. Clinical Indicators: Documented A Fib with RVR \"appears new onset; no prior   documentation\"  Treatment: Cardizem gtt, eliquis    Chronic: nonspecific term that could be referring to paroxysmal, persistent,   or permanent  Longstanding persistent: persistent and continuous, lasting > 1 year. Paroxysmal - self-terminating or intermittent; resolves with or without   intervention within 7 days of onset; may recur with various frequency. Persistent - Fails to terminate within 7 days; Often requires meds or   cardioversion to restore to NSR. Permanent - longstanding & persistent; Medication has been ineffective in   restoring NSR &/or cardioversion is contraindicated    Definitions per MS-DR[employer] Training Guide and Quick Reference Guide, Rajni Johnsonmar 112 5   Diseases and Disorders of the Circulatory System; 2019; [employer]. Software content   from the [employer]? Advanced CDI Transformation Program    Thank you,  Julia Kitchen RN, BSN, ARNULFO [email or web address]  .   Options provided:  -- Paroxysmal Atrial Fibrillation  -- Longstanding Persistent Atrial Fibrillation  -- Permanent Atrial Fibrillation  -- Persistent Atrial Fibrillation  -- Chronic Atrial Fibrillation, unspecified  -- Other - I will add my own diagnosis  -- Disagree - Not applicable / Not valid  -- Disagree - Clinically unable to determine / Unknown  -- Refer to Clinical Documentation Reviewer    PROVIDER RESPONSE TEXT:    This patient has persistent atrial fibrillation. Query created by: Verba Heimlich on 11/21/2022 12:14 PM      Electronically signed by:   Sanchez Sampson MD 11/21/2022 12:21 PM

## 2022-11-21 NOTE — PROGRESS NOTES
Hospitalist Progress Note   Admit Date:  2022  3:57 PM   Name:  Pino Mckeon   Age:  74 y.o. Sex:  male  :  1950   MRN:  [id number]   Room:  [facility]/    Reason(s) for Admission: Acute respiratory failure ([facility] [address] [J96.00]  Hypoxia [R09.02]  COPD exacerbation [facility]) [J44.1]     Hospital Course & Interval History:   Mr. Jose Garcia is a 75 y/o WM with a h/o DM2, NELLY on CPAP and COPD who was admitted to our service on  with acute hypoxemic respiratory failure. Had exposure to grandson with RSV. Labs unremarkable. CXR showed LLL atelectasis vs infiltrate. He apparently reported that his O2 was low at home, 86-88%, but hypoxemia has not been documented since presentation and he is on RA. He was started on ceftriaxone and azithromycin. D-dimer was elevated so he was empirically started on a heparin drip. RVP is negative. CT chest is negative for PE, notable really only for apparent atelectasis at the LLL. Heparin drip was subsequently discontinued due to lack of PE on imaging. Developed rapid atrial fibrillation that was not responsive to IV Lopressor or Cardizem. Transferred to 3rd floor and started on Cardizem drip and Eliquis and Cardiology consulted . Given a dose of IV Lasix on  for SOB with good urine output. Transitioned off Cardizem drip to PO on . Subjective/24hr Events (22):  Up ambulating, on RA, rates much better, mostly 90s to low 100s now. Off Cadrizem drip, now on orals. No chest pain or SOB. Assessment & Plan:   # Atrial fibrillation with RVR              - Off drip and now on oral Cardizem, . Con't Eliquis. - Echo with normal EF, moderate aortic sclerosis with mild AR. Cardiology following. # Acute hypoxemic respiratory failure 2/2 suspected LLL CAP              - RA sat 88%, tolerating 1-2L NC. CT chest neg for PE or gross consolidation, mostly atelectasis LLL. Con't IS, abx EOT , RVP negative. On RA .  S/p 1 dose IV Lasix  with great uop, repeat CXR 11/20 was stable. Walk test today 11/21. # HTN              - Resume lisinopril on 11/22, reduce to 20mg daily. # DM2              - SSI. Hold orals. # COPD              - Currently compensated. # NELLY              - CPAP qhs    Discharge Planning: Home, hopefully tomorrow. Diet:  ADULT DIET; Regular; 4 carb choices (60 gm/meal); Low Fat/Low Chol/High Fiber/2 gm Na  DVT PPx: Eliquis  Code status: Full Code    Hospital Problems             Last Modified POA    * (Principal) Acute respiratory failure ([address] 11/17/2022 Yes    Left lower lobe pneumonia 11/17/2022 Yes    Atrial fibrillation ([address] 11/20/2022 Yes    COPD exacerbation ([address] 11/18/2022 Yes    Aortic valve disease 11/19/2022 Yes    CAD in native artery 11/19/2022 Yes    History of coronary artery stent placement 11/17/2022 Yes    Overview Signed 3/19/2022  5:41 AM by Juwan [email or web address]     1996 RCA           HTN (hypertension), benign 11/17/2022 Yes    Mixed hyperlipidemia 11/17/2022 Yes    Type 2 diabetes with nephropathy ([address] 11/17/2022 Yes    NELLY on CPAP 11/17/2022 Yes    Chronic obstructive pulmonary disease ([address] 11/17/2022 Yes    PVD (peripheral vascular disease) ([facility] [address] 11/17/2022 Yes       Objective:   Patient Vitals for the past 24 hrs:   Temp Pulse Resp BP SpO2   11/21/22 1153 97.3 °F (36.3 °C) 89 -- 126/85 91 %   11/21/22 0800 97.9 °F (36.6 °C) 90 16 135/71 91 %   11/21/22 0742 -- 78 17 -- 97 %   11/21/22 0738 -- 78 17 135/71 97 %   11/21/22 0410 97.8 °F (36.6 °C) 100 16 136/77 93 %   11/21/22 0051 97.5 °F (36.4 °C) 89 16 (!) 153/78 94 %   11/20/22 2112 98 °F (36.7 °C) 90 16 (!) 143/77 91 %   11/20/22 1918 -- -- -- -- 95 %   11/20/22 1515 97.7 °F (36.5 °C) 88 15 138/78 95 %       Estimated body mass index is 31.88 kg/m² as calculated from the following:    Height as of this encounter: 5' 11\" (1.803 m). Weight as of this encounter: 228 lb 9.6 oz (103.7 kg).     Intake/Output Summary (Last 24 hours) at 11/21/2022 [address] filed at 11/21/2022 1214  Gross per 24 hour   Intake 325 ml   Output 1250 ml   Net -925 ml         Physical Exam:   Blood pressure 126/85, pulse 89, temperature 97.3 °F (36.3 °C), resp. rate 16, height 5' 11\" (1.803 m), weight 228 lb 9.6 oz (103.7 kg), SpO2 91 %. General:    Well nourished. No overt distress. Head:  Normocephalic, atraumatic  Eyes:  Sclerae appear normal. Pupils equally round. ENT:  Nares appear normal, no drainage. Moist oral mucosa  Neck:  No restricted ROM. Trachea midline. CV:   Irreg irreg rhythm, rate 90s to low 100s on monitor. No m/r/g. No jugular venous distension. Lungs:   Mild rales L base. Clear otherwise. On RA O2. NAD. Abdomen: Bowel sounds present. Soft, nontender, nondistended. Extremities: No cyanosis or clubbing. No edema. Skin:     No rashes and normal coloration. Warm and dry. Neuro:  CN II-XII grossly intact. Sensation intact. A&Ox3  Psych:  Normal mood and affect.       I have reviewed ordered lab tests and independently visualized imaging below:    Recent Labs:  Recent Results (from the past 48 hour(s))   POCT Glucose    Collection Time: 11/19/22  1:39 PM   Result Value Ref Range    POC Glucose 194 (H) 65 - 100 mg/dL    Performed by: Idris Davila    POCT Glucose    Collection Time: 11/19/22  5:02 PM   Result Value Ref Range    POC Glucose 198 (H) 65 - 100 mg/dL    Performed by: Tr Durant    POCT Glucose    Collection Time: 11/19/22  8:57 PM   Result Value Ref Range    POC Glucose 171 (H) 65 - 100 mg/dL    Performed by: Rei    Basic Metabolic Panel w/ Reflex to MG    Collection Time: 11/20/22  3:30 AM   Result Value Ref Range    Sodium 138 133 - 143 mmol/L    Potassium 4.1 3.5 - 5.1 mmol/L    Chloride 106 101 - 110 mmol/L    CO2 31 21 - 32 mmol/L    Anion Gap 1 (L) 2 - 11 mmol/L    Glucose 177 (H) 65 - 100 mg/dL    BUN 19 8 - 23 MG/DL    Creatinine 0.90 0.8 - 1.5 MG/DL    Est, Glom Filt Rate >60 >60 ml/min/1.73m2    Calcium 9.5 8.3 - 10.4 MG/DL   Magnesium    Collection Time: 11/20/22  3:30 AM   Result Value Ref Range    Magnesium 2.2 1.8 - 2.4 mg/dL   POCT Glucose    Collection Time: 11/20/22  8:22 AM   Result Value Ref Range    POC Glucose 156 (H) 65 - 100 mg/dL    Performed by: Sonya Bauman    Transthoracic echocardiogram (TTE) complete with contrast, bubble, strain, and 3D PRN    Collection Time: 11/20/22 10:56 AM   Result Value Ref Range    LV EDV A2C 132 mL    LV EDV A4C 109 mL    LV ESV A2C 59 mL    LV ESV A4C 54 mL    IVSd 1.2 (A) 0.6 - 1.0 cm    LVIDd 5.0 4.2 - 5.9 cm    LVIDs 3.4 cm    LVOT Diameter 2.2 cm    LVOT Mean Gradient 2 mmHg    LVOT VTI 17.0 cm    LVOT Peak Velocity 1.1 m/s    LVOT Peak Gradient 5 mmHg    LVPWd 1.1 (A) 0.6 - 1.0 cm    LV E' Lateral Velocity 20 cm/s    LV E' Septal Velocity 10 cm/s    LV Ejection Fraction A4C 51 %    EF BP 51 (A) 55 - 100 %    LVOT Area 3.8 cm2    LVOT SV 64.6 ml    LA Minor Axis 5.5 cm    LA Major Axis 5.9 cm    LA Area 2C 20.7 cm2    LA Area 4C 21.1 cm2    LA Volume 2C 63 (A) 18 - 58 mL    LA Volume 4C 59 (A) 18 - 58 mL    LA Volume BP 63 (A) 18 - 58 mL    LA Diameter 2.9 cm    AV Mean Velocity 1.3 m/s    AV Mean Gradient 8 mmHg    AV VTI 29.2 cm    AV Peak Velocity 1.8 m/s    AV Peak Gradient 13 mmHg    AV Area by VTI 2.2 cm2    Aortic Root 3.9 cm    Ascending Aorta 3.9 cm    MV E Wave Deceleration Time 205.0 ms    MV E Velocity 1.08 m/s    PV .0 ms    PV Max Velocity 0.6 m/s    PV Peak Gradient 2 mmHg    RVIDd 3.5 cm    RV Basal Dimension 3.8 cm    RV Free Wall Peak S' 11 cm/s    TAPSE 1.7 1.7 cm    Body Surface Area 2.32 m2    Fractional Shortening 2D 32 28 - 44 %    LV ESV Index A4C 24 mL/m2    LV EDV Index A4C 49 mL/m2    LV ESV Index A2C 26 mL/m2    LV EDV Index A2C 59 mL/m2    LVIDd Index 2.23 cm/m2    LVIDs Index 1.52 cm/m2    LV RWT Ratio 0.44     LV Mass 2D 220.3 88 - 224 g    LV Mass 2D Index 98.3 49 - 115 g/m2    E/E' Ratio (Averaged) 8.10     E/E' Lateral 5.40 E/E' Septal 10.80     LA Volume Index BP 28 16 - 34 ml/m2    LVOT Stroke Volume Index 28.8 mL/m2    LA Volume Index 2C 28 16 - 34 mL/m2    LA Volume Index 4C 26 16 - 34 mL/m2    LA Size Index 1.29 cm/m2    LA/AO Root Ratio 0.74     Ao Root Index 1.74 cm/m2    Ascending Aorta Index 1.74 cm/m2    AV Velocity Ratio 0.61     ROSEMARY/BSA VTI 1.0 cm2/m2    LVOT:AV VTI Index 0.58     Left Ventricular Ejection Fraction 53     LVEF MODALITY ECHO    POCT Glucose    Collection Time: 11/20/22 12:13 PM   Result Value Ref Range    POC Glucose 169 (H) 65 - 100 mg/dL    Performed by: Courtney Lambert    POCT Glucose    Collection Time: 11/20/22  3:43 PM   Result Value Ref Range    POC Glucose 160 (H) 65 - 100 mg/dL    Performed by: Celena    POCT Glucose    Collection Time: 11/20/22  8:54 PM   Result Value Ref Range    POC Glucose 157 (H) 65 - 100 mg/dL    Performed by: Beto    Basic Metabolic Panel    Collection Time: 11/21/22  5:25 AM   Result Value Ref Range    Sodium 137 133 - 143 mmol/L    Potassium 3.8 3.5 - 5.1 mmol/L    Chloride 105 101 - 110 mmol/L    CO2 29 21 - 32 mmol/L    Anion Gap 3 2 - 11 mmol/L    Glucose 162 (H) 65 - 100 mg/dL    BUN 16 8 - 23 MG/DL    Creatinine 0.90 0.8 - 1.5 MG/DL    Est, Glom Filt Rate >60 >60 ml/min/1.73m2    Calcium 9.9 8.3 - 10.4 MG/DL   Magnesium    Collection Time: 11/21/22  5:25 AM   Result Value Ref Range    Magnesium 2.2 1.8 - 2.4 mg/dL   POCT Glucose    Collection Time: 11/21/22  7:37 AM   Result Value Ref Range    POC Glucose 160 (H) 65 - 100 mg/dL    Performed by: Hercules (Oliver)    POCT Glucose    Collection Time: 11/21/22 11:50 AM   Result Value Ref Range    POC Glucose 167 (H) 65 - 100 mg/dL    Performed by: Hercules (Oliver)          Other Studies:  XR CHEST 1 VIEW    Result Date: 11/20/2022  Portable chest: History: SOB, Pna Comparison: 11/17/2022 Findings: A single view of the chest was obtained at 1327 hours. There is a shallow inspiratory result. The cardiac silhouette is normal in size and configuration. There is mild left basilar atelectasis/infiltrate without significant change. There are no pleural effusions. The pulmonary vascularity is within normal limits. Mild left basilar atelectasis/infiltrate, unchanged. Transthoracic echocardiogram (TTE) complete with contrast, bubble, strain, and 3D PRN    Result Date: 11/20/2022    Left Ventricle: Low normal left ventricular systolic function with a visually estimated EF of 50 - 55%. Left ventricle size is normal. Mildly increased wall thickness. Aortic Valve: Not well visualized. Moderate sclerosis of the aortic valve cusp. Mild regurgitation. Aorta: Mildly dilated ascending aorta (3.9 cm).        Current Meds:  Current Facility-Administered Medications   Medication Dose Route Frequency    dilTIAZem (CARDIZEM CD) extended release capsule 180 mg  180 mg Oral Daily    [START ON 11/22/2022] lisinopril (PRINIVIL;ZESTRIL) tablet 20 mg  20 mg Oral Daily    diphenhydrAMINE (BENADRYL) capsule 25 mg  25 mg Oral Nightly PRN    albuterol (PROVENTIL) nebulizer solution 2.5 mg  2.5 mg Nebulization Q4H PRN    metoprolol (LOPRESSOR) injection 5 mg  5 mg IntraVENous Q5 Min PRN    apixaban (ELIQUIS) tablet 5 mg  5 mg Oral BID    sodium chloride flush 0.9 % injection 5-40 mL  5-40 mL IntraVENous 2 times per day    sodium chloride flush 0.9 % injection 5-40 mL  5-40 mL IntraVENous PRN    0.9 % sodium chloride infusion   IntraVENous PRN    ondansetron (ZOFRAN-ODT) disintegrating tablet 4 mg  4 mg Oral Q8H PRN    Or    ondansetron (ZOFRAN) injection 4 mg  4 mg IntraVENous Q6H PRN    polyethylene glycol (GLYCOLAX) packet 17 g  17 g Oral Daily PRN    acetaminophen (TYLENOL) tablet 650 mg  650 mg Oral Q6H PRN    Or    acetaminophen (TYLENOL) suppository 650 mg  650 mg Rectal Q6H PRN    aspirin chewable tablet 81 mg  81 mg Oral Daily    atorvastatin (LIPITOR) tablet 40 mg  40 mg Oral QPM    latanoprost (XALATAN) 0.005 % ophthalmic solution 1 drop  1 drop Both Eyes Nightly    [Held by provider] furosemide (LASIX) tablet 20 mg  20 mg Oral Daily PRN    insulin lispro (HUMALOG) injection vial 0-8 Units  0-8 Units SubCUTAneous TID WC    insulin lispro (HUMALOG) injection vial 0-4 Units  0-4 Units SubCUTAneous Nightly    glucose chewable tablet 16 g  4 tablet Oral PRN    dextrose bolus 10% 125 mL  125 mL IntraVENous PRN    Or    dextrose bolus 10% 250 mL  250 mL IntraVENous PRN    glucagon (rDNA) injection 1 mg  1 mg SubCUTAneous PRN    dextrose 10 % infusion   IntraVENous Continuous PRN    cefTRIAXone (ROCEPHIN) 1,000 mg in sodium chloride 0.9 % 50 mL IVPB mini-bag  1,000 mg IntraVENous Q24H    guaiFENesin (MUCINEX) extended release tablet 600 mg  600 mg Oral BID    benzonatate (TESSALON) capsule 100 mg  100 mg Oral TID PRN       Signed:  Eduard Gonzalez MD    Part of this note may have been written by using a voice dictation software. The note has been proof read but may still contain some grammatical/other typographical errors.

## 2022-11-21 NOTE — PROGRESS NOTES
[facility] CARDIOLOGY PROGRESS NOTE           11/21/2022 7:19 AM    Admit Date: 11/17/2022      Subjective: The patient is bothered that his heart rate fluctuates. He does not report angina. Afebrile overnight. He does report the ability to cough up his \"congestion\". 11/21/22  Patient remains in atrial fibrillation but is controlled on Cardizem drip. I believe we can segue to a rate control anticoagulation paradigm by transitioning to p.o. medicines    ROS:  No obvious pertinent positives on review of systems except for what was outlined above. Objective:      Vitals:    11/20/22 1918 11/20/22 2112 11/21/22 0051 11/21/22 0410   BP:  (!) 143/77 (!) 153/78 136/77   Pulse:  90 89 100   Resp:  16 16 16   Temp:  98 °F (36.7 °C) 97.5 °F (36.4 °C) 97.8 °F (36.6 °C)   TempSrc:  Oral     SpO2: 95% 91% 94% 93%   Weight:    228 lb 9.6 oz (103.7 kg)   Height:           Physical Exam:  General-No Acute Distress  Neck- supple, no JVD  CV-irregularly irregular no RG  Lung-bilateral crackles  Abd- soft, nontender, nondistended  Ext- no edema bilaterally.   Skin- warm and dry    Data Review:   Recent Labs     11/18/22  1459 11/19/22  0524 11/20/22  0330   NA  --  141 138   K  --  4.0 4.1   MG  --  2.3 2.2   BUN  --  22 19   WBC 9.5 8.3  --    HGB 14.8 14.6  --    HCT 44.7 45.1  --     233  --    INR 1.1  --   --          Lab Results   Component Value Date    CHOL 120 01/12/2022    CHOL 129 06/28/2021    CHOL 132 12/23/2020     Lab Results   Component Value Date    TRIG 195 (H) 01/12/2022    TRIG 191 (H) 06/28/2021    TRIG 205 (H) 12/23/2020     Lab Results   Component Value Date    HDL 43 01/12/2022    HDL 45 06/28/2021    HDL 46 12/23/2020     Lab Results   Component Value Date    LDLCALC 45 01/12/2022    LDLCALC 53 06/28/2021    LDLCALC 53 12/23/2020     Lab Results   Component Value Date    LABVLDL 31 09/23/2019    VLDL 32 01/12/2022    VLDL 31 06/28/2021    VLDL 33 12/23/2020     No results found for: [facility]     Lab Results   Component Value Date/Time     11/20/2022 03:30 AM     11/19/2022 05:24 AM     11/18/2022 04:54 AM    K 4.1 11/20/2022 03:30 AM    K 4.0 11/19/2022 05:24 AM    K 3.9 11/18/2022 04:54 AM     11/20/2022 03:30 AM     11/19/2022 05:24 AM     11/18/2022 04:54 AM    CO2 31 11/20/2022 03:30 AM    CO2 27 11/19/2022 05:24 AM    CO2 24 11/18/2022 04:54 AM    BUN 19 11/20/2022 03:30 AM    BUN 22 11/19/2022 05:24 AM    BUN 13 11/18/2022 04:54 AM    CREATININE 0.90 11/20/2022 03:30 AM    CREATININE 1.10 11/19/2022 05:24 AM    CREATININE 1.00 11/18/2022 04:54 AM    GLUCOSE 177 11/20/2022 03:30 AM    GLUCOSE 166 11/19/2022 05:24 AM    GLUCOSE 256 11/18/2022 04:54 AM    CALCIUM 9.5 11/20/2022 03:30 AM    CALCIUM 10.1 11/19/2022 05:24 AM    CALCIUM 9.7 11/18/2022 04:54 AM         Lab Results   Component Value Date    ALT 41 06/28/2021    ALT 49 (H) 12/23/2020    ALT 42 09/23/2019    AST 23 06/28/2021    AST 26 12/23/2020    AST 26 09/23/2019          Assessment/Plan:     1. CAD in native artery  2. Aortic valve disease  3. Atrial fibrillation, unspecified type  4. Pneumonia  5. COPD exacerbation     The patient is admitted with SOB. He is being treated for pneumonia. The patient developed AF with RVR during his admission and was started on Eliquis. Continue with aspirin and statin. He is on an IV diltiazem drip for rate control. The patient had a TTE in December 2021 that was noted to demonstrate a normal EF, moderate AI, mild MR, and mild AS. Continue a rate control strategy with goal HR <110 BPM.  Follow-up TTE. Transition to p.o. Cardizem and continue with Eliquis.   We will start Cardizem  mg p.o. daily and discontinue the Cardizem drsoraya Piedra MD

## 2022-11-21 NOTE — CARE COORDINATION
Pt admitted for acute respiratory failure. Pt lives with his spouse. Indep with all his ADLs. Drives. On RA, was previously on 2L. RT performed O2 qualifier and pt does not need home oxygen:    Oxygen Qualifier       Room air: SpO2 with O2 and liter flow   Resting SpO2  92%     Ambulating SpO2  90%        Pt ambulated on RA with mild SOB noted and maintained adequate SPO2 during exercise. Denies DME need; is active at home. Denies home care services. PCP confirmed. Insurance verified and able to afford home meds. No discharge needs identified but will remain available. 11/21/22 2342   Service Assessment   Patient Orientation Alert and Oriented   Cognition Alert   History Provided By Patient   Primary Caregiver Self   Support Systems Spouse/Significant Other;Children;Family Members;[employer] [employer];Friends/Neighbors   PCP Verified by CM Yes  (Brothers)   Prior Functional Level Independent in ADLs/IADLs   Current Functional Level Independent in ADLs/IADLs   Can patient return to prior living arrangement Yes   Ability to make needs known: Good   Family able to assist with home care needs: Yes   Would you like for me to discuss the discharge plan with any other family members/significant others, and if so, who? Yes   Financial Resources Medicare   Social/Functional History   Lives With Spouse   Type of Home House   ADL Assistance Independent   Ambulation Assistance Independent   Active  Yes   Mode of Transportation Car   Occupation Retired   Discharge Planning   Type of [address] Prior To Admission None   Potential Assistance Needed N/A   DME Ordered? No   Potential Assistance Purchasing Medications No   Type of Home Care Services None   Services At/After Discharge   Transition of Care Consult (CM Consult) Discharge [facility] [address] Discharge None   [place] Resource Information Provided?  No   Mode of Transport at Discharge Other (see comment)  (Family)   Confirm Follow Up Transport Family

## 2022-11-22 VITALS
HEART RATE: 117 BPM | WEIGHT: 227.7 LBS | TEMPERATURE: 97.8 F | HEIGHT: 71 IN | SYSTOLIC BLOOD PRESSURE: 141 MMHG | BODY MASS INDEX: 31.88 KG/M2 | RESPIRATION RATE: 18 BRPM | OXYGEN SATURATION: 90 % | DIASTOLIC BLOOD PRESSURE: 82 MMHG

## 2022-11-22 PROBLEM — J44.1 COPD EXACERBATION (HCC): Status: RESOLVED | Noted: 2022-11-18 | Resolved: 2022-11-22

## 2022-11-22 PROBLEM — J18.9 LEFT LOWER LOBE PNEUMONIA: Status: RESOLVED | Noted: 2022-11-17 | Resolved: 2022-11-22

## 2022-11-22 PROBLEM — J96.00 ACUTE RESPIRATORY FAILURE (HCC): Status: RESOLVED | Noted: 2022-11-17 | Resolved: 2022-11-22

## 2022-11-22 LAB
BACTERIA SPEC CULT: NORMAL
BACTERIA SPEC CULT: NORMAL
GLUCOSE BLD STRIP.AUTO-MCNC: 156 MG/DL (ref 65–100)
GLUCOSE BLD STRIP.AUTO-MCNC: 189 MG/DL (ref 65–100)
SERVICE CMNT-IMP: ABNORMAL
SERVICE CMNT-IMP: ABNORMAL
SERVICE CMNT-IMP: NORMAL
SERVICE CMNT-IMP: NORMAL

## 2022-11-22 PROCEDURE — 99232 SBSQ HOSP IP/OBS MODERATE 35: CPT | Performed by: INTERNAL MEDICINE

## 2022-11-22 PROCEDURE — 6370000000 HC RX 637 (ALT 250 FOR IP): Performed by: INTERNAL MEDICINE

## 2022-11-22 PROCEDURE — 2580000003 HC RX 258: Performed by: FAMILY MEDICINE

## 2022-11-22 PROCEDURE — 6370000000 HC RX 637 (ALT 250 FOR IP): Performed by: FAMILY MEDICINE

## 2022-11-22 PROCEDURE — 82962 GLUCOSE BLOOD TEST: CPT

## 2022-11-22 PROCEDURE — 6370000000 HC RX 637 (ALT 250 FOR IP): Performed by: PHYSICIAN ASSISTANT

## 2022-11-22 RX ORDER — DILTIAZEM HYDROCHLORIDE 240 MG/1
240 CAPSULE, COATED, EXTENDED RELEASE ORAL DAILY
Status: DISCONTINUED | OUTPATIENT
Start: 2022-11-22 | End: 2022-11-22 | Stop reason: HOSPADM

## 2022-11-22 RX ORDER — DILTIAZEM HYDROCHLORIDE 240 MG/1
240 CAPSULE, COATED, EXTENDED RELEASE ORAL DAILY
Qty: 30 CAPSULE | Refills: 1 | Status: SHIPPED | OUTPATIENT
Start: 2022-11-23

## 2022-11-22 RX ORDER — ENALAPRIL MALEATE 20 MG/1
20 TABLET ORAL DAILY
Qty: 1 TABLET | Refills: 0
Start: 2022-11-22

## 2022-11-22 RX ADMIN — LISINOPRIL 20 MG: 20 TABLET ORAL at 09:07

## 2022-11-22 RX ADMIN — SODIUM CHLORIDE, PRESERVATIVE FREE 10 ML: 5 INJECTION INTRAVENOUS at 09:06

## 2022-11-22 RX ADMIN — GUAIFENESIN 600 MG: 600 TABLET, EXTENDED RELEASE ORAL at 09:06

## 2022-11-22 RX ADMIN — DILTIAZEM HYDROCHLORIDE 240 MG: 240 CAPSULE, COATED, EXTENDED RELEASE ORAL at 09:07

## 2022-11-22 RX ADMIN — ASPIRIN 81 MG 81 MG: 81 TABLET ORAL at 09:06

## 2022-11-22 RX ADMIN — APIXABAN 5 MG: 5 TABLET, FILM COATED ORAL at 09:06

## 2022-11-22 NOTE — DISCHARGE INSTRUCTIONS
Atrial Fibrillation: Care Instructions  Your Care Instructions     Atrial fibrillation is an irregular and often fast heartbeat. Treating this condition is important for several reasons. It can cause blood clots, which can travel from your heart to your brain and cause a stroke. If you have a fast heartbeat, you may feel lightheaded, dizzy, and weak. An irregular heartbeat can also increase your risk for heart failure. Atrial fibrillation is often the result of another heart condition, such as high blood pressure or coronary artery disease. Making changes to improve your heart condition will help you stay healthy and active. Follow-up care is a key part of your treatment and safety. Be sure to make and go to all appointments, and call your doctor if you are having problems. It's also a good idea to know your test results and keep a list of the medicines you take. How can you care for yourself at home? Medicines    Take your medicines exactly as prescribed. Call your doctor if you think you are having a problem with your medicine. You will get more details on the specific medicines your doctor prescribes. If your doctor has given you a blood thinner to prevent a stroke, be sure you get instructions about how to take your medicine safely. Blood thinners can cause serious bleeding problems. Do not take any vitamins, over-the-counter drugs, or herbal products without talking to your doctor first.   Lifestyle changes    Do not smoke. Smoking can increase your chance of a stroke and heart attack. If you need help quitting, talk to your doctor about stop-smoking programs and medicines. These can increase your chances of quitting for good. Eat a heart-healthy diet. Stay at a healthy weight. Lose weight if you need to. Limit alcohol to 2 drinks a day for men and 1 drink a day for women. Too much alcohol can cause health problems. Avoid colds and flu. Get a pneumococcal vaccine shot.  If you have had one before, ask your doctor whether you need another dose. Get a flu shot every year. If you must be around people with colds or flu, wash your hands often. Activity    If your doctor recommends it, get more exercise. Walking is a good choice. Bit by bit, increase the amount you walk every day. Try for at least 30 minutes on most days of the week. You also may want to swim, bike, or do other activities. Your doctor may suggest that you join a cardiac rehabilitation program so that you can have help increasing your physical activity safely. Start light exercise if your doctor says it is okay. Even a small amount will help you get stronger, have more energy, and manage stress. Walking is an easy way to get exercise. Start out by walking a little more than you did in the hospital. Gradually increase the amount you walk. When you exercise, watch for signs that your heart is working too hard. You are pushing too hard if you cannot talk while you are exercising. If you become short of breath or dizzy or have chest pain, sit down and rest immediately. Check your pulse regularly. Place two fingers on the artery at the palm side of your wrist, in line with your thumb. If your heartbeat seems uneven or fast, talk to your doctor. When should you call for help? Call 911 anytime you think you may need emergency care. For example, call if:    You have symptoms of a heart attack. These may include:  Chest pain or pressure, or a strange feeling in the chest.  Sweating. Shortness of breath. Nausea or vomiting. Pain, pressure, or a strange feeling in the back, neck, jaw, or upper belly or in one or both shoulders or arms. Lightheadedness or sudden weakness. A fast or irregular heartbeat. After you call 911, the  may tell you to chew 1 adult-strength or 2 to 4 low-dose aspirin. Wait for an ambulance. Do not try to drive yourself. You have symptoms of a stroke.  These may include:  Sudden numbness, tingling, weakness, or loss of movement in your face, arm, or leg, especially on only one side of your body. Sudden vision changes. Sudden trouble speaking. Sudden confusion or trouble understanding simple statements. Sudden problems with walking or balance. A sudden, severe headache that is different from past headaches. You passed out (lost consciousness). Call your doctor now or seek immediate medical care if:    You have new or increased shortness of breath. You feel dizzy or lightheaded, or you feel like you may faint. Your heart rate becomes irregular. You can feel your heart flutter in your chest or skip heartbeats. Tell your doctor if these symptoms are new or worse. Watch closely for changes in your health, and be sure to contact your doctor if you have any problems. Where can you learn more? Go to [email or web address] and sign in to your [facility] account. Enter U020 in the [facility] box to learn more about \"Atrial Fibrillation: Care Instructions. \"     If you do not have an account, please click on the \"Sign Up Now\" link. Current as of: January 10, 2022               Content Version: 13.4  © 8498-0696 [employer] (acquired by [employer] in 2014). Care instructions adapted under license by [facility] ([facility]). If you have questions about a medical condition or this instruction, always ask your healthcare professional. [place] [address] any warranty or liability for your use of this information. Learning About Respiratory Failure  What is respiratory failure? Respiratory failure happens when a person's lungs can't get enough oxygen to the blood. This is a severe problem that may need to be treated in intensive care. Many organs such as the eyes, the brain, and the heart depend on a steady supply of oxygen they get from the blood. The doctor will try to get enough oxygen to those organs to keep them healthy.   Many things can cause lung failure. They include pneumonia and other serious infections. The doctor will look for the cause of the problem and then treat it if possible. How is it treated? To help your lungs get enough oxygen, your doctor may use a few devices. These vary in how much oxygen they give and how they help you breathe. They are:  A nasal cannula (say \"JONELLE-yuh-roberto\"). This is a thin tube with two prongs that fit just inside your nose. Or you may get a face mask. A special face mask that delivers more oxygen. There are different kinds. A face mask with a bag on one end is called a non-rebreather mask. A high-flow nasal cannula. It can warm and wet the oxygen it delivers, so getting high amounts of oxygen feels better. A face mask that gives you oxygen through a bilevel positive airway pressure (BPAP) machine. You may also hear this called BiPAP. It uses different air pressures when you breathe in and out. A ventilator that helps you breathe or that breathes for you. It controls how much air and oxygen flow into your lungs. This machine requires a breathing tube in your windpipe. It can be uncomfortable, so you may get medicine to help you relax or sleep. You also will get fluid through an intravenous (IV) tube. You will get regular tests to see how much oxygen is in your blood. Tests also can show how well the lungs are working. These tests help your doctor adjust the machines and the oxygen supply. The doctor will watch you closely. Current as of: March 9, 2022               Content Version: 13.4  © 2006-2022 Healthwise, [facility]. Care instructions adapted under license by [facility] ([facility] [place]). If you have questions about a medical condition or this instruction, always ask your healthcare professional. [place] [address] any warranty or liability for your use of this information.            diltiazem (oral/injection)  Pronunciation:  tuan harrison  Brand:  Cardizem, Cartia XT, Dilt-XR, Matzim LA, [place] XT, Tiadylt ER, Tiazac  What is the most important information I should know about diltiazem? You should not use diltiazem if you have very low blood pressure, a serious heart condition such as \"sick sinus syndrome\" or \"AV block\" (unless you have a pacemaker), or if you have recently had a heart attack and you have a build-up of fluid in your lungs. What is diltiazem? Diltiazem is a calcium channel blocker that is used to treat hypertension (high blood pressure) or angina (chest pain). Diltiazem injection is used to treat certain heart rhythm disorders such as atrial fibrillation or atrial flutter, or dangerously rapid heartbeats (tachycardia). Diltiazem may also be used for purposes not listed in this medication guide. What should I discuss with my healthcare provider before taking diltiazem? You should not use diltiazem if you are allergic to it, or if you have:  a serious heart condition such as \"sick sinus syndrome\" or \"AV block\" (unless you have a pacemaker);  very low blood pressure;  if you have recently had a heart attack and you have a build-up of fluid in your lungs. You may not be able to receive diltiazem injection if you have certain heart rhythm conditions. Your doctor will test you for these conditions. Tell your doctor if you have ever had:  congestive heart failure;  low blood pressure;  liver disease; or  kidney disease. It is not known whether this medicine will harm an unborn baby. Tell your doctor if you are pregnant or plan to become pregnant. You should not breastfeed while using this medicine. How should I take diltiazem? Follow all directions on your prescription label and read all medication guides or instruction sheets. Your doctor may occasionally change your dose. Use the medicine exactly as directed. Diltiazem oral is taken by mouth. Diltiazem injection is given as an infusion into a vein. A healthcare provider will give you this injection.  Your heart rate will be constantly monitored using an electrocardiograph or ECG (sometimes called an EKG). Your blood pressure and other vital signs will also be watched closely. Swallow the tablet or capsule whole and do not crush, chew, or break it. Your blood pressure will need to be checked often and you may need frequent blood tests. You may be given other heart or blood pressure medications to use, including nitroglycerin or a beta-blocker medicine (such as atenolol, carvedilol, metoprolol, propranolol, or sotalol). Use all medications as directed and read all medication guides you receive. Do not change your dose or dosing schedule without your doctor's advice. Keep using your medicine even if you feel well. High blood pressure often has no symptoms. You may need to use blood pressure medicine for the rest of your life. If you need surgery, tell your surgeon you currently use blood pressure medication. You should not stop taking diltiazem suddenly. Stopping suddenly may make your condition worse. Store at room temperature away from moisture, heat, and light. What happens if I miss a dose? Take the medicine as soon as you can, but skip the missed dose if it is almost time for your next dose. Do not take two doses at one time. What happens if I overdose? Seek emergency medical attention or call the Poison Help line at 1-628.408.1608. Overdose symptoms may include slow heartbeats or fainting. What should I avoid while taking diltiazem? Avoid drinking alcohol while taking diltiazem. Avoid taking an herbal supplement containing [place]'s wort. Avoid driving or hazardous activity until you know how this medicine will affect you. Your reactions could be impaired. What are the possible side effects of diltiazem?   Get emergency medical help if you have signs of an allergic reaction (hives, difficult breathing, swelling in your face or throat) or a severe skin reaction (fever, sore throat, burning eyes, skin pain, red or purple skin rash with blistering and peeling). Call your doctor at once if you have:  chest pain;  slow heartbeats;  pounding heartbeats or fluttering in your chest;  a light-headed feeling, like you might pass out; or  heart problems --swelling, rapid weight gain, feeling short of breath. Common side effects may include:  swelling;  dizziness, weakness;  headache;  nausea; or  rash. This is not a complete list of side effects and others may occur. Call your doctor for medical advice about side effects. You may report side effects to FDA at 5-554-FDA-1996. What other drugs will affect diltiazem? Sometimes it is not safe to use certain medications at the same time. Some drugs can affect your blood levels of other drugs you take, which may increase side effects or make the medications less effective. Many drugs can affect diltiazem. This includes prescription and over-the-counter medicines, vitamins, and herbal products. Not all possible interactions are listed here. Tell your doctor about all your current medicines and any medicine you start or stop using. Where can I get more information? Your pharmacist can provide more information about diltiazem. Remember, keep this and all other medicines out of the reach of children, never share your medicines with others, and use this medication only for the indication prescribed. Every effort has been made to ensure that the information provided by [address] is accurate, up-to-date, and complete, but no guarantee is made to that effect. Drug information contained herein may be time sensitive. [facility] information has been compiled for use by healthcare practitioners and consumers in the [place] and therefore [facility] does not warrant that uses outside of the [place] are appropriate, unless specifically indicated otherwise. [facility]'s drug information does not endorse drugs, diagnose patients or recommend therapy.  [facility] drug information is an informational resource designed to assist licensed healthcare practitioners in caring for their patients and/or to serve consumers viewing this service as a supplement to, and not a substitute for, the expertise, skill, knowledge and judgment of healthcare practitioners. The absence of a warning for a given drug or drug combination in no way should be construed to indicate that the drug or drug combination is safe, effective or appropriate for any given patient. [facility] does not assume any responsibility for any aspect of healthcare administered with the aid of information [facility] provides. The information contained herein is not intended to cover all possible uses, directions, precautions, warnings, drug interactions, allergic reactions, or adverse effects. If you have questions about the drugs you are taking, check with your doctor, nurse or pharmacist.  Copyright 6638-3936 [address] Version: 15.02. Revision date: 3/10/2021. Care instructions adapted under license by [facility] ([facility]). If you have questions about a medical condition or this instruction, always ask your healthcare professional. [place] [address] any warranty or liability for your use of this information. apixaban  Pronunciation:  a PIX a ban  Brand:  Eliquis  What is the most important information I should know about apixaban? Apixaban increases your risk of severe or fatal bleeding, especially if you take certain medicines at the same time (including some over-the-counter medicines). Tell your doctor about all medicines you have recently used. Call your doctor at once if you have signs of bleeding such as: easy bruising, unusual bleeding, unexpected pain or swelling, feeling very weak or dizzy, bleeding gums, nosebleeds, heavy menstrual bleeding, blood in your urine or stools, coughing up blood or vomit that looks like coffee grounds, or any bleeding that will not stop.   Apixaban can cause a very serious blood clot around your spinal cord that can lead to long-term or permanent paralysis. This type of blood clot can occur during a spinal tap or spinal anesthesia (epidural), especially if you have a genetic spinal defect, if you use a spinal catheter, if you've had spinal surgery or repeated spinal taps, or if you use other drugs that can affect blood clotting. Get emergency medical help if you have symptoms of a spinal cord blood clot such as tingling, numbness, or muscle weakness especially in your legs and feet. Do not stop taking apixaban unless your doctor tells you to. Stopping suddenly can increase your risk of blood clot or stroke. What is apixaban? Apixaban is used to lower the risk of stroke caused by a blood clot in people with a heart rhythm disorder called atrial fibrillation. Apixaban is also used after hip or knee replacement surgery to prevent a type of blood clot called deep vein thrombosis (DVT), which can lead to blood clots in the lungs (pulmonary embolism). Apixaban is also used to treat DVT or pulmonary embolism (PE), and to lower your risk of having a repeat DVT or PE. Apixaban may also be used for purposes not listed in this medication guide. What should I discuss with my healthcare provider before taking apixaban? You should not take apixaban if you are allergic to it, or if you have active bleeding from a surgery, injury, or other cause. Apixaban may cause you to bleed more easily, especially if you have a bleeding disorder that is inherited or caused by disease. Tell your doctor if you have an artificial heart valve, or if you have ever had:  bleeding problems;  antiphospholipid syndrome, especially if you have a triple positive antibody test; or  liver or kidney disease. Apixaban can cause a very serious blood clot around your spinal cord if you undergo a spinal tap or receive spinal anesthesia (epidural).  This type of blood clot could cause long-term paralysis, and may be more likely to occur if:    you have a spinal catheter in place or if a catheter has been recently removed;  you have a history of spinal surgery or repeated spinal taps;  you have recently had a spinal tap or epidural anesthesia;  you take aspirin or other NSAIDs (nonsteroidal anti-inflammatory drugs)--ibuprofen (Advil, Motrin), naproxen (Aleve), diclofenac, indomethacin, meloxicam, and others; or  you are using other medicines to treat or prevent blood clots. Taking apixaban may increase the risk of bleeding while you are pregnant or during your delivery. Tell your doctor if you are pregnant or plan to become pregnant. Do not breastfeed. How should I take apixaban? Follow all directions on your prescription label and read all medication guides or instruction sheets. Your doctor may occasionally change your dose. Use the medicine exactly as directed. You may take apixaban with or without food. If you cannot swallow a tablet whole, crush it and mix with water, apple juice, or applesauce. Swallow the mixture right away without chewing. A crushed tablet mixture may also be given through a nasogastric (NG) feeding tube. Read and carefully follow any Instructions for Use provided with your medicine. Apixaban can make it easier for you to bleed, even from a minor injury. Seek medical attention if you have bleeding that will not stop. Tell your doctor if you have a planned surgery or dental work. You may need to stop taking apixaban for a short time. Do not stop taking apixaban unless your doctor tells you to. If you stop taking apixaban for any reason, your doctor may prescribe another medicine to prevent blood clots. Store at room temperature away from moisture and heat. What happens if I miss a dose? Take the missed dose on the same day you remember it. Take your next dose at the regular time and stay on your twice-daily schedule. Do not take two doses at one time.   Get your prescription refilled before you run out of medicine completely. What happens if I overdose? Seek emergency medical attention or call the Poison Help line at 1-493.902.8673. What should I avoid while taking apixaban? Avoid activities that may increase your risk of bleeding or injury. Use extra care while shaving or brushing your teeth. What are the possible side effects of apixaban? Get emergency medical help if you have signs of an allergic reaction: hives; chest pain, wheezing, difficult breathing; feeling light-headed; swelling of your face, lips, tongue, or throat. Also seek emergency medical attention if you have symptoms of a spinal blood clot such as tingling, numbness, or muscle weakness especially in your legs and feet. Call your doctor at once if you have:  easy bruising, unusual bleeding (nose, mouth, vagina, or rectum), bleeding from wounds or needle injections, any bleeding that will not stop;  heavy menstrual bleeding;  headache, dizziness, weakness, feeling like you might pass out;  urine that looks red, pink, or brown; or  black or bloody stools, coughing up blood or vomit that looks like coffee grounds. This is not a complete list of side effects and others may occur. Call your doctor for medical advice about side effects. You may report side effects to FDA at 5-407-SGP-5711. What other drugs will affect apixaban? Sometimes it is not safe to use certain medications at the same time. Some drugs can affect your blood levels of other drugs you take, which may increase side effects or make the medications less effective. Many other drugs (including some over-the-counter medicines) can increase your risk of bleeding or blood clots.  Tell your doctor about all medicines you have recently used, especially:  any other medicines to treat or prevent blood clots;  a blood thinner such as heparin or warfarin (Coumadin, Jantoven);  an antidepressant; or  aspirin or other NSAID (nonsteroidal anti-inflammatory drug) used long term.  This list is not complete and many other drugs may affect apixaban. This includes prescription and over-the-counter medicines, vitamins, and herbal products. Not all possible drug interactions are listed here. Where can I get more information? Your pharmacist can provide more information about apixaban. Remember, keep this and all other medicines out of the reach of children, never share your medicines with others, and use this medication only for the indication prescribed. Every effort has been made to ensure that the information provided by [address] is accurate, up-to-date, and complete, but no guarantee is made to that effect. Drug information contained herein may be time sensitive. [facility] information has been compiled for use by healthcare practitioners and consumers in the [place] and therefore [facility] does not warrant that uses outside of the [place] are appropriate, unless specifically indicated otherwise. [facility]'s drug information does not endorse drugs, diagnose patients or recommend therapy. [facility] information is an informational resource designed to assist licensed healthcare practitioners in caring for their patients and/or to serve consumers viewing this service as a supplement to, and not a substitute for, the expertise, skill, knowledge and judgment of healthcare practitioners. The absence of a warning for a given drug or drug combination in no way should be construed to indicate that the drug or drug combination is safe, effective or appropriate for any given patient. [facility] does not assume any responsibility for any aspect of healthcare administered with the aid of information [facility] provides. The information contained herein is not intended to cover all possible uses, directions, precautions, warnings, drug interactions, allergic reactions, or adverse effects.  If you have questions about the drugs you are taking, check with your doctor, nurse or pharmacist.  Copyright 2429-5848 [employer]. Version: 6.01. Revision date: 8/24/2021. Care instructions adapted under license by [facility] ([facility]). If you have questions about a medical condition or this instruction, always ask your healthcare professional. [address] any warranty or liability for your use of this information.

## 2022-11-22 NOTE — PROGRESS NOTES
[facility], [place] CARDIOLOGY PROGRESS NOTE           11/22/2022 7:16 AM    Admit Date: 11/17/2022      Subjective:   Patient denies any palpitations or tachycardia. Remains in atrial fibrillation. Average rate 108 based on telemetry. Currently with heart rate of 102. Up in a chair. Off of oxygen and currently on room air. Feels like dyspnea has improved to baseline    ROS:  Cardiovascular:  As noted above    Objective:      Vitals:    11/21/22 2100 11/21/22 2242 11/22/22 0043 11/22/22 0441   BP: (!) 129/99 (!) 143/80 127/69 132/87   Pulse: 79  93 86   Resp: 18 18 18   Temp: 97.9 °F (36.6 °C)  97.6 °F (36.4 °C) 97.8 °F (36.6 °C)   TempSrc:       SpO2: 91%  91% 90%   Weight:    227 lb 11.2 oz (103.3 kg)   Height:           Physical Exam:  General-No Acute Distress  Neck- supple, no JVD  CV- IRIR  Lung- clear bilaterally  Abd- soft, nontender, nondistended  Ext- no edema bilaterally. Skin- warm and dry      Data Review:   Recent Labs     11/19/22  0524 11/18/22  1459 11/18/22  0454   WBC 8.3 9.5 4.5   HGB 14.6 14.8 14.7   HCT 45.1 44.7 44.8   .4* 100.9 100.0    246 225       Recent Labs     11/21/22  0525      K 3.8      CO2 29   BUN 16   CREATININE 0.90   GLUCOSE 162*   CALCIUM 9.9   LABGLOM >60       Recent Labs     11/17/22  1913   DDIMER 10.16*       Echo (11/18/22): Left Ventricle: Low normal left ventricular systolic function with a visually estimated EF of 50 - 55%. Left ventricle size is normal. Mildly increased wall thickness. Aortic Valve: Not well visualized. Moderate sclerosis of the aortic valve cusp. Mild regurgitation. Aorta: Mildly dilated ascending aorta (3.9 cm). Assessment/Plan:     Active Hospital Problems     CAD in native artery  No angina. Continue aspirin. Atrial fibrillation (HCC)  Increase Cardizem to 240 mg daily. Rate appears acceptable but not optimal.  Continue Eliquis.   From a cardiovascular standpoint, can follow-up in the office if his pulmonary issues are stable. We will arrange follow-up in 2 weeks. COPD exacerbation ([address]  Continue antibiotic therapy. *Acute respiratory failure (HCC)  Improved. Off of oxygen. Left lower lobe pneumonia  Antibiotic therapy per primary team.      HTN (hypertension), benign  Blood pressure acceptable. Monitor with adjusting Cardizem. Mixed hyperlipidemia  Continue Lipitor therapy.             Charlene Faulkner MD

## 2022-11-22 NOTE — DISCHARGE SUMMARY
Hospitalist Discharge Summary   Admit Date:  2022  3:57 PM   DC Note date: 2022  Name:  Sonam Theodore   Age:  74 y.o. Sex:  male  :  1950   MRN:  [id number]   Room:  [facility]  PCP:  Feliz Closs, DO    Presenting Complaint: Shortness of Breath     Initial Admission Diagnosis: Acute respiratory failure ([address] [J96.00]  Hypoxia [R09.02]  COPD exacerbation ([address] [J44.1]     Problem List for this Hospitalization (present on admission):    Principal Problem (Resolved):    Acute respiratory failure ([address]  Active Problems:    Atrial fibrillation ([address]    Aortic valve disease    CAD in native artery    History of coronary artery stent placement    HTN (hypertension), benign    Mixed hyperlipidemia    Type 2 diabetes with nephropathy (HCC)    NELLY on CPAP    Chronic obstructive pulmonary disease ([address]    PVD (peripheral vascular disease) ([address]  Resolved Problems:    Left lower lobe pneumonia    COPD exacerbation [facility])      Hospital Course:   [facility] is a 73 y/o WM with a h/o DM2, NELLY on CPAP and COPD who was admitted to our service on  with acute hypoxemic respiratory failure. Had exposure to grandson with RSV. Labs  were unremarkable. CXR showed LLL atelectasis vs infiltrate. He apparently reported that his O2 was low at home, 86-88%, but hypoxemia has not been documented since presentation and he is on RA. He was started on ceftriaxone and azithromycin. D-dimer was elevated so he was empirically started on a heparin drip. RVP was negative. CT chest was negative for PE, notable really only for apparent atelectasis at the LLL. Heparin drip was subsequently discontinued due to lack of PE on imaging. Developed rapid atrial fibrillation that was not responsive to IV Lopressor or Cardizem. He was transferred to 3rd floor and started on Cardizem drip and Eliquis and Cardiology consulted . He was given a dose of IV Lasix on  for SOB with good urine output.  Transitioned off Cardizem drip to oral on 11/21. His HR has improved. Ambulatory oximetry on 11/21 did not show a need for home O2. He has completed a course of antibiotics for presumed CAP. Echo showed normal EF with moderate aortic sclerosis and mild AR. He will have PCP and Cardiology follow up outpatient. He is medically stable for discharge home today with family. Disposition: Home  Diet: ADULT DIET; Regular; 4 carb choices (60 gm/meal); Low Fat/Low Chol/High Fiber/2 gm Na  Code Status: Full Code    Follow Ups:   Follow-up Information     [facility] CARDIOLOGY Follow up. Specialty: Cardiology  Why: Dr Fidel Romero - we will call pt with appt  Contact information:  [address] within [address] [address] [phone]                     Time spent in patient discharge and coordination 35 minutes. Follow up labs/diagnostics (ultimately defer to outpatient provider):  N/A    Plan was discussed with patient and wife, RN. All questions answered. Patient was stable at time of discharge. Instructions given to call a physician or return if any concerns.     Current Discharge Medication List        START taking these medications    Details   dilTIAZem (CARDIZEM CD) 240 MG extended release capsule Take 1 capsule by mouth daily  Qty: 30 capsule, Refills: 1      apixaban (ELIQUIS) 5 MG TABS tablet Take 1 tablet by mouth 2 times daily  Qty: 60 tablet, Refills: 0           CONTINUE these medications which have CHANGED    Details   enalapril (VASOTEC) 20 MG tablet Take 1 tablet by mouth daily  Qty: 1 tablet, Refills: 0    Associated Diagnoses: Other proteinuria           CONTINUE these medications which have NOT CHANGED    Details   STIOLTO RESPIMAT 2.5-2.5 MCG/ACT AERS USE 2 INHALATIONS BY MOUTH  DAILY  Qty: 12 g, Refills: 3    Comments: Requesting 1 year supply      metFORMIN (GLUCOPHAGE) 500 MG tablet Take 1 tablet by mouth 2 times daily (with meals) TAKE 1 TABLET BY MOUTH TWICE DAILY WITH MEALS  Qty: 180 tablet, Refills: 1 Associated Diagnoses: Type 2 diabetes with nephropathy (HCC)      furosemide (LASIX) 20 MG tablet Take 1 tablet by mouth daily as needed (Leg edema)  Qty: 90 tablet, Refills: 1    Associated Diagnoses: Bilateral leg edema      CPAP Machine MISC by Other route      aspirin 81 MG chewable tablet Take 81 mg by mouth daily      albuterol sulfate  (90 Base) MCG/ACT inhaler Inhale 2 puffs into the lungs every 6 hours as needed      atorvastatin (LIPITOR) 40 MG tablet Take 40 mg by mouth every evening      bimatoprost (LUMIGAN) 0.01 % SOLN ophthalmic drops Apply 1 drop to eye every evening      nitroGLYCERIN (NITROSTAT) 0.4 MG SL tablet Place 0.4 mg under the tongue             Procedures done this admission:  * No surgery found *    Consults this admission:  IP CONSULT TO SPIRITUAL SERVICES  IP CONSULT TO CARDIOLOGY    Echocardiogram results:  11/17/22    TRANSTHORACIC ECHOCARDIOGRAM (TTE) COMPLETE (CONTRAST/BUBBLE/3D PRN) 11/20/2022  5:00 PM, 11/20/2022 12:00 AM (Final)    Interpretation Summary    Left Ventricle: Low normal left ventricular systolic function with a visually estimated EF of 50 - 55%. Left ventricle size is normal. Mildly increased wall thickness. Aortic Valve: Not well visualized. Moderate sclerosis of the aortic valve cusp. Mild regurgitation. Aorta: Mildly dilated ascending aorta (3.9 cm). Signed by: Joe Chavira MD on 11/20/2022  5:00 PM, Signed by: Unknown Provider Result on 11/20/2022 12:00 AM      Diagnostic Imaging/Tests:   XR CHEST (2 VW)    Result Date: 11/17/2022  Left lower lobe airspace disease reflecting atelectasis or infiltrate. XR CHEST 1 VIEW    Result Date: 11/20/2022  Mild left basilar atelectasis/infiltrate, unchanged. CT CHEST PULMONARY EMBOLISM W CONTRAST    Result Date: 11/17/2022  1. No evidence for pulmonary embolism, or significant acute thoracic process otherwise to suggest a worrisome etiology for the patient's clinical symptoms.  Only atelectatic appearing changes are seen most evident at the left lung base where moderate basilar atelectasis is seen. Labs: Results:       BMP, Mg, Phos Recent Labs     11/20/22  0330 11/21/22  0525    137   K 4.1 3.8    105   CO2 31 29   ANIONGAP 1* 3   BUN 19 16   CREATININE 0.90 0.90   LABGLOM >60 >60   CALCIUM 9.5 9.9   GLUCOSE 177* 162*   MG 2.2 2.2      CBC No results for input(s): WBC, RBC, HGB, HCT, MCV, MCH, MCHC, RDW, PLT, MPV, NRBC, SEGS, LYMPHOPCT, EOSRELPCT, MONOPCT, BASOPCT, IMMGRAN, SEGSABS, LYMPHSABS, EOSABS, MONOSABS, BASOSABS, ABSIMMGRAN in the last 72 hours. LFT No results for input(s): BILITOT, BILIDIR, ALKPHOS, AST, ALT, PROT, LABALBU, GLOB in the last 72 hours.    Cardiac  Lab Results   Component Value Date/Time    NTPROBNP 121 11/17/2022 03:26 PM    TROPHS 13.6 11/17/2022 03:26 PM      Coags Lab Results   Component Value Date/Time    PROTIME 14.6 11/18/2022 02:59 PM    PROTIME 14.7 11/18/2022 04:54 AM    PROTIME 13.6 03/17/2022 09:41 AM    INR 1.1 11/18/2022 02:59 PM    INR 1.1 11/18/2022 04:54 AM    INR 1.0 03/17/2022 09:41 AM    APTT 31.8 11/18/2022 02:59 PM    APTT 34.2 11/18/2022 04:54 AM    APTT 39.3 03/17/2022 09:41 AM    APTT 37.2 11/11/2021 02:23 PM    APTT 36.2 09/13/2021 12:14 PM      A1c Lab Results   Component Value Date/Time    LABA1C 6.9 07/15/2022 09:15 AM    LABA1C 6.7 03/17/2022 09:41 AM    LABA1C 6.7 11/11/2021 02:23 PM     07/15/2022 09:15 AM     03/17/2022 09:41 AM     11/11/2021 02:23 PM      Lipids Lab Results   Component Value Date/Time    CHOL 120 01/12/2022 09:29 AM    LDLCALC 45 01/12/2022 09:29 AM    LABVLDL 31 09/23/2019 09:58 AM    HDL 43 01/12/2022 09:29 AM    TRIG 195 01/12/2022 09:29 AM      Thyroid  No results found for: Hailey Cardozo     Most Recent UA No results found for: [address], [facility], PROTEINU, GLUCOSEU, [address] [facility], [address] [facility], BLOODU, [address], NITRU, [address], [address] [facility] Марина Pandey, RBCUA, EPITHUA, BACTERIA, LABCAST, MUCUS     Recent Labs     11/17/22 1919 11/17/22 1913   CULTURE NO GROWTH 5 DAYS NO GROWTH 5 DAYS       All Labs from Last 24 Hrs:  Recent Results (from the past 24 hour(s))   POCT Glucose    Collection Time: 11/21/22  4:22 PM   Result Value Ref Range    POC Glucose 130 (H) 65 - 100 mg/dL    Performed by: Hercules (Oliver)    POCT Glucose    Collection Time: 11/21/22  9:45 PM   Result Value Ref Range    POC Glucose 157 (H) 65 - 100 mg/dL    Performed by: Beto    POCT Glucose    Collection Time: 11/22/22  7:27 AM   Result Value Ref Range    POC Glucose 156 (H) 65 - 100 mg/dL    Performed by: Harinder    POCT Glucose    Collection Time: 11/22/22 11:08 AM   Result Value Ref Range    POC Glucose 189 (H) 65 - 100 mg/dL    Performed by: Ron Guo        Allergies   Allergen Reactions    Shellfish-Derived Products Hives     Shrimp only       Immunization History   Administered Date(s) Administered    COVID-19, AstraZeneca, PF, 0.5 mL 11/01/2020, 12/01/2020    COVID-19, PFIZER PURPLE top, DILUTE for use, (age 15 y+), 30mcg/0.3mL 10/01/2021    Influenza, FLUZONE (age 72 y+), High Dose, 0.7mL 08/15/2020, 10/15/2022    Influenza, High Dose (Fluzone 65 yrs and older) 10/08/2018, 08/15/2020    Influenza, Triv, inactivated, subunit, adjuvanted, IM (Fluad 65 yrs and older) 09/23/2019    Pneumococcal Conjugate 13-valent ([id number]) 10/01/2019    Tdap (Boostrix, Adacel) 09/17/2020    Zoster Live (Zostavax) 07/03/2019    Zoster Recombinant (Shingrix) 07/03/2019, 10/01/2019, 10/15/2022       Recent Vital Data:  Patient Vitals for the past 24 hrs:   Temp Pulse Resp BP SpO2   11/22/22 1205 -- (!) 117 18 (!) 141/82 90 %   11/22/22 0821 97.8 °F (36.6 °C) 80 18 (!) 117/94 93 %   11/22/22 0441 97.8 °F (36.6 °C) 86 18 132/87 90 %   11/22/22 0043 97.6 °F (36.4 °C) 93 18 127/69 91 %   11/21/22 2242 -- -- -- (!) 143/80 --   11/21/22 2100 97.9 °F (36.6 °C) 79 18 (!) 129/99 91 %   11/21/22 1625 97.7 °F (36.5 °C) 91 18 (!) 145/86 93 %   11/21/22 1513 -- 97 18 -- 92 %       Oxygen Therapy  SpO2: 90 %  Pulse Oximeter Device Mode: Intermittent  O2 Device: None (Room air)  Skin Assessment: Clean, dry, & intact  Skin Protection for O2 Device: No  O2 Flow Rate (L/min): 2 L/min  Oxygen Therapy: Supplemental oxygen  O2 Delivery Method: Nasal cannula    Estimated body mass index is 31.76 kg/m² as calculated from the following:    Height as of this encounter: 5' 11\" (1.803 m). Weight as of this encounter: 227 lb 11.2 oz (103.3 kg). Intake/Output Summary (Last 24 hours) at 11/22/2022 1213  Last data filed at 11/22/2022 0410  Gross per 24 hour   Intake 690 ml   Output --   Net 690 ml         Physical Exam:  General:    Well nourished. No overt distress  Head:  Normocephalic, atraumatic  Eyes:  Sclerae appear normal.  Pupils equally round. HENT:  Nares appear normal, no drainage. Moist mucous membranes  Neck:  No restricted ROM. Trachea midline  CV:   Irreg irreg rhythm, rate 80s-90s. No m/r/g. No JVD  Lungs:   CTAB. No wheezing, rhonchi, or rales. Respirations even, unlabored. Abdomen:   Soft, nontender, nondistended. Extremities: Warm and dry. No cyanosis or clubbing. No edema. Skin:     No rashes. Normal coloration  Neuro:  CN II-XII grossly intact. Psych:  Normal mood and affect. Signed:  Christopher Hollis MD    Part of this note may have been written by using a voice dictation software. The note has been proof read but may still contain some grammatical/other typographical errors.

## 2022-11-22 NOTE — CARE COORDINATION
Discharge order is in. Pt is discharging home today in stable condition. On RA. No discharge needs identified. Tx goals met. 11/22/22 120 [facility] Discharge   Transition of Care Consult (CM Consult) Discharge [address] Discharge None   [place] Resource Information Provided? No   Mode of Transport at Discharge Other (see comment)  (Family)   Confirm Follow Up Transport Family   Condition of Participation: Discharge Planning   The Patient and/or Patient Representative was provided with a Choice of Provider? Patient   The Patient and/Or Patient Representative agree with the Discharge Plan? Yes   Freedom of Choice list was provided with basic dialogue that supports the patient's individualized plan of care/goals, treatment preferences, and shares the quality data associated with the providers?   Yes

## 2022-11-22 NOTE — PLAN OF CARE
Problem: Discharge Planning  Goal: Discharge to home or other facility with appropriate resources  Outcome: Progressing  Flowsheets (Taken 11/21/2022 2015)  Discharge to home or other facility with appropriate resources:   Identify barriers to discharge with patient and caregiver   Arrange for needed discharge resources and transportation as appropriate   Identify discharge learning needs (meds, wound care, etc)   Refer to discharge planning if patient needs post-hospital services based on physician order or complex needs related to functional status, cognitive ability or social support system     Problem: Skin/Tissue Integrity  Goal: Absence of new skin breakdown  Description: 1. Monitor for areas of redness and/or skin breakdown  2. Assess vascular access sites hourly  3. Every 4-6 hours minimum:  Change oxygen saturation probe site  4. Every 4-6 hours:  If on nasal continuous positive airway pressure, respiratory therapy assess nares and determine need for appliance change or resting period.   Outcome: Progressing     Problem: Safety - Adult  Goal: Free from fall injury  Outcome: Progressing  Flowsheets (Taken 11/22/2022 0629)  Free From Fall Injury: Instruct family/caregiver on patient safety     Problem: Pain  Goal: Verbalizes/displays adequate comfort level or baseline comfort level  Outcome: Progressing  Flowsheets (Taken 11/21/2022 2015)  Verbalizes/displays adequate comfort level or baseline comfort level:   Assess pain using appropriate pain scale   Administer analgesics based on type and severity of pain and evaluate response   Implement non-pharmacological measures as appropriate and evaluate response   Consider cultural and social influences on pain and pain management   Notify Licensed Independent Practitioner if interventions unsuccessful or patient reports new pain

## 2022-11-22 NOTE — PLAN OF CARE
Problem: Discharge Planning  Goal: Discharge to home or other facility with appropriate resources  11/22/2022 1300 by Deuce Dobbins RN  Outcome: Adequate for Discharge  11/22/2022 4896 by Troy Noe RN  Outcome: Progressing  Flowsheets (Taken 11/21/2022 2015)  Discharge to home or other facility with appropriate resources:   Identify barriers to discharge with patient and caregiver   Arrange for needed discharge resources and transportation as appropriate   Identify discharge learning needs (meds, wound care, etc)   Refer to discharge planning if patient needs post-hospital services based on physician order or complex needs related to functional status, cognitive ability or social support system     Problem: Skin/Tissue Integrity  Goal: Absence of new skin breakdown  Description: 1. Monitor for areas of redness and/or skin breakdown  2. Assess vascular access sites hourly  3. Every 4-6 hours minimum:  Change oxygen saturation probe site  4. Every 4-6 hours:  If on nasal continuous positive airway pressure, respiratory therapy assess nares and determine need for appliance change or resting period.   11/22/2022 1300 by Deuce Dobbins RN  Outcome: Adequate for Discharge  11/22/2022 1585 by Troy Noe RN  Outcome: Progressing     Problem: Safety - Adult  Goal: Free from fall injury  11/22/2022 1300 by Deuce Dobbins RN  Outcome: Adequate for Discharge  11/22/2022 5169 by Troy Noe RN  Outcome: Progressing  Flowsheets (Taken 11/22/2022 0629)  Free From Fall Injury: Instruct family/caregiver on patient safety     Problem: Pain  Goal: Verbalizes/displays adequate comfort level or baseline comfort level  11/22/2022 1300 by Deuce Dobbins RN  Outcome: Adequate for Discharge  11/22/2022 8936 by Troy Noe RN  Outcome: Progressing  Flowsheets (Taken 11/21/2022 2015)  Verbalizes/displays adequate comfort level or baseline comfort level:   Assess pain using appropriate pain scale   Administer analgesics based on type and severity of pain and evaluate response   Implement non-pharmacological measures as appropriate and evaluate response   Consider cultural and social influences on pain and pain management   Notify Licensed Independent Practitioner if interventions unsuccessful or patient reports new pain     Problem: Chronic Conditions and Co-morbidities  Goal: Patient's chronic conditions and co-morbidity symptoms are monitored and maintained or improved  11/22/2022 1300 by Lina Roldan RN  Outcome: Adequate for Discharge  11/22/2022 9252 by Diaz Pineda RN  Outcome: Progressing  [address] (Taken 11/21/2022 2015)  Care Plan - Patient's Chronic Conditions and Co-Morbidity Symptoms are Monitored and Maintained or Improved:   Monitor and assess patient's chronic conditions and comorbid symptoms for stability, deterioration, or improvement   Collaborate with multidisciplinary team to address chronic and comorbid conditions and prevent exacerbation or deterioration   Update acute care plan with appropriate goals if chronic or comorbid symptoms are exacerbated and prevent overall improvement and discharge     Problem: ABCDS Injury Assessment  Goal: Absence of physical injury  11/22/2022 1300 by Lina Roldan RN  Outcome: Adequate for Discharge  11/22/2022 8256 by Diaz Pineda RN  Outcome: Progressing  Flowsheets (Taken 11/22/2022 9423)  Absence of Physical Injury: Implement safety measures based on patient assessment

## 2022-11-22 NOTE — PROGRESS NOTES
I have discharged the patient, after further education the patient understands teaching. No further questions. Prescriptions and discharge summary given to patient.

## 2022-11-23 ENCOUNTER — TELEPHONE (OUTPATIENT)
Dept: CARDIOLOGY CLINIC | Age: 72
End: 2022-11-23

## 2022-11-23 NOTE — TELEPHONE ENCOUNTER
Spoke with patient regarding TC  Admitted 11-17-22  Discharge 11-22-22  DX Acute respiratory failure and afib    Patient is doing well, no SOB, still in afib, taking medications. All discharge instructions reviewed, patient voiced understanding.   Follow up 12-8-22 at 1145 [facility] with Dr. Emily Ferrera  Directions to [facility] office given//brendab

## 2022-12-05 ENCOUNTER — OFFICE VISIT (OUTPATIENT)
Dept: INTERNAL MEDICINE CLINIC | Facility: CLINIC | Age: 72
End: 2022-12-05
Payer: MEDICARE

## 2022-12-05 VITALS
DIASTOLIC BLOOD PRESSURE: 70 MMHG | HEIGHT: 71 IN | HEART RATE: 81 BPM | WEIGHT: 235 LBS | OXYGEN SATURATION: 92 % | SYSTOLIC BLOOD PRESSURE: 132 MMHG | BODY MASS INDEX: 32.9 KG/M2

## 2022-12-05 DIAGNOSIS — I10 HTN (HYPERTENSION), BENIGN: ICD-10-CM

## 2022-12-05 DIAGNOSIS — R60.0 BILATERAL LEG EDEMA: ICD-10-CM

## 2022-12-05 DIAGNOSIS — I48.91 ATRIAL FIBRILLATION, UNSPECIFIED TYPE (HCC): Primary | ICD-10-CM

## 2022-12-05 DIAGNOSIS — E11.21 TYPE 2 DIABETES WITH NEPHROPATHY (HCC): ICD-10-CM

## 2022-12-05 DIAGNOSIS — J44.9 CHRONIC OBSTRUCTIVE PULMONARY DISEASE, UNSPECIFIED COPD TYPE (HCC): ICD-10-CM

## 2022-12-05 LAB
ERYTHROCYTE [DISTWIDTH] IN BLOOD BY AUTOMATED COUNT: 14 % (ref 11.9–14.6)
EST. AVERAGE GLUCOSE BLD GHB EST-MCNC: 143 MG/DL
HBA1C MFR BLD: 6.6 % (ref 4.8–5.6)
HCT VFR BLD AUTO: 43.4 % (ref 41.1–50.3)
HGB BLD-MCNC: 14.2 G/DL (ref 13.6–17.2)
MCH RBC QN AUTO: 33.2 PG (ref 26.1–32.9)
MCHC RBC AUTO-ENTMCNC: 32.7 G/DL (ref 31.4–35)
MCV RBC AUTO: 101.4 FL (ref 82–102)
NRBC # BLD: 0 K/UL (ref 0–0.2)
PLATELET # BLD AUTO: 220 K/UL (ref 150–450)
PMV BLD AUTO: 10.7 FL (ref 9.4–12.3)
RBC # BLD AUTO: 4.28 M/UL (ref 4.23–5.6)
WBC # BLD AUTO: 3.7 K/UL (ref 4.3–11.1)

## 2022-12-05 PROCEDURE — 3078F DIAST BP <80 MM HG: CPT | Performed by: NURSE PRACTITIONER

## 2022-12-05 PROCEDURE — 99214 OFFICE O/P EST MOD 30 MIN: CPT | Performed by: NURSE PRACTITIONER

## 2022-12-05 PROCEDURE — 1123F ACP DISCUSS/DSCN MKR DOCD: CPT | Performed by: NURSE PRACTITIONER

## 2022-12-05 PROCEDURE — 3017F COLORECTAL CA SCREEN DOC REV: CPT | Performed by: NURSE PRACTITIONER

## 2022-12-05 PROCEDURE — 2022F DILAT RTA XM EVC RTNOPTHY: CPT | Performed by: NURSE PRACTITIONER

## 2022-12-05 PROCEDURE — 3074F SYST BP LT 130 MM HG: CPT | Performed by: NURSE PRACTITIONER

## 2022-12-05 PROCEDURE — 3044F HG A1C LEVEL LT 7.0%: CPT | Performed by: NURSE PRACTITIONER

## 2022-12-05 PROCEDURE — G8427 DOCREV CUR MEDS BY ELIG CLIN: HCPCS | Performed by: NURSE PRACTITIONER

## 2022-12-05 PROCEDURE — G8484 FLU IMMUNIZE NO ADMIN: HCPCS | Performed by: NURSE PRACTITIONER

## 2022-12-05 PROCEDURE — 1036F TOBACCO NON-USER: CPT | Performed by: NURSE PRACTITIONER

## 2022-12-05 PROCEDURE — 1111F DSCHRG MED/CURRENT MED MERGE: CPT | Performed by: NURSE PRACTITIONER

## 2022-12-05 PROCEDURE — 3023F SPIROM DOC REV: CPT | Performed by: NURSE PRACTITIONER

## 2022-12-05 PROCEDURE — G8417 CALC BMI ABV UP PARAM F/U: HCPCS | Performed by: NURSE PRACTITIONER

## 2022-12-05 ASSESSMENT — PATIENT HEALTH QUESTIONNAIRE - PHQ9
SUM OF ALL RESPONSES TO PHQ9 QUESTIONS 1 & 2: 0
SUM OF ALL RESPONSES TO PHQ QUESTIONS 1-9: 0
2. FEELING DOWN, DEPRESSED OR HOPELESS: 0
SUM OF ALL RESPONSES TO PHQ QUESTIONS 1-9: 0
1. LITTLE INTEREST OR PLEASURE IN DOING THINGS: 0
SUM OF ALL RESPONSES TO PHQ QUESTIONS 1-9: 0
SUM OF ALL RESPONSES TO PHQ QUESTIONS 1-9: 0

## 2022-12-05 NOTE — PROGRESS NOTES
Hemal Pace (:  1950) is a 74 y.o. male,Established patient, here for evaluation of the following chief complaint(s):  Atrial Fibrillation (Follow up /) and Medication Adjustment (20 mg was not doing the trick he double his dosage since he was still swelling really bad. Will need new Rx for 40 mg /)         ASSESSMENT/PLAN:  1. Atrial fibrillation, unspecified type (HCC)  -     TSH  -     Comprehensive Metabolic Panel; Future  -     CBC; Future  2. Type 2 diabetes with nephropathy (HCC)  -     Hemoglobin A1C; Future  3. HTN (hypertension), benign  -     TSH  4. Chronic obstructive pulmonary disease, unspecified COPD type ([facility]  5. Bilateral leg edema    No follow-ups on file. Reviewed hospital lab and imaging and d/c summary  Lungs clear  Pulse Readings from Last 3 Encounters:   22 81   22 (!) 117   22 88   Rate controlled  Continue eliquis  Keep f/u with cardiology  Recheck lab  Take 20mg lasix if able but 40mg if swelling worsening      Subjective   SUBJECTIVE/OBJECTIVE:  Patient is here for hospital follow up. He went to ER for shortness of breath. He was treated for possible CAP and the ER treated the pneumonia but then he developed A fib with RVR. He finished abx and was started on eliquis and diltiazem. Hs enalapril was decreased and he has tolerated the lower dose well. He had some increased leg swelling and increased his lasix to 40mg for the leg swelling x 1 week. He is still on 40mg at this time of lasix  Wt Readings from Last 3 Encounters:  22 : 235 lb (106.6 kg)  22 : 227 lb 11.2 oz (103.3 kg)  22 : 236 lb (107 kg)    His home bp and pulse are controlled, rate in the 90s    Atrial Fibrillation      Review of Systems       Objective   Physical Exam  Constitutional:       Appearance: Normal appearance. HENT:      Head: Normocephalic.       Right Ear: External ear normal.      Left Ear: External ear normal.   Eyes:      Extraocular Movements: Extraocular movements intact. Pupils: Pupils are equal, round, and reactive to light. Cardiovascular:      Rate and Rhythm: Normal rate. Rhythm irregular. Pulmonary:      Effort: Pulmonary effort is normal.      Breath sounds: Normal breath sounds. Musculoskeletal:      Cervical back: Neck supple. Right lower leg: No edema. Left lower leg: No edema. Skin:     General: Skin is warm and dry. Neurological:      General: No focal deficit present. Mental Status: He is alert and oriented to person, place, and time. Psychiatric:         Mood and Affect: Mood normal.         Behavior: Behavior normal.                An electronic signature was used to authenticate this note.     --DEB Bustamante - CNP

## 2022-12-06 LAB
ALBUMIN SERPL-MCNC: 3.7 G/DL (ref 3.2–4.6)
ALBUMIN/GLOB SERPL: 1.2 {RATIO} (ref 0.4–1.6)
ALP SERPL-CCNC: 97 U/L (ref 50–136)
ALT SERPL-CCNC: 54 U/L (ref 12–65)
ANION GAP SERPL CALC-SCNC: 3 MMOL/L (ref 2–11)
AST SERPL-CCNC: 27 U/L (ref 15–37)
BILIRUB SERPL-MCNC: 0.6 MG/DL (ref 0.2–1.1)
BUN SERPL-MCNC: 16 MG/DL (ref 8–23)
CALCIUM SERPL-MCNC: 9.7 MG/DL (ref 8.3–10.4)
CHLORIDE SERPL-SCNC: 108 MMOL/L (ref 101–110)
CO2 SERPL-SCNC: 27 MMOL/L (ref 21–32)
CREAT SERPL-MCNC: 1.2 MG/DL (ref 0.8–1.5)
GLOBULIN SER CALC-MCNC: 3.2 G/DL (ref 2.8–4.5)
GLUCOSE SERPL-MCNC: 185 MG/DL (ref 65–100)
POTASSIUM SERPL-SCNC: 4.5 MMOL/L (ref 3.5–5.1)
PROT SERPL-MCNC: 6.9 G/DL (ref 6.3–8.2)
SODIUM SERPL-SCNC: 138 MMOL/L (ref 133–143)
TSH, 3RD GENERATION: 2.08 UIU/ML (ref 0.36–3.74)

## 2022-12-08 ENCOUNTER — OFFICE VISIT (OUTPATIENT)
Dept: CARDIOLOGY CLINIC | Age: 72
End: 2022-12-08
Payer: MEDICARE

## 2022-12-08 VITALS
BODY MASS INDEX: 33.07 KG/M2 | HEART RATE: 101 BPM | DIASTOLIC BLOOD PRESSURE: 60 MMHG | HEIGHT: 71 IN | WEIGHT: 236.2 LBS | SYSTOLIC BLOOD PRESSURE: 136 MMHG

## 2022-12-08 DIAGNOSIS — Z01.818 ENCOUNTER FOR OTHER PREPROCEDURAL EXAMINATION: ICD-10-CM

## 2022-12-08 DIAGNOSIS — I35.8 OTHER NONRHEUMATIC AORTIC VALVE DISORDERS: Primary | ICD-10-CM

## 2022-12-08 DIAGNOSIS — I48.91 ATRIAL FIBRILLATION, UNSPECIFIED TYPE (HCC): ICD-10-CM

## 2022-12-08 DIAGNOSIS — E78.5 HYPERLIPIDEMIA, UNSPECIFIED HYPERLIPIDEMIA TYPE: ICD-10-CM

## 2022-12-08 DIAGNOSIS — I10 ESSENTIAL (PRIMARY) HYPERTENSION: ICD-10-CM

## 2022-12-08 PROCEDURE — 3017F COLORECTAL CA SCREEN DOC REV: CPT | Performed by: INTERNAL MEDICINE

## 2022-12-08 PROCEDURE — 1111F DSCHRG MED/CURRENT MED MERGE: CPT | Performed by: INTERNAL MEDICINE

## 2022-12-08 PROCEDURE — G8417 CALC BMI ABV UP PARAM F/U: HCPCS | Performed by: INTERNAL MEDICINE

## 2022-12-08 PROCEDURE — 3078F DIAST BP <80 MM HG: CPT | Performed by: INTERNAL MEDICINE

## 2022-12-08 PROCEDURE — 1036F TOBACCO NON-USER: CPT | Performed by: INTERNAL MEDICINE

## 2022-12-08 PROCEDURE — 93000 ELECTROCARDIOGRAM COMPLETE: CPT | Performed by: INTERNAL MEDICINE

## 2022-12-08 PROCEDURE — 1123F ACP DISCUSS/DSCN MKR DOCD: CPT | Performed by: INTERNAL MEDICINE

## 2022-12-08 PROCEDURE — 99214 OFFICE O/P EST MOD 30 MIN: CPT | Performed by: INTERNAL MEDICINE

## 2022-12-08 PROCEDURE — 3074F SYST BP LT 130 MM HG: CPT | Performed by: INTERNAL MEDICINE

## 2022-12-08 PROCEDURE — G8428 CUR MEDS NOT DOCUMENT: HCPCS | Performed by: INTERNAL MEDICINE

## 2022-12-08 PROCEDURE — G8484 FLU IMMUNIZE NO ADMIN: HCPCS | Performed by: INTERNAL MEDICINE

## 2022-12-08 ASSESSMENT — ENCOUNTER SYMPTOMS
EYE PAIN: 0
NAIL CHANGES: 0
APHONIA: 0
COUGH: 0
ABDOMINAL PAIN: 0
STRIDOR: 0

## 2022-12-08 NOTE — PROGRESS NOTES
[address] [place], [place]  [address], [address], [place] [address]  [address]  PHONE: [phone]    SUBJECTIVE:   Mauro Mireles is a 74 y.o. male 1950   seen for a follow up visit regarding the following:     Chief Complaint   Patient presents with    Follow-Up from Hospital     Acute respiratory failure          History of present illness: 74 y.o. male presented for follow-up 12/8/22 hospitalized 11/2022 upper respiratory infection due to RSV. Hospital course complicated by A. fib patient placed on anticoagulation therapy      Interval Hx:    The patient presented for consultation 09/10/2018. He presented for follow-up 4/8/2019. The patient is formerly from [place], [place]. He had a history of coronary artery disease with cardiac catheterization in 1994 with PCI to RCA. Subsequent  cardiac catheterization done in 2002 with angioplasty of right coronary artery, following cardiac catheterization in 2016 with nonobstructive coronary disease. Cardiac History:     Angioplasty and stenting of right coronary artery 1994? Cardiac catheterization in 2016 - stable coronary anatomy with 30% LAD, nonobstructive disease in LAD and right coronary artery. 09/10/2018 Pravastatin was changed to Lipitor. 5/2019 [facility] cardiology. ( scanned under procedures)     10/2019 EKG sinus Bradycardia     10/2020 EF normal mild aortic sclleriois MG 13 mm Hg    10/2020 EKG Sinus  Rhythm  - occasional PAC   # PACs = ST depression  -Nondiagnostic. Rightward P/QRS axis and rotation -possible pulmonary disease    10/26/2021Sinus  Rhythm Nonspecific ST depression  -Nondiagnostic  11/18/2022 echocardiogram ejection fraction 55% moderate sclerosis of aortic valve mild aortic regurgitation.    12/8/2022 atrial fibrillation 100 bpm      ABNORMAL       Assessment and Plan:   Atrial fibrillation  Now on diltiazem therapy with anticoagulation with Eliquis  Likely sequela of upper respiratory infection  Plan for cardioversion patient will not need transesophageal echocardiogram due to uninterrupted anticoagulation for 4-week. Additional antiarrhythmic therapies may be discussed in the future    Diabetes  diabetes mellitus   May be a candidate for SGL 2 therapy/Farxiga in the future    Aortic stenosis  Controlled            Current Outpatient Medications   Medication Sig    enalapril (VASOTEC) 20 MG tablet Take 1 tablet by mouth daily    dilTIAZem (CARDIZEM CD) 240 MG extended release capsule Take 1 capsule by mouth daily    apixaban (ELIQUIS) 5 MG TABS tablet Take 1 tablet by mouth 2 times daily    STIOLTO RESPIMAT 2.5-2.5 MCG/ACT AERS USE 2 INHALATIONS BY MOUTH  DAILY    metFORMIN (GLUCOPHAGE) 500 MG tablet Take 1 tablet by mouth 2 times daily (with meals) TAKE 1 TABLET BY MOUTH TWICE DAILY WITH MEALS    furosemide (LASIX) 20 MG tablet Take 1 tablet by mouth daily as needed (Leg edema)    CPAP Machine MISC by Other route    aspirin 81 MG chewable tablet Take 81 mg by mouth daily    albuterol sulfate  (90 Base) MCG/ACT inhaler Inhale 2 puffs into the lungs every 6 hours as needed    atorvastatin (LIPITOR) 40 MG tablet Take 40 mg by mouth every evening    bimatoprost (LUMIGAN) 0.01 % SOLN ophthalmic drops Apply 1 drop to eye every evening    nitroGLYCERIN (NITROSTAT) 0.4 MG SL tablet Place 0.4 mg under the tongue     No current facility-administered medications for this visit. Past Medical History, Past Surgical History, Family history, Social History, and Medications were all reviewed with the patient today and updated as necessary. Allergies   Allergen Reactions    Shellfish-Derived Products Hives     Shrimp only       Past Medical History:   Diagnosis Date    Diabetes mellitus, type II ([address]      Past Surgical History:   Procedure Laterality Date    HAND SURGERY      screw in hand    JOINT REPLACEMENT Bilateral     knee     No family history on file.    Social History     Tobacco Use Smoking status: Former     Packs/day: 1.00     Years: 40.00     Pack years: 40.00     Types: Cigarettes    Smokeless tobacco: Never   Substance Use Topics    Alcohol use: Not Currently     Comment: occ       ROS:    Review of Systems   Constitutional: Negative for fever. HENT:  Negative for stridor. Eyes:  Negative for pain. Cardiovascular:  Negative for chest pain. Respiratory:  Negative for cough. Endocrine: Negative for cold intolerance. Skin:  Negative for nail changes. Musculoskeletal:  Negative for arthritis. Gastrointestinal:  Negative for abdominal pain. Genitourinary:  Negative for dysuria. Neurological:  Negative for aphonia. Psychiatric/Behavioral:  Negative for altered mental status. Allergic/Immunologic: Negative for hives. PHYSICAL EXAM:    /60   Pulse 96   Ht 5' 11\" (1.803 m)   Wt 236 lb 3.2 oz (107.1 kg)   BMI 32.94 kg/m²        Wt Readings from Last 3 Encounters:   12/08/22 236 lb 3.2 oz (107.1 kg)   12/05/22 235 lb (106.6 kg)   11/22/22 227 lb 11.2 oz (103.3 kg)     BP Readings from Last 3 Encounters:   12/08/22 136/60   12/05/22 132/70   11/22/22 (!) 141/82         Physical Exam  Vitals reviewed. HENT:      Head: Normocephalic. Right Ear: External ear normal.      Left Ear: External ear normal.      Nose: Nose normal.   Eyes:      General: No scleral icterus. Cardiovascular:      Rate and Rhythm: Rhythm irregular. Pulmonary:      Effort: Pulmonary effort is normal.   Abdominal:      General: There is no distension. Musculoskeletal:      Cervical back: Neck supple. Skin:     General: Skin is warm. Neurological:      Mental Status: He is alert. Mental status is at baseline. Medical problems and test results were reviewed with the patient today.            Recent Results (from the past 672 hour(s))   CBC with Auto Differential    Collection Time: 11/17/22  3:26 PM   Result Value Ref Range    WBC 5.9 4.3 - 11.1 K/uL    RBC 4.80 4.23 - 5.6 M/uL    Hemoglobin 16.1 13.6 - 17.2 g/dL    Hematocrit 47.8 41.1 - 50.3 %    MCV 99.6 82 - 102 FL    MCH 33.5 (H) 26.1 - 32.9 PG    MCHC 33.7 31.4 - 35.0 g/dL    RDW 13.9 11.9 - 14.6 %    Platelets 296 205 - 130 K/uL    MPV 9.9 9.4 - 12.3 FL    nRBC 0.00 0.0 - 0.2 K/uL    Differential Type AUTOMATED      Seg Neutrophils 89 (H) 43 - 78 %    Lymphocytes 5 (L) 13 - 44 %    Monocytes 4 4.0 - 12.0 %    Eosinophils % 2 0.5 - 7.8 %    Basophils 0 0.0 - 2.0 %    Immature Granulocytes 0 0.0 - 5.0 %    Segs Absolute 5.3 1.7 - 8.2 K/UL    Absolute Lymph # 0.3 (L) 0.5 - 4.6 K/UL    Absolute Mono # 0.2 0.1 - 1.3 K/UL    Absolute Eos # 0.1 0.0 - 0.8 K/UL    Basophils Absolute 0.0 0.0 - 0.2 K/UL    Absolute Immature Granulocyte 0.0 0.0 - 0.5 K/UL   Basic Metabolic Panel    Collection Time: 11/17/22  3:26 PM   Result Value Ref Range    Sodium 135 133 - 143 mmol/L    Potassium 4.3 3.5 - 5.1 mmol/L    Chloride 103 101 - 110 mmol/L    CO2 28 21 - 32 mmol/L    Anion Gap 4 2 - 11 mmol/L    Glucose 189 (H) 65 - 100 mg/dL    BUN 13 8 - 23 MG/DL    Creatinine 1.10 0.8 - 1.5 MG/DL    Est, Glom Filt Rate >60 >60 ml/min/1.73m2    Calcium 10.0 8.3 - 10.4 MG/DL   Troponin    Collection Time: 11/17/22  3:26 PM   Result Value Ref Range    Troponin, High Sensitivity 13.6 0 - 14 pg/mL   Brain Natriuretic Peptide    Collection Time: 11/17/22  3:26 PM   Result Value Ref Range    NT Pro- 5 - 125 PG/ML   Venous Blood Gas, POC    Collection Time: 11/17/22  5:28 PM   Result Value Ref Range    DEVICE ROOM AIR      PH, VENOUS (POC) 7.37 7.32 - 7.42      PCO2, [place], POC 51.0 41 - 51 MMHG    PO2, VENOUS (POC) 18 (LL) mmHg    HCO3, Venous 29.8 (H) 23 - 28 MMOL/L    SO2, VENOUS (POC) 24.3 (L) 65 - 88 %    BASE EXCESS, VENOUS (POC) 3.2 mmol/L    Specimen type: VENOUS BLOOD      Performed by: Jai     POC TCO2 29 (H) 13 - 23 MMOL/L   Culture, Blood 1    Collection Time: 11/17/22  7:13 PM    Specimen: Blood   Result Value Ref Range Special Requests NO SPECIAL REQUESTS  RIGHT  Antecubital        Culture NO GROWTH 5 DAYS     D-Dimer, Quantitative    Collection Time: 11/17/22  7:13 PM   Result Value Ref Range    D-Dimer, Quant 10.16 (H) <0.56 ug/ml(FEU)   Culture, Blood 1    Collection Time: 11/17/22  7:19 PM    Specimen: Blood   Result Value Ref Range    Special Requests LEFT  FOREARM        Culture NO GROWTH 5 DAYS     COVID-19, Rapid    Collection Time: 11/17/22  7:32 PM    Specimen: Nasopharyngeal   Result Value Ref Range    Source NASAL      SARS-CoV-2, Rapid Not detected NOTD     Rapid influenza A/B antigens    Collection Time: 11/17/22  7:32 PM    Specimen: Nasal Washing   Result Value Ref Range    Influenza A Ag Negative NEG      Influenza B Ag Negative NEG      Source Nasopharyngeal     POCT Glucose    Collection Time: 11/17/22  9:39 PM   Result Value Ref Range    POC Glucose 209 (H) 65 - 100 mg/dL    Performed by: RoachRNChristy    Respiratory Panel, Molecular, with COVID-19 (Restricted: peds pts or suitable admitted adults)    Collection Time: 11/17/22 11:30 PM    Specimen: Nasopharyngeal   Result Value Ref Range    Adenovirus by PCR NOT DETECTED NOTDET      Coronavirus 229E by PCR NOT DETECTED NOTDET      Coronavirus HKU1 by PCR NOT DETECTED NOTDET      Coronavirus NL63 by PCR NOT DETECTED NOTDET      Coronavirus OC43 by PCR NOT DETECTED NOTDET      SARS-CoV-2, PCR NOT DETECTED NOTDET      Human Metapneumovirus by PCR NOT DETECTED NOTDET      Rhinovirus Enterovirus PCR NOT DETECTED NOTDET      Influenza A by PCR NOT DETECTED NOTDET      Influenza B PCR NOT DETECTED NOTDET      Parainfluenza 1 PCR NOT DETECTED NOTDET      Parainfluenza 2 PCR NOT DETECTED NOTDET      Parainfluenza 3 PCR NOT DETECTED NOTDET      Parainfluenza 4 PCR NOT DETECTED NOTDET      Respiratory Syncytial Virus by PCR NOT DETECTED NOTDET      Bordetella parapertussis by PCR NOT DETECTED NOTDET      Bordetella pertussis by PCR NOT DETECTED NOTDET      Chlamydophila Pneumonia PCR NOT DETECTED NOTDET      Mycoplasma pneumo by PCR NOT DETECTED NOTDET     Basic Metabolic Panel w/ Reflex to MG    Collection Time: 11/18/22  4:54 AM   Result Value Ref Range    Sodium 140 133 - 143 mmol/L    Potassium 3.9 3.5 - 5.1 mmol/L    Chloride 109 101 - 110 mmol/L    CO2 24 21 - 32 mmol/L    Anion Gap 7 2 - 11 mmol/L    Glucose 256 (H) 65 - 100 mg/dL    BUN 13 8 - 23 MG/DL    Creatinine 1.00 0.8 - 1.5 MG/DL    Est, Glom Filt Rate >60 >60 ml/min/1.73m2    Calcium 9.7 8.3 - 10.4 MG/DL   CBC with Auto Differential    Collection Time: 11/18/22  4:54 AM   Result Value Ref Range    WBC 4.5 4.3 - 11.1 K/uL    RBC 4.48 4.23 - 5.6 M/uL    Hemoglobin 14.7 13.6 - 17.2 g/dL    Hematocrit 44.8 41.1 - 50.3 %    .0 82 - 102 FL    MCH 32.8 26.1 - 32.9 PG    MCHC 32.8 31.4 - 35.0 g/dL    RDW 13.9 11.9 - 14.6 %    Platelets 917 956 - 780 K/uL    MPV 10.2 9.4 - 12.3 FL    nRBC 0.00 0.0 - 0.2 K/uL    Differential Type AUTOMATED      Seg Neutrophils 92 (H) 43 - 78 %    Lymphocytes 7 (L) 13 - 44 %    Monocytes 1 (L) 4.0 - 12.0 %    Eosinophils % 0 (L) 0.5 - 7.8 %    Basophils 0 0.0 - 2.0 %    Immature Granulocytes 0 0.0 - 5.0 %    Segs Absolute 4.1 1.7 - 8.2 K/UL    Absolute Lymph # 0.3 (L) 0.5 - 4.6 K/UL    Absolute Mono # 0.1 0.1 - 1.3 K/UL    Absolute Eos # 0.0 0.0 - 0.8 K/UL    Basophils Absolute 0.0 0.0 - 0.2 K/UL    Absolute Immature Granulocyte 0.0 0.0 - 0.5 K/UL   APTT    Collection Time: 11/18/22  4:54 AM   Result Value Ref Range    PTT 34.2 24.5 - 34.2 SEC   Protime-INR    Collection Time: 11/18/22  4:54 AM   Result Value Ref Range    Protime 14.7 (H) 12.6 - 14.3 sec    INR 1.1     C-Reactive Protein    Collection Time: 11/18/22  4:54 AM   Result Value Ref Range    CRP 4.7 (H) 0.0 - 0.9 mg/dL   POCT Glucose    Collection Time: 11/18/22  8:11 AM   Result Value Ref Range    POC Glucose 205 (H) 65 - 100 mg/dL    Performed by: Flora    EKG 12 Lead    Collection Time: 11/18/22 10:21 AM Result Value Ref Range    Ventricular Rate 119 BPM    Atrial Rate 187 BPM    QRS Duration 94 ms    Q-T Interval 322 ms    QTc Calculation (Bazett) 452 ms    R Axis 47 degrees    T Axis 179 degrees    Diagnosis       Atrial fibrillation with rapid ventricular response   POCT Glucose    Collection Time: 11/18/22 12:09 PM   Result Value Ref Range    POC Glucose 209 (H) 65 - 100 mg/dL    Performed by:  ELVI Virgen    CBC    Collection Time: 11/18/22  2:59 PM   Result Value Ref Range    WBC 9.5 4.3 - 11.1 K/uL    RBC 4.43 4.23 - 5.6 M/uL    Hemoglobin 14.8 13.6 - 17.2 g/dL    Hematocrit 44.7 41.1 - 50.3 %    .9 82 - 102 FL    MCH 33.4 (H) 26.1 - 32.9 PG    MCHC 33.1 31.4 - 35.0 g/dL    RDW 14.1 11.9 - 14.6 %    Platelets 832 970 - 312 K/uL    MPV 10.0 9.4 - 12.3 FL    nRBC 0.00 0.0 - 0.2 K/uL   APTT    Collection Time: 11/18/22  2:59 PM   Result Value Ref Range    PTT 31.8 24.5 - 34.2 SEC   Protime-INR    Collection Time: 11/18/22  2:59 PM   Result Value Ref Range    Protime 14.6 (H) 12.6 - 14.3 sec    INR 1.1     POCT Glucose    Collection Time: 11/18/22  5:46 PM   Result Value Ref Range    POC Glucose 156 (H) 65 - 100 mg/dL    Performed by: Carmenza Nagel    POCT Glucose    Collection Time: 11/18/22  8:41 PM   Result Value Ref Range    POC Glucose 186 (H) 65 - 100 mg/dL    Performed by: Gianfranco    CBC    Collection Time: 11/19/22  5:24 AM   Result Value Ref Range    WBC 8.3 4.3 - 11.1 K/uL    RBC 4.36 4.23 - 5.6 M/uL    Hemoglobin 14.6 13.6 - 17.2 g/dL    Hematocrit 45.1 41.1 - 50.3 %    .4 (H) 82 - 102 FL    MCH 33.5 (H) 26.1 - 32.9 PG    MCHC 32.4 31.4 - 35.0 g/dL    RDW 14.3 11.9 - 14.6 %    Platelets 231 153 - 549 K/uL    MPV 10.1 9.4 - 12.3 FL    nRBC 0.00 0.0 - 0.2 K/uL   Basic Metabolic Panel    Collection Time: 11/19/22  5:24 AM   Result Value Ref Range    Sodium 141 133 - 143 mmol/L    Potassium 4.0 3.5 - 5.1 mmol/L    Chloride 109 101 - 110 mmol/L    CO2 27 21 - 32 mmol/L    Anion Gap 5 2 - 11 mmol/L    Glucose 166 (H) 65 - 100 mg/dL    BUN 22 8 - 23 MG/DL    Creatinine 1.10 0.8 - 1.5 MG/DL    Est, Glom Filt Rate >60 >60 ml/min/1.73m2    Calcium 10.1 8.3 - 10.4 MG/DL   Magnesium    Collection Time: 11/19/22  5:24 AM   Result Value Ref Range    Magnesium 2.3 1.8 - 2.4 mg/dL   POCT Glucose    Collection Time: 11/19/22  7:21 AM   Result Value Ref Range    POC Glucose 145 (H) 65 - 100 mg/dL    Performed by: Sheron    POCT Glucose    Collection Time: 11/19/22  1:39 PM   Result Value Ref Range    POC Glucose 194 (H) 65 - 100 mg/dL    Performed by: Maty Ferguson    POCT Glucose    Collection Time: 11/19/22  5:02 PM   Result Value Ref Range    POC Glucose 198 (H) 65 - 100 mg/dL    Performed by: Joshua Blum    POCT Glucose    Collection Time: 11/19/22  8:57 PM   Result Value Ref Range    POC Glucose 171 (H) 65 - 100 mg/dL    Performed by: Rei    Basic Metabolic Panel w/ Reflex to MG    Collection Time: 11/20/22  3:30 AM   Result Value Ref Range    Sodium 138 133 - 143 mmol/L    Potassium 4.1 3.5 - 5.1 mmol/L    Chloride 106 101 - 110 mmol/L    CO2 31 21 - 32 mmol/L    Anion Gap 1 (L) 2 - 11 mmol/L    Glucose 177 (H) 65 - 100 mg/dL    BUN 19 8 - 23 MG/DL    Creatinine 0.90 0.8 - 1.5 MG/DL    Est, Glom Filt Rate >60 >60 ml/min/1.73m2    Calcium 9.5 8.3 - 10.4 MG/DL   Magnesium    Collection Time: 11/20/22  3:30 AM   Result Value Ref Range    Magnesium 2.2 1.8 - 2.4 mg/dL   POCT Glucose    Collection Time: 11/20/22  8:22 AM   Result Value Ref Range    POC Glucose 156 (H) 65 - 100 mg/dL    Performed by: Elias Sullivan    Transthoracic echocardiogram (TTE) complete with contrast, bubble, strain, and 3D PRN    Collection Time: 11/20/22 10:56 AM   Result Value Ref Range    LV EDV A2C 132 mL    LV EDV A4C 109 mL    LV ESV A2C 59 mL    LV ESV A4C 54 mL    IVSd 1.2 (A) 0.6 - 1.0 cm    LVIDd 5.0 4.2 - 5.9 cm    LVIDs 3.4 cm    LVOT Diameter 2.2 cm    LVOT Mean Gradient 2 mmHg    LVOT VTI 17.0 cm    LVOT Peak Velocity 1.1 m/s    LVOT Peak Gradient 5 mmHg    LVPWd 1.1 (A) 0.6 - 1.0 cm    LV E' Lateral Velocity 20 cm/s    LV E' Septal Velocity 10 cm/s    LV Ejection Fraction A4C 51 %    EF BP 51 (A) 55 - 100 %    LVOT Area 3.8 cm2    LVOT SV 64.6 ml    LA Minor Axis 5.5 cm    LA Major Axis 5.9 cm    LA Area 2C 20.7 cm2    LA Area 4C 21.1 cm2    LA Volume 2C 63 (A) 18 - 58 mL    LA Volume 4C 59 (A) 18 - 58 mL    LA Volume BP 63 (A) 18 - 58 mL    LA Diameter 2.9 cm    AV Mean Velocity 1.3 m/s    AV Mean Gradient 8 mmHg    AV VTI 29.2 cm    AV Peak Velocity 1.8 m/s    AV Peak Gradient 13 mmHg    AV Area by VTI 2.2 cm2    Aortic Root 3.9 cm    Ascending Aorta 3.9 cm    MV E Wave Deceleration Time 205.0 ms    MV E Velocity 1.08 m/s    PV .0 ms    PV Max Velocity 0.6 m/s    PV Peak Gradient 2 mmHg    RVIDd 3.5 cm    RV Basal Dimension 3.8 cm    RV Free Wall Peak S' 11 cm/s    TAPSE 1.7 1.7 cm    Body Surface Area 2.32 m2    Fractional Shortening 2D 32 28 - 44 %    LV ESV Index A4C 24 mL/m2    LV EDV Index A4C 49 mL/m2    LV ESV Index A2C 26 mL/m2    LV EDV Index A2C 59 mL/m2    LVIDd Index 2.23 cm/m2    LVIDs Index 1.52 cm/m2    LV RWT Ratio 0.44     LV Mass 2D 220.3 88 - 224 g    LV Mass 2D Index 98.3 49 - 115 g/m2    E/E' Ratio (Averaged) 8.10     E/E' Lateral 5.40     E/E' Septal 10.80     LA Volume Index BP 28 16 - 34 ml/m2    LVOT Stroke Volume Index 28.8 mL/m2    LA Volume Index 2C 28 16 - 34 mL/m2    LA Volume Index 4C 26 16 - 34 mL/m2    LA Size Index 1.29 cm/m2    LA/AO Root Ratio 0.74     Ao Root Index 1.74 cm/m2    Ascending Aorta Index 1.74 cm/m2    AV Velocity Ratio 0.61     ROSEMARY/BSA VTI 1.0 cm2/m2    LVOT:AV VTI Index 0.58     Left Ventricular Ejection Fraction 53     LVEF MODALITY ECHO    POCT Glucose    Collection Time: 11/20/22 12:13 PM   Result Value Ref Range    POC Glucose 169 (H) 65 - 100 mg/dL    Performed by: Charu Butcher    POCT Glucose    Collection Time: 11/20/22  3:43 PM Result Value Ref Range    POC Glucose 160 (H) 65 - 100 mg/dL    Performed by:  Celena    POCT Glucose    Collection Time: 11/20/22  8:54 PM   Result Value Ref Range    POC Glucose 157 (H) 65 - 100 mg/dL    Performed by: Beto    Basic Metabolic Panel    Collection Time: 11/21/22  5:25 AM   Result Value Ref Range    Sodium 137 133 - 143 mmol/L    Potassium 3.8 3.5 - 5.1 mmol/L    Chloride 105 101 - 110 mmol/L    CO2 29 21 - 32 mmol/L    Anion Gap 3 2 - 11 mmol/L    Glucose 162 (H) 65 - 100 mg/dL    BUN 16 8 - 23 MG/DL    Creatinine 0.90 0.8 - 1.5 MG/DL    Est, Glom Filt Rate >60 >60 ml/min/1.73m2    Calcium 9.9 8.3 - 10.4 MG/DL   Magnesium    Collection Time: 11/21/22  5:25 AM   Result Value Ref Range    Magnesium 2.2 1.8 - 2.4 mg/dL   POCT Glucose    Collection Time: 11/21/22  7:37 AM   Result Value Ref Range    POC Glucose 160 (H) 65 - 100 mg/dL    Performed by: Hercules (Oliver)    POCT Glucose    Collection Time: 11/21/22 11:50 AM   Result Value Ref Range    POC Glucose 167 (H) 65 - 100 mg/dL    Performed by: Hercules (Oliver)    POCT Glucose    Collection Time: 11/21/22  4:22 PM   Result Value Ref Range    POC Glucose 130 (H) 65 - 100 mg/dL    Performed by: Hercules (Oliver)    POCT Glucose    Collection Time: 11/21/22  9:45 PM   Result Value Ref Range    POC Glucose 157 (H) 65 - 100 mg/dL    Performed by: Beto    POCT Glucose    Collection Time: 11/22/22  7:27 AM   Result Value Ref Range    POC Glucose 156 (H) 65 - 100 mg/dL    Performed by: Harinder    POCT Glucose    Collection Time: 11/22/22 11:08 AM   Result Value Ref Range    POC Glucose 189 (H) 65 - 100 mg/dL    Performed by: Almaz Vaughan    TSH    Collection Time: 12/05/22  8:45 AM   Result Value Ref Range    TSH, 3RD GENERATION 2.080 0.358 - 3.740 uIU/mL   CBC    Collection Time: 12/05/22  8:45 AM   Result Value Ref Range    WBC 3.7 (L) 4.3 - 11.1 K/uL    RBC 4.28 4.23 - 5.6 M/uL    Hemoglobin 14.2 13.6 - 17.2 g/dL    Hematocrit 43.4 41.1 - 50.3 %    .4 82 - 102 FL    MCH 33.2 (H) 26.1 - 32.9 PG    MCHC 32.7 31.4 - 35.0 g/dL    RDW 14.0 11.9 - 14.6 %    Platelets 791 580 - 908 K/uL    MPV 10.7 9.4 - 12.3 FL    nRBC 0.00 0.0 - 0.2 K/uL   Comprehensive Metabolic Panel    Collection Time: 12/05/22  8:45 AM   Result Value Ref Range    Sodium 138 133 - 143 mmol/L    Potassium 4.5 3.5 - 5.1 mmol/L    Chloride 108 101 - 110 mmol/L    CO2 27 21 - 32 mmol/L    Anion Gap 3 2 - 11 mmol/L    Glucose 185 (H) 65 - 100 mg/dL    BUN 16 8 - 23 MG/DL    Creatinine 1.20 0.8 - 1.5 MG/DL    Est, Glom Filt Rate >60 >60 ml/min/1.73m2    Calcium 9.7 8.3 - 10.4 MG/DL    Total Bilirubin 0.6 0.2 - 1.1 MG/DL    ALT 54 12 - 65 U/L    AST 27 15 - 37 U/L    Alk Phosphatase 97 50 - 136 U/L    Total Protein 6.9 6.3 - 8.2 g/dL    Albumin 3.7 3.2 - 4.6 g/dL    Globulin 3.2 2.8 - 4.5 g/dL    Albumin/Globulin Ratio 1.2 0.4 - 1.6     Hemoglobin A1C    Collection Time: 12/05/22  8:45 AM   Result Value Ref Range    Hemoglobin A1C 6.6 (H) 4.8 - 5.6 %    eAG 143 mg/dL     Lab Results   Component Value Date/Time    CHOL 120 01/12/2022 09:29 AM    HDL 43 01/12/2022 09:29 AM    VLDL 32 01/12/2022 09:29 AM       No results found for any visits on 12/08/22. PLAN  There are no diagnoses linked to this encounter. No follow-ups on file.        Angel Holt MD  12/8/2022  11:59 AM

## 2022-12-20 NOTE — PROGRESS NOTES
Patient pre-assessment complete for CVN scheduled for , arrival time 0730. Patient verified using . Patient instructed to bring a list of all home medications on the day of procedure. NPO status reinforced. Patient informed to take a full dose aspirin 325mg  or 81 mg x 4 on the day of procedure. Patient instructed to HOLD all medications except Cardizem and Eliquis. Instructed they can take all other medications excluding vitamins & supplements. Patient verbalizes understanding of all instructions & denies any questions at this time.

## 2022-12-21 ENCOUNTER — HOSPITAL ENCOUNTER (OUTPATIENT)
Dept: CARDIAC CATH/INVASIVE PROCEDURES | Age: 72
Discharge: HOME | End: 2022-12-21
Attending: INTERNAL MEDICINE | Admitting: INTERNAL MEDICINE
Payer: MEDICARE

## 2022-12-21 VITALS
BODY MASS INDEX: 32.62 KG/M2 | HEIGHT: 71 IN | DIASTOLIC BLOOD PRESSURE: 74 MMHG | SYSTOLIC BLOOD PRESSURE: 110 MMHG | RESPIRATION RATE: 22 BRPM | OXYGEN SATURATION: 93 % | WEIGHT: 233 LBS | HEART RATE: 62 BPM

## 2022-12-21 DIAGNOSIS — I48.91 ATRIAL FIBRILLATION, UNSPECIFIED TYPE (HCC): ICD-10-CM

## 2022-12-21 LAB
ANION GAP SERPL CALC-SCNC: 8 MMOL/L (ref 2–11)
BASOPHILS # BLD: 0.1 K/UL (ref 0–0.2)
BASOPHILS NFR BLD: 1 % (ref 0–2)
BUN SERPL-MCNC: 14 MG/DL (ref 8–23)
CALCIUM SERPL-MCNC: 9.5 MG/DL (ref 8.3–10.4)
CHLORIDE SERPL-SCNC: 107 MMOL/L (ref 101–110)
CO2 SERPL-SCNC: 27 MMOL/L (ref 21–32)
CREAT SERPL-MCNC: 1.1 MG/DL (ref 0.8–1.5)
DIFFERENTIAL METHOD BLD: NORMAL
ECHO BSA: 2.3 M2
EKG ATRIAL RATE: 141 BPM
EKG DIAGNOSIS: NORMAL
EKG Q-T INTERVAL: 350 MS
EKG QRS DURATION: 88 MS
EKG QTC CALCULATION (BAZETT): 428 MS
EKG R AXIS: 57 DEGREES
EKG T AXIS: 64 DEGREES
EKG VENTRICULAR RATE: 90 BPM
EOSINOPHIL # BLD: 0.2 K/UL (ref 0–0.8)
EOSINOPHIL NFR BLD: 3 % (ref 0.5–7.8)
ERYTHROCYTE [DISTWIDTH] IN BLOOD BY AUTOMATED COUNT: 14.5 % (ref 11.9–14.6)
GLUCOSE SERPL-MCNC: 161 MG/DL (ref 65–100)
HCT VFR BLD AUTO: 44.6 % (ref 41.1–50.3)
HGB BLD-MCNC: 14.3 G/DL (ref 13.6–17.2)
IMM GRANULOCYTES # BLD AUTO: 0 K/UL (ref 0–0.5)
IMM GRANULOCYTES NFR BLD AUTO: 0 % (ref 0–5)
LYMPHOCYTES # BLD: 1.5 K/UL (ref 0.5–4.6)
LYMPHOCYTES NFR BLD: 19 % (ref 13–44)
MAGNESIUM SERPL-MCNC: 1.8 MG/DL (ref 1.8–2.4)
MCH RBC QN AUTO: 32.4 PG (ref 26.1–32.9)
MCHC RBC AUTO-ENTMCNC: 32.1 G/DL (ref 31.4–35)
MCV RBC AUTO: 101.1 FL (ref 82–102)
MONOCYTES # BLD: 0.6 K/UL (ref 0.1–1.3)
MONOCYTES NFR BLD: 7 % (ref 4–12)
NEUTS SEG # BLD: 5.3 K/UL (ref 1.7–8.2)
NEUTS SEG NFR BLD: 70 % (ref 43–78)
NRBC # BLD: 0 K/UL (ref 0–0.2)
PLATELET # BLD AUTO: 250 K/UL (ref 150–450)
PMV BLD AUTO: 10.4 FL (ref 9.4–12.3)
POTASSIUM SERPL-SCNC: 3.7 MMOL/L (ref 3.5–5.1)
RBC # BLD AUTO: 4.41 M/UL (ref 4.23–5.6)
SODIUM SERPL-SCNC: 142 MMOL/L (ref 133–143)
WBC # BLD AUTO: 7.6 K/UL (ref 4.3–11.1)

## 2022-12-21 PROCEDURE — 92960 CARDIOVERSION ELECTRIC EXT: CPT

## 2022-12-21 PROCEDURE — 85025 COMPLETE CBC W/AUTO DIFF WBC: CPT

## 2022-12-21 PROCEDURE — 83735 ASSAY OF MAGNESIUM: CPT

## 2022-12-21 PROCEDURE — 93005 ELECTROCARDIOGRAM TRACING: CPT | Performed by: INTERNAL MEDICINE

## 2022-12-21 PROCEDURE — 80048 BASIC METABOLIC PNL TOTAL CA: CPT

## 2022-12-21 PROCEDURE — 6360000002 HC RX W HCPCS: Performed by: INTERNAL MEDICINE

## 2022-12-21 RX ORDER — FENTANYL CITRATE 50 UG/ML
INJECTION, SOLUTION INTRAMUSCULAR; INTRAVENOUS
Status: COMPLETED | OUTPATIENT
Start: 2022-12-21 | End: 2022-12-21

## 2022-12-21 RX ORDER — SODIUM CHLORIDE 9 MG/ML
INJECTION, SOLUTION INTRAVENOUS CONTINUOUS
Status: DISCONTINUED | OUTPATIENT
Start: 2022-12-21 | End: 2022-12-21 | Stop reason: HOSPADM

## 2022-12-21 RX ORDER — MIDAZOLAM HYDROCHLORIDE 2 MG/2ML
INJECTION, SOLUTION INTRAMUSCULAR; INTRAVENOUS
Status: COMPLETED | OUTPATIENT
Start: 2022-12-21 | End: 2022-12-21

## 2022-12-21 RX ADMIN — MIDAZOLAM HYDROCHLORIDE 2 MG: 1 INJECTION, SOLUTION INTRAMUSCULAR; INTRAVENOUS at 09:39

## 2022-12-21 RX ADMIN — FENTANYL CITRATE 50 MCG: 50 INJECTION, SOLUTION INTRAMUSCULAR; INTRAVENOUS at 09:39

## 2022-12-21 NOTE — PROGRESS NOTES
Patient received to [facility] room # 17  Ambulatory from [facility]. Patient scheduled for CVN today with Dr Nicolas Puri. Procedure reviewed & questions answered, voiced good understanding consent obtained & placed on chart. All medications and medical history reviewed. Will prep patient per orders. Patient & family updated on plan of care. The patient has a fraility score of 4-VULNERABLE, based on shortness of breath with ambulation.

## 2022-12-21 NOTE — PROGRESS NOTES
Patient received to [address] [place] [address] room # 17  Ambulatory from [facility]. Patient scheduled for CVN today with Dr Evy Peterson. Procedure reviewed & questions answered, voiced good understanding consent obtained & placed on chart. All medications and medical history reviewed. Will prep patient per orders. Patient & family updated on plan of care. The patient has a fraility score of 4-VULNERABLE, based on shortness of breath with ambulation.

## 2023-01-03 ENCOUNTER — OFFICE VISIT (OUTPATIENT)
Dept: CARDIOLOGY CLINIC | Age: 73
End: 2023-01-03
Payer: MEDICARE

## 2023-01-03 VITALS
DIASTOLIC BLOOD PRESSURE: 78 MMHG | SYSTOLIC BLOOD PRESSURE: 124 MMHG | HEIGHT: 71 IN | HEART RATE: 80 BPM | BODY MASS INDEX: 33.32 KG/M2 | WEIGHT: 238 LBS

## 2023-01-03 DIAGNOSIS — I48.91 ATRIAL FIBRILLATION, UNSPECIFIED TYPE (HCC): Primary | ICD-10-CM

## 2023-01-03 PROCEDURE — 1123F ACP DISCUSS/DSCN MKR DOCD: CPT | Performed by: INTERNAL MEDICINE

## 2023-01-03 PROCEDURE — G8484 FLU IMMUNIZE NO ADMIN: HCPCS | Performed by: INTERNAL MEDICINE

## 2023-01-03 PROCEDURE — 3078F DIAST BP <80 MM HG: CPT | Performed by: INTERNAL MEDICINE

## 2023-01-03 PROCEDURE — 99214 OFFICE O/P EST MOD 30 MIN: CPT | Performed by: INTERNAL MEDICINE

## 2023-01-03 PROCEDURE — 3074F SYST BP LT 130 MM HG: CPT | Performed by: INTERNAL MEDICINE

## 2023-01-03 PROCEDURE — 93000 ELECTROCARDIOGRAM COMPLETE: CPT | Performed by: INTERNAL MEDICINE

## 2023-01-03 PROCEDURE — 3017F COLORECTAL CA SCREEN DOC REV: CPT | Performed by: INTERNAL MEDICINE

## 2023-01-03 PROCEDURE — G8417 CALC BMI ABV UP PARAM F/U: HCPCS | Performed by: INTERNAL MEDICINE

## 2023-01-03 PROCEDURE — 1036F TOBACCO NON-USER: CPT | Performed by: INTERNAL MEDICINE

## 2023-01-03 PROCEDURE — G8428 CUR MEDS NOT DOCUMENT: HCPCS | Performed by: INTERNAL MEDICINE

## 2023-01-03 RX ORDER — LATANOPROST 50 UG/ML
1 SOLUTION/ DROPS OPHTHALMIC NIGHTLY
COMMUNITY

## 2023-01-03 ASSESSMENT — ENCOUNTER SYMPTOMS
NAIL CHANGES: 0
APHONIA: 0
EYE PAIN: 0
ABDOMINAL PAIN: 0
COUGH: 0
STRIDOR: 0

## 2023-01-03 NOTE — PROGRESS NOTES
[address], [address] [place] [address], [address] [place] [address]  PHONE: [phone]    SUBJECTIVE:   Brittani Dao is a 74 y.o. male 1950   seen for a follow up visit regarding the following:     Chief Complaint   Patient presents with    Atrial Fibrillation         History of present illness: 74 y.o. male presented for follow-up 1/3/23 hospitalized 11/2022 upper respiratory infection due to RSV. Here following cardioversion. Now in sinus rhtyhm      Interval Hx:    The patient presented for consultation 09/10/2018. He presented for follow-up 4/8/2019. The patient is formerly from [place], [place]. He had a history of coronary artery disease with cardiac catheterization in 1994 with PCI to RCA. Subsequent  cardiac catheterization done in 2002 with angioplasty of right coronary artery, following cardiac catheterization in 2016 with nonobstructive coronary disease. Cardiac History:     Angioplasty and stenting of right coronary artery 1994? Cardiac catheterization in 2016 - stable coronary anatomy with 30% LAD, nonobstructive disease in LAD and right coronary artery. 09/10/2018 Pravastatin was changed to Lipitor. 5/2019 [facility] cardiology. ( scanned under procedures)     10/2019 EKG sinus Bradycardia     10/2020 EF normal mild aortic sclleriois MG 13 mm Hg    10/2020 EKG Sinus  Rhythm  - occasional PAC   # PACs = ST depression  -Nondiagnostic. Rightward P/QRS axis and rotation -possible pulmonary disease    10/26/2021Sinus  Rhythm Nonspecific ST depression  -Nondiagnostic  11/18/2022 echocardiogram ejection fraction 55% moderate sclerosis of aortic valve mild aortic regurgitation. 12/8/2022 atrial fibrillation 100 bpm  12/8 cardioversion.    1/3/2022 EKG NSR       ABNORMAL       Assessment and Plan:   Atrial fibrillation  Now on diltiazem therapy with anticoagulation with Eliquis  Likely sequela of upper respiratory infection  May need AAD in the future. Diabetes  diabetes mellitus   May be a candidate for SGL 2 therapy/Farxiga in the future cost is a barrier     [employer]   Controlled      [id number] Score for Atrial Fibrillation Stroke Risk   Risk   Factors  Component Value   C CHF No 0   H HTN Yes 1   A2 Age >= 76 No,  (69 y.o.) 0   D DM Yes 1   S2 Prior Stroke/TIA No 0   V Vascular Disease No 0   A Age 74-69 Yes,  (69 y.o.) 1   Sc Sex male 0    [id number]  Score  3   Score last updated 1/3/23 9:15 PM EST    Click here for a link to the UpToDate guideline \"Atrial Fibrillation: Anticoagulation therapy to prevent embolization    Disclaimer: Risk Score calculation is dependent on accuracy of patient problem list and past encounter diagnosis. Current Outpatient Medications   Medication Sig    latanoprost (XALATAN) 0.005 % ophthalmic solution 1 drop nightly    enalapril (VASOTEC) 20 MG tablet Take 1 tablet by mouth daily    dilTIAZem (CARDIZEM CD) 240 MG extended release capsule Take 1 capsule by mouth daily    apixaban (ELIQUIS) 5 MG TABS tablet Take 1 tablet by mouth 2 times daily    STIOLTO RESPIMAT 2.5-2.5 MCG/ACT AERS USE 2 INHALATIONS BY MOUTH  DAILY    metFORMIN (GLUCOPHAGE) 500 MG tablet Take 1 tablet by mouth 2 times daily (with meals) TAKE 1 TABLET BY MOUTH TWICE DAILY WITH MEALS    furosemide (LASIX) 20 MG tablet Take 1 tablet by mouth daily as needed (Leg edema)    CPAP Machine MISC by Other route    aspirin 81 MG chewable tablet Take 81 mg by mouth daily    albuterol sulfate  (90 Base) MCG/ACT inhaler Inhale 2 puffs into the lungs every 6 hours as needed    atorvastatin (LIPITOR) 40 MG tablet Take 40 mg by mouth every evening    nitroGLYCERIN (NITROSTAT) 0.4 MG SL tablet Place 0.4 mg under the tongue     No current facility-administered medications for this visit.        Past Medical History, Past Surgical History, Family history, Social History, and Medications were all reviewed with the patient today and updated as necessary. Allergies   Allergen Reactions    Shellfish-Derived Products Hives     Shrimp only       Past Medical History:   Diagnosis Date    Diabetes mellitus, type II ([address]      Past Surgical History:   Procedure Laterality Date    HAND SURGERY      screw in hand    JOINT REPLACEMENT Bilateral     knee     No family history on file. Social History     Tobacco Use    Smoking status: Former     Packs/day: 1.00     Years: 40.00     Pack years: 40.00     Types: Cigarettes    Smokeless tobacco: Never   Substance Use Topics    Alcohol use: Not Currently     Comment: occ       ROS:    Review of Systems   Constitutional: Negative for fever. HENT:  Negative for stridor. Eyes:  Negative for pain. Cardiovascular:  Negative for chest pain. Respiratory:  Negative for cough. Endocrine: Negative for cold intolerance. Skin:  Negative for nail changes. Musculoskeletal:  Negative for arthritis. Gastrointestinal:  Negative for abdominal pain. Genitourinary:  Negative for dysuria. Neurological:  Negative for aphonia. Psychiatric/Behavioral:  Negative for altered mental status. Allergic/Immunologic: Negative for hives. PHYSICAL EXAM:    /78   Pulse 80   Ht 5' 11\" (1.803 m)   Wt 238 lb (108 kg)   BMI 33.19 kg/m²        Wt Readings from Last 3 Encounters:   01/03/23 238 lb (108 kg)   12/21/22 233 lb (105.7 kg)   12/08/22 236 lb 3.2 oz (107.1 kg)     BP Readings from Last 3 Encounters:   01/03/23 124/78   12/21/22 110/74   12/08/22 136/60         Physical Exam  Vitals reviewed. HENT:      Head: Normocephalic. Right Ear: External ear normal.      Left Ear: External ear normal.      Nose: Nose normal.   Eyes:      General: No scleral icterus. Cardiovascular:      Rate and Rhythm: Rhythm irregular. Pulmonary:      Effort: Pulmonary effort is normal.   Abdominal:      General: There is no distension. Musculoskeletal:      Cervical back: Neck supple.    Skin:     General: Skin is warm.   Neurological:      Mental Status: He is alert. Mental status is at baseline. Medical problems and test results were reviewed with the patient today.            Recent Results (from the past 672 hour(s))   Basic Metabolic Panel    Collection Time: 12/21/22  7:38 AM   Result Value Ref Range    Sodium 142 133 - 143 mmol/L    Potassium 3.7 3.5 - 5.1 mmol/L    Chloride 107 101 - 110 mmol/L    CO2 27 21 - 32 mmol/L    Anion Gap 8 2 - 11 mmol/L    Glucose 161 (H) 65 - 100 mg/dL    BUN 14 8 - 23 MG/DL    Creatinine 1.10 0.8 - 1.5 MG/DL    Est, Glom Filt Rate >60 >60 ml/min/1.73m2    Calcium 9.5 8.3 - 10.4 MG/DL   CBC with Auto Differential    Collection Time: 12/21/22  7:38 AM   Result Value Ref Range    WBC 7.6 4.3 - 11.1 K/uL    RBC 4.41 4.23 - 5.6 M/uL    Hemoglobin 14.3 13.6 - 17.2 g/dL    Hematocrit 44.6 41.1 - 50.3 %    .1 82 - 102 FL    MCH 32.4 26.1 - 32.9 PG    MCHC 32.1 31.4 - 35.0 g/dL    RDW 14.5 11.9 - 14.6 %    Platelets 721 978 - 906 K/uL    MPV 10.4 9.4 - 12.3 FL    nRBC 0.00 0.0 - 0.2 K/uL    Differential Type AUTOMATED      Seg Neutrophils 70 43 - 78 %    Lymphocytes 19 13 - 44 %    Monocytes 7 4.0 - 12.0 %    Eosinophils % 3 0.5 - 7.8 %    Basophils 1 0.0 - 2.0 %    Immature Granulocytes 0 0.0 - 5.0 %    Segs Absolute 5.3 1.7 - 8.2 K/UL    Absolute Lymph # 1.5 0.5 - 4.6 K/UL    Absolute Mono # 0.6 0.1 - 1.3 K/UL    Absolute Eos # 0.2 0.0 - 0.8 K/UL    Basophils Absolute 0.1 0.0 - 0.2 K/UL    Absolute Immature Granulocyte 0.0 0.0 - 0.5 K/UL   Magnesium    Collection Time: 12/21/22  7:38 AM   Result Value Ref Range    Magnesium 1.8 1.8 - 2.4 mg/dL   EKG 12 Lead    Collection Time: 12/21/22  8:07 AM   Result Value Ref Range    Ventricular Rate 90 BPM    Atrial Rate 141 BPM    QRS Duration 88 ms    Q-T Interval 350 ms    QTc Calculation (Bazett) 428 ms    R Axis 57 degrees    T Axis 64 degrees    Diagnosis       Atrial fibrillation with premature ventricular or aberrantly conducted complexes     Cardioversion external    Collection Time: 12/21/22  9:42 AM   Result Value Ref Range    Body Surface Area 2.3 m2     Lab Results   Component Value Date/Time    CHOL 120 01/12/2022 09:29 AM    HDL 43 01/12/2022 09:29 AM    VLDL 32 01/12/2022 09:29 AM       No results found for any visits on 01/03/23. Cy Downs was seen today for atrial fibrillation. Diagnoses and all orders for this visit:    Atrial fibrillation, unspecified type ([address]  -     EKG 12 Lead  Return in about 3 months (around 4/3/2023).        Oliver Farrell MD  1/3/2023  1:24 PM

## 2023-01-07 DIAGNOSIS — R60.0 BILATERAL LEG EDEMA: ICD-10-CM

## 2023-01-07 DIAGNOSIS — E11.21 TYPE 2 DIABETES WITH NEPHROPATHY (HCC): ICD-10-CM

## 2023-01-09 RX ORDER — FUROSEMIDE 20 MG/1
TABLET ORAL
Qty: 90 TABLET | Refills: 1 | Status: SHIPPED | OUTPATIENT
Start: 2023-01-09

## 2023-01-19 RX ORDER — DILTIAZEM HYDROCHLORIDE 240 MG/1
240 CAPSULE, COATED, EXTENDED RELEASE ORAL DAILY
Qty: 90 CAPSULE | Refills: 3 | Status: SHIPPED | OUTPATIENT
Start: 2023-01-19 | End: 2023-01-20 | Stop reason: SDUPTHER

## 2023-01-19 NOTE — TELEPHONE ENCOUNTER
MEDICATION REFILL REQUEST      Name of Medication:  Cardizem   Dose:  240MG  Frequency:  1QD  Quantity:  90  Days' supply:  90      Pharm[facility]/Location:  [facility]    Pt will be out of this med by tmrw.

## 2023-01-20 RX ORDER — DILTIAZEM HYDROCHLORIDE 240 MG/1
240 CAPSULE, COATED, EXTENDED RELEASE ORAL DAILY
Qty: 30 CAPSULE | Refills: 0 | Status: SHIPPED | OUTPATIENT
Start: 2023-01-20

## 2023-01-23 ENCOUNTER — OFFICE VISIT (OUTPATIENT)
Dept: INTERNAL MEDICINE CLINIC | Facility: CLINIC | Age: 73
End: 2023-01-23
Payer: MEDICARE

## 2023-01-23 VITALS
RESPIRATION RATE: 16 BRPM | OXYGEN SATURATION: 98 % | DIASTOLIC BLOOD PRESSURE: 74 MMHG | SYSTOLIC BLOOD PRESSURE: 118 MMHG | BODY MASS INDEX: 32.76 KG/M2 | HEIGHT: 71 IN | WEIGHT: 234 LBS | HEART RATE: 68 BPM

## 2023-01-23 DIAGNOSIS — E11.21 TYPE 2 DIABETES WITH NEPHROPATHY (HCC): Primary | ICD-10-CM

## 2023-01-23 DIAGNOSIS — J44.9 CHRONIC OBSTRUCTIVE PULMONARY DISEASE, UNSPECIFIED COPD TYPE (HCC): ICD-10-CM

## 2023-01-23 DIAGNOSIS — I73.9 PVD (PERIPHERAL VASCULAR DISEASE) (HCC): ICD-10-CM

## 2023-01-23 DIAGNOSIS — R60.0 BILATERAL LEG EDEMA: ICD-10-CM

## 2023-01-23 PROCEDURE — 3074F SYST BP LT 130 MM HG: CPT | Performed by: FAMILY MEDICINE

## 2023-01-23 PROCEDURE — 1036F TOBACCO NON-USER: CPT | Performed by: FAMILY MEDICINE

## 2023-01-23 PROCEDURE — G8417 CALC BMI ABV UP PARAM F/U: HCPCS | Performed by: FAMILY MEDICINE

## 2023-01-23 PROCEDURE — G8427 DOCREV CUR MEDS BY ELIG CLIN: HCPCS | Performed by: FAMILY MEDICINE

## 2023-01-23 PROCEDURE — 99214 OFFICE O/P EST MOD 30 MIN: CPT | Performed by: FAMILY MEDICINE

## 2023-01-23 PROCEDURE — 3078F DIAST BP <80 MM HG: CPT | Performed by: FAMILY MEDICINE

## 2023-01-23 PROCEDURE — 2022F DILAT RTA XM EVC RTNOPTHY: CPT | Performed by: FAMILY MEDICINE

## 2023-01-23 PROCEDURE — 3017F COLORECTAL CA SCREEN DOC REV: CPT | Performed by: FAMILY MEDICINE

## 2023-01-23 PROCEDURE — G8484 FLU IMMUNIZE NO ADMIN: HCPCS | Performed by: FAMILY MEDICINE

## 2023-01-23 PROCEDURE — 3046F HEMOGLOBIN A1C LEVEL >9.0%: CPT | Performed by: FAMILY MEDICINE

## 2023-01-23 PROCEDURE — 3023F SPIROM DOC REV: CPT | Performed by: FAMILY MEDICINE

## 2023-01-23 PROCEDURE — 1123F ACP DISCUSS/DSCN MKR DOCD: CPT | Performed by: FAMILY MEDICINE

## 2023-01-23 RX ORDER — FUROSEMIDE 20 MG/1
20 TABLET ORAL DAILY PRN
Qty: 90 TABLET | Refills: 1
Start: 2023-01-23

## 2023-01-23 SDOH — ECONOMIC STABILITY: FOOD INSECURITY: WITHIN THE PAST 12 MONTHS, YOU WORRIED THAT YOUR FOOD WOULD RUN OUT BEFORE YOU GOT MONEY TO BUY MORE.: NEVER TRUE

## 2023-01-23 SDOH — ECONOMIC STABILITY: FOOD INSECURITY: WITHIN THE PAST 12 MONTHS, THE FOOD YOU BOUGHT JUST DIDN'T LAST AND YOU DIDN'T HAVE MONEY TO GET MORE.: NEVER TRUE

## 2023-01-23 ASSESSMENT — PATIENT HEALTH QUESTIONNAIRE - PHQ9
1. LITTLE INTEREST OR PLEASURE IN DOING THINGS: 0
SUM OF ALL RESPONSES TO PHQ QUESTIONS 1-9: 0
SUM OF ALL RESPONSES TO PHQ9 QUESTIONS 1 & 2: 0
2. FEELING DOWN, DEPRESSED OR HOPELESS: 0

## 2023-01-23 ASSESSMENT — SOCIAL DETERMINANTS OF HEALTH (SDOH): HOW HARD IS IT FOR YOU TO PAY FOR THE VERY BASICS LIKE FOOD, HOUSING, MEDICAL CARE, AND HEATING?: NOT HARD AT ALL

## 2023-01-23 NOTE — PROGRESS NOTES
Galina Fried DO  Diplomate of the [employer] of [address] [place] [address] Internal Medicine      Edith Barraza (: 1950) is a 74 y.o. male, here for evaluation of the following chief complaint(s):  Diabetes       ASSESSMENT/PLAN:  1. Type 2 diabetes with nephropathy ([facility]  2. Chronic obstructive pulmonary disease, unspecified COPD type ([facility]  3. PVD (peripheral vascular disease) ([facility]  Assessment & Plan:   Well-controlled, continue current medications and continue current treatment plan    4. Bilateral leg edema  -     furosemide (LASIX) 20 MG tablet; Take 1 tablet by mouth daily as needed (Leg edema), Disp-90 tablet, R-1NO PRINT     Tolerating medication well and achieving desired therapeutic response. Will continue with:   Key Antihyperglycemic Medications            metFORMIN (GLUCOPHAGE) 500 MG tablet (Taking)    Sig: TAKE 1 TABLET BY MOUTH  TWICE DAILY WITH MEALS        . A1c levels reviewed. Encourage routine foot care. Recommend routine eye exams. Encourage diet and exercise and medication adherence. Encouraged continued use of Stioloto and albuterol on prn basis. Tolerating medication well and achieving desired therapeutic response. Will decrease lasix to daily prn for edema. Encourage support hose as well. SUBJECTIVE/OBJECTIVE:  HPI:  Patient comes in today for follow-up on his diabetes. He is actually been doing well and his recent A1c had come down. He is tolerating metformin well without significant adverse effects. No symptoms of polyuria, polydipsia, or hypoglycemia. Has been working at weight loss and feels that his COPD is doing a little bit better. He is working on deep breathing exercises as well. Denies any significant mucopurulent productive sputum. Leg edema has significantly improved. He is only taking Lasix now on a as needed basis. No other current complaints.   Social History     Tobacco Use    Smoking status: Former     Packs/day: 1.00 Years: 40.00     Pack years: 40.00     Types: Cigarettes     Quit date:      Years since quittin.0    Smokeless tobacco: Never   Vaping Use    Vaping Use: Never used   Substance Use Topics    Alcohol use: Not Currently     Comment: occ    Drug use: Not Currently     Vitals:    23 1038   BP: 118/74   Pulse: 68   Resp: 16   SpO2: 98%   Weight: 234 lb (106.1 kg)   Height: 5' 11\" (1.803 m)      Body mass index is 32.64 kg/m². Physical Exam  Vitals reviewed. Cardiovascular:      Rate and Rhythm: Normal rate and regular rhythm. Pulmonary:      Effort: Pulmonary effort is normal.      Breath sounds: Normal breath sounds. Comments: Decreased air movement in bases  Skin:     General: Skin is warm and dry. Neurological:      Mental Status: He is alert. Visual inspection:  Deformity/amputation: absent  Skin lesions/pre-ulcerative calluses: present - +callus  Edema: right- trace, left- trace    Sensory exam:  Monofilament sensation: normal  (minimum of 5 random plantar locations tested, avoiding callused areas - > 1 area with absence of sensation is + for neuropathy)    Plus at least one of the following:  Vibration (128 Hz): Intact      An electronic signature was used to authenticate this note.   Galina Fried DO

## 2023-03-14 ENCOUNTER — HOSPITAL ENCOUNTER (OUTPATIENT)
Dept: CT IMAGING | Age: 73
Discharge: HOME | End: 2023-03-17
Payer: MEDICARE

## 2023-03-14 VITALS — WEIGHT: 220 LBS | HEIGHT: 70 IN | BODY MASS INDEX: 31.5 KG/M2

## 2023-03-14 DIAGNOSIS — Z12.9 SCREENING FOR CANCER: ICD-10-CM

## 2023-03-14 DIAGNOSIS — Z87.891 HISTORY OF NICOTINE DEPENDENCE: ICD-10-CM

## 2023-03-14 PROCEDURE — 71271 CT THORAX LUNG CANCER SCR C-: CPT

## 2023-04-03 ENCOUNTER — OFFICE VISIT (OUTPATIENT)
Dept: CARDIOLOGY CLINIC | Age: 73
End: 2023-04-03
Payer: MEDICARE

## 2023-04-03 VITALS
WEIGHT: 227.8 LBS | HEART RATE: 56 BPM | DIASTOLIC BLOOD PRESSURE: 78 MMHG | HEIGHT: 70 IN | SYSTOLIC BLOOD PRESSURE: 138 MMHG | BODY MASS INDEX: 32.61 KG/M2

## 2023-04-03 DIAGNOSIS — E11.65 TYPE 2 DIABETES MELLITUS WITH HYPERGLYCEMIA, UNSPECIFIED WHETHER LONG TERM INSULIN USE (HCC): ICD-10-CM

## 2023-04-03 DIAGNOSIS — I10 ESSENTIAL (PRIMARY) HYPERTENSION: ICD-10-CM

## 2023-04-03 DIAGNOSIS — I48.91 ATRIAL FIBRILLATION, UNSPECIFIED TYPE (HCC): Primary | ICD-10-CM

## 2023-04-03 PROCEDURE — G8428 CUR MEDS NOT DOCUMENT: HCPCS | Performed by: INTERNAL MEDICINE

## 2023-04-03 PROCEDURE — 99214 OFFICE O/P EST MOD 30 MIN: CPT | Performed by: INTERNAL MEDICINE

## 2023-04-03 PROCEDURE — G8417 CALC BMI ABV UP PARAM F/U: HCPCS | Performed by: INTERNAL MEDICINE

## 2023-04-03 PROCEDURE — 3046F HEMOGLOBIN A1C LEVEL >9.0%: CPT | Performed by: INTERNAL MEDICINE

## 2023-04-03 PROCEDURE — 1123F ACP DISCUSS/DSCN MKR DOCD: CPT | Performed by: INTERNAL MEDICINE

## 2023-04-03 PROCEDURE — 3078F DIAST BP <80 MM HG: CPT | Performed by: INTERNAL MEDICINE

## 2023-04-03 PROCEDURE — 93000 ELECTROCARDIOGRAM COMPLETE: CPT | Performed by: INTERNAL MEDICINE

## 2023-04-03 PROCEDURE — 2022F DILAT RTA XM EVC RTNOPTHY: CPT | Performed by: INTERNAL MEDICINE

## 2023-04-03 PROCEDURE — 3017F COLORECTAL CA SCREEN DOC REV: CPT | Performed by: INTERNAL MEDICINE

## 2023-04-03 PROCEDURE — 1036F TOBACCO NON-USER: CPT | Performed by: INTERNAL MEDICINE

## 2023-04-03 PROCEDURE — 3075F SYST BP GE 130 - 139MM HG: CPT | Performed by: INTERNAL MEDICINE

## 2023-04-03 ASSESSMENT — ENCOUNTER SYMPTOMS
NAIL CHANGES: 0
APHONIA: 0
COUGH: 0
STRIDOR: 0
EYE PAIN: 0
ABDOMINAL PAIN: 0

## 2023-04-03 NOTE — PROGRESS NOTES
arthritis. Gastrointestinal:  Negative for abdominal pain. Genitourinary:  Negative for dysuria. Neurological:  Negative for aphonia. Psychiatric/Behavioral:  Negative for altered mental status. Allergic/Immunologic: Negative for hives. PHYSICAL EXAM:    /78   Pulse 56   Ht 5' 10\" (1.778 m)   Wt 227 lb 12.8 oz (103.3 kg)   BMI 32.69 kg/m²        Wt Readings from Last 3 Encounters:   04/03/23 227 lb 12.8 oz (103.3 kg)   03/14/23 220 lb (99.8 kg)   01/23/23 234 lb (106.1 kg)     BP Readings from Last 3 Encounters:   04/03/23 138/78   01/23/23 118/74   01/03/23 124/78         Physical Exam  Vitals reviewed. HENT:      Head: Normocephalic. Right Ear: External ear normal.      Left Ear: External ear normal.      Nose: Nose normal.   Eyes:      General: No scleral icterus. Cardiovascular:      Rate and Rhythm: Rhythm irregular. Pulmonary:      Effort: Pulmonary effort is normal.   Abdominal:      General: There is no distension. Musculoskeletal:      Cervical back: Neck supple. Skin:     General: Skin is warm. Neurological:      Mental Status: He is alert. Mental status is at baseline. Medical problems and test results were reviewed with the patient today. No results found for this or any previous visit (from the past 672 hour(s)). Lab Results   Component Value Date/Time    CHOL 120 01/12/2022 09:29 AM    HDL 43 01/12/2022 09:29 AM    VLDL 32 01/12/2022 09:29 AM       No results found for any visits on 04/03/23. Linda Alonso was seen today for atrial fibrillation. Diagnoses and all orders for this visit:    Atrial fibrillation, unspecified type ([address]    Essential (primary) hypertension  -     EKG 12 Lead    Type 2 diabetes mellitus with hyperglycemia, unspecified whether long term insulin use ([address]    Return in about 3 months (around 7/3/2023).        Salina Walls MD  4/3/2023  4:48 PM

## 2023-04-05 ENCOUNTER — OFFICE VISIT (OUTPATIENT)
Dept: PULMONOLOGY | Age: 73
End: 2023-04-05

## 2023-04-05 VITALS
TEMPERATURE: 97.1 F | HEIGHT: 70 IN | BODY MASS INDEX: 31.92 KG/M2 | WEIGHT: 223 LBS | HEART RATE: 65 BPM | OXYGEN SATURATION: 94 % | RESPIRATION RATE: 16 BRPM | DIASTOLIC BLOOD PRESSURE: 68 MMHG | SYSTOLIC BLOOD PRESSURE: 120 MMHG

## 2023-04-05 DIAGNOSIS — Z87.891 PERSONAL HISTORY OF NICOTINE DEPENDENCE: ICD-10-CM

## 2023-04-05 DIAGNOSIS — J43.2 CENTRILOBULAR EMPHYSEMA (HCC): Primary | ICD-10-CM

## 2023-05-07 RX ORDER — ATORVASTATIN CALCIUM 40 MG/1
TABLET, FILM COATED ORAL
Qty: 100 TABLET | Refills: 2 | Status: SHIPPED | OUTPATIENT
Start: 2023-05-07

## 2023-05-23 DIAGNOSIS — E11.21 TYPE 2 DIABETES WITH NEPHROPATHY (HCC): ICD-10-CM

## 2023-06-07 ENCOUNTER — OFFICE VISIT (OUTPATIENT)
Dept: INTERNAL MEDICINE CLINIC | Facility: CLINIC | Age: 73
End: 2023-06-07
Payer: MEDICARE

## 2023-06-07 VITALS
HEIGHT: 70 IN | WEIGHT: 229 LBS | RESPIRATION RATE: 17 BRPM | OXYGEN SATURATION: 95 % | DIASTOLIC BLOOD PRESSURE: 68 MMHG | HEART RATE: 62 BPM | SYSTOLIC BLOOD PRESSURE: 116 MMHG | BODY MASS INDEX: 32.78 KG/M2

## 2023-06-07 DIAGNOSIS — Z99.89 OSA ON CPAP: ICD-10-CM

## 2023-06-07 DIAGNOSIS — I48.11 LONGSTANDING PERSISTENT ATRIAL FIBRILLATION (HCC): ICD-10-CM

## 2023-06-07 DIAGNOSIS — I10 HTN (HYPERTENSION), BENIGN: ICD-10-CM

## 2023-06-07 DIAGNOSIS — E11.21 TYPE 2 DIABETES WITH NEPHROPATHY (HCC): Primary | ICD-10-CM

## 2023-06-07 DIAGNOSIS — G47.33 OSA ON CPAP: ICD-10-CM

## 2023-06-07 DIAGNOSIS — Z12.5 SPECIAL SCREENING FOR MALIGNANT NEOPLASM OF PROSTATE: ICD-10-CM

## 2023-06-07 DIAGNOSIS — M72.0 DUPUYTREN'S CONTRACTURE OF RIGHT HAND: ICD-10-CM

## 2023-06-07 DIAGNOSIS — R60.0 BILATERAL LEG EDEMA: ICD-10-CM

## 2023-06-07 DIAGNOSIS — J44.9 CHRONIC OBSTRUCTIVE PULMONARY DISEASE, UNSPECIFIED COPD TYPE (HCC): ICD-10-CM

## 2023-06-07 LAB
ERYTHROCYTE [DISTWIDTH] IN BLOOD BY AUTOMATED COUNT: 14.3 % (ref 11.9–14.6)
HCT VFR BLD AUTO: 45 % (ref 41.1–50.3)
HGB BLD-MCNC: 14.8 G/DL (ref 13.6–17.2)
MCH RBC QN AUTO: 33.7 PG (ref 26.1–32.9)
MCHC RBC AUTO-ENTMCNC: 32.9 G/DL (ref 31.4–35)
MCV RBC AUTO: 102.5 FL (ref 82–102)
NRBC # BLD: 0 K/UL (ref 0–0.2)
PLATELET # BLD AUTO: 238 K/UL (ref 150–450)
PMV BLD AUTO: 10.4 FL (ref 9.4–12.3)
RBC # BLD AUTO: 4.39 M/UL (ref 4.23–5.6)
WBC # BLD AUTO: 6.7 K/UL (ref 4.3–11.1)

## 2023-06-07 PROCEDURE — 99214 OFFICE O/P EST MOD 30 MIN: CPT | Performed by: FAMILY MEDICINE

## 2023-06-07 PROCEDURE — 3078F DIAST BP <80 MM HG: CPT | Performed by: FAMILY MEDICINE

## 2023-06-07 PROCEDURE — 1123F ACP DISCUSS/DSCN MKR DOCD: CPT | Performed by: FAMILY MEDICINE

## 2023-06-07 PROCEDURE — 3074F SYST BP LT 130 MM HG: CPT | Performed by: FAMILY MEDICINE

## 2023-06-07 RX ORDER — FUROSEMIDE 20 MG/1
20 TABLET ORAL DAILY PRN
Qty: 90 TABLET | Refills: 1 | Status: SHIPPED | OUTPATIENT
Start: 2023-06-07

## 2023-06-07 RX ORDER — PREDNISONE 20 MG/1
TABLET ORAL
Qty: 21 TABLET | Refills: 0 | Status: SHIPPED | OUTPATIENT
Start: 2023-06-07

## 2023-06-07 SDOH — ECONOMIC STABILITY: INCOME INSECURITY: HOW HARD IS IT FOR YOU TO PAY FOR THE VERY BASICS LIKE FOOD, HOUSING, MEDICAL CARE, AND HEATING?: NOT HARD AT ALL

## 2023-06-07 SDOH — ECONOMIC STABILITY: FOOD INSECURITY: WITHIN THE PAST 12 MONTHS, YOU WORRIED THAT YOUR FOOD WOULD RUN OUT BEFORE YOU GOT MONEY TO BUY MORE.: NEVER TRUE

## 2023-06-07 SDOH — ECONOMIC STABILITY: FOOD INSECURITY: WITHIN THE PAST 12 MONTHS, THE FOOD YOU BOUGHT JUST DIDN'T LAST AND YOU DIDN'T HAVE MONEY TO GET MORE.: NEVER TRUE

## 2023-06-07 SDOH — ECONOMIC STABILITY: HOUSING INSECURITY
IN THE LAST 12 MONTHS, WAS THERE A TIME WHEN YOU DID NOT HAVE A STEADY PLACE TO SLEEP OR SLEPT IN A SHELTER (INCLUDING NOW)?: NO

## 2023-06-07 ASSESSMENT — PATIENT HEALTH QUESTIONNAIRE - PHQ9
SUM OF ALL RESPONSES TO PHQ QUESTIONS 1-9: 0
SUM OF ALL RESPONSES TO PHQ QUESTIONS 1-9: 0
SUM OF ALL RESPONSES TO PHQ9 QUESTIONS 1 & 2: 0
SUM OF ALL RESPONSES TO PHQ QUESTIONS 1-9: 0
2. FEELING DOWN, DEPRESSED OR HOPELESS: 0
SUM OF ALL RESPONSES TO PHQ QUESTIONS 1-9: 0
1. LITTLE INTEREST OR PLEASURE IN DOING THINGS: 0

## 2023-06-07 NOTE — PROGRESS NOTES
used to authenticate this note. Lisa Rodriguez, DO   Elements of this note have been dictated via voice recognition software. Text and phrases may be limited by the accuracy and autoconversion of the software. The chart has been reviewed, but errors may still be present.

## 2023-06-08 LAB
ALBUMIN SERPL-MCNC: 3.9 G/DL (ref 3.2–4.6)
ALBUMIN/GLOB SERPL: 1.1 (ref 0.4–1.6)
ALP SERPL-CCNC: 80 U/L (ref 50–136)
ALT SERPL-CCNC: 39 U/L (ref 12–65)
ANION GAP SERPL CALC-SCNC: 6 MMOL/L (ref 2–11)
AST SERPL-CCNC: 27 U/L (ref 15–37)
BILIRUB SERPL-MCNC: 1.1 MG/DL (ref 0.2–1.1)
BUN SERPL-MCNC: 14 MG/DL (ref 8–23)
CALCIUM SERPL-MCNC: 10 MG/DL (ref 8.3–10.4)
CHLORIDE SERPL-SCNC: 105 MMOL/L (ref 101–110)
CHOLEST SERPL-MCNC: 120 MG/DL
CO2 SERPL-SCNC: 26 MMOL/L (ref 21–32)
CREAT SERPL-MCNC: 0.9 MG/DL (ref 0.8–1.5)
CREAT UR-MCNC: 51 MG/DL
EST. AVERAGE GLUCOSE BLD GHB EST-MCNC: 120 MG/DL
GLOBULIN SER CALC-MCNC: 3.4 G/DL (ref 2.8–4.5)
GLUCOSE SERPL-MCNC: 96 MG/DL (ref 65–100)
HBA1C MFR BLD: 5.8 % (ref 4.8–5.6)
HDLC SERPL-MCNC: 59 MG/DL (ref 40–60)
HDLC SERPL: 2
LDLC SERPL CALC-MCNC: 21.2 MG/DL
MICROALBUMIN UR-MCNC: 141 MG/DL
MICROALBUMIN/CREAT UR-RTO: 2765 MG/G (ref 0–30)
POTASSIUM SERPL-SCNC: 4.5 MMOL/L (ref 3.5–5.1)
PROT SERPL-MCNC: 7.3 G/DL (ref 6.3–8.2)
PSA SERPL-MCNC: 0.7 NG/ML
SODIUM SERPL-SCNC: 137 MMOL/L (ref 133–143)
TRIGL SERPL-MCNC: 199 MG/DL (ref 35–150)
VLDLC SERPL CALC-MCNC: 39.8 MG/DL (ref 6–23)

## 2023-06-16 ENCOUNTER — TELEPHONE (OUTPATIENT)
Dept: SLEEP MEDICINE | Age: 73
End: 2023-06-16

## 2023-06-16 NOTE — TELEPHONE ENCOUNTER
Patient was traveling this week and had his suitcase and cpap searched. Realized too late that his cpap was not put back in case. Now is asking if we can get him a cpap to use until he can get a new one.   Scheduled to see Dr. Blanche Fitch on 6/28

## 2023-06-28 ENCOUNTER — OFFICE VISIT (OUTPATIENT)
Dept: SLEEP MEDICINE | Age: 73
End: 2023-06-28
Payer: MEDICARE

## 2023-06-28 VITALS
TEMPERATURE: 97.2 F | RESPIRATION RATE: 16 BRPM | WEIGHT: 236.2 LBS | DIASTOLIC BLOOD PRESSURE: 60 MMHG | HEART RATE: 78 BPM | HEIGHT: 70 IN | SYSTOLIC BLOOD PRESSURE: 134 MMHG | OXYGEN SATURATION: 90 % | BODY MASS INDEX: 33.82 KG/M2

## 2023-06-28 DIAGNOSIS — Z99.89 OSA ON CPAP: Primary | ICD-10-CM

## 2023-06-28 DIAGNOSIS — G47.33 OSA ON CPAP: Primary | ICD-10-CM

## 2023-06-28 DIAGNOSIS — G47.34 NOCTURNAL HYPOXEMIA: ICD-10-CM

## 2023-06-28 PROCEDURE — 1123F ACP DISCUSS/DSCN MKR DOCD: CPT | Performed by: INTERNAL MEDICINE

## 2023-06-28 PROCEDURE — 99214 OFFICE O/P EST MOD 30 MIN: CPT | Performed by: INTERNAL MEDICINE

## 2023-06-28 PROCEDURE — 3078F DIAST BP <80 MM HG: CPT | Performed by: INTERNAL MEDICINE

## 2023-06-28 PROCEDURE — 3075F SYST BP GE 130 - 139MM HG: CPT | Performed by: INTERNAL MEDICINE

## 2023-07-06 ENCOUNTER — TELEPHONE (OUTPATIENT)
Age: 73
End: 2023-07-06

## 2023-07-06 ENCOUNTER — PATIENT MESSAGE (OUTPATIENT)
Age: 73
End: 2023-07-06

## 2023-07-06 NOTE — TELEPHONE ENCOUNTER
----- Message from [place] sent at 7/6/2023 11:47 AM EDT -----  Regarding: FW: A-Fib event  Contact: [phone]    ----- Message -----  From: Juan Torres"  Sent: 7/6/2023  11:44 AM EDT  To: [facility] Clinical Staff  Subject: A-Fib event                                      Sending ECG from 7-4 Apple Watch.

## 2023-07-06 NOTE — TELEPHONE ENCOUNTER
Jose Valladares MD  You 11 minutes ago (12:32 PM)     JM     Rate controlled A-fib. Continue current therapies if patient is asymptomatic. Response sent to pt via [employer]. Advise return call if symptomatic. Encouraged hydration.   cgh

## 2023-07-06 NOTE — TELEPHONE ENCOUNTER
----- Message from Maxine Worley MD sent at 7/6/2023 12:32 PM EDT -----  Regarding: RE: A-Fib event  Contact: [phone]  Rate controlled A-fib. Continue current therapies of patient is asymptomatic.  ----- Message -----  From: Melita Warner LPN  Sent: 5/1/4614  12:18 PM EDT  To: Maxine Worley MD  Subject: FW: A-Fib event                                  Hx of A Fib. Anticoagulated Eliquis 5mg BID. UCD f/u 7/25/23. Called pt to see if symptomatic. Will note in telephone encounter, prn. Please advise. [facility]    ----- Message -----  From: Kelli Severino MA  Sent: 7/6/2023  11:47 AM EDT  To: [facility] Triage  Subject: FW: A-Fib event                                    ----- Message -----  From: Charli Smith"  Sent: 7/6/2023  11:44 AM EDT  To: [facility] Clinical Staff  Subject: A-Fib event                                      Sending ECG from 7-4 Apple Watch.

## 2023-07-25 ENCOUNTER — OFFICE VISIT (OUTPATIENT)
Age: 73
End: 2023-07-25
Payer: MEDICARE

## 2023-07-25 ENCOUNTER — TELEPHONE (OUTPATIENT)
Dept: SLEEP MEDICINE | Age: 73
End: 2023-07-25

## 2023-07-25 VITALS
HEART RATE: 78 BPM | SYSTOLIC BLOOD PRESSURE: 132 MMHG | HEIGHT: 70 IN | WEIGHT: 232.5 LBS | BODY MASS INDEX: 33.28 KG/M2 | DIASTOLIC BLOOD PRESSURE: 60 MMHG

## 2023-07-25 DIAGNOSIS — G47.33 OSA (OBSTRUCTIVE SLEEP APNEA): Primary | ICD-10-CM

## 2023-07-25 DIAGNOSIS — I35.8 OTHER NONRHEUMATIC AORTIC VALVE DISORDERS: ICD-10-CM

## 2023-07-25 DIAGNOSIS — I48.91 ATRIAL FIBRILLATION, UNSPECIFIED TYPE (HCC): Primary | ICD-10-CM

## 2023-07-25 DIAGNOSIS — I10 ESSENTIAL (PRIMARY) HYPERTENSION: ICD-10-CM

## 2023-07-25 DIAGNOSIS — E11.65 TYPE 2 DIABETES MELLITUS WITH HYPERGLYCEMIA, UNSPECIFIED WHETHER LONG TERM INSULIN USE (HCC): ICD-10-CM

## 2023-07-25 DIAGNOSIS — G47.34 NOCTURNAL HYPOXEMIA: ICD-10-CM

## 2023-07-25 PROCEDURE — 3075F SYST BP GE 130 - 139MM HG: CPT | Performed by: INTERNAL MEDICINE

## 2023-07-25 PROCEDURE — 1123F ACP DISCUSS/DSCN MKR DOCD: CPT | Performed by: INTERNAL MEDICINE

## 2023-07-25 PROCEDURE — 3078F DIAST BP <80 MM HG: CPT | Performed by: INTERNAL MEDICINE

## 2023-07-25 PROCEDURE — 3044F HG A1C LEVEL LT 7.0%: CPT | Performed by: INTERNAL MEDICINE

## 2023-07-25 PROCEDURE — 99214 OFFICE O/P EST MOD 30 MIN: CPT | Performed by: INTERNAL MEDICINE

## 2023-07-25 ASSESSMENT — ENCOUNTER SYMPTOMS
APHONIA: 0
ABDOMINAL PAIN: 0
STRIDOR: 0
EYE PAIN: 0
NAIL CHANGES: 0
COUGH: 0

## 2023-07-25 NOTE — TELEPHONE ENCOUNTER
Overnight oximetry reviewed on CPAP at current settings, patient had surprisingly low oxygen with a mean of 84%, spending over 4 hours less than 89% with an ERVIN of 52. He notes on the study 7/14/2023 he had not been sick with anything and was in his usual state of health, he checks his oxygen when he is awake with his Apple watch and is usually in the low 90s. He denies any morning headaches. I explained to add oxygen we have to do another in lab study per Medicare rules and I will order that today. We will call him and likely get him started on oxygen once this is done.

## 2023-07-27 ENCOUNTER — HOSPITAL ENCOUNTER (OUTPATIENT)
Dept: SLEEP CENTER | Age: 73
Discharge: HOME | End: 2023-07-30
Payer: MEDICARE

## 2023-07-27 PROCEDURE — 95811 POLYSOM 6/>YRS CPAP 4/> PARM: CPT

## 2023-08-15 ENCOUNTER — TELEPHONE (OUTPATIENT)
Dept: SLEEP MEDICINE | Age: 73
End: 2023-08-15

## 2023-08-15 NOTE — TELEPHONE ENCOUNTER
Calling to check on his sleep study results. Had this on 7/28.   Also he is having some problems sleeping

## 2023-08-17 ASSESSMENT — SLEEP AND FATIGUE QUESTIONNAIRES
HOW LIKELY ARE YOU TO NOD OFF OR FALL ASLEEP WHILE SITTING INACTIVE IN A PUBLIC PLACE: 0
HOW LIKELY ARE YOU TO NOD OFF OR FALL ASLEEP WHILE WATCHING TV: 1
HOW LIKELY ARE YOU TO NOD OFF OR FALL ASLEEP WHILE SITTING AND READING: 1
ESS TOTAL SCORE: 2
HOW LIKELY ARE YOU TO NOD OFF OR FALL ASLEEP WHEN YOU ARE A PASSENGER IN A CAR FOR AN HOUR WITHOUT A BREAK: 0
HOW LIKELY ARE YOU TO NOD OFF OR FALL ASLEEP WHILE SITTING AND TALKING TO SOMEONE: 0
HOW LIKELY ARE YOU TO NOD OFF OR FALL ASLEEP IN A CAR, WHILE STOPPED FOR A FEW MINUTES IN TRAFFIC: 0
HOW LIKELY ARE YOU TO NOD OFF OR FALL ASLEEP WHILE SITTING QUIETLY AFTER LUNCH WITHOUT ALCOHOL: 0
HOW LIKELY ARE YOU TO NOD OFF OR FALL ASLEEP WHILE LYING DOWN TO REST IN THE AFTERNOON WHEN CIRCUMSTANCES PERMIT: 0

## 2023-08-17 NOTE — PROGRESS NOTES
Resource Strain: Low Risk     Difficulty of Paying Living Expenses: Not hard at all   Food Insecurity: No Food Insecurity    Worried About [place] in the Last Year: Never true    Ran Out of Food in the Last Year: Never true   Transportation Needs: Unknown    Lack of Transportation (Medical): Not on file    Lack of Transportation (Non-Medical): No   Physical Activity: Inactive    Days of Exercise per Week: 0 days    Minutes of Exercise per Session: 0 min   Stress: Not on file   Social Connections: Not on file   Intimate Partner Violence: Not on file   Housing Stability: Unknown    Unable to Pay for Housing in the Last Year: Not on file    Number of Places Lived in the Last Year: Not on file    Unstable Housing in the Last Year: No         History reviewed. No pertinent family history.       Allergies   Allergen Reactions    Shellfish-Derived Products Hives     Shrimp only           Current Outpatient Medications   Medication Sig    furosemide (LASIX) 20 MG tablet Take 1 tablet by mouth daily as needed (Leg edema)    tiotropium-olodaterol (STIOLTO RESPIMAT) 2.5-2.5 MCG/ACT AERS USE 2 INHALATIONS BY MOUTH  DAILY    metFORMIN (GLUCOPHAGE) 500 MG tablet TAKE 1 TABLET BY MOUTH TWICE  DAILY WITH MEALS    atorvastatin (LIPITOR) 40 MG tablet TAKE 1 TABLET BY MOUTH IN  THE EVENING    apixaban (ELIQUIS) 5 MG TABS tablet Take 1 tablet by mouth 2 times daily    dilTIAZem (CARDIZEM CD) 240 MG extended release capsule Take 1 capsule by mouth daily    latanoprost (XALATAN) 0.005 % ophthalmic solution 1 drop nightly    enalapril (VASOTEC) 20 MG tablet Take 1 tablet by mouth daily    CPAP Machine MISC by Other route    aspirin 81 MG chewable tablet Take 1 tablet by mouth daily    albuterol sulfate  (90 Base) MCG/ACT inhaler Inhale 2 puffs into the lungs every 6 hours as needed    nitroGLYCERIN (NITROSTAT) 0.4 MG SL tablet Place 1 tablet under the tongue     No current facility-administered medications for this

## 2023-08-18 ENCOUNTER — OFFICE VISIT (OUTPATIENT)
Dept: SLEEP MEDICINE | Age: 73
End: 2023-08-18
Payer: MEDICARE

## 2023-08-18 VITALS
HEIGHT: 70 IN | RESPIRATION RATE: 16 BRPM | DIASTOLIC BLOOD PRESSURE: 62 MMHG | SYSTOLIC BLOOD PRESSURE: 120 MMHG | HEART RATE: 70 BPM | OXYGEN SATURATION: 92 % | TEMPERATURE: 97.9 F | BODY MASS INDEX: 33.76 KG/M2 | WEIGHT: 235.8 LBS

## 2023-08-18 DIAGNOSIS — G47.34 NOCTURNAL HYPOXEMIA: ICD-10-CM

## 2023-08-18 DIAGNOSIS — G47.33 OSA (OBSTRUCTIVE SLEEP APNEA): Primary | ICD-10-CM

## 2023-08-18 DIAGNOSIS — G47.00 PERSISTENT DISORDER OF INITIATING OR MAINTAINING SLEEP: ICD-10-CM

## 2023-08-18 DIAGNOSIS — G47.8 NON-RESTORATIVE SLEEP: ICD-10-CM

## 2023-08-18 PROCEDURE — 3078F DIAST BP <80 MM HG: CPT | Performed by: STUDENT IN AN ORGANIZED HEALTH CARE EDUCATION/TRAINING PROGRAM

## 2023-08-18 PROCEDURE — 1123F ACP DISCUSS/DSCN MKR DOCD: CPT | Performed by: STUDENT IN AN ORGANIZED HEALTH CARE EDUCATION/TRAINING PROGRAM

## 2023-08-18 PROCEDURE — 3074F SYST BP LT 130 MM HG: CPT | Performed by: STUDENT IN AN ORGANIZED HEALTH CARE EDUCATION/TRAINING PROGRAM

## 2023-08-18 PROCEDURE — 99214 OFFICE O/P EST MOD 30 MIN: CPT | Performed by: STUDENT IN AN ORGANIZED HEALTH CARE EDUCATION/TRAINING PROGRAM

## 2023-08-22 DIAGNOSIS — G47.34 NOCTURNAL HYPOXEMIA: ICD-10-CM

## 2023-08-22 DIAGNOSIS — G47.33 OSA (OBSTRUCTIVE SLEEP APNEA): Primary | ICD-10-CM

## 2023-08-26 DIAGNOSIS — R80.8 OTHER PROTEINURIA: ICD-10-CM

## 2023-08-28 ENCOUNTER — HOSPITAL ENCOUNTER (OUTPATIENT)
Age: 73
Setting detail: OBSERVATION
Discharge: HOME | End: 2023-08-30
Attending: EMERGENCY MEDICINE | Admitting: INTERNAL MEDICINE
Payer: MEDICARE

## 2023-08-28 ENCOUNTER — APPOINTMENT (OUTPATIENT)
Dept: GENERAL RADIOLOGY | Age: 73
End: 2023-08-28
Payer: MEDICARE

## 2023-08-28 DIAGNOSIS — R80.8 OTHER PROTEINURIA: ICD-10-CM

## 2023-08-28 DIAGNOSIS — J44.1 COPD EXACERBATION (HCC): Primary | ICD-10-CM

## 2023-08-28 DIAGNOSIS — R09.02 HYPOXIA: ICD-10-CM

## 2023-08-28 LAB
ALBUMIN SERPL-MCNC: 4.4 G/DL (ref 3.2–4.6)
ALBUMIN/GLOB SERPL: 1.1 (ref 0.4–1.6)
ALP SERPL-CCNC: 90 U/L (ref 50–136)
ALT SERPL-CCNC: 39 U/L (ref 12–65)
ANION GAP SERPL CALC-SCNC: 7 MMOL/L (ref 2–11)
AST SERPL-CCNC: 16 U/L (ref 15–37)
BASOPHILS # BLD: 0.1 K/UL (ref 0–0.2)
BASOPHILS NFR BLD: 0 % (ref 0–2)
BILIRUB SERPL-MCNC: 1.6 MG/DL (ref 0.2–1.1)
BUN SERPL-MCNC: 20 MG/DL (ref 8–23)
CALCIUM SERPL-MCNC: 10.1 MG/DL (ref 8.3–10.4)
CHLORIDE SERPL-SCNC: 102 MMOL/L (ref 101–110)
CO2 SERPL-SCNC: 27 MMOL/L (ref 21–32)
CREAT SERPL-MCNC: 1.3 MG/DL (ref 0.8–1.5)
DIFFERENTIAL METHOD BLD: ABNORMAL
EOSINOPHIL # BLD: 0 K/UL (ref 0–0.8)
EOSINOPHIL NFR BLD: 0 % (ref 0.5–7.8)
ERYTHROCYTE [DISTWIDTH] IN BLOOD BY AUTOMATED COUNT: 14 % (ref 11.9–14.6)
GLOBULIN SER CALC-MCNC: 3.9 G/DL (ref 2.8–4.5)
GLUCOSE SERPL-MCNC: 124 MG/DL (ref 65–100)
HCT VFR BLD AUTO: 44.4 % (ref 41.1–50.3)
HGB BLD-MCNC: 14.9 G/DL (ref 13.6–17.2)
IMM GRANULOCYTES # BLD AUTO: 0 K/UL (ref 0–0.5)
IMM GRANULOCYTES NFR BLD AUTO: 0 % (ref 0–5)
LACTATE SERPL-SCNC: 1.2 MMOL/L (ref 0.4–2)
LYMPHOCYTES # BLD: 0.5 K/UL (ref 0.5–4.6)
LYMPHOCYTES NFR BLD: 4 % (ref 13–44)
MCH RBC QN AUTO: 33.9 PG (ref 26.1–32.9)
MCHC RBC AUTO-ENTMCNC: 33.6 G/DL (ref 31.4–35)
MCV RBC AUTO: 101.1 FL (ref 82–102)
MONOCYTES # BLD: 0.7 K/UL (ref 0.1–1.3)
MONOCYTES NFR BLD: 6 % (ref 4–12)
NEUTS SEG # BLD: 10.2 K/UL (ref 1.7–8.2)
NEUTS SEG NFR BLD: 90 % (ref 43–78)
NRBC # BLD: 0 K/UL (ref 0–0.2)
PLATELET # BLD AUTO: 192 K/UL (ref 150–450)
PMV BLD AUTO: 10.1 FL (ref 9.4–12.3)
POTASSIUM SERPL-SCNC: 4.6 MMOL/L (ref 3.5–5.1)
PROT SERPL-MCNC: 8.3 G/DL (ref 6.3–8.2)
RBC # BLD AUTO: 4.39 M/UL (ref 4.23–5.6)
SODIUM SERPL-SCNC: 136 MMOL/L (ref 133–143)
WBC # BLD AUTO: 11.5 K/UL (ref 4.3–11.1)

## 2023-08-28 PROCEDURE — 87040 BLOOD CULTURE FOR BACTERIA: CPT

## 2023-08-28 PROCEDURE — 81001 URINALYSIS AUTO W/SCOPE: CPT

## 2023-08-28 PROCEDURE — 99285 EMERGENCY DEPT VISIT HI MDM: CPT

## 2023-08-28 PROCEDURE — 83880 ASSAY OF NATRIURETIC PEPTIDE: CPT

## 2023-08-28 PROCEDURE — 80053 COMPREHEN METABOLIC PANEL: CPT

## 2023-08-28 PROCEDURE — 83605 ASSAY OF LACTIC ACID: CPT

## 2023-08-28 PROCEDURE — 71045 X-RAY EXAM CHEST 1 VIEW: CPT

## 2023-08-28 PROCEDURE — 81003 URINALYSIS AUTO W/O SCOPE: CPT

## 2023-08-28 PROCEDURE — 84484 ASSAY OF TROPONIN QUANT: CPT

## 2023-08-28 PROCEDURE — 85025 COMPLETE CBC W/AUTO DIFF WBC: CPT

## 2023-08-28 PROCEDURE — 84145 PROCALCITONIN (PCT): CPT

## 2023-08-28 RX ORDER — ENALAPRIL MALEATE 20 MG/1
TABLET ORAL
Qty: 180 TABLET | Refills: 3 | Status: ON HOLD | OUTPATIENT
Start: 2023-08-28 | End: 2023-08-30 | Stop reason: SDUPTHER

## 2023-08-28 ASSESSMENT — LIFESTYLE VARIABLES
HOW OFTEN DO YOU HAVE A DRINK CONTAINING ALCOHOL: 4 OR MORE TIMES A WEEK
HOW MANY STANDARD DRINKS CONTAINING ALCOHOL DO YOU HAVE ON A TYPICAL DAY: 1 OR 2

## 2023-08-28 ASSESSMENT — PAIN - FUNCTIONAL ASSESSMENT: PAIN_FUNCTIONAL_ASSESSMENT: NONE - DENIES PAIN

## 2023-08-28 ASSESSMENT — ENCOUNTER SYMPTOMS: SHORTNESS OF BREATH: 1

## 2023-08-29 LAB
APPEARANCE UR: CLEAR
ARTERIAL PATENCY WRIST A: POSITIVE
B PERT DNA SPEC QL NAA+PROBE: NOT DETECTED
BACTERIA URNS QL MICRO: NEGATIVE /HPF
BASE EXCESS BLD CALC-SCNC: 0 MMOL/L
BDY SITE: ABNORMAL
BILIRUB UR QL: NEGATIVE
BORDETELLA PARAPERTUSSIS BY PCR: NOT DETECTED
C PNEUM DNA SPEC QL NAA+PROBE: NOT DETECTED
CASTS URNS QL MICRO: ABNORMAL /LPF
COLOR UR: ABNORMAL
EPI CELLS #/AREA URNS HPF: ABNORMAL /HPF
FLUAV SUBTYP SPEC NAA+PROBE: NOT DETECTED
FLUBV RNA SPEC QL NAA+PROBE: NOT DETECTED
GAS FLOW.O2 O2 DELIVERY SYS: ABNORMAL
GLUCOSE BLD STRIP.AUTO-MCNC: 199 MG/DL (ref 65–100)
GLUCOSE BLD STRIP.AUTO-MCNC: 224 MG/DL (ref 65–100)
GLUCOSE BLD STRIP.AUTO-MCNC: 278 MG/DL (ref 65–100)
GLUCOSE BLD STRIP.AUTO-MCNC: 349 MG/DL (ref 65–100)
GLUCOSE BLD STRIP.AUTO-MCNC: 365 MG/DL (ref 65–100)
GLUCOSE UR STRIP.AUTO-MCNC: NEGATIVE MG/DL
HADV DNA SPEC QL NAA+PROBE: NOT DETECTED
HCO3 BLD-SCNC: 23.3 MMOL/L (ref 22–26)
HCOV 229E RNA SPEC QL NAA+PROBE: NOT DETECTED
HCOV HKU1 RNA SPEC QL NAA+PROBE: NOT DETECTED
HCOV NL63 RNA SPEC QL NAA+PROBE: NOT DETECTED
HCOV OC43 RNA SPEC QL NAA+PROBE: NOT DETECTED
HGB UR QL STRIP: ABNORMAL
HMPV RNA SPEC QL NAA+PROBE: NOT DETECTED
HPIV1 RNA SPEC QL NAA+PROBE: NOT DETECTED
HPIV2 RNA SPEC QL NAA+PROBE: NOT DETECTED
HPIV3 RNA SPEC QL NAA+PROBE: NOT DETECTED
HPIV4 RNA SPEC QL NAA+PROBE: NOT DETECTED
KETONES UR QL STRIP.AUTO: NEGATIVE MG/DL
LACTATE SERPL-SCNC: 1.3 MMOL/L (ref 0.4–2)
LEUKOCYTE ESTERASE UR QL STRIP.AUTO: NEGATIVE
M PNEUMO DNA SPEC QL NAA+PROBE: NOT DETECTED
NITRITE UR QL STRIP.AUTO: NEGATIVE
NT PRO BNP: 111 PG/ML
O2/TOTAL GAS SETTING VFR VENT: 28 %
PCO2 BLD: 33.5 MMHG (ref 35–45)
PH BLD: 7.45 (ref 7.35–7.45)
PH UR STRIP: 6.5 (ref 5–9)
PO2 BLD: 64 MMHG (ref 75–100)
PROCALCITONIN SERPL-MCNC: <0.05 NG/ML (ref 0–0.49)
PROT UR STRIP-MCNC: 300 MG/DL
RBC #/AREA URNS HPF: ABNORMAL /HPF
RSV RNA SPEC QL NAA+PROBE: NOT DETECTED
RV+EV RNA SPEC QL NAA+PROBE: NOT DETECTED
SAO2 % BLD: 93.3 % (ref 95–98)
SARS-COV-2 RDRP RESP QL NAA+PROBE: NOT DETECTED
SARS-COV-2 RNA RESP QL NAA+PROBE: NOT DETECTED
SERVICE CMNT-IMP: ABNORMAL
SOURCE: NORMAL
SP GR UR REFRACTOMETRY: 1.02 (ref 1–1.02)
SPECIMEN TYPE: ABNORMAL
TROPONIN I SERPL HS-MCNC: 6.8 PG/ML (ref 0–14)
UROBILINOGEN UR QL STRIP.AUTO: 0.2 EU/DL (ref 0.2–1)
WBC URNS QL MICRO: ABNORMAL /HPF

## 2023-08-29 PROCEDURE — G0378 HOSPITAL OBSERVATION PER HR: HCPCS

## 2023-08-29 PROCEDURE — 97535 SELF CARE MNGMENT TRAINING: CPT

## 2023-08-29 PROCEDURE — 6360000002 HC RX W HCPCS: Performed by: EMERGENCY MEDICINE

## 2023-08-29 PROCEDURE — 6370000000 HC RX 637 (ALT 250 FOR IP): Performed by: EMERGENCY MEDICINE

## 2023-08-29 PROCEDURE — 96374 THER/PROPH/DIAG INJ IV PUSH: CPT

## 2023-08-29 PROCEDURE — 94640 AIRWAY INHALATION TREATMENT: CPT

## 2023-08-29 PROCEDURE — 94760 N-INVAS EAR/PLS OXIMETRY 1: CPT

## 2023-08-29 PROCEDURE — 6370000000 HC RX 637 (ALT 250 FOR IP): Performed by: NURSE PRACTITIONER

## 2023-08-29 PROCEDURE — 94660 CPAP INITIATION&MGMT: CPT

## 2023-08-29 PROCEDURE — 97161 PT EVAL LOW COMPLEX 20 MIN: CPT

## 2023-08-29 PROCEDURE — 2580000003 HC RX 258: Performed by: NURSE PRACTITIONER

## 2023-08-29 PROCEDURE — 36600 WITHDRAWAL OF ARTERIAL BLOOD: CPT

## 2023-08-29 PROCEDURE — 97165 OT EVAL LOW COMPLEX 30 MIN: CPT

## 2023-08-29 PROCEDURE — 36415 COLL VENOUS BLD VENIPUNCTURE: CPT

## 2023-08-29 PROCEDURE — 0202U NFCT DS 22 TRGT SARS-COV-2: CPT

## 2023-08-29 PROCEDURE — 6370000000 HC RX 637 (ALT 250 FOR IP): Performed by: INTERNAL MEDICINE

## 2023-08-29 PROCEDURE — 97530 THERAPEUTIC ACTIVITIES: CPT

## 2023-08-29 PROCEDURE — 2700000000 HC OXYGEN THERAPY PER DAY

## 2023-08-29 PROCEDURE — 82962 GLUCOSE BLOOD TEST: CPT

## 2023-08-29 PROCEDURE — 87635 SARS-COV-2 COVID-19 AMP PRB: CPT

## 2023-08-29 PROCEDURE — 82803 BLOOD GASES ANY COMBINATION: CPT

## 2023-08-29 RX ORDER — DILTIAZEM HYDROCHLORIDE 240 MG/1
240 CAPSULE, COATED, EXTENDED RELEASE ORAL DAILY
Status: DISCONTINUED | OUTPATIENT
Start: 2023-08-29 | End: 2023-08-30 | Stop reason: HOSPADM

## 2023-08-29 RX ORDER — INSULIN GLARGINE 100 [IU]/ML
15 INJECTION, SOLUTION SUBCUTANEOUS DAILY
Status: DISCONTINUED | OUTPATIENT
Start: 2023-08-29 | End: 2023-08-30 | Stop reason: HOSPADM

## 2023-08-29 RX ORDER — ASPIRIN 81 MG/1
81 TABLET, CHEWABLE ORAL DAILY
Status: DISCONTINUED | OUTPATIENT
Start: 2023-08-29 | End: 2023-08-30 | Stop reason: HOSPADM

## 2023-08-29 RX ORDER — IPRATROPIUM BROMIDE AND ALBUTEROL SULFATE 2.5; .5 MG/3ML; MG/3ML
1 SOLUTION RESPIRATORY (INHALATION)
Status: COMPLETED | OUTPATIENT
Start: 2023-08-29 | End: 2023-08-29

## 2023-08-29 RX ORDER — ACETAMINOPHEN 325 MG/1
650 TABLET ORAL EVERY 6 HOURS PRN
Status: DISCONTINUED | OUTPATIENT
Start: 2023-08-29 | End: 2023-08-30 | Stop reason: HOSPADM

## 2023-08-29 RX ORDER — SODIUM CHLORIDE 0.9 % (FLUSH) 0.9 %
5-40 SYRINGE (ML) INJECTION PRN
Status: DISCONTINUED | OUTPATIENT
Start: 2023-08-29 | End: 2023-08-30 | Stop reason: HOSPADM

## 2023-08-29 RX ORDER — DEXTROSE MONOHYDRATE 100 MG/ML
INJECTION, SOLUTION INTRAVENOUS CONTINUOUS PRN
Status: DISCONTINUED | OUTPATIENT
Start: 2023-08-29 | End: 2023-08-30 | Stop reason: HOSPADM

## 2023-08-29 RX ORDER — ONDANSETRON 4 MG/1
4 TABLET, ORALLY DISINTEGRATING ORAL EVERY 8 HOURS PRN
Status: DISCONTINUED | OUTPATIENT
Start: 2023-08-29 | End: 2023-08-30 | Stop reason: HOSPADM

## 2023-08-29 RX ORDER — LANOLIN ALCOHOL/MO/W.PET/CERES
3 CREAM (GRAM) TOPICAL NIGHTLY PRN
Status: DISCONTINUED | OUTPATIENT
Start: 2023-08-29 | End: 2023-08-30 | Stop reason: HOSPADM

## 2023-08-29 RX ORDER — INSULIN LISPRO 100 [IU]/ML
0-8 INJECTION, SOLUTION INTRAVENOUS; SUBCUTANEOUS
Status: DISCONTINUED | OUTPATIENT
Start: 2023-08-29 | End: 2023-08-30 | Stop reason: HOSPADM

## 2023-08-29 RX ORDER — ATORVASTATIN CALCIUM 40 MG/1
40 TABLET, FILM COATED ORAL EVERY EVENING
Status: DISCONTINUED | OUTPATIENT
Start: 2023-08-29 | End: 2023-08-30 | Stop reason: HOSPADM

## 2023-08-29 RX ORDER — ALBUTEROL SULFATE 2.5 MG/3ML
2.5 SOLUTION RESPIRATORY (INHALATION) EVERY 4 HOURS PRN
Status: DISCONTINUED | OUTPATIENT
Start: 2023-08-29 | End: 2023-08-30 | Stop reason: HOSPADM

## 2023-08-29 RX ORDER — POLYETHYLENE GLYCOL 3350 17 G/17G
17 POWDER, FOR SOLUTION ORAL DAILY PRN
Status: DISCONTINUED | OUTPATIENT
Start: 2023-08-29 | End: 2023-08-30 | Stop reason: HOSPADM

## 2023-08-29 RX ORDER — LISINOPRIL 20 MG/1
20 TABLET ORAL 2 TIMES DAILY
Status: DISCONTINUED | OUTPATIENT
Start: 2023-08-29 | End: 2023-08-30

## 2023-08-29 RX ORDER — IBUPROFEN 600 MG/1
1 TABLET ORAL PRN
Status: DISCONTINUED | OUTPATIENT
Start: 2023-08-29 | End: 2023-08-30 | Stop reason: HOSPADM

## 2023-08-29 RX ORDER — SODIUM CHLORIDE 0.9 % (FLUSH) 0.9 %
5-40 SYRINGE (ML) INJECTION EVERY 12 HOURS SCHEDULED
Status: DISCONTINUED | OUTPATIENT
Start: 2023-08-29 | End: 2023-08-30 | Stop reason: HOSPADM

## 2023-08-29 RX ORDER — LEVOFLOXACIN 500 MG/1
500 TABLET, FILM COATED ORAL DAILY
Status: DISCONTINUED | OUTPATIENT
Start: 2023-08-29 | End: 2023-08-30 | Stop reason: HOSPADM

## 2023-08-29 RX ORDER — ACETAMINOPHEN 650 MG/1
650 SUPPOSITORY RECTAL EVERY 6 HOURS PRN
Status: DISCONTINUED | OUTPATIENT
Start: 2023-08-29 | End: 2023-08-30 | Stop reason: HOSPADM

## 2023-08-29 RX ORDER — GUAIFENESIN/DEXTROMETHORPHAN 100-10MG/5
5 SYRUP ORAL EVERY 4 HOURS PRN
Status: DISCONTINUED | OUTPATIENT
Start: 2023-08-29 | End: 2023-08-30 | Stop reason: HOSPADM

## 2023-08-29 RX ORDER — ENOXAPARIN SODIUM 100 MG/ML
30 INJECTION SUBCUTANEOUS 2 TIMES DAILY
Status: DISCONTINUED | OUTPATIENT
Start: 2023-08-29 | End: 2023-08-29

## 2023-08-29 RX ORDER — PREDNISONE 20 MG/1
40 TABLET ORAL DAILY
Status: DISCONTINUED | OUTPATIENT
Start: 2023-08-29 | End: 2023-08-30 | Stop reason: HOSPADM

## 2023-08-29 RX ORDER — SODIUM CHLORIDE 9 MG/ML
INJECTION, SOLUTION INTRAVENOUS PRN
Status: DISCONTINUED | OUTPATIENT
Start: 2023-08-29 | End: 2023-08-30 | Stop reason: HOSPADM

## 2023-08-29 RX ORDER — INSULIN LISPRO 100 [IU]/ML
0-4 INJECTION, SOLUTION INTRAVENOUS; SUBCUTANEOUS NIGHTLY
Status: DISCONTINUED | OUTPATIENT
Start: 2023-08-29 | End: 2023-08-30 | Stop reason: HOSPADM

## 2023-08-29 RX ORDER — ONDANSETRON 2 MG/ML
4 INJECTION INTRAMUSCULAR; INTRAVENOUS EVERY 6 HOURS PRN
Status: DISCONTINUED | OUTPATIENT
Start: 2023-08-29 | End: 2023-08-30 | Stop reason: HOSPADM

## 2023-08-29 RX ORDER — IPRATROPIUM BROMIDE AND ALBUTEROL SULFATE 2.5; .5 MG/3ML; MG/3ML
1 SOLUTION RESPIRATORY (INHALATION)
Status: DISCONTINUED | OUTPATIENT
Start: 2023-08-29 | End: 2023-08-30 | Stop reason: HOSPADM

## 2023-08-29 RX ORDER — FAMOTIDINE 20 MG/1
20 TABLET, FILM COATED ORAL 2 TIMES DAILY
Status: DISCONTINUED | OUTPATIENT
Start: 2023-08-29 | End: 2023-08-30 | Stop reason: HOSPADM

## 2023-08-29 RX ORDER — GUAIFENESIN 600 MG/1
600 TABLET, EXTENDED RELEASE ORAL 2 TIMES DAILY
Status: DISCONTINUED | OUTPATIENT
Start: 2023-08-29 | End: 2023-08-30 | Stop reason: HOSPADM

## 2023-08-29 RX ADMIN — METHYLPREDNISOLONE SODIUM SUCCINATE 125 MG: 125 INJECTION, POWDER, FOR SOLUTION INTRAMUSCULAR; INTRAVENOUS at 00:27

## 2023-08-29 RX ADMIN — INSULIN LISPRO 4 UNITS: 100 INJECTION, SOLUTION INTRAVENOUS; SUBCUTANEOUS at 17:30

## 2023-08-29 RX ADMIN — LISINOPRIL 20 MG: 20 TABLET ORAL at 20:21

## 2023-08-29 RX ADMIN — INSULIN LISPRO 8 UNITS: 100 INJECTION, SOLUTION INTRAVENOUS; SUBCUTANEOUS at 11:53

## 2023-08-29 RX ADMIN — GUAIFENESIN 600 MG: 600 TABLET ORAL at 09:38

## 2023-08-29 RX ADMIN — PREDNISONE 40 MG: 20 TABLET ORAL at 09:39

## 2023-08-29 RX ADMIN — LEVOFLOXACIN 500 MG: 500 TABLET, FILM COATED ORAL at 09:38

## 2023-08-29 RX ADMIN — IPRATROPIUM BROMIDE AND ALBUTEROL SULFATE 1 DOSE: 2.5; .5 SOLUTION RESPIRATORY (INHALATION) at 00:32

## 2023-08-29 RX ADMIN — SODIUM CHLORIDE, PRESERVATIVE FREE 5 ML: 5 INJECTION INTRAVENOUS at 09:39

## 2023-08-29 RX ADMIN — GUAIFENESIN 600 MG: 600 TABLET ORAL at 03:46

## 2023-08-29 RX ADMIN — FAMOTIDINE 20 MG: 20 TABLET, FILM COATED ORAL at 20:20

## 2023-08-29 RX ADMIN — APIXABAN 5 MG: 5 TABLET, FILM COATED ORAL at 09:36

## 2023-08-29 RX ADMIN — SODIUM CHLORIDE, PRESERVATIVE FREE 10 ML: 5 INJECTION INTRAVENOUS at 20:28

## 2023-08-29 RX ADMIN — ASPIRIN 81 MG 81 MG: 81 TABLET ORAL at 09:39

## 2023-08-29 RX ADMIN — ATORVASTATIN CALCIUM 40 MG: 40 TABLET, FILM COATED ORAL at 17:36

## 2023-08-29 RX ADMIN — LISINOPRIL 20 MG: 20 TABLET ORAL at 09:38

## 2023-08-29 RX ADMIN — IPRATROPIUM BROMIDE AND ALBUTEROL SULFATE 1 DOSE: .5; 3 SOLUTION RESPIRATORY (INHALATION) at 11:08

## 2023-08-29 RX ADMIN — FAMOTIDINE 20 MG: 20 TABLET, FILM COATED ORAL at 03:46

## 2023-08-29 RX ADMIN — FAMOTIDINE 20 MG: 20 TABLET, FILM COATED ORAL at 09:38

## 2023-08-29 RX ADMIN — IPRATROPIUM BROMIDE AND ALBUTEROL SULFATE 1 DOSE: .5; 3 SOLUTION RESPIRATORY (INHALATION) at 07:18

## 2023-08-29 RX ADMIN — IPRATROPIUM BROMIDE AND ALBUTEROL SULFATE 1 DOSE: .5; 3 SOLUTION RESPIRATORY (INHALATION) at 15:01

## 2023-08-29 RX ADMIN — APIXABAN 5 MG: 5 TABLET, FILM COATED ORAL at 20:21

## 2023-08-29 RX ADMIN — DILTIAZEM HYDROCHLORIDE 240 MG: 240 CAPSULE, EXTENDED RELEASE ORAL at 09:38

## 2023-08-29 RX ADMIN — INSULIN GLARGINE 15 UNITS: 100 INJECTION, SOLUTION SUBCUTANEOUS at 13:48

## 2023-08-29 RX ADMIN — IPRATROPIUM BROMIDE AND ALBUTEROL SULFATE 1 DOSE: .5; 3 SOLUTION RESPIRATORY (INHALATION) at 19:51

## 2023-08-29 RX ADMIN — GUAIFENESIN 600 MG: 600 TABLET ORAL at 20:21

## 2023-08-29 RX ADMIN — BENZOCAINE AND MENTHOL 1 LOZENGE: 15; 2.6 LOZENGE ORAL at 20:20

## 2023-08-29 NOTE — ED NOTES
TRANSFER - OUT REPORT:    Verbal report given to [facility] on Radha Castillo  being transferred to [address] [place] [address] for routine progression of patient care       Report consisted of patient's Situation, Background, Assessment and   Recommendations(SBAR). Information from the following report(s) ED Encounter Summary was reviewed with the receiving nurse. [place] Fall Assessment:    Presents to emergency department  because of falls (Syncope, seizure, or loss of consciousness): No  Age > 70: Yes  Altered Mental Status, Intoxication with alcohol or substance confusion (Disorientation, impaired judgment, poor safety awaremess, or inability to follow instructions): No  Impaired Mobility: Ambulates or transfers with assistive devices or assistance; Unable to ambulate or transer.: No  Nursing Judgement: No          Lines:   Peripheral IV 08/28/23 Left Antecubital (Active)   Site Assessment Clean, dry & intact 08/28/23 2204   Line Status Blood return noted;Brisk blood return;Flushed;Specimen collected 08/28/23 2204   Phlebitis Assessment No symptoms 08/28/23 2204   Infiltration Assessment 0 08/28/23 2204   Dressing Status New dressing applied;Clean, dry & intact 08/28/23 2204   Dressing Type Transparent 08/28/23 2204   Dressing Intervention New 08/28/23 2204        Opportunity for questions and clarification was provided.       Patient transported with:  O2 @ 2lpm          Glenny Barry RN  08/29/23 3803

## 2023-08-29 NOTE — ACP (ADVANCE CARE PLANNING)
[facility] Hospitalist Service  At the heart of better care     Advance Care Planning   Admit Date:  2023  9:02 PM   Name:  Radha Castillo   Age:  74 y.o. Sex:  male  :  1950   MRN:  [id number]   Room:  [place] [address]    Radha Castillo has capacity to make his own decisions:   Yes    If pt unable to make decisions, POA/surrogate decision maker:  Spouse    Other people present:   Spouse    Patient / surrogate decision-maker directed code status:  Full Code    Other ACP topics discussed, if applicable:   ABT, neb tx    Patient or surrogate consented to discussion of the current conditions, workup, management plans, prognosis, and the risk for further deterioration. Time spent: 30 minutes in direct discussion.       Signed:  DEB Andujar CNP

## 2023-08-29 NOTE — CARE COORDINATION
08/29/23 1109   Service Assessment   Patient Orientation Alert and Oriented   Cognition Alert   History Provided By Patient   Primary [address] is: Named in [address]    PCP Verified by CM Yes   Last Visit to PCP Within last 6 months   Prior Functional Level Independent in ADLs/IADLs   Current Functional Level Independent in ADLs/IADLs   Can patient return to prior living arrangement Yes   Ability to make needs known: Good   Family able to assist with home care needs: Yes   Would you like for me to discuss the discharge plan with any other family members/significant others, and if so, who? Yes   Financial Resources Medicare   Social/Functional History   Lives With Spouse   Type of [address] Responsibilities No   Ambulation Assistance Independent   Transfer Assistance Independent   Active  Yes     Patient has a history of HH but not rehab. Drives. DME: cpap with 02 ([address] [place] [address] provider). CM following for discharge needs.

## 2023-08-29 NOTE — ED TRIAGE NOTES
Pt arrives to ER with reports of difficultly breathing and increased HR. Pt states earlier today he checked his HR on his apple watch after feeling \"terrible\" and consistently was at at rate of 120. Pt reports a hx of COPD. Placed pt on 2L via [place] in triage for comfort, pts room air saturation was 92%.

## 2023-08-29 NOTE — ED PROVIDER NOTES
Emergency Department Provider Note       PCP: Jt Mclaughlin DO   Age: 74 y.o. Sex: male     DISPOSITION Decision To Admit 08/29/2023 01:12:08 AM       ICD-10-CM    1. COPD exacerbation ([address])  J44.1       2. Hypoxia  R09.02           Medical Decision Making     Complexity of Problems Addressed:  1 or more acute illnesses that pose a threat to life or bodily function. Patient presenting with shortness of breath and continued tachypnea with decreased breath sounds. Suggestive of probable COPD exacerbation. Will require further evaluation and treatment. Data Reviewed and Analyzed:  I independently ordered and reviewed each unique test.         I interpreted the EKG at 8:03 PM.  This normal sinus rhythm rhythm rate of 88. Age-indeterminate possible anterior infarct. No acute ischemic changes. .    Discussion of management or test interpretation. Patient has no clinically significant abnormalities on blood test.  Chest x-ray is clear. However with exertion off oxygen he desaturates to 88% range. And becomes dyspneic again. Blood gas shows a PaO2 of only 64 on supplemental oxygen. Patient has oxygen at home but only connects to his CPAP machine. Will discuss for admission for COPD exacerbation with hypoxia. The patient was admitted and I have discussed patient management with the admitting provider. Risk of Complications and/or Morbidity of Patient Management:  Shared medical decision making was utilized in creating the patients health plan today. History      Ledy Yancey is a 74 y.o. male who presents to the Emergency Department with chief complaint of    Chief Complaint   Patient presents with    Tachycardia      Patient presents to the ER for evaluation with complaint of shortness of breath and elevated heart rate. The patient states he and his wife were working in her garden about 2 PM and after about an hour he became extremely short of breath.   He does have a history of COPD and went inside to get out of the heat. He has oxygen to use with CPAP at night and put his oxygen on and it did not seem to help so he came to the emergency department. He denies any cough or congestion or fever or chills. He denies any chest pain. Review of systems otherwise negative. The history is provided by the patient and medical records. Review of Systems   Constitutional:  Negative for chills and fever. Respiratory:  Positive for shortness of breath. Cardiovascular:  Positive for palpitations. All other systems reviewed and are negative. Physical Exam     Vitals signs and nursing note reviewed:  Vitals:    08/28/23 2232 08/28/23 2317 08/28/23 2332 08/29/23 0034   BP: 119/81 120/77 122/74    Pulse: 87 89 84    Resp: 21 25 24    Temp:       TempSrc:       SpO2: 91% 92% 92% 92%   Weight:       Height:          Physical Exam  Vitals and nursing note reviewed. Constitutional:       General: He is not in acute distress. Appearance: Normal appearance. He is not ill-appearing or toxic-appearing. HENT:      Head: Normocephalic and atraumatic. Mouth/Throat:      Mouth: Mucous membranes are moist.      Pharynx: Oropharynx is clear. No oropharyngeal exudate or posterior oropharyngeal erythema. Eyes:      Extraocular Movements: Extraocular movements intact. Conjunctiva/sclera: Conjunctivae normal.      Pupils: Pupils are equal, round, and reactive to light. Cardiovascular:      Rate and Rhythm: Normal rate and regular rhythm. Pulses: Normal pulses. Pulmonary:      Comments: Breath sounds are diminished throughout, patient is tachypneic  Abdominal:      General: There is no distension. Palpations: Abdomen is soft. Tenderness: There is no abdominal tenderness. There is no right CVA tenderness or guarding. Musculoskeletal:         General: Normal range of motion. Cervical back: Normal range of motion and neck supple. Skin:     General: Skin is warm and dry.

## 2023-08-30 VITALS
DIASTOLIC BLOOD PRESSURE: 54 MMHG | HEART RATE: 73 BPM | TEMPERATURE: 97.3 F | BODY MASS INDEX: 33.21 KG/M2 | RESPIRATION RATE: 20 BRPM | OXYGEN SATURATION: 94 % | SYSTOLIC BLOOD PRESSURE: 106 MMHG | HEIGHT: 70 IN | WEIGHT: 232 LBS

## 2023-08-30 LAB
ANION GAP SERPL CALC-SCNC: 9 MMOL/L (ref 2–11)
BASOPHILS # BLD: 0 K/UL (ref 0–0.2)
BASOPHILS NFR BLD: 0 % (ref 0–2)
BUN SERPL-MCNC: 34 MG/DL (ref 8–23)
CALCIUM SERPL-MCNC: 10.1 MG/DL (ref 8.3–10.4)
CHLORIDE SERPL-SCNC: 101 MMOL/L (ref 101–110)
CO2 SERPL-SCNC: 23 MMOL/L (ref 21–32)
CREAT SERPL-MCNC: 1.6 MG/DL (ref 0.8–1.5)
DIFFERENTIAL METHOD BLD: ABNORMAL
EOSINOPHIL # BLD: 0 K/UL (ref 0–0.8)
EOSINOPHIL NFR BLD: 0 % (ref 0.5–7.8)
ERYTHROCYTE [DISTWIDTH] IN BLOOD BY AUTOMATED COUNT: 14 % (ref 11.9–14.6)
GLUCOSE BLD STRIP.AUTO-MCNC: 152 MG/DL (ref 65–100)
GLUCOSE SERPL-MCNC: 165 MG/DL (ref 65–100)
HCT VFR BLD AUTO: 39.2 % (ref 41.1–50.3)
HGB BLD-MCNC: 13.2 G/DL (ref 13.6–17.2)
IMM GRANULOCYTES # BLD AUTO: 0.1 K/UL (ref 0–0.5)
IMM GRANULOCYTES NFR BLD AUTO: 1 % (ref 0–5)
LYMPHOCYTES # BLD: 0.8 K/UL (ref 0.5–4.6)
LYMPHOCYTES NFR BLD: 5 % (ref 13–44)
MCH RBC QN AUTO: 33.8 PG (ref 26.1–32.9)
MCHC RBC AUTO-ENTMCNC: 33.7 G/DL (ref 31.4–35)
MCV RBC AUTO: 100.5 FL (ref 82–102)
MONOCYTES # BLD: 0.8 K/UL (ref 0.1–1.3)
MONOCYTES NFR BLD: 5 % (ref 4–12)
NEUTS SEG # BLD: 14.2 K/UL (ref 1.7–8.2)
NEUTS SEG NFR BLD: 89 % (ref 43–78)
NRBC # BLD: 0 K/UL (ref 0–0.2)
PLATELET # BLD AUTO: 195 K/UL (ref 150–450)
PMV BLD AUTO: 10.2 FL (ref 9.4–12.3)
POTASSIUM SERPL-SCNC: 4.7 MMOL/L (ref 3.5–5.1)
RBC # BLD AUTO: 3.9 M/UL (ref 4.23–5.6)
SERVICE CMNT-IMP: ABNORMAL
SODIUM SERPL-SCNC: 133 MMOL/L (ref 133–143)
WBC # BLD AUTO: 15.9 K/UL (ref 4.3–11.1)

## 2023-08-30 PROCEDURE — 6370000000 HC RX 637 (ALT 250 FOR IP): Performed by: NURSE PRACTITIONER

## 2023-08-30 PROCEDURE — 80048 BASIC METABOLIC PNL TOTAL CA: CPT

## 2023-08-30 PROCEDURE — 82962 GLUCOSE BLOOD TEST: CPT

## 2023-08-30 PROCEDURE — 2580000003 HC RX 258: Performed by: NURSE PRACTITIONER

## 2023-08-30 PROCEDURE — G0378 HOSPITAL OBSERVATION PER HR: HCPCS

## 2023-08-30 PROCEDURE — 94760 N-INVAS EAR/PLS OXIMETRY 1: CPT

## 2023-08-30 PROCEDURE — 85025 COMPLETE CBC W/AUTO DIFF WBC: CPT

## 2023-08-30 PROCEDURE — 6370000000 HC RX 637 (ALT 250 FOR IP): Performed by: INTERNAL MEDICINE

## 2023-08-30 PROCEDURE — 94640 AIRWAY INHALATION TREATMENT: CPT

## 2023-08-30 PROCEDURE — 36415 COLL VENOUS BLD VENIPUNCTURE: CPT

## 2023-08-30 RX ORDER — LISINOPRIL 20 MG/1
20 TABLET ORAL DAILY
Status: DISCONTINUED | OUTPATIENT
Start: 2023-08-31 | End: 2023-08-30 | Stop reason: HOSPADM

## 2023-08-30 RX ORDER — PREDNISONE 20 MG/1
40 TABLET ORAL DAILY
Qty: 6 TABLET | Refills: 0 | Status: SHIPPED | OUTPATIENT
Start: 2023-08-31 | End: 2023-09-03

## 2023-08-30 RX ORDER — ENALAPRIL MALEATE 20 MG/1
20 TABLET ORAL DAILY
Qty: 30 TABLET | Refills: 0
Start: 2023-08-30 | End: 2023-09-29

## 2023-08-30 RX ORDER — LEVOFLOXACIN 250 MG/1
250 TABLET ORAL DAILY
Qty: 3 TABLET | Refills: 0 | Status: SHIPPED | OUTPATIENT
Start: 2023-08-31 | End: 2023-09-03

## 2023-08-30 RX ADMIN — DILTIAZEM HYDROCHLORIDE 240 MG: 240 CAPSULE, EXTENDED RELEASE ORAL at 09:15

## 2023-08-30 RX ADMIN — APIXABAN 5 MG: 5 TABLET, FILM COATED ORAL at 09:16

## 2023-08-30 RX ADMIN — LEVOFLOXACIN 500 MG: 500 TABLET, FILM COATED ORAL at 09:16

## 2023-08-30 RX ADMIN — FAMOTIDINE 20 MG: 20 TABLET, FILM COATED ORAL at 09:16

## 2023-08-30 RX ADMIN — SODIUM CHLORIDE, PRESERVATIVE FREE 5 ML: 5 INJECTION INTRAVENOUS at 09:17

## 2023-08-30 RX ADMIN — GUAIFENESIN 600 MG: 600 TABLET ORAL at 09:16

## 2023-08-30 RX ADMIN — ASPIRIN 81 MG 81 MG: 81 TABLET ORAL at 09:16

## 2023-08-30 RX ADMIN — IPRATROPIUM BROMIDE AND ALBUTEROL SULFATE 1 DOSE: .5; 3 SOLUTION RESPIRATORY (INHALATION) at 07:24

## 2023-08-30 RX ADMIN — PREDNISONE 40 MG: 20 TABLET ORAL at 09:16

## 2023-08-30 RX ADMIN — TIOTROPIUM BROMIDE AND OLODATEROL 2 PUFF: 3.124; 2.736 SPRAY, METERED RESPIRATORY (INHALATION) at 07:29

## 2023-08-30 RX ADMIN — INSULIN GLARGINE 15 UNITS: 100 INJECTION, SOLUTION SUBCUTANEOUS at 09:17

## 2023-08-30 NOTE — CARE COORDINATION
08/30/23 [address] Discharge None   [employer] Information Provided? Yes   Mode of Transport at Discharge Other (see comment)   Confirm Follow Up Transport Family   Condition of Participation: Discharge Planning   The Patient and/or Patient Representative was provided with a Choice of Provider? Patient   The Patient and/Or Patient Representative agree with the Discharge Plan? Yes   Freedom of Choice list was provided with basic dialogue that supports the patient's individualized plan of care/goals, treatment preferences, and shares the quality data associated with the providers? Yes     Patient is discharging home. CM did not identify any discharge needs.

## 2023-08-31 ENCOUNTER — OFFICE VISIT (OUTPATIENT)
Dept: INTERNAL MEDICINE CLINIC | Facility: CLINIC | Age: 73
End: 2023-08-31

## 2023-08-31 ENCOUNTER — CARE COORDINATION (OUTPATIENT)
Dept: CARE COORDINATION | Facility: CLINIC | Age: 73
End: 2023-08-31

## 2023-08-31 VITALS
DIASTOLIC BLOOD PRESSURE: 62 MMHG | SYSTOLIC BLOOD PRESSURE: 110 MMHG | OXYGEN SATURATION: 93 % | WEIGHT: 229 LBS | HEART RATE: 69 BPM | BODY MASS INDEX: 32.78 KG/M2 | TEMPERATURE: 97.4 F | HEIGHT: 70 IN

## 2023-08-31 DIAGNOSIS — E11.21 TYPE 2 DIABETES WITH NEPHROPATHY (HCC): ICD-10-CM

## 2023-08-31 DIAGNOSIS — Z09 HOSPITAL DISCHARGE FOLLOW-UP: Primary | ICD-10-CM

## 2023-08-31 DIAGNOSIS — J44.9 CHRONIC OBSTRUCTIVE PULMONARY DISEASE, UNSPECIFIED COPD TYPE (HCC): ICD-10-CM

## 2023-08-31 RX ORDER — RESPIRATORY SYNCYTIAL VIRUS VACCINE 120MCG/0.5
0.5 KIT INTRAMUSCULAR ONCE
Qty: 1 EACH | Refills: 0 | Status: SHIPPED | OUTPATIENT
Start: 2023-08-31 | End: 2023-08-31

## 2023-08-31 NOTE — CARE COORDINATION
Care Transitions Outreach Attempt    Call within 2 business days of discharge: Yes   Attempted to reach patient for transitions of care follow up. Unable to reach patient. Patient: Meme White Patient : 1950 MRN: [id number]    Last Discharge [address]       Date Complaint Diagnosis Description Type Department Provider    23 Tachycardia COPD exacerbation ([address]) . .. ED to Hosp-Admission (Discharged) (ADMITTED) Verner Adams, MD; Mayelin Lopez ... Was this an external facility discharge?  No Discharge Facility: [facility]    Noted following upcoming appointments from discharge chart review:   [facility] follow up appointment(s):   Future Appointments   Date Time Provider [address]   10/4/2023  1:40 PM DEB Layton - CNP [facility]CD [facility] AMB   2023 10:00 AM DO CINDY David [facility] AMB   2024  8:15 AM Keturah Holley MD [facility] AMB     Non-[facility] follow up appointment(s): n/a

## 2023-09-02 LAB
BACTERIA SPEC CULT: NORMAL
BACTERIA SPEC CULT: NORMAL
SERVICE CMNT-IMP: NORMAL
SERVICE CMNT-IMP: NORMAL

## 2023-09-04 LAB
BACTERIA SPEC CULT: NORMAL
SERVICE CMNT-IMP: NORMAL

## 2023-09-07 ENCOUNTER — CARE COORDINATION (OUTPATIENT)
Dept: CARE COORDINATION | Facility: CLINIC | Age: 73
End: 2023-09-07

## 2023-09-07 ENCOUNTER — NURSE ONLY (OUTPATIENT)
Dept: INTERNAL MEDICINE CLINIC | Facility: CLINIC | Age: 73
End: 2023-09-07

## 2023-09-07 DIAGNOSIS — E11.21 TYPE 2 DIABETES WITH NEPHROPATHY (HCC): ICD-10-CM

## 2023-09-07 DIAGNOSIS — J44.9 CHRONIC OBSTRUCTIVE PULMONARY DISEASE, UNSPECIFIED COPD TYPE (HCC): ICD-10-CM

## 2023-09-07 LAB
ALBUMIN SERPL-MCNC: 3.7 G/DL (ref 3.2–4.6)
ALBUMIN/GLOB SERPL: 1.2 (ref 0.4–1.6)
ALP SERPL-CCNC: 75 U/L (ref 50–136)
ALT SERPL-CCNC: 50 U/L (ref 12–65)
ANION GAP SERPL CALC-SCNC: 5 MMOL/L (ref 2–11)
AST SERPL-CCNC: 21 U/L (ref 15–37)
BILIRUB SERPL-MCNC: 1.3 MG/DL (ref 0.2–1.1)
BUN SERPL-MCNC: 14 MG/DL (ref 8–23)
CALCIUM SERPL-MCNC: 9.6 MG/DL (ref 8.3–10.4)
CHLORIDE SERPL-SCNC: 108 MMOL/L (ref 101–110)
CO2 SERPL-SCNC: 28 MMOL/L (ref 21–32)
CREAT SERPL-MCNC: 1 MG/DL (ref 0.8–1.5)
ERYTHROCYTE [DISTWIDTH] IN BLOOD BY AUTOMATED COUNT: 13.8 % (ref 11.9–14.6)
GLOBULIN SER CALC-MCNC: 3 G/DL (ref 2.8–4.5)
GLUCOSE SERPL-MCNC: 116 MG/DL (ref 65–100)
HCT VFR BLD AUTO: 42.4 % (ref 41.1–50.3)
HGB BLD-MCNC: 13.9 G/DL (ref 13.6–17.2)
MCH RBC QN AUTO: 33.7 PG (ref 26.1–32.9)
MCHC RBC AUTO-ENTMCNC: 32.8 G/DL (ref 31.4–35)
MCV RBC AUTO: 102.9 FL (ref 82–102)
NRBC # BLD: 0 K/UL (ref 0–0.2)
PLATELET # BLD AUTO: 236 K/UL (ref 150–450)
PMV BLD AUTO: 10.3 FL (ref 9.4–12.3)
POTASSIUM SERPL-SCNC: 4.7 MMOL/L (ref 3.5–5.1)
PROT SERPL-MCNC: 6.7 G/DL (ref 6.3–8.2)
RBC # BLD AUTO: 4.12 M/UL (ref 4.23–5.6)
SODIUM SERPL-SCNC: 141 MMOL/L (ref 133–143)
WBC # BLD AUTO: 9 K/UL (ref 4.3–11.1)

## 2023-09-07 NOTE — CARE COORDINATION
[facility] Care Transitions Follow Up Call    Patient Current Location:  Home: [address] [place] [address]    Care Transition Nurse contacted the patient by telephone to follow up after admission on 2023. Verified name and  with patient as identifiers. Patient: Monica Huddleston  Patient : 1950   MRN: [id number]  Reason for Admission: COPD Exacerbation  Discharge Date: 23 RARS: Readmission Risk Score: 8.3      Needs to be reviewed by the provider   Additional needs identified to be addressed with provider: No  none             Method of communication with provider: none. Addressed changes since last contact:  Patient reports doing fine with no issues at time of call. Attended PCP follow up as scheduled 2023 and labs drawn today. No SOB and using CPAP nightly. Agrees to contact CTN with any issues/concerns prior to next outreach. Discussed follow-up appointments. If no appointment was previously scheduled, appointment scheduling offered: Yes. Is follow up appointment scheduled within 7 days of discharge? Yes. Follow Up  Future Appointments   Date Time Provider [address]   10/4/2023  1:40 PM DEB Rosa CNP [facility] AMB   2023 10:00 AM DO CINDY Alejandre [facility] AMB   2024  8:15 AM Herminia Briscoe MD [facility]     Non-[facility] follow up appointment(s): n/a    Care Transition Nurse reviewed medical action plan and red flags with patient and discussed any barriers to care and/or understanding of plan of care after discharge. Discussed appropriate site of care based on symptoms and resources available to patient including: PCP  Specialist  Urgent care clinics. The patient agrees to contact the PCP office for questions related to their healthcare. Advance Care Planning:   decision maker updated.      Patients top risk factors for readmission:  COPD  Interventions to address risk factors:  Continue taking all medications as

## 2023-09-14 ENCOUNTER — CARE COORDINATION (OUTPATIENT)
Dept: CARE COORDINATION | Facility: CLINIC | Age: 73
End: 2023-09-14

## 2023-09-14 NOTE — CARE COORDINATION
[facility] Care Transitions Follow Up Call    Patient Current Location:  Home: [address] [place] [address]    Care Transition Nurse contacted the patient by telephone to follow up after admission on 2023. Verified name and  with patient as identifiers. Patient: Vilma Brock  Patient : 1950   MRN: [id number]  Reason for Admission: COPD Exacerbation  Discharge Date: 23 RARS: Readmission Risk Score: 8.3      Needs to be reviewed by the provider   Additional needs identified to be addressed with provider: No  none             Method of communication with provider: none. Addressed changes since last contact:   Patient reports doing well with no issues this am. No reports of SOB and he continues to use his CPAP nightly. Attended PCP follow up as scheduled. Agrees to contact CTN with any issues/concerns prior to next outreach. Discussed follow-up appointments. If no appointment was previously scheduled, appointment scheduling offered: Yes. Is follow up appointment scheduled within 7 days of discharge? Yes. Follow Up  Future Appointments   Date Time Provider [facility] [address]   10/4/2023  1:40 PM DEB Amezcua CNP [facility] AMB   2023 10:00 AM DO CINDY Ball [facility] AMB   2024  8:15 AM Iman Cornell MD [facility] AMB     Non-[facility] follow up appointment(s): n/a    Care Transition Nurse reviewed medical action plan and red flags with patient and discussed any barriers to care and/or understanding of plan of care after discharge. Discussed appropriate site of care based on symptoms and resources available to patient including: PCP  Specialist  Urgent care clinics. The patient agrees to contact the PCP office for questions related to their healthcare. Advance Care Planning:   decision maker updated.      Patients top risk factors for readmission:  COPD  Interventions to address risk factors:   Continue taking all medications as ordered  Continue

## 2023-09-15 RX ORDER — DILTIAZEM HYDROCHLORIDE 240 MG/1
240 CAPSULE, COATED, EXTENDED RELEASE ORAL DAILY
Qty: 100 CAPSULE | Refills: 3 | Status: SHIPPED | OUTPATIENT
Start: 2023-09-15

## 2023-09-15 RX ORDER — APIXABAN 5 MG/1
5 TABLET, FILM COATED ORAL 2 TIMES DAILY
Qty: 200 TABLET | Refills: 3 | Status: SHIPPED | OUTPATIENT
Start: 2023-09-15

## 2023-09-15 NOTE — TELEPHONE ENCOUNTER
Requested Prescriptions     Pending Prescriptions Disp Refills    ELIQUIS 5 MG TABS tablet [Pharmacy Med Name: Eliquis 5 MG Oral Tablet] 200 tablet 2     Sig: TAKE 1 TABLET BY MOUTH TWICE  DAILY    dilTIAZem (CARDIZEM CD) 240 MG extended release capsule [Pharmacy Med Name: DILTIAZEM  240MG  CAP  24 HR CD] 100 capsule 2     Sig: TAKE 1 CAPSULE BY MOUTH DAILY

## 2023-09-21 ENCOUNTER — CARE COORDINATION (OUTPATIENT)
Dept: CARE COORDINATION | Facility: CLINIC | Age: 73
End: 2023-09-21

## 2023-09-21 NOTE — CARE COORDINATION
[facility] Care Transitions Follow Up Call  CTN attempted outreach to patient for ROHAN follow up call. Unable to reach patient. Message left with contact information requesting a return call. Will continue to outreach per protocol if no return call received.   Patient Current Location:  [facility]      Patient: Richard Mendes  Patient : 1950   MRN: [id number]  Reason for Admission: COPD exacerbation  Discharge Date: 23 RARS: Readmission Risk Score: 8.3        Follow Up  Future Appointments   Date Time Provider [address] [place] [address]   10/4/2023  1:40 PM DEB Ruiz - CNP [facility] AMB   2023 10:00 AM Genetta Goltz, DO [facility] AMB   2024  8:15 AM Meredith Bernal MD [facility] AMB         Andry Rendon RN

## 2023-09-29 ENCOUNTER — CARE COORDINATION (OUTPATIENT)
Dept: CARE COORDINATION | Facility: CLINIC | Age: 73
End: 2023-09-29

## 2023-09-29 NOTE — CARE COORDINATION
Patient has graduated from the Care Transitions program on 9/29/2023. Patient/family has the ability to self-manage at this time. Patient declined referral to the [facility] team for further management. Patient has Care Transition Nurse's contact information for any further questions, concerns, or needs.   Patients upcoming visits:    Future Appointments   Date Time Provider [address] [place] [address]   10/4/2023  1:40 PM DEB Rosa - CNP [facility]CD [facility] AMB   12/8/2023 10:00 AM DO CINDY Alejandre [facility] AMB   1/29/2024  8:15 AM Herminia Briscoe MD [facility] AMB

## 2023-10-02 NOTE — PROGRESS NOTES
normal.     NEURO:   The patient is alert and oriented to person, place, and time. Memory appears intact and mood is normal.  No gross sensorimotor deficits are present. ASSESSMENT:  (Medical Decision Making)      Diagnosis Orders   1. NELLY (obstructive sleep apnea)  DME - DURABLE MEDICAL EQUIPMENT   AHI was previously controlled however over the last few weeks patient has noticed his events increasing. He does feel that pressure is a little too low. We will change to an APAP of 15-18 cm H2O. He is to continue 2 L O2 bleed in. He feels that the air is a little too warm and is causing him awakenings so we will also adjust humidity and tube temperature. 2. Hypoxemia  DME - [facility] 1   In clinic today patient had desaturations as low as 88% after exertion. We did walk him in clinic and he improved with 2 L of oxygen. Did discuss this with patient, he does recover with rest.  Discussed that patient should be using 2 L oxygen with exertion for now and patient declined me ordering oxygen tank. He does have oxygen concentrator at home and was given nasal cannula today. He is aware he needs to wear oxygen with exertion if he notices that his oxygen levels are dropping. He is not wheezing on exam, denies any shortness of breath or fevers. No sputum production or cough. 3. Non-restorative sleep  DME - DURABLE MEDICAL EQUIPMENT   Like related to frequent nocturnal awakenings. 4. Persistent disorder of initiating or maintaining sleep  DME - [facility] 1   Patient states he is awakening 2-3 times nightly due to air being too warm. Will make adjustments today to help this. 5. Seasonal allergies     Patient states he noticed his oxygen levels dropping and he has been having more issues since sitting outside more. He thinks he is allergic to ragweed. We did discuss continuing his Nasacort daily and adding an antihistamine daily.   We will also have him follow-up up in

## 2023-10-04 ENCOUNTER — OFFICE VISIT (OUTPATIENT)
Dept: SLEEP MEDICINE | Age: 73
End: 2023-10-04
Payer: MEDICARE

## 2023-10-04 VITALS
SYSTOLIC BLOOD PRESSURE: 120 MMHG | OXYGEN SATURATION: 96 % | HEIGHT: 70 IN | HEART RATE: 67 BPM | WEIGHT: 236.2 LBS | BODY MASS INDEX: 33.82 KG/M2 | TEMPERATURE: 97.3 F | RESPIRATION RATE: 16 BRPM | DIASTOLIC BLOOD PRESSURE: 58 MMHG

## 2023-10-04 DIAGNOSIS — G47.33 OSA (OBSTRUCTIVE SLEEP APNEA): Primary | ICD-10-CM

## 2023-10-04 DIAGNOSIS — G47.8 NON-RESTORATIVE SLEEP: ICD-10-CM

## 2023-10-04 DIAGNOSIS — J30.2 SEASONAL ALLERGIES: ICD-10-CM

## 2023-10-04 DIAGNOSIS — R09.02 HYPOXEMIA: ICD-10-CM

## 2023-10-04 DIAGNOSIS — G47.00 PERSISTENT DISORDER OF INITIATING OR MAINTAINING SLEEP: ICD-10-CM

## 2023-10-04 PROCEDURE — 99214 OFFICE O/P EST MOD 30 MIN: CPT | Performed by: STUDENT IN AN ORGANIZED HEALTH CARE EDUCATION/TRAINING PROGRAM

## 2023-10-04 PROCEDURE — 3078F DIAST BP <80 MM HG: CPT | Performed by: STUDENT IN AN ORGANIZED HEALTH CARE EDUCATION/TRAINING PROGRAM

## 2023-10-04 PROCEDURE — 3074F SYST BP LT 130 MM HG: CPT | Performed by: STUDENT IN AN ORGANIZED HEALTH CARE EDUCATION/TRAINING PROGRAM

## 2023-10-04 PROCEDURE — 1123F ACP DISCUSS/DSCN MKR DOCD: CPT | Performed by: STUDENT IN AN ORGANIZED HEALTH CARE EDUCATION/TRAINING PROGRAM

## 2023-10-04 ASSESSMENT — SLEEP AND FATIGUE QUESTIONNAIRES
HOW LIKELY ARE YOU TO NOD OFF OR FALL ASLEEP WHILE WATCHING TV: 2
HOW LIKELY ARE YOU TO NOD OFF OR FALL ASLEEP IN A CAR, WHILE STOPPED FOR A FEW MINUTES IN TRAFFIC: 0
ESS TOTAL SCORE: 5
HOW LIKELY ARE YOU TO NOD OFF OR FALL ASLEEP WHILE SITTING QUIETLY AFTER LUNCH WITHOUT ALCOHOL: 0
HOW LIKELY ARE YOU TO NOD OFF OR FALL ASLEEP WHEN YOU ARE A PASSENGER IN A CAR FOR AN HOUR WITHOUT A BREAK: 0
HOW LIKELY ARE YOU TO NOD OFF OR FALL ASLEEP WHILE SITTING AND TALKING TO SOMEONE: 0
HOW LIKELY ARE YOU TO NOD OFF OR FALL ASLEEP WHILE SITTING INACTIVE IN A PUBLIC PLACE: 0
HOW LIKELY ARE YOU TO NOD OFF OR FALL ASLEEP WHILE LYING DOWN TO REST IN THE AFTERNOON WHEN CIRCUMSTANCES PERMIT: 1
HOW LIKELY ARE YOU TO NOD OFF OR FALL ASLEEP WHILE SITTING AND READING: 2

## 2023-10-04 NOTE — PATIENT INSTRUCTIONS
Change CPAP pressure to [id number]  Needs to have first available pulmonary appointment  Start claritin or zyrtec  Need to use oxygen with exertion.

## 2023-10-10 ENCOUNTER — OFFICE VISIT (OUTPATIENT)
Dept: PULMONOLOGY | Age: 73
End: 2023-10-10
Payer: MEDICARE

## 2023-10-10 VITALS
TEMPERATURE: 97.9 F | SYSTOLIC BLOOD PRESSURE: 153 MMHG | OXYGEN SATURATION: 90 % | DIASTOLIC BLOOD PRESSURE: 73 MMHG | HEIGHT: 70 IN | BODY MASS INDEX: 33.79 KG/M2 | WEIGHT: 236 LBS | RESPIRATION RATE: 14 BRPM | HEART RATE: 81 BPM

## 2023-10-10 DIAGNOSIS — J44.9 STAGE 4 VERY SEVERE COPD BY GOLD CLASSIFICATION (HCC): ICD-10-CM

## 2023-10-10 DIAGNOSIS — G47.33 OBSTRUCTIVE SLEEP APNEA: ICD-10-CM

## 2023-10-10 DIAGNOSIS — Z87.891 PERSONAL HISTORY OF NICOTINE DEPENDENCE: ICD-10-CM

## 2023-10-10 DIAGNOSIS — J96.11 CHRONIC RESPIRATORY FAILURE WITH HYPOXIA (HCC): ICD-10-CM

## 2023-10-10 DIAGNOSIS — J43.2 CENTRILOBULAR EMPHYSEMA (HCC): Primary | ICD-10-CM

## 2023-10-10 LAB
EXPIRATORY TIME: NORMAL
FEF 25-75% %PRED-PRE: NORMAL
FEF 25-75% PRED: NORMAL
FEF 25-75%-PRE: NORMAL
FEV1 %PRED-PRE: 41 %
FEV1 PRED: NORMAL
FEV1/FVC %PRED-PRE: NORMAL
FEV1/FVC PRED: 63 %
FEV1/FVC: NORMAL
FEV1: 1.27 L
FVC %PRED-PRE: 47 %
FVC PRED: NORMAL
FVC: 2.01 L
PEF %PRED-PRE: NORMAL
PEF PRED: NORMAL
PEF-PRE: NORMAL

## 2023-10-10 PROCEDURE — 94010 BREATHING CAPACITY TEST: CPT | Performed by: INTERNAL MEDICINE

## 2023-10-10 PROCEDURE — 3078F DIAST BP <80 MM HG: CPT | Performed by: INTERNAL MEDICINE

## 2023-10-10 PROCEDURE — 99215 OFFICE O/P EST HI 40 MIN: CPT | Performed by: INTERNAL MEDICINE

## 2023-10-10 PROCEDURE — 3077F SYST BP >= 140 MM HG: CPT | Performed by: INTERNAL MEDICINE

## 2023-10-10 PROCEDURE — 1123F ACP DISCUSS/DSCN MKR DOCD: CPT | Performed by: INTERNAL MEDICINE

## 2023-10-10 RX ORDER — FLUTICASONE FUROATE, UMECLIDINIUM BROMIDE AND VILANTEROL TRIFENATATE 200; 62.5; 25 UG/1; UG/1; UG/1
1 POWDER RESPIRATORY (INHALATION) DAILY
Qty: 1 EACH | Refills: 11 | Status: SHIPPED | OUTPATIENT
Start: 2023-10-10

## 2023-10-10 RX ORDER — ALBUTEROL SULFATE 90 UG/1
2 AEROSOL, METERED RESPIRATORY (INHALATION) EVERY 6 HOURS PRN
Qty: 1 EACH | Refills: 11 | Status: SHIPPED | OUTPATIENT
Start: 2023-10-10

## 2023-10-10 ASSESSMENT — PULMONARY FUNCTION TESTS
FVC_PERCENT_PREDICTED_PRE: 47
FEV1_PERCENT_PREDICTED_PRE: 41
FEV1/FVC_PREDICTED: 63
FEV1: 1.27
FVC: 2.01

## 2023-10-10 NOTE — PROGRESS NOTES
sounds clear to auscultation bilaterally with no rhonchi wheezing or crackles. HEART:    There is a regular rate and rhythm. No murmur, rub, or gallop. There is no edema in the lower extremities. ABDOMEN:    Soft and non-tender. No hepatosplenomegaly. Bowel sounds are normal.      SKIN:    There are no rashes, cyanosis, jaundice, or ecchymosis present. EXTREMITIES:    The extremities are unremarkable without clubbing, cyanosis, joint inflammation, degenerative, or ischemic change. Right forearm in sling after recent wrist fracture. MUSCULOSKELETAL:    There is no abnormal tone, muscle atrophy, or abnormal movement present. NEURO:    The patient is alert and oriented to person, place, and time. Memory appears intact and mood is normal.  No gross sensorimotor deficits are present. DIAGNOSTIC TESTS:        CXR:                               Study Result    Comparison: 20 February 2020       Indication: Tobacco dependence, cigarette smoking   Age: 66   Pack-year history: 40   Current smoker: No   Any current signs or symptoms of lung carcinoma: No       Technique:  Transaxial CT imaging from the thoracic inlet through the lung bases   was performed. Low dose protocol was implemented. (LDCT) Slice thickness is   6.19 mm. Radiation dose reduction techniques were used for this study. Our CT   scanners use one or all of the following: Automated exposure control, adjustment   of the mA and/or kV according to patient size, iterative reconstruction. FINDINGS:   LUNGS: No pulmonary nodules. No airspace disease. AIRWAYS: Trachea and proximal bronchi grossly patent. PLEURA: No effusion or thickening or calcifications. LYMPH NODES: No enlarged axillary, hilar or mediastinal lymph nodes. HEART: Normal size. CORONARIES: Extensive calcifications. UPPER ABDOMEN: Cyst in the left lobe of the liver. SKELETAL/CHEST WALL: DJD, otherwise no gross bony lesions.        IMPRESSION   No

## 2023-10-16 DIAGNOSIS — J44.9 STAGE 4 VERY SEVERE COPD BY GOLD CLASSIFICATION (HCC): Primary | ICD-10-CM

## 2023-10-16 RX ORDER — FLUTICASONE FUROATE, UMECLIDINIUM BROMIDE AND VILANTEROL TRIFENATATE 200; 62.5; 25 UG/1; UG/1; UG/1
1 POWDER RESPIRATORY (INHALATION) DAILY
Qty: 3 EACH | Refills: 3 | Status: CANCELLED | OUTPATIENT
Start: 2023-10-16

## 2023-10-16 NOTE — TELEPHONE ENCOUNTER
Patient says he needs a three month supply prescription for this med sent to     [facility] delivery            [phone]        fluticasone-umeclidin-vilant (TRELEGY ELLIPTA) 200-62.5-25 MCG/ACT AEPB inhaler

## 2023-10-17 RX ORDER — FLUTICASONE FUROATE, UMECLIDINIUM BROMIDE AND VILANTEROL TRIFENATATE 200; 62.5; 25 UG/1; UG/1; UG/1
1 POWDER RESPIRATORY (INHALATION) DAILY
Qty: 3 EACH | Refills: 3 | Status: SHIPPED | OUTPATIENT
Start: 2023-10-17

## 2023-11-14 RX ORDER — ALBUTEROL SULFATE 90 UG/1
2 AEROSOL, METERED RESPIRATORY (INHALATION) EVERY 6 HOURS PRN
Qty: 1 EACH | Refills: 11 | OUTPATIENT
Start: 2023-11-14

## 2023-11-21 RX ORDER — ALBUTEROL SULFATE 90 UG/1
2 AEROSOL, METERED RESPIRATORY (INHALATION) EVERY 6 HOURS PRN
Qty: 1 EACH | Refills: 11 | Status: SHIPPED | OUTPATIENT
Start: 2023-11-21

## 2023-12-05 ENCOUNTER — PATIENT MESSAGE (OUTPATIENT)
Dept: INTERNAL MEDICINE CLINIC | Facility: CLINIC | Age: 73
End: 2023-12-05

## 2023-12-05 ENCOUNTER — NURSE ONLY (OUTPATIENT)
Dept: INTERNAL MEDICINE CLINIC | Facility: CLINIC | Age: 73
End: 2023-12-05

## 2023-12-05 DIAGNOSIS — E78.2 MIXED HYPERLIPIDEMIA: ICD-10-CM

## 2023-12-05 DIAGNOSIS — I10 HTN (HYPERTENSION), BENIGN: ICD-10-CM

## 2023-12-05 DIAGNOSIS — E11.21 TYPE 2 DIABETES WITH NEPHROPATHY (HCC): Primary | ICD-10-CM

## 2023-12-05 DIAGNOSIS — R80.8 OTHER PROTEINURIA: ICD-10-CM

## 2023-12-05 DIAGNOSIS — Z12.5 SCREENING PSA (PROSTATE SPECIFIC ANTIGEN): ICD-10-CM

## 2023-12-05 DIAGNOSIS — E11.21 TYPE 2 DIABETES WITH NEPHROPATHY (HCC): ICD-10-CM

## 2023-12-05 LAB
CHOLEST SERPL-MCNC: 116 MG/DL
ERYTHROCYTE [DISTWIDTH] IN BLOOD BY AUTOMATED COUNT: 13.9 % (ref 11.9–14.6)
EST. AVERAGE GLUCOSE BLD GHB EST-MCNC: 123 MG/DL
HBA1C MFR BLD: 5.9 % (ref 4.8–5.6)
HCT VFR BLD AUTO: 43.6 % (ref 41.1–50.3)
HDLC SERPL-MCNC: 56 MG/DL (ref 40–60)
HDLC SERPL: 2.1
HGB BLD-MCNC: 14.1 G/DL (ref 13.6–17.2)
LDLC SERPL CALC-MCNC: 26.6 MG/DL
MCH RBC QN AUTO: 33 PG (ref 26.1–32.9)
MCHC RBC AUTO-ENTMCNC: 32.3 G/DL (ref 31.4–35)
MCV RBC AUTO: 102.1 FL (ref 82–102)
NRBC # BLD: 0 K/UL (ref 0–0.2)
PLATELET # BLD AUTO: 236 K/UL (ref 150–450)
PMV BLD AUTO: 10.4 FL (ref 9.4–12.3)
RBC # BLD AUTO: 4.27 M/UL (ref 4.23–5.6)
TRIGL SERPL-MCNC: 167 MG/DL (ref 35–150)
VLDLC SERPL CALC-MCNC: 33.4 MG/DL (ref 6–23)
WBC # BLD AUTO: 7.9 K/UL (ref 4.3–11.1)

## 2023-12-05 SDOH — HEALTH STABILITY: PHYSICAL HEALTH: ON AVERAGE, HOW MANY MINUTES DO YOU ENGAGE IN EXERCISE AT THIS LEVEL?: 20 MIN

## 2023-12-05 SDOH — HEALTH STABILITY: PHYSICAL HEALTH: ON AVERAGE, HOW MANY DAYS PER WEEK DO YOU ENGAGE IN MODERATE TO STRENUOUS EXERCISE (LIKE A BRISK WALK)?: 4 DAYS

## 2023-12-05 ASSESSMENT — LIFESTYLE VARIABLES
HOW MANY STANDARD DRINKS CONTAINING ALCOHOL DO YOU HAVE ON A TYPICAL DAY: 1
HOW OFTEN DO YOU HAVE A DRINK CONTAINING ALCOHOL: 4
HOW MANY STANDARD DRINKS CONTAINING ALCOHOL DO YOU HAVE ON A TYPICAL DAY: 1 OR 2
HOW OFTEN DO YOU HAVE A DRINK CONTAINING ALCOHOL: 2-3 TIMES A WEEK
HOW OFTEN DO YOU HAVE SIX OR MORE DRINKS ON ONE OCCASION: 1

## 2023-12-05 ASSESSMENT — PATIENT HEALTH QUESTIONNAIRE - PHQ9
SUM OF ALL RESPONSES TO PHQ9 QUESTIONS 1 & 2: 0
2. FEELING DOWN, DEPRESSED OR HOPELESS: 0
2. FEELING DOWN, DEPRESSED OR HOPELESS: NOT AT ALL
1. LITTLE INTEREST OR PLEASURE IN DOING THINGS: 0
SUM OF ALL RESPONSES TO PHQ QUESTIONS 1-9: 0
SUM OF ALL RESPONSES TO PHQ9 QUESTIONS 1 & 2: 0
1. LITTLE INTEREST OR PLEASURE IN DOING THINGS: NOT AT ALL
SUM OF ALL RESPONSES TO PHQ QUESTIONS 1-9: 0

## 2023-12-08 ENCOUNTER — OFFICE VISIT (OUTPATIENT)
Dept: INTERNAL MEDICINE CLINIC | Facility: CLINIC | Age: 73
End: 2023-12-08

## 2023-12-08 VITALS
OXYGEN SATURATION: 90 % | HEART RATE: 68 BPM | RESPIRATION RATE: 17 BRPM | BODY MASS INDEX: 35.55 KG/M2 | DIASTOLIC BLOOD PRESSURE: 60 MMHG | SYSTOLIC BLOOD PRESSURE: 124 MMHG | HEIGHT: 69 IN | WEIGHT: 240 LBS

## 2023-12-08 DIAGNOSIS — J44.9 CHRONIC OBSTRUCTIVE PULMONARY DISEASE, UNSPECIFIED COPD TYPE (HCC): ICD-10-CM

## 2023-12-08 DIAGNOSIS — E78.2 MIXED HYPERLIPIDEMIA: ICD-10-CM

## 2023-12-08 DIAGNOSIS — Z00.00 MEDICARE ANNUAL WELLNESS VISIT, SUBSEQUENT: Primary | ICD-10-CM

## 2023-12-08 DIAGNOSIS — I10 HTN (HYPERTENSION), BENIGN: ICD-10-CM

## 2023-12-08 DIAGNOSIS — Z85.46 HISTORY OF PROSTATE CANCER: ICD-10-CM

## 2023-12-08 DIAGNOSIS — E11.21 TYPE 2 DIABETES WITH NEPHROPATHY (HCC): ICD-10-CM

## 2023-12-08 DIAGNOSIS — I48.11 LONGSTANDING PERSISTENT ATRIAL FIBRILLATION (HCC): ICD-10-CM

## 2023-12-08 LAB
ALBUMIN SERPL-MCNC: 3.5 G/DL (ref 3.2–4.6)
ALBUMIN/GLOB SERPL: 1.1 (ref 0.4–1.6)
ALP SERPL-CCNC: 85 U/L (ref 50–136)
ALT SERPL-CCNC: 24 U/L (ref 12–65)
ANION GAP SERPL CALC-SCNC: 4 MMOL/L (ref 2–11)
AST SERPL-CCNC: 15 U/L (ref 15–37)
BILIRUB SERPL-MCNC: 1 MG/DL (ref 0.2–1.1)
BUN SERPL-MCNC: 16 MG/DL (ref 8–23)
CALCIUM SERPL-MCNC: 9.6 MG/DL (ref 8.3–10.4)
CHLORIDE SERPL-SCNC: 105 MMOL/L (ref 103–113)
CO2 SERPL-SCNC: 31 MMOL/L (ref 21–32)
CREAT SERPL-MCNC: 1 MG/DL (ref 0.8–1.5)
CREAT UR-MCNC: 68 MG/DL
GLOBULIN SER CALC-MCNC: 3.2 G/DL (ref 2.8–4.5)
GLUCOSE SERPL-MCNC: 114 MG/DL (ref 65–100)
MICROALBUMIN UR-MCNC: 332 MG/DL
MICROALBUMIN/CREAT UR-RTO: 4882 MG/G (ref 0–30)
POTASSIUM SERPL-SCNC: 4.4 MMOL/L (ref 3.5–5.1)
PROT SERPL-MCNC: 6.7 G/DL (ref 6.3–8.2)
PSA SERPL-MCNC: 0.6 NG/ML
SODIUM SERPL-SCNC: 140 MMOL/L (ref 136–146)

## 2023-12-08 RX ORDER — ATORVASTATIN CALCIUM 40 MG/1
40 TABLET, FILM COATED ORAL EVERY EVENING
Qty: 100 TABLET | Refills: 2 | Status: SHIPPED | OUTPATIENT
Start: 2023-12-08

## 2023-12-08 NOTE — PROGRESS NOTES
Class: NO PRINT    Notes to Pharmacy: Please send a replace/new response with 100-Day Supply if appropriate to maximize member benefit. Requesting 1 year supply. --will continue. Will periodically review and adjust if needed. Encourage home monitoring. Continue with statin therapy is tolerating well and providing good clinical benefit. Given his history of prostate cancer, will recheck PSA and get in with urology for surveillance. PSA slightly trending upward. Recommendations for Preventive Services Due: see orders and patient instructions/AVS.  Recommended screening schedule for the next 5-10 years is provided to the patient in written form: see Patient Instructions/AVS.     Return in 6 months (on 6/8/2024). Subjective   The following acute and/or chronic problems were also addressed today:  Diabetes remains well-controlled. No symptoms of polyuria, polydipsia, or hypoglycemia. COPD is slowly worsening. States he feels like he is not able to do as much as he did previously. No recent exacerbation of wheezing/mucopurulent productive cough. Atrial fibrillation remains controlled. He does intermittently feel some palpitations, but this has been stable. No chest pain. He does have a history of prostate cancer in the past and status post prostatectomy. Has not followed up with urology in a while. Actually since moving here. Previously he was in [place]. Denies any significant current urinary complaints. Patient's complete Health Risk Assessment and screening values have been reviewed and are found in Flowsheets. The following problems were reviewed today and where indicated follow up appointments were made and/or referrals ordered.     Positive Risk Factor Screenings with Interventions:                 Weight and Activity:  Physical Activity: Insufficiently Active (12/5/2023)    Exercise Vital Sign     Days of Exercise per Week: 4 days     Minutes of Exercise per Session: 20 min

## 2023-12-13 DIAGNOSIS — R80.9 MICROALBUMINURIA: Primary | ICD-10-CM

## 2024-01-18 ENCOUNTER — NURSE ONLY (OUTPATIENT)
Dept: PULMONOLOGY | Age: 74
End: 2024-01-18

## 2024-01-18 DIAGNOSIS — J44.9 STAGE 4 VERY SEVERE COPD BY GOLD CLASSIFICATION (HCC): Primary | ICD-10-CM

## 2024-01-18 LAB
FEV 1 , POC: 1.52 L
FEV1 % PRED, POC: 48 %
FEV1/FVC, POC: NORMAL
FVC % PRED, POC: 55 %
FVC, POC: NORMAL

## 2024-01-18 ASSESSMENT — PULMONARY FUNCTION TESTS
FEV1_PERCENT_PREDICTED_POC: 48
FVC_PERCENT_PREDICTED_POC: 55

## 2024-01-29 ENCOUNTER — OFFICE VISIT (OUTPATIENT)
Age: 74
End: 2024-01-29
Payer: MEDICARE

## 2024-01-29 VITALS
SYSTOLIC BLOOD PRESSURE: 120 MMHG | WEIGHT: 239 LBS | BODY MASS INDEX: 34.22 KG/M2 | HEART RATE: 91 BPM | DIASTOLIC BLOOD PRESSURE: 70 MMHG | HEIGHT: 70 IN

## 2024-01-29 DIAGNOSIS — I10 ESSENTIAL (PRIMARY) HYPERTENSION: ICD-10-CM

## 2024-01-29 DIAGNOSIS — I35.8 AORTIC VALVE SCLEROSIS: ICD-10-CM

## 2024-01-29 DIAGNOSIS — E78.5 HYPERLIPIDEMIA, UNSPECIFIED HYPERLIPIDEMIA TYPE: ICD-10-CM

## 2024-01-29 DIAGNOSIS — E11.65 TYPE 2 DIABETES MELLITUS WITH HYPERGLYCEMIA, UNSPECIFIED WHETHER LONG TERM INSULIN USE (HCC): ICD-10-CM

## 2024-01-29 DIAGNOSIS — I48.91 ATRIAL FIBRILLATION, UNSPECIFIED TYPE (HCC): Primary | ICD-10-CM

## 2024-01-29 PROCEDURE — 93000 ELECTROCARDIOGRAM COMPLETE: CPT | Performed by: INTERNAL MEDICINE

## 2024-01-29 PROCEDURE — 3078F DIAST BP <80 MM HG: CPT | Performed by: INTERNAL MEDICINE

## 2024-01-29 PROCEDURE — 1123F ACP DISCUSS/DSCN MKR DOCD: CPT | Performed by: INTERNAL MEDICINE

## 2024-01-29 PROCEDURE — 99214 OFFICE O/P EST MOD 30 MIN: CPT | Performed by: INTERNAL MEDICINE

## 2024-01-29 PROCEDURE — 3074F SYST BP LT 130 MM HG: CPT | Performed by: INTERNAL MEDICINE

## 2024-01-29 RX ORDER — FUROSEMIDE 20 MG/1
40 TABLET ORAL DAILY PRN
Qty: 60 TABLET | Refills: 3 | Status: SHIPPED | OUTPATIENT
Start: 2024-01-29 | End: 2024-01-29

## 2024-01-29 RX ORDER — FUROSEMIDE 20 MG/1
20 TABLET ORAL DAILY PRN
Qty: 60 TABLET | Refills: 3 | Status: SHIPPED | OUTPATIENT
Start: 2024-01-29 | End: 2024-02-28

## 2024-01-29 ASSESSMENT — ENCOUNTER SYMPTOMS
COUGH: 0
NAUSEA: 0
DIARRHEA: 0
EYE PAIN: 0
SKIN LESIONS: 0
SHORTNESS OF BREATH: 0
INDIGESTION: 0
WHEEZING: 0
EYE DISCHARGE: 0
ABDOMINAL PAIN: 0
BACK PAIN: 0
NAIL CHANGES: 0
VOMITING: 0
STRIDOR: 0
HEARTBURN: 0
CONSTIPATION: 0
COUGH: 0
BLOOD IN STOOL: 0
EYE PAIN: 0
ABDOMINAL PAIN: 0
APHONIA: 0

## 2024-01-29 NOTE — PROGRESS NOTES
[facility]  [address] [facility] [address]   [address]  [place] [address]  [phone]    Christophe Xavier  : 1950    No chief complaint on file.         HPI     Christophe Xavier is a 74 y.o. male          Past Medical History:   Diagnosis Date    Diabetes mellitus, type II (HCC)      Past Surgical History:   Procedure Laterality Date    HAND SURGERY      screw in hand    JOINT REPLACEMENT Bilateral     knee     Current Outpatient Medications   Medication Sig Dispense Refill    atorvastatin (LIPITOR) 40 MG tablet Take 1 tablet by mouth every evening 100 tablet 2    albuterol sulfate HFA (PROAIR HFA) 108 (90 Base) MCG/ACT inhaler Inhale 2 puffs into the lungs every 6 hours as needed for Wheezing 1 each 11    fluticasone-umeclidin-vilant (TRELEGY ELLIPTA) 200-62.5-25 MCG/ACT AEPB inhaler Inhale 1 puff into the lungs daily 3 each 3    OXYGEN Inhale 2 L into the lungs daily 3 liters with CPAP and 2 liters with Exertion      ELIQUIS 5 MG TABS tablet TAKE 1 TABLET BY MOUTH TWICE  DAILY 200 tablet 3    dilTIAZem (CARDIZEM CD) 240 MG extended release capsule TAKE 1 CAPSULE BY MOUTH DAILY 100 capsule 3    enalapril (VASOTEC) 20 MG tablet Take 1 tablet by mouth daily 30 tablet 0    metFORMIN (GLUCOPHAGE) 500 MG tablet TAKE 1 TABLET BY MOUTH TWICE  DAILY WITH MEALS 180 tablet 3    latanoprost (XALATAN) 0.005 % ophthalmic solution 1 drop nightly      CPAP Machine MISC by Other route With 2L of 02      nitroGLYCERIN (NITROSTAT) 0.4 MG SL tablet Place 1 tablet under the tongue       No current facility-administered medications for this visit.     Allergies   Allergen Reactions    Shellfish-Derived Products Hives     Shrimp only       Social History     Socioeconomic History    Marital status:      Spouse name: Not on file    Number of children: Not on file    Years of education: Not on file    Highest education level: Not on file   Occupational History    Not on file   Tobacco Use    Smoking

## 2024-01-29 NOTE — PROGRESS NOTES
fibrillation.    Diagnoses and all orders for this visit:    Atrial fibrillation, unspecified type (HCC)  -     EKG 12 Lead - Clinic Performed    Essential (primary) hypertension  -     EKG 12 Lead - Clinic Performed    Type 2 diabetes mellitus with hyperglycemia, unspecified whether long term insulin use (HCC)  -     Basic Metabolic Panel; Future    Hyperlipidemia, unspecified hyperlipidemia type    Aortic valve sclerosis  -     Echo (TTE) complete (PRN contrast/bubble/strain/3D); Future    Other orders  -     Discontinue: furosemide (LASIX) 20 MG tablet; Take 2 tablets by mouth daily as needed (Take for weight gain greater than 2 pounds in 24 hours or 5 pounds in 48 hours.)  -     dapagliflozin (FARXIGA) 10 MG tablet; Take 1 tablet by mouth every morning  -     furosemide (LASIX) 20 MG tablet; Take 1 tablet by mouth daily as needed (Take for weight gain greater than 2 pounds in 24 hours or 5 pounds in 48 hours.)    Return in about 6 months (around 7/29/2024).       Baron Ribera MD  1/29/2024  8:51 AM

## 2024-01-30 ENCOUNTER — PATIENT MESSAGE (OUTPATIENT)
Age: 74
End: 2024-01-30

## 2024-01-30 ASSESSMENT — ENCOUNTER SYMPTOMS
HEARTBURN: 0
BLOOD IN STOOL: 0
INDIGESTION: 0
WHEEZING: 0
VOMITING: 0
NAUSEA: 0
SKIN LESIONS: 0
COUGH: 0
CONSTIPATION: 0
ABDOMINAL PAIN: 0
SHORTNESS OF BREATH: 0
EYE DISCHARGE: 0
EYE PAIN: 0
DIARRHEA: 0
BACK PAIN: 0

## 2024-01-30 NOTE — PROGRESS NOTES
[facility]  [address]   [address]  [place] [address]  [phone]    Christophe Xavier  : 1950    CC: Prostate cancer history    HPI     Christophe Xavier is a 74 y.o. male with a history of prostate cancer, T2DM, COPD, HTN, HLD, and persistent atrial fibrillation. He has a history of prostate cancer and has had a radical retropubic prostatectomy () with no urological follow up since his move from [place]. States PSA < 0.1 for 10 years after surgery but has slowly risen.      PCP reports no current urinary complaints.  PSA has remained relatively stable being .5 in , .7 in 2023 and most recently .6 in 2023.     Today he presents with concerns about the rising PSA and chronic proteinuria.  PSA trend below.  He has not had salvage XRT or ADT.  No bothersome urgency, frequency, retention, hematuria, urinary incontinence or other voiding concerns. ED not of concern to him at this time.     He sees nephrology for proteinuria (last seen ) and kidney biopsy was recommended for work up but patient has been hesitant to proceed with this.       Lab Results   Component Value Date    PSA 0.6 2023    PSA 0.7 2023    PSA 0.5 2022    PSA 0.4 2020    PSA 0.3 2019       Past Medical History:   Diagnosis Date    Diabetes mellitus, type II (HCC)      Past Surgical History:   Procedure Laterality Date    HAND SURGERY      screw in hand    JOINT REPLACEMENT Bilateral     knee     Current Outpatient Medications   Medication Sig Dispense Refill    atorvastatin (LIPITOR) 40 MG tablet Take 1 tablet by mouth every evening 100 tablet 2    albuterol sulfate HFA (PROAIR HFA) 108 (90 Base) MCG/ACT inhaler Inhale 2 puffs into the lungs every 6 hours as needed for Wheezing 1 each 11    fluticasone-umeclidin-vilant (TRELEGY ELLIPTA) 200-62.5-25 MCG/ACT AEPB inhaler Inhale 1 puff into the lungs daily 3 each 3    OXYGEN Inhale 2 L into the lungs

## 2024-01-31 ENCOUNTER — OFFICE VISIT (OUTPATIENT)
Dept: UROLOGY | Age: 74
End: 2024-01-31
Payer: MEDICARE

## 2024-01-31 DIAGNOSIS — Z90.79 STATUS POST PROSTATECTOMY: ICD-10-CM

## 2024-01-31 DIAGNOSIS — Z85.46 HISTORY OF PROSTATE CANCER: Primary | ICD-10-CM

## 2024-01-31 LAB
BILIRUBIN, URINE, POC: NEGATIVE
BLOOD URINE, POC: NORMAL
GLUCOSE URINE, POC: >=1000
KETONES, URINE, POC: NEGATIVE
LEUKOCYTE ESTERASE, URINE, POC: NEGATIVE
NITRITE, URINE, POC: NEGATIVE
PH, URINE, POC: 6 (ref 4.6–8)
PROTEIN,URINE, POC: 300
SPECIFIC GRAVITY, URINE, POC: 1.02 (ref 1–1.03)
URINALYSIS CLARITY, POC: NORMAL
URINALYSIS COLOR, POC: NORMAL
UROBILINOGEN, POC: NORMAL

## 2024-01-31 PROCEDURE — 99204 OFFICE O/P NEW MOD 45 MIN: CPT | Performed by: UROLOGY

## 2024-01-31 PROCEDURE — 81003 URINALYSIS AUTO W/O SCOPE: CPT | Performed by: UROLOGY

## 2024-01-31 PROCEDURE — 1123F ACP DISCUSS/DSCN MKR DOCD: CPT | Performed by: UROLOGY

## 2024-01-31 NOTE — TELEPHONE ENCOUNTER
Requested Prescriptions     Signed Prescriptions Disp Refills    dapagliflozin (FARXIGA) 10 MG tablet 90 tablet 3     Sig: Take 1 tablet by mouth every morning     Authorizing Provider: THIERNO NUNN     Ordering User: ZECHARIAH ANAYA     Rx printed and set out for Dr. Nunn to sign and fax to [facility].

## 2024-02-05 ENCOUNTER — TELEPHONE (OUTPATIENT)
Dept: INTERNAL MEDICINE CLINIC | Facility: CLINIC | Age: 74
End: 2024-02-05

## 2024-02-05 NOTE — TELEPHONE ENCOUNTER
Patient is coming in tomorrow for labs wants labs added by dr varela he has order from another doctor told him that the orders would have to be added by Dr. Varela if it something that he will order   Please call patient back and advise

## 2024-02-06 ENCOUNTER — NURSE ONLY (OUTPATIENT)
Dept: INTERNAL MEDICINE CLINIC | Facility: CLINIC | Age: 74
End: 2024-02-06

## 2024-02-06 DIAGNOSIS — E11.65 TYPE 2 DIABETES MELLITUS WITH HYPERGLYCEMIA, UNSPECIFIED WHETHER LONG TERM INSULIN USE (HCC): ICD-10-CM

## 2024-02-06 LAB
ANION GAP SERPL CALC-SCNC: 4 MMOL/L (ref 2–11)
BUN SERPL-MCNC: 15 MG/DL (ref 8–23)
CALCIUM SERPL-MCNC: 9.6 MG/DL (ref 8.3–10.4)
CHLORIDE SERPL-SCNC: 109 MMOL/L (ref 103–113)
CO2 SERPL-SCNC: 29 MMOL/L (ref 21–32)
CREAT SERPL-MCNC: 1 MG/DL (ref 0.8–1.5)
GLUCOSE SERPL-MCNC: 117 MG/DL (ref 65–100)
POTASSIUM SERPL-SCNC: 4.3 MMOL/L (ref 3.5–5.1)
SODIUM SERPL-SCNC: 142 MMOL/L (ref 136–146)

## 2024-02-07 ENCOUNTER — OFFICE VISIT (OUTPATIENT)
Dept: PULMONOLOGY | Age: 74
End: 2024-02-07
Payer: MEDICARE

## 2024-02-07 VITALS
DIASTOLIC BLOOD PRESSURE: 64 MMHG | HEIGHT: 70 IN | BODY MASS INDEX: 33.64 KG/M2 | HEART RATE: 78 BPM | TEMPERATURE: 97.3 F | RESPIRATION RATE: 14 BRPM | OXYGEN SATURATION: 84 % | SYSTOLIC BLOOD PRESSURE: 146 MMHG | WEIGHT: 235 LBS

## 2024-02-07 DIAGNOSIS — Z87.891 PERSONAL HISTORY OF NICOTINE DEPENDENCE: ICD-10-CM

## 2024-02-07 DIAGNOSIS — G47.33 OBSTRUCTIVE SLEEP APNEA: ICD-10-CM

## 2024-02-07 DIAGNOSIS — J43.2 CENTRILOBULAR EMPHYSEMA (HCC): ICD-10-CM

## 2024-02-07 DIAGNOSIS — J44.9 STAGE 4 VERY SEVERE COPD BY GOLD CLASSIFICATION (HCC): Primary | ICD-10-CM

## 2024-02-07 DIAGNOSIS — J96.11 CHRONIC RESPIRATORY FAILURE WITH HYPOXIA (HCC): ICD-10-CM

## 2024-02-07 PROCEDURE — 1123F ACP DISCUSS/DSCN MKR DOCD: CPT | Performed by: INTERNAL MEDICINE

## 2024-02-07 PROCEDURE — 99214 OFFICE O/P EST MOD 30 MIN: CPT | Performed by: INTERNAL MEDICINE

## 2024-02-07 PROCEDURE — 3077F SYST BP >= 140 MM HG: CPT | Performed by: INTERNAL MEDICINE

## 2024-02-07 PROCEDURE — 3078F DIAST BP <80 MM HG: CPT | Performed by: INTERNAL MEDICINE

## 2024-02-07 NOTE — PROGRESS NOTES
Progress Notes by Moody Sloan MD at 03/11/22 0810                    Author: Moody Sloan MD  Service: --  Author Type: Physician       Filed: 03/11/22 0904  Encounter Date: 3/11/2022  Status: Signed                       [facility]   [address] [place] [address] [address]   [place] [address]   [phone]      Patient Name:  Christophe Xavier   YOB: 1950     LOV 10/10/2024       LOV 4/5/2023 with Gustavo         Last Office Visit 3/11/2022 with Moody Blas         CHIEF COMPLAINT:         Chief Complaint       Patient presents with                   HISTORY OF PRESENT ILLNESS:     Previously seen by:  Dr. Moody Sloan M.D. on 09/24/21   This is a delightful 70-year-old white male with history of COPD who presents for establishment of care for his COPD.  He has moved here from [place] approximately 2 years ago and  has never established care with a pulmonologist which he used to see in [place].  At the time he was enrolled in a lung cancer screening program and has been having yearly CT scans up until he came here.  He has GOLD stage II COPD and has quit smoking  in 1995.  He is accumulated about a 45-pack-year smoking history having smoked for 30 years about a pack and 1/2/day.  He has had exacerbations in the past not for the past many years the last hospitalization was a few years ago in [place] for an exacerbation.   He was never intubated for this.  He has undergone pulmonary rehab while in [place], he is physically active tries to walk about 3 miles a day on a treadmill 5 days out of the week and has noted recently increasing dyspnea on exertion.  He is compliant  with Stiolto which he uses twice a day and albuterol intermittently which she barely ever uses.  He has no complaints today and is up-to-date on his flu and pneumonia vaccinations.The low-dose screening CTs of his chest were negative to the best of his  knowledge.

## 2024-02-21 ENCOUNTER — PATIENT MESSAGE (OUTPATIENT)
Age: 74
End: 2024-02-21

## 2024-02-21 RX ORDER — DILTIAZEM HYDROCHLORIDE 240 MG/1
240 CAPSULE, COATED, EXTENDED RELEASE ORAL DAILY
Qty: 60 CAPSULE | Refills: 0 | Status: SHIPPED | OUTPATIENT
Start: 2024-02-21

## 2024-02-21 NOTE — TELEPHONE ENCOUNTER
Requested Prescriptions     Signed Prescriptions Disp Refills    dilTIAZem (CARDIZEM CD) 240 MG extended release capsule 60 capsule 0     Sig: Take 1 capsule by mouth daily     Authorizing Provider: THIERNO NUNN     Ordering User: DONTA VILCHIS       Sent 2 month supply to [facility] [address], [place] as requested.  Please do not hesitate to call for any further assistance.

## 2024-02-21 NOTE — TELEPHONE ENCOUNTER
From: Christophe Xavier  To: Dr. Baron Ribera  Sent: 2/21/2024 9:38 AM EST  Subject: Cardizem    I have experienced an out of stock situation with [facility]. for this medication: Attention  Your DilTIAZem CD CAP 240MG/24HR is currently out of stock.    Is there an alternative they can provide? or can you send am Rx to alternative pharmacy at [facility] [address] [place]?   I have been out of the Med sinced Monday 2-19; they had told me last week it would arrive by 2-20, but now it is out of stock.

## 2024-04-03 NOTE — PROGRESS NOTES
[facility]  [address]  [place] [address]  [phone]    PATIENT ID    Mr. Christophe Xavier  1950  His primary provider is Brothers, Rashel CLEMONS DO    CC: Mr. Xavier returns to the clinic today for follow up of his obstructive sleep apnea.    INTERVAL HISTORY  Samuel  is here for followup on NELLY.  He was last seen by Joyce 9/23.  He was diagnosed with mild obstructive sleep apnea in 2016 with an AHI of 10 SPO2 monisha 70%.  He has a past medical history notable for stage IV COPD, hypertension/hyperlipidemia, coronary artery disease, type 2 diabetes mellitus, peripheral vascular disease,  atrial fibrillation.  He is currently on CPAP 15-18 cm H2O, this was empirically increased from last visit from 15, he notes his sleep quality has deteriorated, and he is also having trouble with his mask hurting his cheeks, he uses an oronasal mask, he notes he has had much worsened dyspnea with exertion and has been classified now at stage IV COPD from stage II a year ago.  He is actively followed by pulmonary.  He denies any forgetfulness but notes if I ask his wife she may give a different answer.  He has not had any major health changes since last seen a few months ago but did have an episode of tachycardia, his cardiology team is aware.    He was last seen by Joyce in 9/23 and was compliant and getting good clinical benefits from CPAP therapy.  However his events were not well-controlled  On CPAP download review today he is utilizing his CPAP machine 96% of the time, greater than 4 hours per night, for a median daily usage of 6 hours 31 minutes per night. His AHI is up to 27 and many nights up to 40 and his leak rate is acceptable most nights.  Past medical and surgical histories, medications and allergies, problem list, and social history were reviewed.    From 6/23     He actually was wondering if his CPAP machine was working because he was having more frequent awakenings at night

## 2024-04-04 ENCOUNTER — OFFICE VISIT (OUTPATIENT)
Dept: SLEEP MEDICINE | Age: 74
End: 2024-04-04
Payer: MEDICARE

## 2024-04-04 VITALS
DIASTOLIC BLOOD PRESSURE: 60 MMHG | HEIGHT: 70 IN | RESPIRATION RATE: 14 BRPM | HEART RATE: 86 BPM | WEIGHT: 236 LBS | OXYGEN SATURATION: 90 % | BODY MASS INDEX: 33.79 KG/M2 | SYSTOLIC BLOOD PRESSURE: 128 MMHG

## 2024-04-04 DIAGNOSIS — G47.31 CENTRAL SLEEP APNEA: ICD-10-CM

## 2024-04-04 DIAGNOSIS — G47.34 NOCTURNAL HYPOXEMIA: ICD-10-CM

## 2024-04-04 DIAGNOSIS — J96.11 CHRONIC RESPIRATORY FAILURE WITH HYPOXIA (HCC): ICD-10-CM

## 2024-04-04 DIAGNOSIS — G47.33 OSA ON CPAP: Primary | ICD-10-CM

## 2024-04-04 PROCEDURE — 3078F DIAST BP <80 MM HG: CPT | Performed by: INTERNAL MEDICINE

## 2024-04-04 PROCEDURE — 1123F ACP DISCUSS/DSCN MKR DOCD: CPT | Performed by: INTERNAL MEDICINE

## 2024-04-04 PROCEDURE — 3074F SYST BP LT 130 MM HG: CPT | Performed by: INTERNAL MEDICINE

## 2024-04-04 PROCEDURE — 99214 OFFICE O/P EST MOD 30 MIN: CPT | Performed by: INTERNAL MEDICINE

## 2024-04-04 NOTE — ASSESSMENT & PLAN NOTE
Patient has mild obstructive sleep apnea, has had that unexpected deterioration in PAP control after adjustment last visit and now having almost severe disease nightly with CPAP including central apneas which may be treatment emergent centrals, we will empirically drop him down to 12-15 cm H2O but given concerns for worsening hypoxemia during the day and sudden deterioration of control we will bring him back in for an urgent study.  I will check a download in 1 week to ensure improvement on new settings.  In terms of his cheek discomfort we recommended both trying a different mask FP Vitera as well as hopefully we can get him on lower pressures where he does not need to tighten his mask as much.  We will call him after the study to implement changes and see him back in 3 months

## 2024-04-04 NOTE — PATIENT INSTRUCTIONS
It was a pleasure meeting you today.  Here are some items that we discussed:    1.   Your cpap is not well controlled at all, we need to have you come back to the sleep lab for an overnight study.  I changed your pressures to 13-15cm H20 today.    2. I will try a new mask called GRACIE martinez from [employer]    3. Talk with Dr. Winters about possible pulmonary rehab and ? Could you have pulmonary hypertension    Los Babb MD  [phone]

## 2024-04-04 NOTE — ASSESSMENT & PLAN NOTE
He has had worsening dyspnea on exertion and hypoxemia during the day, advised him to continue wearing oxygen during the day, also no estimate of pulmonary pressures on most recent echo but certainly pulmonary hypertension could be leading to worsening hypoxemia and he may be a good candidate for pulmonary rehab, advised him to discuss this with his pulmonary care team.

## 2024-04-04 NOTE — ASSESSMENT & PLAN NOTE
On download today patient develops a central apnea index on average of 13, last echocardiogram in January did show normal EF of 60%, will monitor this further on in lab titration study, I did advise them to convert to BiPAP ST should he have centrals on his current settings and then also to titrate oxygenation.

## 2024-04-11 ENCOUNTER — TELEPHONE (OUTPATIENT)
Dept: SLEEP MEDICINE | Age: 74
End: 2024-04-11

## 2024-04-11 NOTE — PROGRESS NOTES
Your Child's Health  Five to Six-Year-Old Visit      Imtiaz Jones  April 11, 2024    Visit Vitals  /68   Ht 3' 8.5\" (1.13 m)   Wt 23.4 kg (51 lb 9.6 oz)   BMI 18.32 kg/m²     Weight:       YOUR CHILD'S 5 to 6 YEAR-OLD VISIT    School  Starting school is a major milestone for children and their family members. Good language and willingness or readiness to separate from parents easily are a couple of the most important skills indicating school readiness. Once a child is enrolled in school, parents need to keep in close contact with the teachers. Learning or developmental problems which had not been previously apparent may become evident in  or first grade. If your child had previously received some educational services or therapies in a  program, they may qualify for continued services in school. School systems are obligated to evaluate children if there are significant concerns about learning or developmental problems. If you need help accessing this type of evaluation, talk with your schools, your doctor, or you can call the Department of Public Instruction at (904) 236-8250 or visit their  website at http://dpi.wi.gov.    Help ensure your child's success at school by making sure they are well rested (a regular bedtime routine is important in this regard). Also, make sure that they have eaten (or have an opportunity to eat at school) every morning. Children who are tired or hungry are not in the best state to learn well! Make sure they are not over scheduled with afterschool activities (sports, clubs, other social activities)-- children need some unstructured downtime every day. As your child learns to read, set aside time daily to read together--it helps them learn and is a great way to spend family time together.     Social Development  Five and six-year-old children will be spending more time with friends and peers and away from their families. It is important to know the  Olivier Sanchez  : 1950  Primary: [employer] Complete  Secondary:  [facility] @ [address], [place].  [phone]   [phone]      OUTPATIENT PHYSICAL THERAPY: Daily Treatment Note  2021   Pain in right knee (M25.561) and Stiffness of right knee, not elsewhere classified (M25.661) and Muscle weakness (generalized) (M62.81) and Other abnormalities of gait and mobility (R26.89)     21 L TKA  Effective Dates: 2021 TO 3/14/2022 (90 days). Frequency/Duration: 2-3 times a week for 90 Days  GOALS: (Goals have been discussed and agreed upon with patient.)  Short-Term Functional Goals: Time Frame: 4 weeks  1. Pt to report compliance with HEP  2. Pt to restore good quad set for full knee ext, normal gait mechanics. 3. Pt to restore at least  AROM to prevent need for manipulation. 4. Pt to demonstrate normal gait mechanics level surfaces  Discharge Goals: Time Frame: 12 weeks  1. Pt to restore 5-120 AROM for normal ADL's without compensation  2. Pt to increase strength for sit to stand without UE assist x 30 reps  3. Pt to increase strength for reciprocal gait on stairs  4. Pt to increase SLS > 15 sec B for safe amb all surfaces  _______________________________________________________________________________  Pre-treatment Symptoms/Complaints: I have been stretching. I do the lay down stretch on my stomach about twice a day.   Pain: Initial: 5/10 Post Session: no pain with ex's   Medications Last Reviewed:  2021    Updated Objective Findings:      ROM:         AROM knee flex-ext R 108-10, L 98-15 before, 98-10 after  21       Tightness in B gastroc, soleus, hams, quads, piriformis  Strength:         Fair quad set L; L glut med weakness noted with SLS 12/15/21  Functional Mobility:         Gait/Ambulation:  Antalgic with straight cane - foot flat B, side to side trunk motion with exaggerated arm swing No Transfers:  Uses UE assist, trunk momentum, L LE in front        Stairs:  Step-to pattern  Balance:          < 1 sec    KOOS: 9 raw score, 63.776 interval score     TREATMENT:   THERAPEUTIC ACTIVITY: ( see below for minutes): Therapeutic activities per grid below to improve mobility. Required moderate verbal and manual cues to improve functional mobility . THERAPEUTIC EXERCISE: (see below for minutes):  Exercises per grid below to improve strength. Required moderate verbal and manual cues to promote proper body alignment, promote proper body posture, promote proper body mechanics and promote proper body breathing techniques. Progressed resistance, range, repetitions and complexity of movement as indicated. MANUAL THERAPY: (see below for minutes): Joint mobilization and Soft tissue mobilization was utilized and necessary because of the patient's restricted joint motion, painful spasm, loss of articular motion and restricted motion of soft tissue. MODALITIES: (see below for minutes):      for pain modulation    AQUATIC THERAPY (see below for minutes): Aquatic treatment performed per flow grid for Decreased muscle strength, Decreased endurance, Decreased static/dynamic balance and reactive control, Decreased activity endurance, Decompression, Ease of movement and Low impact and reduced weight bearing activity.     Date: 12/14/21 12/15/21  Visit 2 12/17/21  Visit 3 12/20/21  Visit 4 12/22/21 Visit 5 12/28/21 Visit 6     Modalities: 15 mins 10 mins 10 mins 10 mins 10 mins 15 mins      Game ready L seated seated seated seated seated seated                Manual Therapy:                                 Aquatics Activities:    60 mins 60 mins 60 mins     GAIT (F/B/S/M)    3 laps 3 laps 4 laps     SLR gait    3 laps 3 laps 4 laps     Hamstring Curl gait     3 laps 3 laps 4 laps     Rockettes    3 laps 3 laps 4 laps     Squats    X 30 X 30 X 30     Calf raises    X 20 X 20 X 30     Hamstring stretch B    3 x 15 friends and families that your child is spending time with.     Peers have a lot of influence on each other, and your child may be interacting with friends who do not have to follow the same rules that you have set for your child. Some rules may change as they get older, but being very clear and very consistent continues to be critical component of effective parenting. Children will frequently push the limits at this age to see what they can get away with--so it is important to regularly remind your child of appropriate rules and expectations that you have for them.     Help your child feel good about themselves by giving them praise for their accomplishments, doing things together, and listening to them without interrupting. Give them opportunities to gradually be more independent and responsible.    Continue to set a good example for them - be responsible in your own obligations, mean what you say, model appropriate behavior when you yourself are angry or upset, and show respect for others by being on time. When your child is angry or frustrated, talk to them about why they are feeling that way, what their options are and how to resolve conflicts appropriately. Physical aggression - hitting, biting, kicking, throwing things- should not be tolerated at this age; teach your children healthier ways to respond to upsetting situations and blow off steam.    Households with working parents and children school are busy! Try to devote mealtimes, bedtimes, and even driving time, to talk with your children-- keep phones and other electronic media turned off and focus on talking to each other.     Remind your children that they can tell you anything. It is especially important that they know to report you if they feel they are being teased or bullied. They should also be taught to report bullying that they witness to you or to a teacher. Any concerns about bullying should be addressed-talk to your teachers, administrators or  guidance counselors at the school to help with this issue. Good online resources regarding bullying are [employer].gov and the American Academy of Pediatrics \"HealthyChildren.org\" website (search for \"Bullying\").     Dental  Your child should be brushing at least twice daily for 2 minutes at a time with a pea-sized amount of regular (fluoridated) toothpaste. Children this age can also be taught to floss their teeth, but they still need a parent’s help to make sure all their back teeth are brushed well. Look for a dentist if you do not already have one. Limiting candy, other sweets, juice and sticky/chewy foods is good for their dental health.     Nutrition  Your child will be making more of their own choices about what to eat, so keep plenty of nutritious foods in your home for meal and snack times. Make sure your child eats something healthy for breakfast every morning; this is proven to positively impact school performance and learning. Your child needs ~3 servings of good sources of calcium everyday; this can include lowfat (or skim) milk, yogurt, low fat cheese or foods which have been fortified with calcium. Children this age should get 600 IU of vitamin D daily which (along with appropriate calcium intake) ensures good bone health. Most people cannot meet their vitamin D needs with their usual diet, so a multivitamin with iron supplement continues to be appropriate for this age. (A pure vitamin D supplement with 400 or 600 IU is also okay.)    Overweight and obesity are very serious problems; teaching your growing child to make healthy choices is important, as is continuing to avoid unhealthy choices like greasy fast food, bagged snacks, sodas and sweet drinks, lots of juice, candies and sweets. Make unhealthy choices an occasional treat only; don’t keep them in your home, because your child will find them!    Physical Activity and Screen Time   A child who enjoys being physically active when they are young  sec 3 x 15 sec 3 x 15 sec     Piriformis stretch B           Step ups ant B      X 10     SLS B     X 2 X 2                Therapeutic Exercises: 30 mins 45 mins 45 mins 10 mins 10 mins 10 mins     Pt education, postural education, HEP, functional breathing 15 mins          Slant board bent and straight knee   3 x 15 sec         PFK alone and with overpressure  X 10 X 15 X 15 each X 20 each       PROM flex stretch L passively By therapist Prone, seated prone  prone     Bike for ROM   15 mins  10 mins 10 mins     HEP: see hand out    marker.to Portal  Treatment/Session Summary:    · Response to Treatment:  Motion not maintained. Pt holds breath and grunts when attempting to demonstration AROM. Slight gain after use of exercise bike. Pt will not be able to normalize stair gait until L knee flexion is restored to 110 deg minimum. Again reviewed importance of consistent home stretching routine. Pt struggles with discomfort and does not hold the stretch for the full instructed period of time. Fair effort with aquatic ex's. Pt requires occasional cues for performance. · Communication/Consultation:  None today  · Equipment provided today:  None today  · Recommendations/Intent for next treatment session: Next visit will focus on stretching, strengthening, gait, balance, modalities as indicated.     Total Treatment Billable Duration:  85 mins  PT Patient Time In/Time Out  Time In: 0130  Time Out: 0300    Marie Cheng PTA    Future Appointments   Date Time Provider Susana Victoria   12/30/2021 11:00 AM Jaja Francisco PTA [facility]   1/3/2022  1:50 PM Christi French MD [facility] PO[facility]   1/4/2022  3:00 PM Ruben Healy, PT [facility]   1/6/2022  3:00 PM Ruben Healy, PT [facility]   1/11/2022  3:00 PM Ruben Healy, PT [facility]   1/12/2022  9:00 AM Prescott Brittle, DO [facility] TRIM   1/13/2022  3:00 PM Ruben Healy, PT [facility]   1/18/2022  3:00 PM Ruben Healy, PT [facility]   1/20/2022  3:00 PM Monet, Emerald Platts, PT [facility]   1/25/2022  3:00 PM [place], Emerald Platts, PT [facility]   1/27/2022  3:00 PM Monet, Emerald Platts, PT [facility]   2/17/2022  8:40 AM Cindy Carr NP [facility] PP   2/28/2022  1:63 AM SFD CT 64 SLICE UNIT 1 [facility]   3/3/2022  8:10 AM Shabbir Leal MD [facility] PP PP will be more likely to stay active as they grow older. Set a goal of 60 minutes of physical activity every day--it can be all at once or broken up into shorter segments. Try to make it a family activity as much as possible.     Time watching television, playing on the computer or using any other form of electronic media is NOT physical activity. As your child learns to read and their fine motor skills improve, they are going to become very skilled at using the computer and Internet (and they are going to like it!). Setting limits and supervising media use is very important. Set a time limit for media use each day (and enforce it). Consider setting up child-specific browsers and creating a “Favorites” toolbar so your child can only get to approved websites. For suggestions on developing healthy media habits, do an internet search for “healthychildren media use plan” for recommendations from the American Academy of Pediatrics.  Also, don't allow snacking in front of the TV set-- that is another unhealthy habit that is easier to prevent than change!    And parents need to be a role model--if you are constantly on your phone in situations where you could be talking with or interacting with your child, you are teaching them that the phone takes priority over your interaction with them.        Safety  Car:  Until a child weighs at least 40 pounds, they are safest in a rear or forward-facing car seat with a 5-point harness. If your 5-point harness seat has a higher weight limit than 40 pounds, keep your child in it-- they are safest in these seats until they outgrow the 's recommended size limits. (There is not a specific height limit for most of these seats, but children are safe as long as the top of their ears are below the top/ back edge of the seat and their shoulders are below the highest shoulder strap slots.) When they do outgrow the 5-point harness seat limits, they should ride in a booster seat in the  backseat. High-backed booster seats should be used if there are low seat backs or no head rests in your car; backless boosters can be used if your car has high seat backs and head rests.    Street Safety: Teach your child how to be safe when standing outside waiting for a bus or walking to school. Children this age should always be supervised when crossing streets.     Biking: Children (and their parents) should wear properly fitted helmets when biking. Children this age are not safe riding in the street.    Water & Sun Safety: Swimming lessons are a good idea if your child does not know how to swim yet. Even if they can swim, constant supervision by an adult who know how to swim is a must. Remember to use sunscreen with an SPF of 15 or higher when outside and reapply after time in the water.  Your child should avoid prolonged time in the sun between 11 AM and 3 PM and wear hats, sunglasses and sun protection clothing.     Personal Safety: A parent’s safety is just as important as a child’s safety. Violence is common in many people’s lives. If you do not feel safe in your home or if a partner has ever hit, kicked, shoved or physically hurt you or your child, it is important for you to get help. Talk to your doctors or a . In [place], resources include [facility] (186-945-5992) and the [facility] (24 hour hotline is 547- 864-9674); the National Domestic Violence Hotline is 9-696-533-JIGF (2187).    Review with your child that certain body parts (the parts usually covered by a bathing suit) and behaviors are private. For safety purposes, make sure your child knows that they should never keep secrets from parents, and they should always report to you if any adult shows any interest in their private parts (or if an adult discussed or shared their own private parts with a child). Tell them they should talk to you if any adult is doing or saying anything that makes them  uncomfortable, especially if an adult is asking them to keep a secret.    Fires & Burns: Children this age are fascinated by fires and they can be very compulsive--so watch them very carefully around fires,  grills, and stoves. Make sure matches and lighters are safely stored out of reach and continue to keep all hot items out of reach. Have a family fire safety plan, including regular smoke detector (and carbon monoxide detector) battery checks, working fire extinguishers, and escape routes. It is important that children this age know what door they should go out of if the smoke alarm goes off, where to meet family members outside and the importance of not going back into a burning building.     Firearms: Children this age are not capable of understanding how dangerous firearms are and evidence shows a child is safest in a home where no firearms are stored. If there any hunters or firearm owners in your home or anywhere your child is visiting, make sure all weapons are safely stored: unloaded, securely locked and ammunition stored separately.   Smoking: Continue to protect your child from cigarette smoke; secondhand smoke increases the risk of heart and lung disease in your child. Vapors from e-cigarettes may also be harmful, so don’t use those around your child either. If you smoke and are ready to consider quitting, talk to your doctor. Nicotine replacement products can be very helpful in breaking this tough addiction. 800-QUIT-NOW is a national help line that can help you find resources; other resources can be found at cdc.gov.       MEDICATION FOR FEVER OR PAIN:   Acetaminophen liquid (e.g., Tylenol or Tempra) may be given every four hours as needed for pain or fever.  Acetaminophen liquid is less concentrated than the infant dropper bottle type.  Be sure to check which product CONCENTRATION you are using.    OLD Concentration INFANT Tylenol/Acetaminophen  Drops (80 MG/0.8 mL)    Child’s Weight: Dose:  36 -  47 pounds:   240 mg (3 droppers)  48 - 59 pounds:   320 mg (4 droppers)    NEW Concentration INFANT Tylenol/Acetaminophen  Drops (160 MG/5 mL)    Child’s Weight: Dose:  36 - 47 pounds:   240 mg (7.5 mL (1 1/2 Teaspoons))  48 - 59 pounds:   320 mg (10.0 mL (2 Teaspoons))    CHILDREN’S Tylenol/Acetaminophen  (160 MG/5 mL)    Child’s Weight: Dose:  36 - 47 pounds:   240 240 mg (7.5 mL (1 1/2 Teaspoons))  48 - 59 pounds:   320 mg (10.0 mL (2 Teaspoons))    CHILDREN’S Tylenol/Acetaminophen MELTAWAYS ( 80 MG tablets)    Child’s Weight:  Dose:  36 - 47 pounds:    240 mg (3 meltaway tablets)  48 - 59 pounds:    320 mg (4 meltaway tablets)    CHILDREN'S Ibuprofen liquid (e.g., Advil or Motrin) may be given every six hours as needed for pain or fever.    Child’s Weight:  Dose:  36 - 47 pounds:    150 MG (1 1/2 Teaspoons)  48 - 59 pounds  200 mg (2 Teaspoons)     Most Recent Immunizations   Administered Date(s) Administered    DTaP(INFANRIX) 04/06/2022    DTaP/Hep B/IPV 07/07/2021    DTaP/IPV 04/11/2023    Hep A, ped/adol, 2 dose 07/07/2021    Hep B, adolescent or pediatric 2019    Hib (PRP-OMP) 02/15/2021    MMR 01/04/2021    Measles Mumps Rubella Varicella 04/11/2023    Pneumococcal Conjugate 13 Valent Vacc (Prevnar 13) 07/07/2021    Varicella 01/04/2021       If [place] develops any of the following reactions within 72 hours after an immunization, notify your pediatrician by calling the pediatric phone nurse:  1.  A temperature of 105 degrees or above.  2.  More than 3 hours of continuous crying.  3.  A shrill, high-pitched cry.  4.  A pale, limp spell.  5.  A seizure or fainting spell.  In this case, you should call 911 or go immediately to the emergency room.      NEXT VISIT:  4 YEARS OF AGE    Thank you for entrusting your care to [facility].    Also, check out “Children’s Health” on the [facility] Blog for updates on timely topics regarding children’s health!

## 2024-04-11 NOTE — TELEPHONE ENCOUNTER
-  -  --- Message from Eva Babb III, MD sent at 4/4/2024  8:22 AM EDT -----  Regarding: download  Pls check on cpap 13-15

## 2024-04-11 NOTE — TELEPHONE ENCOUNTER
As expected, he is still not well-controlled and we will proceed with in lab titration study.  I sent a note to the patient to let him know and Rishabht    Can you please call the lab and asked them to do this as an urgent study?  Thank you, I know they are backed up on scheduling

## 2024-04-15 RX ORDER — DILTIAZEM HYDROCHLORIDE 240 MG/1
240 CAPSULE, COATED, EXTENDED RELEASE ORAL DAILY
Qty: 90 CAPSULE | Refills: 3 | Status: SHIPPED | OUTPATIENT
Start: 2024-04-15

## 2024-04-15 NOTE — TELEPHONE ENCOUNTER
“Verified rx in last OV date 01/29/2024. Pharmacy confirmed. Erx as requested”.    Requested Prescriptions     Pending Prescriptions Disp Refills    dilTIAZem (CARDIZEM CD) 240 MG extended release capsule 90 capsule 3     Sig: Take 1 capsule by mouth daily

## 2024-04-23 ENCOUNTER — TELEPHONE (OUTPATIENT)
Age: 74
End: 2024-04-23

## 2024-04-23 NOTE — TELEPHONE ENCOUNTER
Called [facility] and confirmed that they received rx. [facility] stated that med was put back since pt never picked up after it was sent in 04/15/2024.       Called pt and notified pt that rx will be ready at [facility]. Pt voiced understanding.

## 2024-04-23 NOTE — TELEPHONE ENCOUNTER
MEDICATION REFILL REQUEST      Name of Medication:  Diltiazem  Dose:  240 mg  Frequency:  QD  Quantity:  30  Days' supply:  30      Pharmacy Name/Location:  [phone]  Please call this in today pt is out of meds,and he needs this called in asap please

## 2024-04-26 RX ORDER — DILTIAZEM HYDROCHLORIDE 240 MG/1
240 CAPSULE, COATED, EXTENDED RELEASE ORAL DAILY
Qty: 90 CAPSULE | Refills: 3 | Status: SHIPPED | OUTPATIENT
Start: 2024-04-26

## 2024-04-26 NOTE — TELEPHONE ENCOUNTER
Requested Prescriptions     Pending Prescriptions Disp Refills    dilTIAZem (CARDIZEM CD) 240 MG extended release capsule 90 capsule 3     Sig: Take 1 capsule by mouth daily     Verified rx. Last OV 01/29/2024. Erx to pharm on file. Pt requested change in pharmacy.

## 2024-04-26 NOTE — TELEPHONE ENCOUNTER
MEDICATION REFILL REQUEST      Name of Medication:  Diltiazem  Dose:  240 mg  Frequency:  QD  Quantity:  30  Days' supply:  30 with refills      Pharmacy Name/Location:  [phone]  Pt is calling saying [facility] said they never received a RX for pt   Pt is asking for you to please call in again to [facility]  then call pt to confirm this has been done

## 2024-05-02 ENCOUNTER — HOSPITAL ENCOUNTER (OUTPATIENT)
Dept: SLEEP CENTER | Age: 74
Discharge: HOME | End: 2024-05-05
Payer: MEDICARE

## 2024-05-02 PROCEDURE — 95811 POLYSOM 6/>YRS CPAP 4/> PARM: CPT

## 2024-05-03 DIAGNOSIS — E11.21 TYPE 2 DIABETES WITH NEPHROPATHY (HCC): ICD-10-CM

## 2024-05-07 ENCOUNTER — TELEPHONE (OUTPATIENT)
Dept: SLEEP MEDICINE | Age: 74
End: 2024-05-07

## 2024-05-07 DIAGNOSIS — G47.31 CSA (CENTRAL SLEEP APNEA): ICD-10-CM

## 2024-05-07 DIAGNOSIS — G47.33 OSA (OBSTRUCTIVE SLEEP APNEA): Primary | ICD-10-CM

## 2024-05-07 NOTE — TELEPHONE ENCOUNTER
Patient did have centrals at higher CPAP pressures on titration study which I reviewed, he actually had good settings at 18 over 12 cm H2O with a backup rate of 10+5 L in the circuit with an AHI of 3, I called patient and he notes he felt better in the morning when he woke up.  Will put order in for bilevel therapy and he will need to follow-up in roughly 3 months to assess efficacy and compliance.  Patient is agreeable with plan.  We also noticed occasional PACs which she has been told he has in the past otherwise he is doing well.

## 2024-05-14 NOTE — PROGRESS NOTES
No  Any current signs or symptoms of lung carcinoma: No    TECHNIQUE:  Transaxial CT imaging from the thoracic inlet through the lung bases  was performed.  Low dose protocol was implemented. (LDCT) Slice thickness is  1.25 mm. Radiation dose reduction techniques were used for this study. Our CT  scanners use one or all of the following: Automated exposure control, adjustment  of the mA and/or kV according to patient size, iterative reconstruction.    FINDINGS:  LUNGS: No pulmonary nodules.  No airspace disease.  AIRWAYS: Trachea and proximal bronchi grossly patent.  PLEURA: No effusion or thickening or calcifications.  LYMPH NODES: No enlarged axillary, hilar or mediastinal lymph nodes.  HEART: Normal size.  AORTA: Normal caliber.  CORONARIES: Extensive dense calcifications.  UPPER ABDOMEN: Normal size adrenal glands.  SKELETAL/CHEST WALL: DJD, otherwise no gross bony lesions.    Impression  Extensive dense coronary atherosclerosis  L1S: No nodules.  Recommend noncontrast LDCT chest CT in 12 months.                       Study Result    Comparison: 20 February 2020       Indication: Tobacco dependence, cigarette smoking   Age: 71   Pack-year history: 40   Current smoker: No   Any current signs or symptoms of lung carcinoma: No       Technique:  Transaxial CT imaging from the thoracic inlet through the lung bases   was performed.  Low dose protocol was implemented. (LDCT) Slice thickness is   1.25 mm. Radiation dose reduction techniques were used for this study. Our CT   scanners use one or all of the following: Automated exposure control, adjustment   of the mA and/or kV according to patient size, iterative reconstruction.       FINDINGS:   LUNGS: No pulmonary nodules.   No airspace disease.    AIRWAYS: Trachea and proximal bronchi grossly patent.   PLEURA: No effusion or thickening or calcifications.   LYMPH NODES: No enlarged axillary, hilar or mediastinal lymph nodes.   HEART: Normal size.   CORONARIES:

## 2024-05-15 ENCOUNTER — OFFICE VISIT (OUTPATIENT)
Dept: PULMONOLOGY | Age: 74
End: 2024-05-15
Payer: MEDICARE

## 2024-05-15 VITALS
RESPIRATION RATE: 18 BRPM | HEART RATE: 76 BPM | DIASTOLIC BLOOD PRESSURE: 82 MMHG | SYSTOLIC BLOOD PRESSURE: 130 MMHG | WEIGHT: 236 LBS | HEIGHT: 70 IN | TEMPERATURE: 97.3 F | OXYGEN SATURATION: 96 % | BODY MASS INDEX: 33.79 KG/M2

## 2024-05-15 DIAGNOSIS — J44.9 STAGE 4 VERY SEVERE COPD BY GOLD CLASSIFICATION (HCC): Primary | ICD-10-CM

## 2024-05-15 DIAGNOSIS — J43.2 CENTRILOBULAR EMPHYSEMA (HCC): ICD-10-CM

## 2024-05-15 DIAGNOSIS — J96.11 CHRONIC RESPIRATORY FAILURE WITH HYPOXIA (HCC): ICD-10-CM

## 2024-05-15 DIAGNOSIS — G47.33 OBSTRUCTIVE SLEEP APNEA: ICD-10-CM

## 2024-05-15 PROCEDURE — 1123F ACP DISCUSS/DSCN MKR DOCD: CPT | Performed by: INTERNAL MEDICINE

## 2024-05-15 PROCEDURE — 99214 OFFICE O/P EST MOD 30 MIN: CPT | Performed by: INTERNAL MEDICINE

## 2024-05-15 PROCEDURE — 3075F SYST BP GE 130 - 139MM HG: CPT | Performed by: INTERNAL MEDICINE

## 2024-05-15 PROCEDURE — 3079F DIAST BP 80-89 MM HG: CPT | Performed by: INTERNAL MEDICINE

## 2024-05-23 ENCOUNTER — TELEPHONE (OUTPATIENT)
Dept: UROLOGY | Age: 74
End: 2024-05-23

## 2024-05-23 NOTE — TELEPHONE ENCOUNTER
Pt called to cancel blood work appt, states will get 6 month blood work done with primary doctor, states will also get 12 month blood work done with primary as well.

## 2024-05-28 DIAGNOSIS — Z85.46 HISTORY OF PROSTATE CANCER: ICD-10-CM

## 2024-05-28 DIAGNOSIS — E11.21 TYPE 2 DIABETES WITH NEPHROPATHY (HCC): Primary | ICD-10-CM

## 2024-05-30 ENCOUNTER — TELEPHONE (OUTPATIENT)
Dept: SLEEP MEDICINE | Age: 74
End: 2024-05-30

## 2024-05-30 NOTE — TELEPHONE ENCOUNTER
Called when patient did not arrive for his scheduled appointment, he does know he called yesterday and left a message on her voicemail that he had to reschedule due to his wife having a procedure.  I let him know that his CPAP data actually looks fine and he is not having any trouble so we will just get him back in next available, I will send this to our scheduling team.

## 2024-06-07 ENCOUNTER — TELEPHONE (OUTPATIENT)
Dept: UROLOGY | Age: 74
End: 2024-06-07

## 2024-06-07 NOTE — TELEPHONE ENCOUNTER
Pt is requesting for orders to be placed in system for his 6 month PSA from his 01/31/2024 visit.  He is having it done at his PCP who is with [facility].

## 2024-06-09 SDOH — HEALTH STABILITY: PHYSICAL HEALTH: ON AVERAGE, HOW MANY MINUTES DO YOU ENGAGE IN EXERCISE AT THIS LEVEL?: 30 MIN

## 2024-06-09 SDOH — HEALTH STABILITY: PHYSICAL HEALTH: ON AVERAGE, HOW MANY DAYS PER WEEK DO YOU ENGAGE IN MODERATE TO STRENUOUS EXERCISE (LIKE A BRISK WALK)?: 3 DAYS

## 2024-06-09 ASSESSMENT — LIFESTYLE VARIABLES
HOW OFTEN DO YOU HAVE A DRINK CONTAINING ALCOHOL: 5
HOW OFTEN DURING THE LAST YEAR HAVE YOU HAD A FEELING OF GUILT OR REMORSE AFTER DRINKING: NEVER
HOW OFTEN DURING THE LAST YEAR HAVE YOU BEEN UNABLE TO REMEMBER WHAT HAPPENED THE NIGHT BEFORE BECAUSE YOU HAD BEEN DRINKING: NEVER
HOW OFTEN DO YOU HAVE SIX OR MORE DRINKS ON ONE OCCASION: 1
HAVE YOU OR SOMEONE ELSE BEEN INJURED AS A RESULT OF YOUR DRINKING: NO
HAVE YOU OR SOMEONE ELSE BEEN INJURED AS A RESULT OF YOUR DRINKING: NO
HAS A RELATIVE, FRIEND, DOCTOR, OR ANOTHER HEALTH PROFESSIONAL EXPRESSED CONCERN ABOUT YOUR DRINKING OR SUGGESTED YOU CUT DOWN: NO
HOW MANY STANDARD DRINKS CONTAINING ALCOHOL DO YOU HAVE ON A TYPICAL DAY: 1
HOW MANY STANDARD DRINKS CONTAINING ALCOHOL DO YOU HAVE ON A TYPICAL DAY: 1 OR 2
HOW OFTEN DURING THE LAST YEAR HAVE YOU NEEDED AN ALCOHOLIC DRINK FIRST THING IN THE MORNING TO GET YOURSELF GOING AFTER A NIGHT OF HEAVY DRINKING: NEVER
HOW OFTEN DO YOU HAVE A DRINK CONTAINING ALCOHOL: 4 OR MORE TIMES A WEEK
HOW OFTEN DURING THE LAST YEAR HAVE YOU NEEDED AN ALCOHOLIC DRINK FIRST THING IN THE MORNING TO GET YOURSELF GOING AFTER A NIGHT OF HEAVY DRINKING: NEVER
HOW OFTEN DURING THE LAST YEAR HAVE YOU BEEN UNABLE TO REMEMBER WHAT HAPPENED THE NIGHT BEFORE BECAUSE YOU HAD BEEN DRINKING: NEVER
HAS A RELATIVE, FRIEND, DOCTOR, OR ANOTHER HEALTH PROFESSIONAL EXPRESSED CONCERN ABOUT YOUR DRINKING OR SUGGESTED YOU CUT DOWN: NO
HOW OFTEN DURING THE LAST YEAR HAVE YOU FOUND THAT YOU WERE NOT ABLE TO STOP DRINKING ONCE YOU HAD STARTED: NEVER
HOW OFTEN DURING THE LAST YEAR HAVE YOU FAILED TO DO WHAT WAS NORMALLY EXPECTED FROM YOU BECAUSE OF DRINKING: NEVER
HOW OFTEN DURING THE LAST YEAR HAVE YOU FAILED TO DO WHAT WAS NORMALLY EXPECTED FROM YOU BECAUSE OF DRINKING: NEVER
HOW OFTEN DURING THE LAST YEAR HAVE YOU FOUND THAT YOU WERE NOT ABLE TO STOP DRINKING ONCE YOU HAD STARTED: NEVER
HOW OFTEN DURING THE LAST YEAR HAVE YOU HAD A FEELING OF GUILT OR REMORSE AFTER DRINKING: NEVER

## 2024-06-09 ASSESSMENT — PATIENT HEALTH QUESTIONNAIRE - PHQ9
SUM OF ALL RESPONSES TO PHQ QUESTIONS 1-9: 0
SUM OF ALL RESPONSES TO PHQ QUESTIONS 1-9: 0
SUM OF ALL RESPONSES TO PHQ9 QUESTIONS 1 & 2: 0
2. FEELING DOWN, DEPRESSED OR HOPELESS: NOT AT ALL
2. FEELING DOWN, DEPRESSED OR HOPELESS: NOT AT ALL
SUM OF ALL RESPONSES TO PHQ QUESTIONS 1-9: 0
SUM OF ALL RESPONSES TO PHQ QUESTIONS 1-9: 0
1. LITTLE INTEREST OR PLEASURE IN DOING THINGS: NOT AT ALL
SUM OF ALL RESPONSES TO PHQ9 QUESTIONS 1 & 2: 0
1. LITTLE INTEREST OR PLEASURE IN DOING THINGS: NOT AT ALL

## 2024-06-10 ENCOUNTER — NURSE ONLY (OUTPATIENT)
Dept: INTERNAL MEDICINE CLINIC | Facility: CLINIC | Age: 74
End: 2024-06-10

## 2024-06-10 DIAGNOSIS — E11.21 TYPE 2 DIABETES WITH NEPHROPATHY (HCC): ICD-10-CM

## 2024-06-10 DIAGNOSIS — Z85.46 HISTORY OF PROSTATE CANCER: ICD-10-CM

## 2024-06-10 LAB
ALBUMIN SERPL-MCNC: 3.9 G/DL (ref 3.2–4.6)
ALBUMIN/GLOB SERPL: 1.3 (ref 1–1.9)
ALP SERPL-CCNC: 85 U/L (ref 40–129)
ALT SERPL-CCNC: 33 U/L (ref 12–65)
ANION GAP SERPL CALC-SCNC: 11 MMOL/L (ref 9–18)
AST SERPL-CCNC: 26 U/L (ref 15–37)
BILIRUB SERPL-MCNC: 0.9 MG/DL (ref 0–1.2)
BUN SERPL-MCNC: 20 MG/DL (ref 8–23)
CALCIUM SERPL-MCNC: 10 MG/DL (ref 8.8–10.2)
CHLORIDE SERPL-SCNC: 104 MMOL/L (ref 98–107)
CHOLEST SERPL-MCNC: 111 MG/DL (ref 0–200)
CO2 SERPL-SCNC: 26 MMOL/L (ref 20–28)
CREAT SERPL-MCNC: 1.15 MG/DL (ref 0.8–1.3)
CREAT UR-MCNC: 104 MG/DL (ref 39–259)
ERYTHROCYTE [DISTWIDTH] IN BLOOD BY AUTOMATED COUNT: 14.5 % (ref 11.9–14.6)
EST. AVERAGE GLUCOSE BLD GHB EST-MCNC: 141 MG/DL
GLOBULIN SER CALC-MCNC: 3 G/DL (ref 2.3–3.5)
GLUCOSE SERPL-MCNC: 97 MG/DL (ref 70–99)
HBA1C MFR BLD: 6.5 % (ref 0–5.6)
HCT VFR BLD AUTO: 44.4 % (ref 41.1–50.3)
HDLC SERPL-MCNC: 45 MG/DL (ref 40–60)
HDLC SERPL: 2.5 (ref 0–5)
HGB BLD-MCNC: 14.1 G/DL (ref 13.6–17.2)
LDLC SERPL CALC-MCNC: 37 MG/DL (ref 0–100)
MCH RBC QN AUTO: 32.6 PG (ref 26.1–32.9)
MCHC RBC AUTO-ENTMCNC: 31.8 G/DL (ref 31.4–35)
MCV RBC AUTO: 102.5 FL (ref 82–102)
MICROALBUMIN UR-MCNC: 194 MG/DL (ref 0–20)
MICROALBUMIN/CREAT UR-RTO: 1865 MG/G (ref 0–30)
NRBC # BLD: 0 K/UL (ref 0–0.2)
PLATELET # BLD AUTO: 243 K/UL (ref 150–450)
PMV BLD AUTO: 10.5 FL (ref 9.4–12.3)
POTASSIUM SERPL-SCNC: 4.2 MMOL/L (ref 3.5–5.1)
PROT SERPL-MCNC: 6.9 G/DL (ref 6.3–8.2)
RBC # BLD AUTO: 4.33 M/UL (ref 4.23–5.6)
SODIUM SERPL-SCNC: 141 MMOL/L (ref 136–145)
TRIGL SERPL-MCNC: 143 MG/DL (ref 0–150)
VLDLC SERPL CALC-MCNC: 29 MG/DL (ref 6–23)
WBC # BLD AUTO: 8.2 K/UL (ref 4.3–11.1)

## 2024-06-11 SDOH — ECONOMIC STABILITY: FOOD INSECURITY: WITHIN THE PAST 12 MONTHS, YOU WORRIED THAT YOUR FOOD WOULD RUN OUT BEFORE YOU GOT MONEY TO BUY MORE.: NEVER TRUE

## 2024-06-11 SDOH — ECONOMIC STABILITY: TRANSPORTATION INSECURITY
IN THE PAST 12 MONTHS, HAS LACK OF TRANSPORTATION KEPT YOU FROM MEETINGS, WORK, OR FROM GETTING THINGS NEEDED FOR DAILY LIVING?: NO

## 2024-06-11 SDOH — ECONOMIC STABILITY: FOOD INSECURITY: WITHIN THE PAST 12 MONTHS, THE FOOD YOU BOUGHT JUST DIDN'T LAST AND YOU DIDN'T HAVE MONEY TO GET MORE.: NEVER TRUE

## 2024-06-11 SDOH — ECONOMIC STABILITY: INCOME INSECURITY: HOW HARD IS IT FOR YOU TO PAY FOR THE VERY BASICS LIKE FOOD, HOUSING, MEDICAL CARE, AND HEATING?: NOT HARD AT ALL

## 2024-06-12 ENCOUNTER — OFFICE VISIT (OUTPATIENT)
Dept: INTERNAL MEDICINE CLINIC | Facility: CLINIC | Age: 74
End: 2024-06-12
Payer: MEDICARE

## 2024-06-12 VITALS
DIASTOLIC BLOOD PRESSURE: 60 MMHG | OXYGEN SATURATION: 94 % | WEIGHT: 239 LBS | HEART RATE: 85 BPM | SYSTOLIC BLOOD PRESSURE: 110 MMHG | HEIGHT: 70 IN | BODY MASS INDEX: 34.22 KG/M2

## 2024-06-12 DIAGNOSIS — I48.11 LONGSTANDING PERSISTENT ATRIAL FIBRILLATION (HCC): ICD-10-CM

## 2024-06-12 DIAGNOSIS — E78.2 MIXED HYPERLIPIDEMIA: ICD-10-CM

## 2024-06-12 DIAGNOSIS — I73.9 PVD (PERIPHERAL VASCULAR DISEASE) (HCC): ICD-10-CM

## 2024-06-12 DIAGNOSIS — Z00.00 MEDICARE ANNUAL WELLNESS VISIT, SUBSEQUENT: Primary | ICD-10-CM

## 2024-06-12 DIAGNOSIS — J44.9 CHRONIC OBSTRUCTIVE PULMONARY DISEASE, UNSPECIFIED COPD TYPE (HCC): ICD-10-CM

## 2024-06-12 DIAGNOSIS — D68.69 SECONDARY HYPERCOAGULABLE STATE (HCC): ICD-10-CM

## 2024-06-12 DIAGNOSIS — I10 HTN (HYPERTENSION), BENIGN: ICD-10-CM

## 2024-06-12 DIAGNOSIS — E11.21 TYPE 2 DIABETES WITH NEPHROPATHY (HCC): ICD-10-CM

## 2024-06-12 LAB — PSA SERPL DL<=0.01 NG/ML-MCNC: 0.51 NG/ML (ref 0–4)

## 2024-06-12 PROCEDURE — 1123F ACP DISCUSS/DSCN MKR DOCD: CPT | Performed by: FAMILY MEDICINE

## 2024-06-12 PROCEDURE — 99214 OFFICE O/P EST MOD 30 MIN: CPT | Performed by: FAMILY MEDICINE

## 2024-06-12 PROCEDURE — G0439 PPPS, SUBSEQ VISIT: HCPCS | Performed by: FAMILY MEDICINE

## 2024-06-12 PROCEDURE — 3074F SYST BP LT 130 MM HG: CPT | Performed by: FAMILY MEDICINE

## 2024-06-12 PROCEDURE — 3078F DIAST BP <80 MM HG: CPT | Performed by: FAMILY MEDICINE

## 2024-06-12 PROCEDURE — 3044F HG A1C LEVEL LT 7.0%: CPT | Performed by: FAMILY MEDICINE

## 2024-06-12 RX ORDER — ENALAPRIL MALEATE 20 MG/1
20 TABLET ORAL 2 TIMES DAILY
Qty: 180 TABLET | Refills: 3 | Status: SHIPPED | OUTPATIENT
Start: 2024-06-12 | End: 2024-06-12

## 2024-06-12 RX ORDER — ATORVASTATIN CALCIUM 40 MG/1
40 TABLET, FILM COATED ORAL EVERY EVENING
Qty: 100 TABLET | Refills: 2 | Status: SHIPPED | OUTPATIENT
Start: 2024-06-12

## 2024-06-12 RX ORDER — ENALAPRIL MALEATE 20 MG/1
20 TABLET ORAL DAILY
Qty: 90 TABLET | Refills: 3 | Status: SHIPPED | OUTPATIENT
Start: 2024-06-12 | End: 2025-06-12

## 2024-06-12 NOTE — PROGRESS NOTES
Medicare Annual Wellness Visit    Christophe Xavier is here for Medicare AWV and Diabetes    Assessment & Plan   Medicare annual wellness visit, subsequent  Type 2 diabetes with nephropathy (HCC)  HTN (hypertension), benign  -     enalapril (VASOTEC) 20 MG tablet; Take 1 tablet by mouth daily, Disp-90 tablet, R-3Please send a replace/new response with 100-Day Supply if appropriate to maximize member benefit. Requesting 1 year supply.Normal  Longstanding persistent atrial fibrillation (HCC)  Secondary hypercoagulable state (HCC)  Mixed hyperlipidemia  -     atorvastatin (LIPITOR) 40 MG tablet; Take 1 tablet by mouth every evening, Disp-100 tablet, R-2Requesting 1 year supplyNormal  PVD (peripheral vascular disease) (HCC)  Assessment & Plan:   Asymptomatic, continue current medications and continue current treatment plan  Orders:  -     Vascular duplex carotid bilateral; Future  Chronic obstructive pulmonary disease, unspecified COPD type (HCC)        Tolerating medication well and achieving desired therapeutic response.  Will continue with:   Diabetic Medications       Biguanides       metFORMIN (GLUCOPHAGE) 500 MG tablet Take 1 tablet by mouth 2 times daily (with meals)       Sodium-Glucose Co-Transporter 2 (SGLT2) Inhibitors       dapagliflozin (FARXIGA) 10 MG tablet Take 1 tablet by mouth every morning        .  A1c levels reviewed--will recheck. Encourage routine foot care. Recommend routine eye exams.  Encourage diet and exercise and medication adherence.   Tolerating medication well for HTN. Achieving desired therapeutic response with   Hypertension Medications       ACE Inhibitors       enalapril (VASOTEC) 20 MG tablet Take 1 tablet by mouth daily       Calcium Channel Blockers       dilTIAZem (CARDIZEM CD) 240 MG extended release capsule Take 1 capsule by mouth daily       Loop Diuretics       furosemide (LASIX) 20 MG tablet Take 1 tablet by mouth daily as needed (Take for weight gain greater than 2

## 2024-06-19 ENCOUNTER — HOSPITAL ENCOUNTER (OUTPATIENT)
Dept: ULTRASOUND IMAGING | Age: 74
Discharge: HOME | End: 2024-06-22
Attending: FAMILY MEDICINE
Payer: MEDICARE

## 2024-06-19 DIAGNOSIS — I73.9 PVD (PERIPHERAL VASCULAR DISEASE) (HCC): ICD-10-CM

## 2024-06-19 PROCEDURE — 93880 EXTRACRANIAL BILAT STUDY: CPT

## 2024-07-01 DIAGNOSIS — I10 HTN (HYPERTENSION), BENIGN: ICD-10-CM

## 2024-07-01 DIAGNOSIS — I48.91 ATRIAL FIBRILLATION (HCC): Primary | ICD-10-CM

## 2024-07-02 RX ORDER — DILTIAZEM HYDROCHLORIDE 240 MG/1
240 CAPSULE, COATED, EXTENDED RELEASE ORAL DAILY
Qty: 90 CAPSULE | Refills: 1 | Status: SHIPPED | OUTPATIENT
Start: 2024-07-02

## 2024-07-02 NOTE — TELEPHONE ENCOUNTER
Requested Prescriptions     Pending Prescriptions Disp Refills    dilTIAZem (CARDIZEM CD) 240 MG extended release capsule 90 capsule 1     Sig: Take 1 capsule by mouth daily

## 2024-07-31 ENCOUNTER — OFFICE VISIT (OUTPATIENT)
Age: 74
End: 2024-07-31
Payer: MEDICARE

## 2024-07-31 VITALS
WEIGHT: 236 LBS | HEIGHT: 70 IN | HEART RATE: 92 BPM | DIASTOLIC BLOOD PRESSURE: 70 MMHG | BODY MASS INDEX: 33.79 KG/M2 | SYSTOLIC BLOOD PRESSURE: 138 MMHG

## 2024-07-31 DIAGNOSIS — I10 HTN (HYPERTENSION), BENIGN: ICD-10-CM

## 2024-07-31 DIAGNOSIS — I48.91 ATRIAL FIBRILLATION, UNSPECIFIED TYPE (HCC): Primary | ICD-10-CM

## 2024-07-31 PROCEDURE — 3078F DIAST BP <80 MM HG: CPT | Performed by: INTERNAL MEDICINE

## 2024-07-31 PROCEDURE — 3075F SYST BP GE 130 - 139MM HG: CPT | Performed by: INTERNAL MEDICINE

## 2024-07-31 PROCEDURE — 1123F ACP DISCUSS/DSCN MKR DOCD: CPT | Performed by: INTERNAL MEDICINE

## 2024-07-31 PROCEDURE — 99214 OFFICE O/P EST MOD 30 MIN: CPT | Performed by: INTERNAL MEDICINE

## 2024-07-31 ASSESSMENT — ENCOUNTER SYMPTOMS
ABDOMINAL PAIN: 0
APHONIA: 0
STRIDOR: 0
COUGH: 0
EYE PAIN: 0
NAIL CHANGES: 0

## 2024-07-31 NOTE — PROGRESS NOTES
36 mL/m2    LVIDd Index 1.78 cm/m2    LVIDs Index 1.24 cm/m2    LV RWT Ratio 0.50     LV Mass 2D 136.2 88 - 224 g    LV Mass 2D Index 60.5 49 - 115 g/m2    LA Volume Index BP 40 (A) 16 - 34 ml/m2    LA Volume Index A/L 43 16 - 34 mL/m2    LVOT Stroke Volume Index 26.4 mL/m2    LVOT Area 3.1 cm2    LA Volume Index A-L A2C 46 (A) 16 - 34 mL/m2    LA Volume Index A-L A4C 38 (A) 16 - 34 mL/m2    LA Volume Index MOD A2C 43 (A) 16 - 34 ml/m2    LA Volume Index MOD A4C 34 16 - 34 ml/m2    LA Size Index 1.96 cm/m2    LA/AO Root Ratio 1.33     Ao Root Index 1.47 cm/m2    Aortic Sinus Valsalva Index 1.64 cm/m2    Ascending Aorta Index 1.69 cm/m2    AV Velocity Ratio 0.50     LVOT:AV VTI Index 0.60     ROSEMARY/BSA VTI 0.9 cm2/m2    ROSEMARY/BSA Peak Velocity 0.7 cm2/m2       Lab Results   Component Value Date/Time    CHOL 111 06/10/2024 11:08 AM    HDL 45 06/10/2024 11:08 AM    LDL 37 06/10/2024 11:08 AM    LDL 26.6 12/05/2023 01:42 PM    VLDL 29 06/10/2024 11:08 AM    VLDL 32 01/12/2022 09:29 AM       No results found for any visits on 07/31/24.        DANETTE Saeed was seen today for results and atrial fibrillation.    Diagnoses and all orders for this visit:    Atrial fibrillation, unspecified type (HCC)    HTN (hypertension), benign    Return in about 6 months (around 1/31/2025).       Baron Ribera MD  7/31/2024  9:14 AM

## 2024-07-31 NOTE — PATIENT INSTRUCTIONS
[phone] Toll Free  REGISTERSIGN IN 0   Search    Prescription DrugsNon-Prescription DrugsPet MedsFAQSecurity & PrivacyAbout UsContact Us  Contact Us   Hours of Operation  Mon to Fri, 7:00am - 6:00pm  Sat, 7:30am - 6:00pm  Sun, 7:30am - 6:00pm  All times Central Standard Time (CST)   Phone  Toll-free: [phone]  International: [phone]  (Kelin bansal)   Email  [email or web address]   Mailing Address  Please send all prescriptions and order forms to:  [employer]  [address] [facility]  [address]  [place]   Fax  Toll-free: [phone]  International: [phone]   Fax order form   Live Chat

## 2024-08-02 ENCOUNTER — PATIENT MESSAGE (OUTPATIENT)
Age: 74
End: 2024-08-02

## 2024-08-13 RX ORDER — ALBUTEROL SULFATE 90 UG/1
INHALANT RESPIRATORY (INHALATION)
Qty: 8.5 G | Refills: 13 | OUTPATIENT
Start: 2024-08-13

## 2024-10-02 ENCOUNTER — TELEMEDICINE (OUTPATIENT)
Dept: SLEEP MEDICINE | Age: 74
End: 2024-10-02

## 2024-10-02 DIAGNOSIS — G47.33 OSA (OBSTRUCTIVE SLEEP APNEA): Primary | ICD-10-CM

## 2024-10-02 DIAGNOSIS — G47.34 NOCTURNAL HYPOXEMIA: ICD-10-CM

## 2024-10-02 DIAGNOSIS — G47.31 CSA (CENTRAL SLEEP APNEA): ICD-10-CM

## 2024-10-02 NOTE — PATIENT INSTRUCTIONS
The company who will be taking care of your BIPAP supplies is:    Address: [address], [place] [address]  Phone: [phone] Option #1  Fax: [phone]

## 2024-10-03 ENCOUNTER — PATIENT MESSAGE (OUTPATIENT)
Age: 74
End: 2024-10-03

## 2024-10-03 ENCOUNTER — TELEPHONE (OUTPATIENT)
Dept: CARDIAC CATH/INVASIVE PROCEDURES | Age: 74
End: 2024-10-03

## 2024-10-03 DIAGNOSIS — I48.91 ATRIAL FIBRILLATION (HCC): Primary | ICD-10-CM

## 2024-10-03 NOTE — TELEPHONE ENCOUNTER
Pt notified and said ok and referral made for Watchman Consult.    Per Mounika pt needs referral for Watchman Consult, note sent to schedule to complete referral.

## 2024-10-03 NOTE — TELEPHONE ENCOUNTER
Pt sent in EKG from [employer] wanted Dr Ribera to see it and also wants to know if he would be a candidate for a Watchman.  Pt said he is not having any symptoms and he  is still taking all of his medicine. Nurse will let Dr Ribera know.

## 2024-10-03 NOTE — TELEPHONE ENCOUNTER
The Watchman device is intended for patients that have increased risk of stroke as well as increased risk of bleeding or falls.      https://[email or web address]-us/home.[email or web address]      Above is a online website which has more information about the procedure.  Additionally there are questions that the patient can answer to see if he qualifies.  If he is interested in discussing this, we can arrange appointment with one of our watchman physicians.

## 2024-10-03 NOTE — TELEPHONE ENCOUNTER
The patient was contacted to discuss LAAO. The patient has viewed the [employer] website and has no questions at this time.  The patient has a tolingo consult with Dr. Torres on 10/24/2024 at 1pm.

## 2024-10-04 RX ORDER — FLUTICASONE FUROATE, UMECLIDINIUM BROMIDE AND VILANTEROL TRIFENATATE 200; 62.5; 25 UG/1; UG/1; UG/1
POWDER RESPIRATORY (INHALATION)
Qty: 3 EACH | Refills: 3 | Status: SHIPPED | OUTPATIENT
Start: 2024-10-04

## 2024-10-24 ENCOUNTER — OFFICE VISIT (OUTPATIENT)
Age: 74
End: 2024-10-24
Payer: MEDICARE

## 2024-10-24 VITALS
DIASTOLIC BLOOD PRESSURE: 60 MMHG | WEIGHT: 214 LBS | HEIGHT: 70 IN | HEART RATE: 96 BPM | SYSTOLIC BLOOD PRESSURE: 102 MMHG | BODY MASS INDEX: 30.64 KG/M2

## 2024-10-24 DIAGNOSIS — I25.10 CAD IN NATIVE ARTERY: ICD-10-CM

## 2024-10-24 DIAGNOSIS — I48.0 PAROXYSMAL ATRIAL FIBRILLATION (HCC): Primary | ICD-10-CM

## 2024-10-24 DIAGNOSIS — I10 HTN (HYPERTENSION), BENIGN: ICD-10-CM

## 2024-10-24 PROCEDURE — 99214 OFFICE O/P EST MOD 30 MIN: CPT | Performed by: INTERNAL MEDICINE

## 2024-10-24 PROCEDURE — 1126F AMNT PAIN NOTED NONE PRSNT: CPT | Performed by: INTERNAL MEDICINE

## 2024-10-24 PROCEDURE — 3078F DIAST BP <80 MM HG: CPT | Performed by: INTERNAL MEDICINE

## 2024-10-24 PROCEDURE — 1159F MED LIST DOCD IN RCRD: CPT | Performed by: INTERNAL MEDICINE

## 2024-10-24 PROCEDURE — 3074F SYST BP LT 130 MM HG: CPT | Performed by: INTERNAL MEDICINE

## 2024-10-24 PROCEDURE — 1123F ACP DISCUSS/DSCN MKR DOCD: CPT | Performed by: INTERNAL MEDICINE

## 2024-10-24 ASSESSMENT — ENCOUNTER SYMPTOMS
LEFT EYE: 0
SHORTNESS OF BREATH: 0
DIARRHEA: 0
COLOR CHANGE: 0

## 2024-10-24 NOTE — PROGRESS NOTES
[facility], [address], [address]  [place] [address]  PHONE: [phone]    Christophe Xavier  1950      SUBJECTIVE:   Christophe Xavier is a 74 y.o. male seen for a follow up visit regarding the following:     Chief Complaint   Patient presents with    Consultation     watchman       HPI:    Patient presents for consultation requested by Dr. Ribera for consideration of left atrial appendage occlusion.  He has a following past cardiac history:    Expand All Collapse All         [address], [address] [place] [address]  PHONE: [phone]     SUBJECTIVE:   Christophe Xavier is a 74 y.o. male 1950   seen for a follow up visit regarding the following:           Chief Complaint   Patient presents with    Results       echo    Atrial Fibrillation            History of present illness: 74 y.o. male presented for follow-up 7/31/24 history of atrial fibrillation COPD.  At her last appointment he was placed on SGL 2 therapy with as needed diuretics.  He has a wearable device. He apple watch ahs detected high heart rates at times.  Apple Watch data reviewed with A-fib alerts that appear to be sinus rhythm with PACs last occurring 5/27/2024.     Interval Hx:   he was hospitalized for viral infection in November 2022.  He had a hospital course complicated by atrial fibrillation.  Heart rates are in the low 100 bpm range in December 2022.  He underwent cardioversion in December 2022 with successful restoration of sinus rhythm.  He is on diltiazem therapy.  Additionally since her last appointment the patient has purchased an Apple Watch and has detected no episodes of atrial fibrillation over the past several months.  He did have symptomatic benefit with cardioversion and feels that his breathing as well as stamina are better.       The patient presented for consultation 09/10/2018.  He presented for follow-up 4/8/2019.  The patient is formerly

## 2024-10-25 ENCOUNTER — TELEPHONE (OUTPATIENT)
Dept: CARDIAC CATH/INVASIVE PROCEDURES | Age: 74
End: 2024-10-25

## 2024-10-25 NOTE — PROGRESS NOTES
Christophe Xavier has been referred to the [facility] Program for evaluation for Left Atrial Appendage Closure with a FDA-approved Watchman device for management of stroke risk resulting from non-valvular atrial fibrillation.    Based on this patient's history, it has been determined that this patient is a poor candidate for long-term oral anticoagulation, however may be tolerant of short term treatment as needed for watchman implantation.     Specifically regarding anticoagulation, the patient has demonstrated: Anti-coagulation History: Documented poor compliance or intolerance with oral anti-coagulation therapy.         The patient and I have discussed their unique stroke and bleeding risk both on and off oral-anticoagulation, and the rationale for this referral. Their individual [id number] stroke risk score, based on past history is indicated below**.  Select all that apply based on patient history:    Atrial Fibrillation CHADSVASC2 Score Stroke Risk:_  74 y.o. > 75 - 2    male Male - 0   CHF HX: No - 0   HTN HX: Yes - 1   Stroke/TIA/Thromboembolism No - 0   Vascular Disease HX: Yes - 1   Diabetes Mellitus Yes - 1   CHADSVASC 2 Score 5      Annual Stroke Risk 7.2% - moderate-high                Vascular disease: PCI and CAD    HAS-BLED Score     HTN Hx [x] 1 Point   Renal Disease  [] 1 Point   Liver Disease [] 1 Point   Stroke Hx [] 1 Point   Prior Major Bleeding or Predisposition [x] 1 Point   Labile INR [] 1 Point   Age > 65 Years Current: 74 y.o.   [x] 1 Point   Rx Usage Predisposing to Bleeding [] 1 Point   Alcohol Use (> or = 8 Drinks/wk) [] 1 Point   Total: UCHASBLED: Score 3 High Risk 5.8% Risk of major bleeding 3.72  Bleeds Per 100 pts years Alternatives to Anticoagulation can be considered       Dr. Baron Ribera and Dr. Sameer Torres have both had a face-to-face conversation with this patient explaining atrial fibrillation, stroke risk with atrial fibrillation,

## 2024-10-25 NOTE — TELEPHONE ENCOUNTER
Christophe Xavier was contacted to discuss LAAO.  Educational information/booklet emailed to the patient regarding stroke prevention options for patients with Atrial Fibrillation. [facility] CardioSmart tool provided for review as the shared decision making process continues between patient and physicians. He will be scheduled for Watchman implant on 11/05/2024.  Patient will be contacted the week prior to procedure with an arrival time and additional instructions.  Watchman coordinator contact ([phone]) information provided.

## 2024-10-26 DIAGNOSIS — I10 HTN (HYPERTENSION), BENIGN: Primary | ICD-10-CM

## 2024-10-28 RX ORDER — FUROSEMIDE 20 MG/1
20 TABLET ORAL DAILY PRN
Qty: 60 TABLET | Refills: 3 | Status: SHIPPED | OUTPATIENT
Start: 2024-10-28 | End: 2025-06-25

## 2024-10-28 NOTE — TELEPHONE ENCOUNTER
.  Requested Prescriptions     Pending Prescriptions Disp Refills    furosemide (LASIX) 20 MG tablet 60 tablet 3     Sig: Take 1 tablet by mouth daily as needed (Take for weight gain greater than 2 pounds in 24 hours or 5 pounds in 48 hours.)

## 2024-10-30 ENCOUNTER — PREP FOR PROCEDURE (OUTPATIENT)
Age: 74
End: 2024-10-30

## 2024-10-30 DIAGNOSIS — I48.0 PAROXYSMAL ATRIAL FIBRILLATION (HCC): Primary | ICD-10-CM

## 2024-10-31 NOTE — PROGRESS NOTES
Patient pre-assessment complete for LAAO scheduled for 2024, arrival time 0600. Patient verified using . Patient instructed to bring all home medications in labeled bottles on the day of procedure. NPO status reinforced. Patient's last doses of eliquis on 24 and metformin will be on 2024. He will take albuterol & trelegy inhalers, and diltiazem with a small sip of water, the morning of his procedure. Patient instructed to HOLD all other medications and supplements. Patient verbalizes understanding of all instructions & denies any questions at this time. Patient has watchman contact ([phone]) information for questions.

## 2024-11-04 ENCOUNTER — HOSPITAL ENCOUNTER (OUTPATIENT)
Dept: LAB | Age: 74
Setting detail: SPECIMEN
Discharge: HOME | DRG: 274 | End: 2024-11-07
Payer: MEDICARE

## 2024-11-04 ENCOUNTER — HOSPITAL ENCOUNTER (OUTPATIENT)
Dept: LAB | Age: 74
Discharge: HOME | End: 2024-11-07
Payer: MEDICARE

## 2024-11-04 DIAGNOSIS — I48.0 PAROXYSMAL ATRIAL FIBRILLATION (HCC): ICD-10-CM

## 2024-11-04 LAB
ALBUMIN SERPL-MCNC: 4 G/DL (ref 3.2–4.6)
ANION GAP SERPL CALC-SCNC: 11 MMOL/L (ref 7–16)
BASOPHILS # BLD: 0.1 K/UL (ref 0–0.2)
BASOPHILS NFR BLD: 1 % (ref 0–2)
BUN SERPL-MCNC: 21 MG/DL (ref 8–23)
CALCIUM SERPL-MCNC: 10.4 MG/DL (ref 8.8–10.2)
CHLORIDE SERPL-SCNC: 101 MMOL/L (ref 98–107)
CO2 SERPL-SCNC: 26 MMOL/L (ref 20–29)
CREAT SERPL-MCNC: 1.02 MG/DL (ref 0.8–1.3)
DIFFERENTIAL METHOD BLD: NORMAL
EOSINOPHIL # BLD: 0.6 K/UL (ref 0–0.8)
EOSINOPHIL NFR BLD: 7 % (ref 0.5–7.8)
ERYTHROCYTE [DISTWIDTH] IN BLOOD BY AUTOMATED COUNT: 14.4 % (ref 11.9–14.6)
GLUCOSE SERPL-MCNC: 99 MG/DL (ref 70–99)
HCT VFR BLD AUTO: 44.2 % (ref 41.1–50.3)
HGB BLD-MCNC: 14.4 G/DL (ref 13.6–17.2)
IMM GRANULOCYTES # BLD AUTO: 0 K/UL (ref 0–0.5)
IMM GRANULOCYTES NFR BLD AUTO: 1 % (ref 0–5)
INR PPP: 1.2
LYMPHOCYTES # BLD: 1.1 K/UL (ref 0.5–4.6)
LYMPHOCYTES NFR BLD: 14 % (ref 13–44)
MCH RBC QN AUTO: 32.2 PG (ref 26.1–32.9)
MCHC RBC AUTO-ENTMCNC: 32.6 G/DL (ref 31.4–35)
MCV RBC AUTO: 98.9 FL (ref 82–102)
MONOCYTES # BLD: 0.6 K/UL (ref 0.1–1.3)
MONOCYTES NFR BLD: 7 % (ref 4–12)
NEUTS SEG # BLD: 5.8 K/UL (ref 1.7–8.2)
NEUTS SEG NFR BLD: 71 % (ref 43–78)
NRBC # BLD: 0 K/UL (ref 0–0.2)
PLATELET # BLD AUTO: 270 K/UL (ref 150–450)
PMV BLD AUTO: 10.2 FL (ref 9.4–12.3)
POTASSIUM SERPL-SCNC: 4.3 MMOL/L (ref 3.5–5.1)
PROTHROMBIN TIME: 15.4 SEC (ref 11.3–14.9)
RBC # BLD AUTO: 4.47 M/UL (ref 4.23–5.6)
SODIUM SERPL-SCNC: 138 MMOL/L (ref 136–145)
WBC # BLD AUTO: 8.2 K/UL (ref 4.3–11.1)

## 2024-11-04 PROCEDURE — 86850 RBC ANTIBODY SCREEN: CPT

## 2024-11-04 PROCEDURE — 86900 BLOOD TYPING SEROLOGIC ABO: CPT

## 2024-11-04 PROCEDURE — 85025 COMPLETE CBC W/AUTO DIFF WBC: CPT

## 2024-11-04 PROCEDURE — 82040 ASSAY OF SERUM ALBUMIN: CPT

## 2024-11-04 PROCEDURE — 86901 BLOOD TYPING SEROLOGIC RH(D): CPT

## 2024-11-04 PROCEDURE — 36415 COLL VENOUS BLD VENIPUNCTURE: CPT

## 2024-11-04 PROCEDURE — 80048 BASIC METABOLIC PNL TOTAL CA: CPT

## 2024-11-04 PROCEDURE — 85610 PROTHROMBIN TIME: CPT

## 2024-11-04 RX ORDER — SODIUM CHLORIDE, SODIUM LACTATE, POTASSIUM CHLORIDE, CALCIUM CHLORIDE 600; 310; 30; 20 MG/100ML; MG/100ML; MG/100ML; MG/100ML
INJECTION, SOLUTION INTRAVENOUS CONTINUOUS
Status: CANCELLED | OUTPATIENT
Start: 2024-11-04

## 2024-11-04 RX ORDER — SODIUM CHLORIDE 0.9 % (FLUSH) 0.9 %
5-40 SYRINGE (ML) INJECTION PRN
Status: CANCELLED | OUTPATIENT
Start: 2024-11-04

## 2024-11-04 RX ORDER — FENTANYL CITRATE 50 UG/ML
100 INJECTION, SOLUTION INTRAMUSCULAR; INTRAVENOUS
Status: CANCELLED | OUTPATIENT
Start: 2024-11-04 | End: 2024-11-05

## 2024-11-04 RX ORDER — SODIUM CHLORIDE 0.9 % (FLUSH) 0.9 %
5-40 SYRINGE (ML) INJECTION EVERY 12 HOURS SCHEDULED
Status: CANCELLED | OUTPATIENT
Start: 2024-11-04

## 2024-11-04 RX ORDER — LIDOCAINE HYDROCHLORIDE 10 MG/ML
1 INJECTION, SOLUTION INFILTRATION; PERINEURAL
Status: CANCELLED | OUTPATIENT
Start: 2024-11-04 | End: 2024-11-05

## 2024-11-04 RX ORDER — MIDAZOLAM HYDROCHLORIDE 2 MG/2ML
2 INJECTION, SOLUTION INTRAMUSCULAR; INTRAVENOUS
Status: CANCELLED | OUTPATIENT
Start: 2024-11-04 | End: 2024-11-05

## 2024-11-04 RX ORDER — SODIUM CHLORIDE 9 MG/ML
INJECTION, SOLUTION INTRAVENOUS PRN
Status: CANCELLED | OUTPATIENT
Start: 2024-11-04

## 2024-11-05 ENCOUNTER — ANESTHESIA (OUTPATIENT)
Dept: SURGERY | Age: 74
DRG: 274 | End: 2024-11-05
Payer: MEDICARE

## 2024-11-05 ENCOUNTER — HOSPITAL ENCOUNTER (INPATIENT)
Age: 74
LOS: 1 days | Discharge: HOME | DRG: 274 | End: 2024-11-05
Attending: INTERNAL MEDICINE | Admitting: INTERNAL MEDICINE
Payer: MEDICARE

## 2024-11-05 ENCOUNTER — APPOINTMENT (OUTPATIENT)
Dept: CARDIAC CATH/INVASIVE PROCEDURES | Age: 74
DRG: 274 | End: 2024-11-05
Attending: INTERNAL MEDICINE
Payer: MEDICARE

## 2024-11-05 ENCOUNTER — ANESTHESIA EVENT (OUTPATIENT)
Dept: SURGERY | Age: 74
DRG: 274 | End: 2024-11-05
Payer: MEDICARE

## 2024-11-05 VITALS
DIASTOLIC BLOOD PRESSURE: 75 MMHG | WEIGHT: 212 LBS | HEIGHT: 70 IN | OXYGEN SATURATION: 95 % | RESPIRATION RATE: 19 BRPM | SYSTOLIC BLOOD PRESSURE: 129 MMHG | BODY MASS INDEX: 30.35 KG/M2 | HEART RATE: 105 BPM | TEMPERATURE: 97.2 F

## 2024-11-05 DIAGNOSIS — I48.0 PAROXYSMAL A-FIB (HCC): ICD-10-CM

## 2024-11-05 LAB
ABO + RH BLD: NORMAL
ACT BLD: 291 SECS (ref 70–128)
BLOOD GROUP ANTIBODIES SERPL: NORMAL
ECHO BSA: 2.18 M2
EKG DIAGNOSIS: NORMAL
EKG Q-T INTERVAL: 352 MS
EKG QRS DURATION: 94 MS
EKG QTC CALCULATION (BAZETT): 454 MS
EKG R AXIS: 60 DEGREES
EKG T AXIS: 71 DEGREES
EKG VENTRICULAR RATE: 100 BPM
GLUCOSE BLD STRIP.AUTO-MCNC: 94 MG/DL (ref 65–100)
SERVICE CMNT-IMP: NORMAL
SPECIMEN EXP DATE BLD: NORMAL

## 2024-11-05 PROCEDURE — 6360000002 HC RX W HCPCS: Performed by: INTERNAL MEDICINE

## 2024-11-05 PROCEDURE — 93319 3D ECHO IMG CGEN CAR ANOMAL: CPT

## 2024-11-05 PROCEDURE — 6360000004 HC RX CONTRAST MEDICATION

## 2024-11-05 PROCEDURE — 82962 GLUCOSE BLOOD TEST: CPT

## 2024-11-05 PROCEDURE — C1769 GUIDE WIRE: HCPCS | Performed by: INTERNAL MEDICINE

## 2024-11-05 PROCEDURE — C1894 INTRO/SHEATH, NON-LASER: HCPCS | Performed by: INTERNAL MEDICINE

## 2024-11-05 PROCEDURE — 7100000000 HC PACU RECOVERY - FIRST 15 MIN: Performed by: INTERNAL MEDICINE

## 2024-11-05 PROCEDURE — 6360000004 HC RX CONTRAST MEDICATION: Performed by: INTERNAL MEDICINE

## 2024-11-05 PROCEDURE — 3700000001 HC ADD 15 MINUTES (ANESTHESIA): Performed by: INTERNAL MEDICINE

## 2024-11-05 PROCEDURE — 02L73DK OCCLUSION OF LEFT ATRIAL APPENDAGE WITH INTRALUMINAL DEVICE, PERCUTANEOUS APPROACH: ICD-10-PCS | Performed by: INTERNAL MEDICINE

## 2024-11-05 PROCEDURE — 7100000001 HC PACU RECOVERY - ADDTL 15 MIN: Performed by: INTERNAL MEDICINE

## 2024-11-05 PROCEDURE — 3700000000 HC ANESTHESIA ATTENDED CARE: Performed by: INTERNAL MEDICINE

## 2024-11-05 PROCEDURE — 2709999900 HC NON-CHARGEABLE SUPPLY: Performed by: INTERNAL MEDICINE

## 2024-11-05 PROCEDURE — 85347 COAGULATION TIME ACTIVATED: CPT

## 2024-11-05 PROCEDURE — 2580000003 HC RX 258: Performed by: INTERNAL MEDICINE

## 2024-11-05 PROCEDURE — B24BZZ4 ULTRASONOGRAPHY OF HEART WITH AORTA, TRANSESOPHAGEAL: ICD-10-PCS | Performed by: INTERNAL MEDICINE

## 2024-11-05 PROCEDURE — C1889 IMPLANT/INSERT DEVICE, NOC: HCPCS | Performed by: INTERNAL MEDICINE

## 2024-11-05 PROCEDURE — 1100000000 HC RM PRIVATE

## 2024-11-05 PROCEDURE — 2580000003 HC RX 258

## 2024-11-05 PROCEDURE — C1753 CATH, INTRAVAS ULTRASOUND: HCPCS | Performed by: INTERNAL MEDICINE

## 2024-11-05 PROCEDURE — 7100000010 HC PHASE II RECOVERY - FIRST 15 MIN: Performed by: INTERNAL MEDICINE

## 2024-11-05 PROCEDURE — 93662 INTRACARDIAC ECG (ICE): CPT | Performed by: INTERNAL MEDICINE

## 2024-11-05 PROCEDURE — 33340 PERQ CLSR TCAT L ATR APNDGE: CPT | Performed by: INTERNAL MEDICINE

## 2024-11-05 PROCEDURE — 93005 ELECTROCARDIOGRAM TRACING: CPT | Performed by: INTERNAL MEDICINE

## 2024-11-05 PROCEDURE — 6360000002 HC RX W HCPCS

## 2024-11-05 PROCEDURE — 7100000011 HC PHASE II RECOVERY - ADDTL 15 MIN: Performed by: INTERNAL MEDICINE

## 2024-11-05 PROCEDURE — 2500000003 HC RX 250 WO HCPCS

## 2024-11-05 PROCEDURE — C1760 CLOSURE DEV, VASC: HCPCS | Performed by: INTERNAL MEDICINE

## 2024-11-05 DEVICE — LEFT ATRIAL APPENDAGE CLOSURE DEVICE WITH DELIVERY SYSTEM
Type: IMPLANTABLE DEVICE | Site: HEART | Status: FUNCTIONAL
Brand: WATCHMAN FLX™ PRO

## 2024-11-05 RX ORDER — IOPAMIDOL 755 MG/ML
INJECTION, SOLUTION INTRAVASCULAR PRN
Status: DISCONTINUED | OUTPATIENT
Start: 2024-11-05 | End: 2024-11-05 | Stop reason: HOSPADM

## 2024-11-05 RX ORDER — METOPROLOL TARTRATE 1 MG/ML
INJECTION, SOLUTION INTRAVENOUS
Status: DISCONTINUED | OUTPATIENT
Start: 2024-11-05 | End: 2024-11-05 | Stop reason: SDUPTHER

## 2024-11-05 RX ORDER — DEXAMETHASONE SODIUM PHOSPHATE 4 MG/ML
INJECTION, SOLUTION INTRA-ARTICULAR; INTRALESIONAL; INTRAMUSCULAR; INTRAVENOUS; SOFT TISSUE
Status: DISCONTINUED | OUTPATIENT
Start: 2024-11-05 | End: 2024-11-05 | Stop reason: SDUPTHER

## 2024-11-05 RX ORDER — SODIUM CHLORIDE 0.9 % (FLUSH) 0.9 %
5-40 SYRINGE (ML) INJECTION EVERY 12 HOURS SCHEDULED
Status: CANCELLED | OUTPATIENT
Start: 2024-11-05

## 2024-11-05 RX ORDER — ONDANSETRON 4 MG/1
4 TABLET, ORALLY DISINTEGRATING ORAL EVERY 8 HOURS PRN
Status: CANCELLED | OUTPATIENT
Start: 2024-11-05

## 2024-11-05 RX ORDER — SODIUM CHLORIDE 9 MG/ML
INJECTION, SOLUTION INTRAVENOUS PRN
Status: CANCELLED | OUTPATIENT
Start: 2024-11-05

## 2024-11-05 RX ORDER — HYDROMORPHONE HYDROCHLORIDE 2 MG/ML
0.25 INJECTION, SOLUTION INTRAMUSCULAR; INTRAVENOUS; SUBCUTANEOUS EVERY 5 MIN PRN
Status: DISCONTINUED | OUTPATIENT
Start: 2024-11-05 | End: 2024-11-05 | Stop reason: HOSPADM

## 2024-11-05 RX ORDER — ESMOLOL HYDROCHLORIDE 10 MG/ML
INJECTION INTRAVENOUS
Status: DISCONTINUED | OUTPATIENT
Start: 2024-11-05 | End: 2024-11-05 | Stop reason: SDUPTHER

## 2024-11-05 RX ORDER — HEPARIN SODIUM 1000 [USP'U]/ML
INJECTION, SOLUTION INTRAVENOUS; SUBCUTANEOUS
Status: DISCONTINUED | OUTPATIENT
Start: 2024-11-05 | End: 2024-11-05 | Stop reason: SDUPTHER

## 2024-11-05 RX ORDER — ROCURONIUM BROMIDE 10 MG/ML
INJECTION, SOLUTION INTRAVENOUS
Status: DISCONTINUED | OUTPATIENT
Start: 2024-11-05 | End: 2024-11-05 | Stop reason: SDUPTHER

## 2024-11-05 RX ORDER — ONDANSETRON 2 MG/ML
4 INJECTION INTRAMUSCULAR; INTRAVENOUS EVERY 6 HOURS PRN
Status: CANCELLED | OUTPATIENT
Start: 2024-11-05

## 2024-11-05 RX ORDER — OXYCODONE HYDROCHLORIDE 5 MG/1
5 TABLET ORAL
Status: DISCONTINUED | OUTPATIENT
Start: 2024-11-05 | End: 2024-11-05 | Stop reason: HOSPADM

## 2024-11-05 RX ORDER — SODIUM CHLORIDE, SODIUM LACTATE, POTASSIUM CHLORIDE, CALCIUM CHLORIDE 600; 310; 30; 20 MG/100ML; MG/100ML; MG/100ML; MG/100ML
INJECTION, SOLUTION INTRAVENOUS
Status: DISCONTINUED | OUTPATIENT
Start: 2024-11-05 | End: 2024-11-05 | Stop reason: SDUPTHER

## 2024-11-05 RX ORDER — OXYCODONE HYDROCHLORIDE 5 MG/1
5 TABLET ORAL EVERY 4 HOURS PRN
Status: CANCELLED | OUTPATIENT
Start: 2024-11-05

## 2024-11-05 RX ORDER — HEPARIN SODIUM 200 [USP'U]/100ML
INJECTION, SOLUTION INTRAVENOUS CONTINUOUS PRN
Status: DISCONTINUED | OUTPATIENT
Start: 2024-11-05 | End: 2024-11-05 | Stop reason: HOSPADM

## 2024-11-05 RX ORDER — ACETAMINOPHEN 325 MG/1
650 TABLET ORAL EVERY 4 HOURS PRN
Status: DISCONTINUED | OUTPATIENT
Start: 2024-11-05 | End: 2024-11-05 | Stop reason: HOSPADM

## 2024-11-05 RX ORDER — NALOXONE HYDROCHLORIDE 0.4 MG/ML
INJECTION, SOLUTION INTRAMUSCULAR; INTRAVENOUS; SUBCUTANEOUS PRN
Status: DISCONTINUED | OUTPATIENT
Start: 2024-11-05 | End: 2024-11-05 | Stop reason: HOSPADM

## 2024-11-05 RX ORDER — DILTIAZEM HYDROCHLORIDE 5 MG/ML
INJECTION INTRAVENOUS
Status: DISCONTINUED | OUTPATIENT
Start: 2024-11-05 | End: 2024-11-05 | Stop reason: SDUPTHER

## 2024-11-05 RX ORDER — SODIUM CHLORIDE 9 MG/ML
INJECTION, SOLUTION INTRAVENOUS CONTINUOUS
Status: CANCELLED | OUTPATIENT
Start: 2024-11-05 | End: 2024-11-05

## 2024-11-05 RX ORDER — SODIUM CHLORIDE 0.9 % (FLUSH) 0.9 %
5-40 SYRINGE (ML) INJECTION PRN
Status: CANCELLED | OUTPATIENT
Start: 2024-11-05

## 2024-11-05 RX ORDER — PROPOFOL 10 MG/ML
INJECTION, EMULSION INTRAVENOUS
Status: DISCONTINUED | OUTPATIENT
Start: 2024-11-05 | End: 2024-11-05 | Stop reason: SDUPTHER

## 2024-11-05 RX ORDER — ONDANSETRON 2 MG/ML
INJECTION INTRAMUSCULAR; INTRAVENOUS
Status: DISCONTINUED | OUTPATIENT
Start: 2024-11-05 | End: 2024-11-05 | Stop reason: SDUPTHER

## 2024-11-05 RX ORDER — LIDOCAINE HYDROCHLORIDE 20 MG/ML
INJECTION, SOLUTION EPIDURAL; INFILTRATION; INTRACAUDAL; PERINEURAL
Status: DISCONTINUED | OUTPATIENT
Start: 2024-11-05 | End: 2024-11-05 | Stop reason: SDUPTHER

## 2024-11-05 RX ADMIN — SODIUM CHLORIDE, SODIUM LACTATE, POTASSIUM CHLORIDE, AND CALCIUM CHLORIDE: 600; 310; 30; 20 INJECTION, SOLUTION INTRAVENOUS at 07:59

## 2024-11-05 RX ADMIN — PERFLUTREN 2 ML: 6.52 INJECTION, SUSPENSION INTRAVENOUS at 08:42

## 2024-11-05 RX ADMIN — DEXAMETHASONE SODIUM PHOSPHATE 4 MG: 4 INJECTION INTRA-ARTICULAR; INTRALESIONAL; INTRAMUSCULAR; INTRAVENOUS; SOFT TISSUE at 08:25

## 2024-11-05 RX ADMIN — ROCURONIUM BROMIDE 50 MG: 10 INJECTION, SOLUTION INTRAVENOUS at 08:09

## 2024-11-05 RX ADMIN — DILTIAZEM HYDROCHLORIDE 10 MG: 5 INJECTION, SOLUTION INTRAVENOUS at 08:53

## 2024-11-05 RX ADMIN — ESMOLOL HYDROCHLORIDE 20 MG: 10 INJECTION INTRAVENOUS at 08:17

## 2024-11-05 RX ADMIN — ROCURONIUM BROMIDE 10 MG: 10 INJECTION, SOLUTION INTRAVENOUS at 09:03

## 2024-11-05 RX ADMIN — LIDOCAINE HYDROCHLORIDE 60 MG: 20 INJECTION, SOLUTION EPIDURAL; INFILTRATION; INTRACAUDAL; PERINEURAL at 08:09

## 2024-11-05 RX ADMIN — PROPOFOL 150 MG: 10 INJECTION, EMULSION INTRAVENOUS at 08:09

## 2024-11-05 RX ADMIN — METOPROLOL TARTRATE 1 MG: 5 INJECTION INTRAVENOUS at 08:46

## 2024-11-05 RX ADMIN — PROPOFOL 50 MG: 10 INJECTION, EMULSION INTRAVENOUS at 08:13

## 2024-11-05 RX ADMIN — HEPARIN SODIUM 6000 UNITS: 1000 INJECTION, SOLUTION INTRAVENOUS; SUBCUTANEOUS at 09:00

## 2024-11-05 RX ADMIN — PHENYLEPHRINE HYDROCHLORIDE 200 MCG: 10 INJECTION INTRAVENOUS at 08:15

## 2024-11-05 RX ADMIN — CEFAZOLIN 2000 MG: 2 INJECTION, POWDER, FOR SOLUTION INTRAMUSCULAR; INTRAVENOUS at 08:25

## 2024-11-05 RX ADMIN — SUGAMMADEX 200 MG: 100 INJECTION, SOLUTION INTRAVENOUS at 09:21

## 2024-11-05 RX ADMIN — SODIUM CHLORIDE, PRESERVATIVE FREE 0.3 ML: 5 INJECTION INTRAVENOUS at 09:10

## 2024-11-05 RX ADMIN — ROCURONIUM BROMIDE 10 MG: 10 INJECTION, SOLUTION INTRAVENOUS at 08:45

## 2024-11-05 RX ADMIN — HEPARIN SODIUM 6000 UNITS: 1000 INJECTION, SOLUTION INTRAVENOUS; SUBCUTANEOUS at 08:36

## 2024-11-05 RX ADMIN — ONDANSETRON 4 MG: 2 INJECTION INTRAMUSCULAR; INTRAVENOUS at 09:20

## 2024-11-05 RX ADMIN — HEPARIN SODIUM 2000 UNITS: 1000 INJECTION, SOLUTION INTRAVENOUS; SUBCUTANEOUS at 09:10

## 2024-11-05 ASSESSMENT — COPD QUESTIONNAIRES: CAT_SEVERITY: MODERATE

## 2024-11-05 NOTE — PROGRESS NOTES
LAAO with Dr Torres  Successful deployment of Watchman device  16 fr RFV closed with Perclose device  10 fr RFV closed with Perclose device  Site covered with tegaderm and gauze  No bleeding or hematoma noted at site. Site soft   Pt to go to PACU

## 2024-11-05 NOTE — DISCHARGE SUMMARY
[facility] Discharge Summary     Patient ID:  Christophe Xavier  [id number]  74 y.o.  1950    Admit date: 11/5/2024    Discharge date:  11/05/202024    Admitting Physician: Sameer Torres MD     Discharge Physician: Graeme Mcelroy, APRESTEVAN - [facility]/Dr. Torres    Admission Diagnoses: Paroxysmal A-fib (HCC) [I48.0]    Discharge Diagnoses:   Patient Active Problem List    Diagnosis    Paroxysmal A-fib (HCC)    Aortic valve disease    CAD in native artery    Atrial fibrillation (HCC)    COPD exacerbation (HCC)    Secondary hypercoagulable state (HCC)    Central sleep apnea    Chronic respiratory failure with hypoxia    Dupuytren's contracture of right hand    Status post total knee replacement, right    Nocturnal hypoxemia    NELLY on CPAP    Localized osteoarthritis of knees, bilateral    PVD (peripheral vascular disease) ([facility])    Severe obesity    Type 2 diabetes with nephropathy ([facility])    History of coronary artery stent placement    HTN (hypertension), benign    Mixed hyperlipidemia    Increased pressure in the eye, bilateral    Chronic obstructive pulmonary disease ([facility])       Cardiology Procedures this admission:  CHRIS, Implantation of Watchman device, Echocardiogram  Consults: none    Hospital Course: Patient was seen at the office of [facility] by Dr. Torres in follow up. The patient was felt to be an appropriate candidate for Watchman device. The patient presented for procedure. CHRIS was performed directly prior to procedure that was negative for ANTWON thrombus. The patient underwent successful implantation of a 27 FLX Pro Watchman device.     The patient tolerated the procedure well and was monitored closely.   At the time of discharge the patient was up feeling well without any complaints of chest pain or shortness of breath. Patient was instructed on the importance of medication compliance. He will remain on Eliquis for at least 45 days. Once ANTWON is noted to be

## 2024-11-05 NOTE — PROGRESS NOTES
Discharge instructions given per orders, voiced good understanding of right groin care, medications & follow up care. Denies any questions

## 2024-11-05 NOTE — PROGRESS NOTES
Patient received to CPRU room # 12  Ambulatory from [facility]. Patient scheduled for LAAO today with Dr Torres. Procedure reviewed & questions answered, voiced good understanding consent obtained & placed on chart. All medications and medical history reviewed. Will prep patient per orders. Patient & family updated on plan of care.      The patient has a fraility score of 3-MANAGING WELL, based on ambulation.    Last dose of Eliquis 11/2    Pt had 40 oz of water last night.

## 2024-11-05 NOTE — ANESTHESIA PRE PROCEDURE
Department of Anesthesiology  Preprocedure Note       Name:  Christophe Xavier   Age:  74 y.o.  :  1950                                          MRN:  [id number]         Date:  2024      Surgeon: Surgeon(s):  Sameer Torres MD    Procedure: Procedure(s):  Watchman joseph closure device    Medications prior to admission:   Prior to Admission medications    Medication Sig Start Date End Date Taking? Authorizing Provider   furosemide (LASIX) 20 MG tablet Take 1 tablet by mouth daily as needed (Take for weight gain greater than 2 pounds in 24 hours or 5 pounds in 48 hours.) 10/28/24 6/25/25 Yes Baron Ribera MD   TRELEGY ELLIPTA 200-62.5-25 MCG/ACT AEPB inhaler USE 1 INHALATION BY MOUTH DAILY 10/4/24  Yes Moody Dukes MD   dilTIAZem (CARDIZEM CD) 240 MG extended release capsule Take 1 capsule by mouth daily 24  Yes Baron Ribera MD   atorvastatin (LIPITOR) 40 MG tablet Take 1 tablet by mouth every evening 24  Yes Rashel Varela, DO   enalapril (VASOTEC) 20 MG tablet Take 1 tablet by mouth daily 24 Yes Rashel Varela, DO   metFORMIN (GLUCOPHAGE) 500 MG tablet Take 1 tablet by mouth 2 times daily (with meals) 5/3/24  Yes Rashel Varela, DO   dapagliflozin (FARXIGA) 10 MG tablet Take 1 tablet by mouth every morning 24  Yes Baron Ribera MD   albuterol sulfate HFA (PROAIR HFA) 108 (90 Base) MCG/ACT inhaler Inhale 2 puffs into the lungs every 6 hours as needed for Wheezing  Patient taking differently: Inhale 2 puffs into the lungs 4 times daily 23  Yes Moody Dukes MD   OXYGEN Inhale 2 L into the lungs daily 3 liters with CPAP and 2 liters with Exertion   Yes Zach Bear MD   ELIQUIS 5 MG TABS tablet TAKE 1 TABLET BY MOUTH TWICE  DAILY 9/15/23  Yes Baron Ribera MD   latanoprost (XALATAN) 0.005 % ophthalmic solution 1 drop nightly   Yes Zach Bear MD   BiPAP Machine MISC by Does not apply route    Provider,

## 2024-11-05 NOTE — PERIOP NOTE
TRANSFER - OUT REPORT:    Verbal report given to Raisa MORALES on Christophe Xavier  being transferred to Pre/Post cathlab for routine progression of patient care       Report consisted of patient’s Situation, Background, Assessment and   Recommendations(SBAR).     Information from the following report(s) Nurse Handoff Report, MAR, and Cardiac Rhythm A-Fib  was reviewed with the receiving nurse.    Lines:   Peripheral IV 11/05/24 Left Forearm (Active)   Site Assessment Clean, dry & intact 11/05/24 0939   Line Status Capped 11/05/24 0939   Line Care Connections checked and tightened 11/05/24 0939   Phlebitis Assessment No symptoms 11/05/24 0939   Infiltration Assessment 0 11/05/24 0939   Dressing Status Clean, dry & intact 11/05/24 0939   Dressing Type Transparent 11/05/24 0939       Peripheral IV 11/05/24 Right Forearm (Active)   Site Assessment Clean, dry & intact 11/05/24 0939   Line Status Infusing 11/05/24 0939   Line Care Connections checked and tightened 11/05/24 0939   Phlebitis Assessment No symptoms 11/05/24 0939   Infiltration Assessment 0 11/05/24 0939   Dressing Status Clean, dry & intact 11/05/24 0939   Dressing Type Transparent 11/05/24 0939        Opportunity for questions and clarification was provided.      Patient transported with:   Registered Nurse      Patient and family given floor number and nurses name.  Family updated re: pt status after security code verified.

## 2024-11-05 NOTE — PROGRESS NOTES
Ambulated pt around CPRU without difficulty.  Denies pain, discomfort, or SHAH during ambulation.  No swelling or bleeding in right groin.

## 2024-11-05 NOTE — PROGRESS NOTES
Raised HOB to sitting position.  No s/s of bleeding or swelling noted in left groin site.  Denies pain or discomfort at this time.  Gave pt meal and drink, no difficulty noted while eating.

## 2024-11-05 NOTE — ANESTHESIA POSTPROCEDURE EVALUATION
Department of Anesthesiology  Postprocedure Note    Patient: Christophe Xavier  MRN: [id number]  YOB: 1950  Date of evaluation: 11/5/2024    Procedure Summary       Date: 11/05/24 Room / Location: [facility] MAIN OR  / [facility] MAIN OR    Anesthesia Start: 0755 Anesthesia Stop: 0936    Procedure: Watchman joseph closure device Diagnosis:       Paroxysmal A-fib (HCC)      (Paroxysmal atrial fibrillation)    Providers: Sameer Torres MD Responsible Provider: Eliceo Parrish MD    Anesthesia Type: general ASA Status: 3            Anesthesia Type: No value filed.    Baron Phase I: Baron Score: 10    Baron Phase II:      Anesthesia Post Evaluation    Patient location during evaluation: PACU  Patient participation: complete - patient participated  Level of consciousness: awake and alert  Airway patency: patent  Nausea & Vomiting: no nausea and no vomiting  Cardiovascular status: hemodynamically stable  Respiratory status: acceptable, nonlabored ventilation and spontaneous ventilation  Hydration status: euvolemic  Comments: /65   Pulse 91   Temp 97.2 °F (36.2 °C) (Temporal)   Resp 12   Ht 1.778 m (5' 10\")   Wt 96.2 kg (212 lb)   SpO2 96%   BMI 30.42 kg/m²     Multimodal analgesia pain management approach  Pain management: adequate and satisfactory to patient    There were no known notable events for this encounter.

## 2024-12-10 SDOH — ECONOMIC STABILITY: INCOME INSECURITY: HOW HARD IS IT FOR YOU TO PAY FOR THE VERY BASICS LIKE FOOD, HOUSING, MEDICAL CARE, AND HEATING?: NOT HARD AT ALL

## 2024-12-10 SDOH — ECONOMIC STABILITY: FOOD INSECURITY: WITHIN THE PAST 12 MONTHS, THE FOOD YOU BOUGHT JUST DIDN'T LAST AND YOU DIDN'T HAVE MONEY TO GET MORE.: NEVER TRUE

## 2024-12-10 SDOH — ECONOMIC STABILITY: FOOD INSECURITY: WITHIN THE PAST 12 MONTHS, YOU WORRIED THAT YOUR FOOD WOULD RUN OUT BEFORE YOU GOT MONEY TO BUY MORE.: NEVER TRUE

## 2024-12-10 ASSESSMENT — PATIENT HEALTH QUESTIONNAIRE - PHQ9
1. LITTLE INTEREST OR PLEASURE IN DOING THINGS: NOT AT ALL
SUM OF ALL RESPONSES TO PHQ9 QUESTIONS 1 & 2: 0
SUM OF ALL RESPONSES TO PHQ QUESTIONS 1-9: 0
SUM OF ALL RESPONSES TO PHQ9 QUESTIONS 1 & 2: 0
2. FEELING DOWN, DEPRESSED OR HOPELESS: NOT AT ALL
2. FEELING DOWN, DEPRESSED OR HOPELESS: NOT AT ALL
1. LITTLE INTEREST OR PLEASURE IN DOING THINGS: NOT AT ALL
SUM OF ALL RESPONSES TO PHQ QUESTIONS 1-9: 0

## 2024-12-13 ENCOUNTER — OFFICE VISIT (OUTPATIENT)
Dept: INTERNAL MEDICINE CLINIC | Facility: CLINIC | Age: 74
End: 2024-12-13
Payer: MEDICARE

## 2024-12-13 VITALS
WEIGHT: 211 LBS | HEART RATE: 95 BPM | HEIGHT: 70 IN | DIASTOLIC BLOOD PRESSURE: 60 MMHG | BODY MASS INDEX: 30.21 KG/M2 | OXYGEN SATURATION: 95 % | SYSTOLIC BLOOD PRESSURE: 110 MMHG

## 2024-12-13 DIAGNOSIS — J44.9 CHRONIC OBSTRUCTIVE PULMONARY DISEASE, UNSPECIFIED COPD TYPE (HCC): ICD-10-CM

## 2024-12-13 DIAGNOSIS — I48.0 PAROXYSMAL ATRIAL FIBRILLATION (HCC): Primary | ICD-10-CM

## 2024-12-13 DIAGNOSIS — I10 HTN (HYPERTENSION), BENIGN: ICD-10-CM

## 2024-12-13 DIAGNOSIS — Z85.46 HISTORY OF PROSTATE CANCER: ICD-10-CM

## 2024-12-13 DIAGNOSIS — E11.21 TYPE 2 DIABETES WITH NEPHROPATHY (HCC): ICD-10-CM

## 2024-12-13 DIAGNOSIS — E78.2 MIXED HYPERLIPIDEMIA: ICD-10-CM

## 2024-12-13 PROBLEM — J44.1 COPD EXACERBATION (HCC): Status: RESOLVED | Noted: 2022-11-18 | Resolved: 2024-12-13

## 2024-12-13 PROBLEM — I48.91 ATRIAL FIBRILLATION (HCC): Status: RESOLVED | Noted: 2022-11-18 | Resolved: 2024-12-13

## 2024-12-13 LAB
EST. AVERAGE GLUCOSE BLD GHB EST-MCNC: 119 MG/DL
HBA1C MFR BLD: 5.8 % (ref 0–5.6)
TSH W FREE THYROID IF ABNORMAL: 2.13 UIU/ML (ref 0.27–4.2)

## 2024-12-13 PROCEDURE — 1160F RVW MEDS BY RX/DR IN RCRD: CPT | Performed by: FAMILY MEDICINE

## 2024-12-13 PROCEDURE — 3074F SYST BP LT 130 MM HG: CPT | Performed by: FAMILY MEDICINE

## 2024-12-13 PROCEDURE — 1036F TOBACCO NON-USER: CPT | Performed by: FAMILY MEDICINE

## 2024-12-13 PROCEDURE — 3044F HG A1C LEVEL LT 7.0%: CPT | Performed by: FAMILY MEDICINE

## 2024-12-13 PROCEDURE — 3023F SPIROM DOC REV: CPT | Performed by: FAMILY MEDICINE

## 2024-12-13 PROCEDURE — G8427 DOCREV CUR MEDS BY ELIG CLIN: HCPCS | Performed by: FAMILY MEDICINE

## 2024-12-13 PROCEDURE — G8482 FLU IMMUNIZE ORDER/ADMIN: HCPCS | Performed by: FAMILY MEDICINE

## 2024-12-13 PROCEDURE — 3078F DIAST BP <80 MM HG: CPT | Performed by: FAMILY MEDICINE

## 2024-12-13 PROCEDURE — 1159F MED LIST DOCD IN RCRD: CPT | Performed by: FAMILY MEDICINE

## 2024-12-13 PROCEDURE — 99214 OFFICE O/P EST MOD 30 MIN: CPT | Performed by: FAMILY MEDICINE

## 2024-12-13 PROCEDURE — G8417 CALC BMI ABV UP PARAM F/U: HCPCS | Performed by: FAMILY MEDICINE

## 2024-12-13 PROCEDURE — 1123F ACP DISCUSS/DSCN MKR DOCD: CPT | Performed by: FAMILY MEDICINE

## 2024-12-13 RX ORDER — ATORVASTATIN CALCIUM 40 MG/1
40 TABLET, FILM COATED ORAL EVERY EVENING
Qty: 100 TABLET | Refills: 2 | Status: SHIPPED | OUTPATIENT
Start: 2024-12-13

## 2024-12-13 NOTE — PROGRESS NOTES
diabetes with nephropathy (HCC)  -     Hemoglobin A1C; Future  3. Mixed hyperlipidemia  -     atorvastatin (LIPITOR) 40 MG tablet; Take 1 tablet by mouth every evening, Disp-100 tablet, R-2Requesting 1 year supplyNormal  4. Chronic obstructive pulmonary disease, unspecified COPD type (HCC)  5. HTN (hypertension), benign  6. History of prostate cancer  -     PSA, ultrasensitive; Future    History of Present Illness  He is under cardiology care and has a Watchman device. He continues on Eliquis but wants to discontinue due to bruising and bleeding risk. No adverse events reported.    He has not monitored blood glucose levels recently and needs an A1c test.  No symptoms of hypoglycemia    HTN: Home BP Monitoring: yes. taking medications as instructed, no medication side effects noted.  He denies chest pain, palpitations, exertional pain or pressure.    He lost over 25 pounds since 2024 on a ketogenic diet, improving respiratory function. He aims to lose 10 more pounds.     No significant urinary symptoms.    ROS negative except as noted above today.    Social History     Tobacco Use   • Smoking status: Former     Current packs/day: 0.00     Average packs/day: 1 pack/day for 40.0 years (40.0 ttl pk-yrs)     Types: Cigarettes     Start date:      Quit date:      Years since quittin.9   • Smokeless tobacco: Never   Vaping Use   • Vaping status: Never Used   Substance Use Topics   • Alcohol use: Yes     Alcohol/week: 2.0 standard drinks of alcohol     Types: 2 Shots of liquor per week     Comment: 2 drinks per night   • Drug use: Never     Vitals:    24 0940   BP: 110/60   Site: Left Upper Arm   Position: Sitting   Pulse: 95   SpO2: 95%   Weight: 95.7 kg (211 lb)   Height: 1.778 m (5' 10\")     Body mass index is 30.28 kg/m².  Physical Exam  Constitutional:       General: He is not in acute distress.     Appearance: He is not ill-appearing.   HENT:      Head: Normocephalic.   Cardiovascular:

## 2024-12-14 LAB — PSA SERPL DL<=0.01 NG/ML-MCNC: 1.13 NG/ML (ref 0–4)

## 2024-12-18 NOTE — PROGRESS NOTES
Called to pre-assess for CHRIS , Scheduled 0830. No answer & message left to arrive at 0730 and not to eat or drink after midnight.

## 2024-12-19 ENCOUNTER — HOSPITAL ENCOUNTER (OUTPATIENT)
Dept: CARDIAC CATH/INVASIVE PROCEDURES | Age: 74
Discharge: HOME | End: 2024-12-19
Attending: INTERNAL MEDICINE | Admitting: INTERNAL MEDICINE
Payer: MEDICARE

## 2024-12-19 VITALS
DIASTOLIC BLOOD PRESSURE: 88 MMHG | RESPIRATION RATE: 21 BRPM | SYSTOLIC BLOOD PRESSURE: 118 MMHG | OXYGEN SATURATION: 92 % | HEART RATE: 103 BPM

## 2024-12-19 DIAGNOSIS — I48.0 PAROXYSMAL A-FIB (HCC): Primary | ICD-10-CM

## 2024-12-19 LAB
ANION GAP SERPL CALC-SCNC: 11 MMOL/L (ref 7–16)
BASOPHILS # BLD: 0.1 K/UL (ref 0–0.2)
BASOPHILS NFR BLD: 1 % (ref 0–2)
BUN SERPL-MCNC: 14 MG/DL (ref 8–23)
CALCIUM SERPL-MCNC: 9.8 MG/DL (ref 8.8–10.2)
CHLORIDE SERPL-SCNC: 109 MMOL/L (ref 98–107)
CO2 SERPL-SCNC: 23 MMOL/L (ref 20–29)
CREAT SERPL-MCNC: 1.06 MG/DL (ref 0.8–1.3)
DIFFERENTIAL METHOD BLD: ABNORMAL
ECHO AV MEAN GRADIENT: 11 MMHG
ECHO AV MEAN VELOCITY: 1.6 M/S
ECHO AV PEAK GRADIENT: 18 MMHG
ECHO AV PEAK VELOCITY: 2.1 M/S
ECHO AV VELOCITY RATIO: 0.38
ECHO AV VTI: 42.4 CM
ECHO LVOT AV VTI INDEX: 0.38
ECHO LVOT MEAN GRADIENT: 2 MMHG
ECHO LVOT PEAK GRADIENT: 3 MMHG
ECHO LVOT PEAK VELOCITY: 0.8 M/S
ECHO LVOT VTI: 15.9 CM
EKG DIAGNOSIS: NORMAL
EKG Q-T INTERVAL: 352 MS
EKG QRS DURATION: 94 MS
EKG QTC CALCULATION (BAZETT): 447 MS
EKG R AXIS: 60 DEGREES
EKG T AXIS: 32 DEGREES
EKG VENTRICULAR RATE: 97 BPM
EOSINOPHIL # BLD: 0.4 K/UL (ref 0–0.8)
EOSINOPHIL NFR BLD: 8 % (ref 0.5–7.8)
ERYTHROCYTE [DISTWIDTH] IN BLOOD BY AUTOMATED COUNT: 14.5 % (ref 11.9–14.6)
GLUCOSE SERPL-MCNC: 103 MG/DL (ref 70–99)
HCT VFR BLD AUTO: 44.4 % (ref 41.1–50.3)
HGB BLD-MCNC: 14.3 G/DL (ref 13.6–17.2)
IMM GRANULOCYTES # BLD AUTO: 0 K/UL (ref 0–0.5)
IMM GRANULOCYTES NFR BLD AUTO: 0 % (ref 0–5)
LYMPHOCYTES # BLD: 1.2 K/UL (ref 0.5–4.6)
LYMPHOCYTES NFR BLD: 21 % (ref 13–44)
MCH RBC QN AUTO: 32.4 PG (ref 26.1–32.9)
MCHC RBC AUTO-ENTMCNC: 32.2 G/DL (ref 31.4–35)
MCV RBC AUTO: 100.7 FL (ref 82–102)
MONOCYTES # BLD: 0.5 K/UL (ref 0.1–1.3)
MONOCYTES NFR BLD: 8 % (ref 4–12)
NEUTS SEG # BLD: 3.5 K/UL (ref 1.7–8.2)
NEUTS SEG NFR BLD: 62 % (ref 43–78)
NRBC # BLD: 0 K/UL (ref 0–0.2)
PLATELET # BLD AUTO: 206 K/UL (ref 150–450)
PMV BLD AUTO: 10 FL (ref 9.4–12.3)
POTASSIUM SERPL-SCNC: 4.2 MMOL/L (ref 3.5–5.1)
RBC # BLD AUTO: 4.41 M/UL (ref 4.23–5.6)
SODIUM SERPL-SCNC: 143 MMOL/L (ref 136–145)
WBC # BLD AUTO: 5.6 K/UL (ref 4.3–11.1)

## 2024-12-19 PROCEDURE — 85025 COMPLETE CBC W/AUTO DIFF WBC: CPT

## 2024-12-19 PROCEDURE — 6370000000 HC RX 637 (ALT 250 FOR IP): Performed by: INTERNAL MEDICINE

## 2024-12-19 PROCEDURE — 6360000002 HC RX W HCPCS: Performed by: INTERNAL MEDICINE

## 2024-12-19 PROCEDURE — 93312 ECHO TRANSESOPHAGEAL: CPT

## 2024-12-19 PROCEDURE — 93010 ELECTROCARDIOGRAM REPORT: CPT | Performed by: INTERNAL MEDICINE

## 2024-12-19 PROCEDURE — 99152 MOD SED SAME PHYS/QHP 5/>YRS: CPT | Performed by: INTERNAL MEDICINE

## 2024-12-19 PROCEDURE — 93005 ELECTROCARDIOGRAM TRACING: CPT | Performed by: INTERNAL MEDICINE

## 2024-12-19 PROCEDURE — 99152 MOD SED SAME PHYS/QHP 5/>YRS: CPT

## 2024-12-19 PROCEDURE — 80048 BASIC METABOLIC PNL TOTAL CA: CPT

## 2024-12-19 RX ORDER — ASPIRIN 81 MG/1
81 TABLET ORAL DAILY
Qty: 90 TABLET | Refills: 1 | Status: SHIPPED | OUTPATIENT
Start: 2024-12-19

## 2024-12-19 RX ORDER — LIDOCAINE HYDROCHLORIDE 20 MG/ML
SOLUTION OROPHARYNGEAL PRN
Status: COMPLETED | OUTPATIENT
Start: 2024-12-19 | End: 2024-12-19

## 2024-12-19 RX ORDER — MIDAZOLAM HYDROCHLORIDE 1 MG/ML
INJECTION, SOLUTION INTRAMUSCULAR; INTRAVENOUS PRN
Status: COMPLETED | OUTPATIENT
Start: 2024-12-19 | End: 2024-12-19

## 2024-12-19 RX ORDER — CLOPIDOGREL BISULFATE 75 MG/1
75 TABLET ORAL DAILY
Qty: 90 TABLET | Refills: 1 | Status: SHIPPED | OUTPATIENT
Start: 2024-12-19

## 2024-12-19 RX ORDER — FENTANYL CITRATE 50 UG/ML
INJECTION, SOLUTION INTRAMUSCULAR; INTRAVENOUS PRN
Status: COMPLETED | OUTPATIENT
Start: 2024-12-19 | End: 2024-12-19

## 2024-12-19 RX ADMIN — MIDAZOLAM 1 MG: 1 INJECTION INTRAMUSCULAR; INTRAVENOUS at 09:01

## 2024-12-19 RX ADMIN — LIDOCAINE HYDROCHLORIDE 15 ML: 20 SOLUTION ORAL at 08:45

## 2024-12-19 RX ADMIN — FENTANYL CITRATE 50 MCG: 50 INJECTION, SOLUTION INTRAMUSCULAR; INTRAVENOUS at 08:57

## 2024-12-19 RX ADMIN — MIDAZOLAM 2 MG: 1 INJECTION INTRAMUSCULAR; INTRAVENOUS at 08:57

## 2024-12-19 NOTE — PROGRESS NOTES
Discharge instructions reviewed with patient and wife. Prescriptions given for ASA 81 & Plavix. Med info sheets provided for all new medications. Opportunity for questions provided. Patient and wife voiced understanding of all discharge instructions.

## 2024-12-19 NOTE — H&P
[facility] Cardiology History & Physical      Date of  Admission: 12/19/2024  7:23 AM     CC: Post watchman CHRIS    HPI:  Christophe Xavier is a 74 y.o. male     74 y.o. male presents today for post watchman CHRIS.  Post watchman implantation, no new issues.     Interval Hx:   he was hospitalized for viral infection in November 2022.  He had a hospital course complicated by atrial fibrillation.  Heart rates are in the low 100 bpm range in December 2022.  He underwent cardioversion in December 2022 with successful restoration of sinus rhythm.  He is on diltiazem therapy.  Additionally since  last appointment the patient has purchased an Apple Watch and has detected no episodes of atrial fibrillation over the past several months.  He did have symptomatic benefit with cardioversion and feels that his breathing as well as stamina are better.       The patient presented for consultation 09/10/2018.  He presented for follow-up 4/8/2019.  The patient is formerly from [place], [place].  He had a history of coronary artery disease with cardiac catheterization in 1994 with PCI to RCA.  Subsequent  cardiac catheterization done in 2002 with angioplasty of right coronary artery, following cardiac catheterization in 2016 with nonobstructive coronary disease.       Past Medical History:   Diagnosis Date    COPD (chronic obstructive pulmonary disease) (HCC)     Diabetes mellitus, type II (HCC)     Prostate cancer (HCC) 2003      Past Surgical History:   Procedure Laterality Date    ABDOMINAL HERNIA REPAIR      25 years ago    EP DEVICE PROCEDURE N/A 11/5/2024    Watchman joseph closure device performed by Sameer Torres MD at [facility] MAIN OR    HAND SURGERY      screw in hand    JOINT REPLACEMENT Bilateral     knee    PROSTATECTOMY  2003       Allergies   Allergen Reactions    Shrimp (Diagnostic) Hives      Social History     Socioeconomic History    Marital status:      Spouse name: Not on file    Number of

## 2024-12-19 NOTE — DISCHARGE INSTRUCTIONS
**YOU CAN EAT/ DRINK TODAY @ 10:45AM. START WITH WATER/ ICE FIRST. IF THAT GOES DOWN EASY, THEN YOU CAN PROCEED WITH NORMAL FOOD.   -----------------------------------------------------------------------------------------------------------  Plavix stop date is 5/05/2025.    Transesophageal Echocardiogram: What to Expect at Home  Your Recovery  A transesophageal echocardiogram is a test to help your doctor look at the inside of your heart. A small device called a transducer directs sound waves toward your heart. The sound waves make a picture of the heart's valves and chambers.  Before the test, your throat was sprayed with medicine to numb it. Your throat may be sore for a few days.  You may have had a sedative to help you relax. You may be unsteady after having sedation. It can take a few hours for the medicine's effects to wear off. Common side effects include nausea, vomiting, and feeling sleepy or tired.  This care sheet gives you a general idea about how long it will take for you to recover. But each person recovers at a different pace. Follow the steps below to feel better as quickly as possible.  How can you care for yourself at home?  Activity    If a sedative was used, your doctor will tell you when it is safe for you to do your normal activities.     For your safety, do not drive or operate any machinery that could be dangerous. Wait until the medicine wears off and you can think clearly and react easily.   Diet    Do not eat or drink until the numbness in your throat wears off.     When the numbness is gone, you can eat your normal diet.     Throat lozenges and warm saltwater gargles can help relieve throat soreness. Throat lozenges can be used by people age 4 or older. And most people can gargle at age 8 and older.     Do not drink alcohol for 24 hours.   Follow-up care is a key part of your treatment and safety. Be sure to make and go to all appointments, and call your doctor if you are having problems.

## 2024-12-19 NOTE — PROGRESS NOTES
CHRIS by Dr Narayan  II ASA II Mallampati  3mg versed  50mcg fentanyl  Viscous Solution given at 0845  Pt tolerated well.

## 2024-12-19 NOTE — NURSE NAVIGATOR
Successful 45 day post Watchman CHRIS this am.  No thrombus or significant periprosthetic device leak noted.  See CHRIS report for additional information.  Patient meets criteria to stop Eliquis.  Aspirin 81 mg and Plavix 75 mg daily ordered per Dr. Narayan/Joyce protocol.  Upcoming Plavix stop date is 5/05/2025.  Patient has Watchman coordinator contact ([phone]) information for questions or concerns.      THE   BARTHEL INDEX   Activity Score  FEEDING  0 = unable  5 = needs help cutting, spreading butter, etc., or requires modified diet  10 = independent   10  BATHING  0 = dependent  5 = independent (or in shower)  5  GROOMING  0 = needs to help with personal care  5 = independent face/hair/teeth/shaving (implements provided)  5  DRESSING  0 = dependent  5 = needs help but can do about half unaided  10 = independent (including buttons, zips, laces, etc.)   10  BOWELS  0 = incontinent (or needs to be given enemas)  5 = occasional accident  10 = continent  10  BLADDER  0 = incontinent, or catheterized and unable to manage alone  5 = occasional accident  10 = continent  10  TOILET USE  0 = dependent  5 = needs some help, but can do something alone  10 = independent (on and off, dressing, wiping)  10  TRANSFERS (BED TO CHAIR AND BACK)  0 = unable, no sitting balance  5 = major help (one or two people, physical), can sit  10 = minor help (verbal or physical)  15 = independent   15  MOBILITY (ON LEVEL SURFACES)  0 = immobile or < 50 yards  5 = wheelchair independent, including corners, > 50 yards  10 = walks with help of one person (verbal or physical) > 50 yards  15 = independent (but may use any aid; for example, stick) > 50 yards   15  STAIRS  0 = unable  5 = needs help (verbal, physical, carrying aid)  10 = independent  10  TOTAL (0-100): 100

## 2024-12-19 NOTE — PROGRESS NOTES
Patient received to CPRU room # 16  Ambulatory from [facility]. Patient scheduled for CHRIS today with Dr Narayan. Procedure reviewed & questions answered, voiced good understanding consent obtained & placed on chart. All medications and medical history reviewed. Will prep patient per orders. Patient & family updated on plan of care.      The patient has a fraility score of 3-MANAGING WELL, based on ambulation.

## 2024-12-29 ENCOUNTER — HOSPITAL ENCOUNTER (EMERGENCY)
Age: 74
Discharge: HOME | End: 2024-12-29
Payer: MEDICARE

## 2024-12-29 VITALS
SYSTOLIC BLOOD PRESSURE: 115 MMHG | BODY MASS INDEX: 29.63 KG/M2 | HEIGHT: 70 IN | WEIGHT: 207 LBS | TEMPERATURE: 98.1 F | RESPIRATION RATE: 18 BRPM | OXYGEN SATURATION: 93 % | HEART RATE: 90 BPM | DIASTOLIC BLOOD PRESSURE: 88 MMHG

## 2024-12-29 DIAGNOSIS — T78.40XA ALLERGIC REACTION, INITIAL ENCOUNTER: Primary | ICD-10-CM

## 2024-12-29 PROCEDURE — 6370000000 HC RX 637 (ALT 250 FOR IP): Performed by: PHYSICIAN ASSISTANT

## 2024-12-29 PROCEDURE — 96372 THER/PROPH/DIAG INJ SC/IM: CPT

## 2024-12-29 PROCEDURE — 6360000002 HC RX W HCPCS: Performed by: PHYSICIAN ASSISTANT

## 2024-12-29 PROCEDURE — 99284 EMERGENCY DEPT VISIT MOD MDM: CPT

## 2024-12-29 RX ORDER — FAMOTIDINE 20 MG/1
20 TABLET, FILM COATED ORAL
Status: COMPLETED | OUTPATIENT
Start: 2024-12-29 | End: 2024-12-29

## 2024-12-29 RX ORDER — DEXAMETHASONE SODIUM PHOSPHATE 10 MG/ML
10 INJECTION INTRAMUSCULAR; INTRAVENOUS ONCE
Status: COMPLETED | OUTPATIENT
Start: 2024-12-29 | End: 2024-12-29

## 2024-12-29 RX ADMIN — DEXAMETHASONE SODIUM PHOSPHATE 10 MG: 10 INJECTION INTRAMUSCULAR; INTRAVENOUS at 10:05

## 2024-12-29 RX ADMIN — FAMOTIDINE 20 MG: 20 TABLET, FILM COATED ORAL at 10:04

## 2024-12-29 ASSESSMENT — LIFESTYLE VARIABLES
HOW MANY STANDARD DRINKS CONTAINING ALCOHOL DO YOU HAVE ON A TYPICAL DAY: 1 OR 2
HOW OFTEN DO YOU HAVE A DRINK CONTAINING ALCOHOL: 4 OR MORE TIMES A WEEK

## 2024-12-29 ASSESSMENT — PAIN - FUNCTIONAL ASSESSMENT: PAIN_FUNCTIONAL_ASSESSMENT: NONE - DENIES PAIN

## 2024-12-29 NOTE — ED PROVIDER NOTES
10 mg (10 mg IntraMUSCular Given 24 1005)   famotidine (PEPCID) tablet 20 mg (20 mg Oral Given 24 1004)       Discharge Medication List as of 2024 11:08 AM           Past Medical History:   Diagnosis Date    COPD (chronic obstructive pulmonary disease) (HCC)     Diabetes mellitus, type II (HCC)     Prostate cancer (HCC)         Past Surgical History:   Procedure Laterality Date    ABDOMINAL HERNIA REPAIR      25 years ago    EP DEVICE PROCEDURE N/A 2024    Watchman joseph closure device performed by Sameer Torres MD at [facility] MAIN OR    HAND SURGERY      screw in hand    JOINT REPLACEMENT Bilateral     knee    PROSTATECTOMY          Social History     Socioeconomic History    Marital status:    Tobacco Use    Smoking status: Former     Current packs/day: 0.00     Average packs/day: 1 pack/day for 40.0 years (40.0 ttl pk-yrs)     Types: Cigarettes     Start date:      Quit date:      Years since quittin.0    Smokeless tobacco: Never   Vaping Use    Vaping status: Never Used   Substance and Sexual Activity    Alcohol use: Yes     Alcohol/week: 2.0 standard drinks of alcohol     Types: 2 Shots of liquor per week     Comment: 2 drinks per night    Drug use: Never    Sexual activity: Not Currently     Social Determinants of Health     Financial Resource Strain: Low Risk  (12/10/2024)    Overall Financial Resource Strain (CARDIA)     Difficulty of Paying Living Expenses: Not hard at all   Food Insecurity: No Food Insecurity (12/10/2024)    Hunger Vital Sign     Worried About Running Out of Food in the Last Year: Never true     Ran Out of Food in the Last Year: Never true   Transportation Needs: Unknown (12/10/2024)    PRAPARE - Transportation     Lack of Transportation (Non-Medical): No   Physical Activity: Insufficiently Active (2024)    Exercise Vital Sign     Days of Exercise per Week: 3 days     Minutes of Exercise per Session: 30 min   Social Connections:

## 2024-12-29 NOTE — ED TRIAGE NOTES
Patient arrived with a  complaint of allergic reaction from a salami last night. \"Only lip was swollen\" patient states, \" I am not short of breath or having difficulty breathing.\" Patient denies throat closing or itching. Patient took benadryl yesterday and this morning. The swelling isn't improving. No irritation or agitation to the area. Patient reports of feeling well.

## 2024-12-31 ENCOUNTER — OFFICE VISIT (OUTPATIENT)
Dept: INTERNAL MEDICINE CLINIC | Facility: CLINIC | Age: 74
End: 2024-12-31
Payer: MEDICARE

## 2024-12-31 VITALS
TEMPERATURE: 97.5 F | HEART RATE: 91 BPM | HEIGHT: 70 IN | WEIGHT: 213 LBS | BODY MASS INDEX: 30.49 KG/M2 | OXYGEN SATURATION: 94 % | SYSTOLIC BLOOD PRESSURE: 118 MMHG | DIASTOLIC BLOOD PRESSURE: 60 MMHG

## 2024-12-31 DIAGNOSIS — T78.40XD ALLERGIC REACTION, SUBSEQUENT ENCOUNTER: ICD-10-CM

## 2024-12-31 DIAGNOSIS — T78.3XXD ANGIOEDEMA OF LIPS, SUBSEQUENT ENCOUNTER: Primary | ICD-10-CM

## 2024-12-31 PROCEDURE — 1159F MED LIST DOCD IN RCRD: CPT | Performed by: NURSE PRACTITIONER

## 2024-12-31 PROCEDURE — G8482 FLU IMMUNIZE ORDER/ADMIN: HCPCS | Performed by: NURSE PRACTITIONER

## 2024-12-31 PROCEDURE — 3074F SYST BP LT 130 MM HG: CPT | Performed by: NURSE PRACTITIONER

## 2024-12-31 PROCEDURE — 3078F DIAST BP <80 MM HG: CPT | Performed by: NURSE PRACTITIONER

## 2024-12-31 PROCEDURE — 99214 OFFICE O/P EST MOD 30 MIN: CPT | Performed by: NURSE PRACTITIONER

## 2024-12-31 PROCEDURE — 1123F ACP DISCUSS/DSCN MKR DOCD: CPT | Performed by: NURSE PRACTITIONER

## 2024-12-31 PROCEDURE — G8417 CALC BMI ABV UP PARAM F/U: HCPCS | Performed by: NURSE PRACTITIONER

## 2024-12-31 PROCEDURE — G8427 DOCREV CUR MEDS BY ELIG CLIN: HCPCS | Performed by: NURSE PRACTITIONER

## 2024-12-31 PROCEDURE — 1036F TOBACCO NON-USER: CPT | Performed by: NURSE PRACTITIONER

## 2024-12-31 RX ORDER — PREDNISONE 10 MG/1
TABLET ORAL
Qty: 1 EACH | Refills: 0 | Status: SHIPPED | OUTPATIENT
Start: 2024-12-31

## 2024-12-31 RX ORDER — EPINEPHRINE 0.3 MG/.3ML
INJECTION SUBCUTANEOUS
Qty: 1 EACH | Refills: 1 | Status: SHIPPED | OUTPATIENT
Start: 2024-12-31

## 2025-01-11 DIAGNOSIS — I25.10 CAD IN NATIVE ARTERY: Primary | ICD-10-CM

## 2025-01-11 DIAGNOSIS — E11.21 TYPE 2 DIABETES WITH NEPHROPATHY (HCC): ICD-10-CM

## 2025-01-13 RX ORDER — DAPAGLIFLOZIN 10 MG/1
10 TABLET, FILM COATED ORAL EVERY MORNING
Qty: 90 TABLET | Refills: 3 | Status: SHIPPED | OUTPATIENT
Start: 2025-01-13

## 2025-01-13 NOTE — TELEPHONE ENCOUNTER
Requested Prescriptions     Pending Prescriptions Disp Refills    dapagliflozin (FARXIGA) 10 MG tablet 90 tablet 3     Sig: Take 1 tablet by mouth every morning

## 2025-01-14 PROBLEM — Z95.818 PRESENCE OF WATCHMAN LEFT ATRIAL APPENDAGE CLOSURE DEVICE: Status: ACTIVE | Noted: 2025-01-14

## 2025-01-14 ASSESSMENT — ENCOUNTER SYMPTOMS: SHORTNESS OF BREATH: 0

## 2025-01-15 ENCOUNTER — OFFICE VISIT (OUTPATIENT)
Age: 75
End: 2025-01-15
Payer: MEDICARE

## 2025-01-15 ENCOUNTER — PATIENT MESSAGE (OUTPATIENT)
Age: 75
End: 2025-01-15

## 2025-01-15 VITALS
HEIGHT: 70 IN | BODY MASS INDEX: 30.24 KG/M2 | WEIGHT: 211.2 LBS | SYSTOLIC BLOOD PRESSURE: 110 MMHG | DIASTOLIC BLOOD PRESSURE: 60 MMHG | HEART RATE: 72 BPM

## 2025-01-15 DIAGNOSIS — I48.11 LONGSTANDING PERSISTENT ATRIAL FIBRILLATION (HCC): Primary | ICD-10-CM

## 2025-01-15 DIAGNOSIS — Z95.818 PRESENCE OF WATCHMAN LEFT ATRIAL APPENDAGE CLOSURE DEVICE: ICD-10-CM

## 2025-01-15 DIAGNOSIS — I10 HTN (HYPERTENSION), BENIGN: ICD-10-CM

## 2025-01-15 DIAGNOSIS — I25.10 CAD IN NATIVE ARTERY: ICD-10-CM

## 2025-01-15 PROCEDURE — G8417 CALC BMI ABV UP PARAM F/U: HCPCS | Performed by: INTERNAL MEDICINE

## 2025-01-15 PROCEDURE — 1036F TOBACCO NON-USER: CPT | Performed by: INTERNAL MEDICINE

## 2025-01-15 PROCEDURE — 3074F SYST BP LT 130 MM HG: CPT | Performed by: INTERNAL MEDICINE

## 2025-01-15 PROCEDURE — 1126F AMNT PAIN NOTED NONE PRSNT: CPT | Performed by: INTERNAL MEDICINE

## 2025-01-15 PROCEDURE — G8427 DOCREV CUR MEDS BY ELIG CLIN: HCPCS | Performed by: INTERNAL MEDICINE

## 2025-01-15 PROCEDURE — 1123F ACP DISCUSS/DSCN MKR DOCD: CPT | Performed by: INTERNAL MEDICINE

## 2025-01-15 PROCEDURE — 1159F MED LIST DOCD IN RCRD: CPT | Performed by: INTERNAL MEDICINE

## 2025-01-15 PROCEDURE — 99213 OFFICE O/P EST LOW 20 MIN: CPT | Performed by: INTERNAL MEDICINE

## 2025-01-15 PROCEDURE — 3078F DIAST BP <80 MM HG: CPT | Performed by: INTERNAL MEDICINE

## 2025-01-15 NOTE — PROGRESS NOTES
Physical Exam  Constitutional:       General: He is not in acute distress.  Neck:      Vascular: No carotid bruit.   Cardiovascular:      Rate and Rhythm: Normal rate. Rhythm irregular.   Pulmonary:      Effort: No respiratory distress.      Breath sounds: Normal breath sounds.   Abdominal:      General: Bowel sounds are normal.      Palpations: Abdomen is soft.   Musculoskeletal:         General: No swelling.   Skin:     General: Skin is warm and dry.   Neurological:      General: No focal deficit present.   Psychiatric:         Mood and Affect: Mood normal.         Medical problems and test results were reviewed with the patient today.       Lab Results   Component Value Date    WBC 5.6 12/19/2024    HGB 14.3 12/19/2024    HCT 44.4 12/19/2024    .7 12/19/2024     12/19/2024       Lab Results   Component Value Date/Time     12/19/2024 07:36 AM    K 4.2 12/19/2024 07:36 AM     12/19/2024 07:36 AM    CO2 23 12/19/2024 07:36 AM    BUN 14 12/19/2024 07:36 AM    CREATININE 1.06 12/19/2024 07:36 AM    GLUCOSE 103 12/19/2024 07:36 AM    CALCIUM 9.8 12/19/2024 07:36 AM        Lab Results   Component Value Date    CHOL 111 06/10/2024     Lab Results   Component Value Date    TRIG 143 06/10/2024     Lab Results   Component Value Date    HDL 45 06/10/2024     No components found for: \"LDLCHOLESTEROL\", \"LDLCALC\"  Lab Results   Component Value Date    VLDL 29 (H) 06/10/2024     Lab Results   Component Value Date    CHOLHDLRATIO 2.5 06/10/2024        Data from outside records/labs from outside providers have been reviewed and summarized as noted in the HPI, past history and data review sections of this note       ASSESSMENT and PLAN    Diagnoses and all orders for this visit:    Paroxysmal A-fib (HCC)    Presence of Watchman left atrial appendage closure device    HTN (hypertension), benign        Overall Impression    1. Persistent A-fib (HCC)  Rate controlled with Cardizem.   Continue to

## 2025-01-30 ENCOUNTER — OFFICE VISIT (OUTPATIENT)
Age: 75
End: 2025-01-30
Payer: MEDICARE

## 2025-01-30 VITALS
DIASTOLIC BLOOD PRESSURE: 80 MMHG | SYSTOLIC BLOOD PRESSURE: 110 MMHG | WEIGHT: 211.6 LBS | BODY MASS INDEX: 30.29 KG/M2 | HEIGHT: 70 IN | HEART RATE: 70 BPM

## 2025-01-30 DIAGNOSIS — I48.0 PAROXYSMAL A-FIB (HCC): ICD-10-CM

## 2025-01-30 DIAGNOSIS — I10 HTN (HYPERTENSION), BENIGN: ICD-10-CM

## 2025-01-30 DIAGNOSIS — I25.10 ASCVD (ARTERIOSCLEROTIC CARDIOVASCULAR DISEASE): ICD-10-CM

## 2025-01-30 DIAGNOSIS — E11.21 TYPE 2 DIABETES WITH NEPHROPATHY (HCC): ICD-10-CM

## 2025-01-30 DIAGNOSIS — Z95.818 PRESENCE OF WATCHMAN LEFT ATRIAL APPENDAGE CLOSURE DEVICE: Primary | ICD-10-CM

## 2025-01-30 PROCEDURE — G8428 CUR MEDS NOT DOCUMENT: HCPCS | Performed by: INTERNAL MEDICINE

## 2025-01-30 PROCEDURE — 3074F SYST BP LT 130 MM HG: CPT | Performed by: INTERNAL MEDICINE

## 2025-01-30 PROCEDURE — 1126F AMNT PAIN NOTED NONE PRSNT: CPT | Performed by: INTERNAL MEDICINE

## 2025-01-30 PROCEDURE — 1123F ACP DISCUSS/DSCN MKR DOCD: CPT | Performed by: INTERNAL MEDICINE

## 2025-01-30 PROCEDURE — 3079F DIAST BP 80-89 MM HG: CPT | Performed by: INTERNAL MEDICINE

## 2025-01-30 PROCEDURE — G8417 CALC BMI ABV UP PARAM F/U: HCPCS | Performed by: INTERNAL MEDICINE

## 2025-01-30 PROCEDURE — 1036F TOBACCO NON-USER: CPT | Performed by: INTERNAL MEDICINE

## 2025-01-30 PROCEDURE — 99214 OFFICE O/P EST MOD 30 MIN: CPT | Performed by: INTERNAL MEDICINE

## 2025-01-30 NOTE — PROGRESS NOTES
[address], [address]  [place] [address]  PHONE: [phone]    SUBJECTIVE:   Christophe Xavier is a 75 y.o. male 1950   seen for a follow up visit regarding the following:     Chief Complaint   Patient presents with    Atrial Fibrillation         History of Present Illness  The patient is a 75-year-old male with a medical history significant for atrial fibrillation and chronic obstructive pulmonary disease (COPD), currently managed with sodium-glucose hh-mezjmqmolup-2 (SGLT2) inhibitors and as-needed diuretic therapy.    The patient reports awareness of elevated heart rates detected by his Apple Watch. A review of historical data from the device indicates alerts for atrial fibrillation, which appear as sinus rhythm with premature atrial contractions (PACs). Despite these findings, the patient reports feeling generally well and does not experience any associated symptoms during these episodes of atrial fibrillation as detected by the Apple Watch.    The patient consistently uses compression hosiery and believes it effectively manages his symptoms.    MEDICATIONS  Current: diltiazem, Farxiga, atorvastatin, Plavix, aspirin        Interval history:  he was hospitalized for viral infection in November 2022.  He had a hospital course complicated by atrial fibrillation.  Heart rates are in the low 100 bpm range in December 2022.  He underwent cardioversion in December 2022 with successful restoration of sinus rhythm.  He is on diltiazem therapy.  Additionally since her last appointment the patient has purchased an Apple Watch and has detected no episodes of atrial fibrillation over the past several months.  He did have symptomatic benefit with cardioversion and feels that his breathing as well as stamina are better.       The patient presented for consultation 09/10/2018.  He presented for follow-up 4/8/2019.  The patient is formerly from [place], [place].

## 2025-01-31 ENCOUNTER — OFFICE VISIT (OUTPATIENT)
Dept: UROLOGY | Age: 75
End: 2025-01-31

## 2025-01-31 DIAGNOSIS — Z85.46 HISTORY OF PROSTATE CANCER: Primary | ICD-10-CM

## 2025-01-31 DIAGNOSIS — Z85.46 HISTORY OF PROSTATE CANCER: ICD-10-CM

## 2025-01-31 DIAGNOSIS — Z90.79 STATUS POST PROSTATECTOMY: ICD-10-CM

## 2025-01-31 LAB
BILIRUBIN, URINE, POC: NEGATIVE
BLOOD URINE, POC: NEGATIVE
GLUCOSE URINE, POC: 500 MG/DL
KETONES, URINE, POC: NEGATIVE MG/DL
LEUKOCYTE ESTERASE, URINE, POC: NEGATIVE
NITRITE, URINE, POC: NEGATIVE
PH, URINE, POC: 6 (ref 4.6–8)
PROTEIN,URINE, POC: 100 MG/DL
SPECIFIC GRAVITY, URINE, POC: 1.01 (ref 1–1.03)
URINALYSIS CLARITY, POC: NORMAL
URINALYSIS COLOR, POC: NORMAL
UROBILINOGEN, POC: NORMAL MG/DL

## 2025-01-31 ASSESSMENT — ENCOUNTER SYMPTOMS
VOMITING: 0
BLOOD IN STOOL: 0
CONSTIPATION: 0
EYE DISCHARGE: 0
SHORTNESS OF BREATH: 0
HEARTBURN: 0
WHEEZING: 0
NAUSEA: 0
SKIN LESIONS: 0
BACK PAIN: 0
COUGH: 0
ABDOMINAL PAIN: 0
EYE PAIN: 0
INDIGESTION: 0
DIARRHEA: 0

## 2025-02-01 NOTE — PROGRESS NOTES
[facility]  [address] [place] [address]   [address]  [place] [address]  [phone]    Christophe Xavier  : 1950    Chief Complaint   Patient presents with    Follow-up          HPI     Christophe Xavier is a 75 y.o. male    History of Present Illness  The patient is a 75-year-old gentleman with a history of prostate cancer, presenting today for his annual follow-up.    He underwent a radical retropubic prostatectomy in , after which his PSA levels were initially undetectable. He has not had any salvage treatment or a defined recurrence, but his PSA has been slowly rising with greater than a 1-year doubling time for the past few years. Trend below.      His current concern revolves around the elevated PSA levels, which have been a consistent finding during his annual checkups. PSA up significantly this year from 0.5 in 2024 to 1.13 (2024).  Denies known cause for the rise.  He expresses dissatisfaction with the lack of definitive answers regarding this issue during his previous visits. He questions the necessity of continued follow-ups and suggests the possibility of conducting another PSA test to monitor its progression.    He also has known proteinuria and was previously referred to nephrology, who recommended a biopsy, but he never had this done. His urine test today shows no blood or infection.    Supplemental Information  He has a Watchman implant and has been informed that he needs to notify someone about it before undergoing any scans.    PSA Trend:     PSA: 1.13 (2024)   PSA: 0.50 (2024)    Lab Results   Component Value Date    PSA 0.6 2023    PSA 0.7 2023    PSA 0.5 2022    PSA 0.4 2020    PSA 0.3 2019           Past Medical History:   Diagnosis Date    COPD (chronic obstructive pulmonary disease) (HCC)     Diabetes mellitus, type II (HCC)     Prostate cancer (HCC)        Past Surgical History:   Procedure Laterality Date

## 2025-02-02 LAB — PSA SERPL DL<=0.01 NG/ML-MCNC: 0.99 NG/ML (ref 0–4)

## 2025-02-04 NOTE — DISCHARGE INSTRUCTIONS
Continue Benadryl twice a day, Pepcid over-the-counter twice a day and cool compresses.  Return to ER for any worsening swelling difficulty breathing or swallowing.  See your primary care physician for recheck   RELAY  STRESS  Called patient to notify of no acute findings or abnormalities. Keep follow up as scheduled. If you have any problem between now and then give our office a call.   ----- Message from Catyh CLEVELAND sent at 2/3/2025  1:39 PM EST -----    ----- Message -----  From: Kei Cartagena APRN  Sent: 2/3/2025  11:53 AM EST  To: Cathy Amezquita MA    Patient was found to have a normal stress test with no evidence of ischemia.  Keep follow-up.

## 2025-02-04 NOTE — RESULT ENCOUNTER NOTE
Mr. Xavier, your PSA recheck returned still elevated at 0.987.  Even though this is a slight decrease from the 1.130 in 12/2024, it has still essentially doubled since 6/2024.  I would recommend that you proceed with the PET scan that we discussed at your appointment.  If you have not heard from radiology scheduling by 2/10/25, please let me know.     Best Regards,  Dr. Petersen

## 2025-02-06 ENCOUNTER — HOSPITAL ENCOUNTER (OUTPATIENT)
Dept: PET IMAGING | Age: 75
Discharge: HOME | End: 2025-02-09
Payer: MEDICARE

## 2025-02-06 DIAGNOSIS — Z85.46 HISTORY OF PROSTATE CANCER: ICD-10-CM

## 2025-02-06 PROCEDURE — 2500000003 HC RX 250 WO HCPCS: Performed by: UROLOGY

## 2025-02-06 PROCEDURE — 78815 PET IMAGE W/CT SKULL-THIGH: CPT

## 2025-02-06 PROCEDURE — 3430000000 HC RX DIAGNOSTIC RADIOPHARMACEUTICAL: Performed by: UROLOGY

## 2025-02-06 PROCEDURE — A9596 HC RX DIAGNOSTIC RADIOPHARMACEUTICAL: HCPCS | Performed by: UROLOGY

## 2025-02-06 RX ORDER — SODIUM CHLORIDE 0.9 % (FLUSH) 0.9 %
20 SYRINGE (ML) INJECTION AS NEEDED
Status: DISCONTINUED | OUTPATIENT
Start: 2025-02-06 | End: 2025-02-10 | Stop reason: HOSPADM

## 2025-02-06 RX ADMIN — SODIUM CHLORIDE, PRESERVATIVE FREE 20 ML: 5 INJECTION INTRAVENOUS at 13:35

## 2025-02-06 RX ADMIN — KIT FOR THE PREPARATION OF GALLIUM GA 68 GOZETOTIDE INJECTION 6.82 MILLICURIE: KIT INTRAVENOUS at 13:35

## 2025-02-10 DIAGNOSIS — C61 PROSTATE CANCER (HCC): Primary | ICD-10-CM

## 2025-02-10 NOTE — RESULT ENCOUNTER NOTE
Mr. Xavier,     You PET scan shows NO concern for recurrent prostate cancer in your body at this time.  You do not need additional treatment at this time.  I would recommend continuing to check PSAs every 6 months to make sure it is stable or trending down and not continuing to go up.     My medical assistant, Ceci, will contact you to set up another check in 6 months.  Ceci, please set up follow up in 6 months with psa prior to appointment.     Best Regards,  Dr. Petersen

## 2025-02-13 NOTE — PROGRESS NOTES
through the lung bases   was performed.  Low dose protocol was implemented. (LDCT) Slice thickness is   1.25 mm. Radiation dose reduction techniques were used for this study. Our CT   scanners use one or all of the following: Automated exposure control, adjustment   of the mA and/or kV according to patient size, iterative reconstruction.       FINDINGS:   LUNGS: No pulmonary nodules.   No airspace disease.    AIRWAYS: Trachea and proximal bronchi grossly patent.   PLEURA: No effusion or thickening or calcifications.   LYMPH NODES: No enlarged axillary, hilar or mediastinal lymph nodes.   HEART: Normal size.   CORONARIES: Extensive calcifications.   UPPER ABDOMEN: Cyst in the left lobe of the liver.   SKELETAL/CHEST WALL: DJD, otherwise no gross bony lesions.       IMPRESSION   No pulmonary nodules. Extensive calcified coronary atherosclerosis. Recommend   repeat screening LDCT in 12 months       L1 Negative: No nodules or nodules with specific calcifications such as   complete, central, popcorn, concentric rings, and fat containing nodules.    Continue annual screening with noncontrast chest CT using LDCT protocol in 12   months.     Nuclear Medicine:   PET CT PSMA TUMOR IMAGE SKULL THIGH 02/06/2025    Narrative  EXAMINATION: PET/CT PSMA scan.    COMPARISON: None available    INDICATION: Prostate Cancer since 2002. PSA level is 0.987 ng/cm January 31, 2025.    TECHNIQUE: RADIOPHARMACEUTICAL: 6.82 mCi GA-68 PSMA [employer]  IV.  PET CT  performed on dedicated GE digital 3 ring scanner. Low dose, nondiagnostic CT  axial source images covering the same anatomy as the PET images are acquired for  PET CT fusion. These images do not constitute a diagnostic quality CT exam.    Patient underwent standard prep, well hydrated. Approximately 60 minutes after  injection of the radiotracer, PET imaging was initiated with acquisitions from  the mid thigh cephalad to the top of the calvarium.    Radiation dose reduction techniques

## 2025-02-14 ENCOUNTER — OFFICE VISIT (OUTPATIENT)
Dept: PULMONOLOGY | Age: 75
End: 2025-02-14
Payer: MEDICARE

## 2025-02-14 VITALS
RESPIRATION RATE: 20 BRPM | TEMPERATURE: 97.4 F | BODY MASS INDEX: 30.06 KG/M2 | OXYGEN SATURATION: 96 % | HEIGHT: 70 IN | SYSTOLIC BLOOD PRESSURE: 110 MMHG | WEIGHT: 210 LBS | DIASTOLIC BLOOD PRESSURE: 66 MMHG | HEART RATE: 89 BPM

## 2025-02-14 DIAGNOSIS — Z87.891 PERSONAL HISTORY OF NICOTINE DEPENDENCE: ICD-10-CM

## 2025-02-14 DIAGNOSIS — J44.9 STAGE 4 VERY SEVERE COPD BY GOLD CLASSIFICATION (HCC): Primary | ICD-10-CM

## 2025-02-14 DIAGNOSIS — J43.2 CENTRILOBULAR EMPHYSEMA (HCC): ICD-10-CM

## 2025-02-14 DIAGNOSIS — J96.11 CHRONIC RESPIRATORY FAILURE WITH HYPOXIA: ICD-10-CM

## 2025-02-14 PROCEDURE — 3023F SPIROM DOC REV: CPT | Performed by: INTERNAL MEDICINE

## 2025-02-14 PROCEDURE — 1123F ACP DISCUSS/DSCN MKR DOCD: CPT | Performed by: INTERNAL MEDICINE

## 2025-02-14 PROCEDURE — 1036F TOBACCO NON-USER: CPT | Performed by: INTERNAL MEDICINE

## 2025-02-14 PROCEDURE — G8417 CALC BMI ABV UP PARAM F/U: HCPCS | Performed by: INTERNAL MEDICINE

## 2025-02-14 PROCEDURE — 3074F SYST BP LT 130 MM HG: CPT | Performed by: INTERNAL MEDICINE

## 2025-02-14 PROCEDURE — G8428 CUR MEDS NOT DOCUMENT: HCPCS | Performed by: INTERNAL MEDICINE

## 2025-02-14 PROCEDURE — 99214 OFFICE O/P EST MOD 30 MIN: CPT | Performed by: INTERNAL MEDICINE

## 2025-02-14 PROCEDURE — 3078F DIAST BP <80 MM HG: CPT | Performed by: INTERNAL MEDICINE

## 2025-02-14 RX ORDER — ALBUTEROL SULFATE 90 UG/1
2 INHALANT RESPIRATORY (INHALATION) EVERY 4 HOURS PRN
Qty: 3 EACH | Refills: 3 | Status: SHIPPED | OUTPATIENT
Start: 2025-02-14

## 2025-03-28 ENCOUNTER — APPOINTMENT (OUTPATIENT)
Dept: GENERAL RADIOLOGY | Age: 75
End: 2025-03-28
Payer: MEDICARE

## 2025-03-28 ENCOUNTER — APPOINTMENT (OUTPATIENT)
Dept: CT IMAGING | Age: 75
End: 2025-03-28
Payer: MEDICARE

## 2025-03-28 ENCOUNTER — HOSPITAL ENCOUNTER (EMERGENCY)
Age: 75
Discharge: HOME | End: 2025-03-28
Payer: MEDICARE

## 2025-03-28 VITALS
RESPIRATION RATE: 18 BRPM | TEMPERATURE: 98.8 F | WEIGHT: 205 LBS | SYSTOLIC BLOOD PRESSURE: 98 MMHG | BODY MASS INDEX: 29.35 KG/M2 | OXYGEN SATURATION: 94 % | DIASTOLIC BLOOD PRESSURE: 70 MMHG | HEART RATE: 70 BPM | HEIGHT: 70 IN

## 2025-03-28 DIAGNOSIS — S09.90XA INJURY OF HEAD, INITIAL ENCOUNTER: ICD-10-CM

## 2025-03-28 DIAGNOSIS — S01.81XA FACIAL LACERATION, INITIAL ENCOUNTER: ICD-10-CM

## 2025-03-28 DIAGNOSIS — S49.92XA INJURY OF LEFT SHOULDER, INITIAL ENCOUNTER: ICD-10-CM
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
## 2025-03-28 PROCEDURE — 90471 IMMUNIZATION ADMIN: CPT

## 2025-03-28 PROCEDURE — 90714 TD VACC NO PRESV 7 YRS+ IM: CPT

## 2025-03-28 PROCEDURE — 12013 RPR F/E/E/N/L/M 2.6-5.0 CM: CPT

## 2025-03-28 PROCEDURE — 73030 X-RAY EXAM OF SHOULDER: CPT

## 2025-03-28 PROCEDURE — 99284 EMERGENCY DEPT VISIT MOD MDM: CPT

## 2025-03-28 PROCEDURE — 72125 CT NECK SPINE W/O DYE: CPT

## 2025-03-28 PROCEDURE — 6360000002 HC RX W HCPCS

## 2025-03-28 PROCEDURE — 70450 CT HEAD/BRAIN W/O DYE: CPT

## 2025-03-28 RX ORDER — LIDOCAINE HYDROCHLORIDE 10 MG/ML
5 INJECTION, SOLUTION INFILTRATION; PERINEURAL
Status: COMPLETED | OUTPATIENT
Start: 2025-03-28 | End: 2025-03-28

## 2025-03-28 RX ADMIN — CLOSTRIDIUM TETANI TOXOID ANTIGEN (FORMALDEHYDE INACTIVATED) AND CORYNEBACTERIUM DIPHTHERIAE TOXOID ANTIGEN (FORMALDEHYDE INACTIVATED) 0.5 ML: 5; 2 INJECTION, SUSPENSION INTRAMUSCULAR at 02:39

## 2025-03-28 RX ADMIN — LIDOCAINE HYDROCHLORIDE 5 ML: 10 INJECTION, SOLUTION INFILTRATION; PERINEURAL at 02:48

## 2025-03-28 ASSESSMENT — LIFESTYLE VARIABLES
HOW MANY STANDARD DRINKS CONTAINING ALCOHOL DO YOU HAVE ON A TYPICAL DAY: 3 OR 4
HOW OFTEN DO YOU HAVE A DRINK CONTAINING ALCOHOL: 4 OR MORE TIMES A WEEK

## 2025-03-28 ASSESSMENT — PAIN - FUNCTIONAL ASSESSMENT: PAIN_FUNCTIONAL_ASSESSMENT: 0-10

## 2025-03-28 ASSESSMENT — PAIN DESCRIPTION - LOCATION: LOCATION: SHOULDER

## 2025-03-28 ASSESSMENT — PAIN DESCRIPTION - DESCRIPTORS: DESCRIPTORS: ACHING

## 2025-03-28 ASSESSMENT — PAIN SCALES - GENERAL: PAINLEVEL_OUTOF10: 2

## 2025-03-28 ASSESSMENT — PAIN DESCRIPTION - ORIENTATION: ORIENTATION: LEFT

## 2025-03-28 NOTE — ED PROVIDER NOTES
Emergency Department Provider Note       PCP: Rashel Varela DO   Age: 75 y.o.   Sex: male     DISPOSITION Decision To Discharge 03/28/2025 03:13:46 AM    ICD-10-CM    1. Fall, initial encounter  W19.XXXA       2. Facial laceration, initial encounter  S01.81XA       3. Injury of head, initial encounter  S09.90XA       4. Injury of left shoulder, initial encounter  S49.92XA           Medical Decision Making     Patient is a well-appearing 75-year-old male presenting after he rolled out of his bed and fell onto the floor.  Patient states he was having a \"intense dream\" where he was protecting his family, he rolled out of his bed and landed on the floor.  He was wearing his CPAP which impacted the left side of his upper eye and cheek.      On presentation, patient is afebrile, vital signs are stable, and he is well-appearing in no acute distress.  He has a large gaping laceration present to the left upper eyelid extending into the eyebrow with a smaller linear laceration lateral to the larger.  The laceration does extend through to the muscle of the eyelid but does not lacerate the muscle, still able to open and close and move his eyelid.    The wound was intensely irrigated and repaired using eight 6-0 Prolene sutures.  Patient tolerated the procedure well.    CT head and neck unremarkable.  X-ray of the shoulder does not reveal any evidence of fracture.    Due to stable vital signs, nontoxic appearance, no CT evidence of any trauma, patient is neurologically intact, plan for this patient as continued outpatient management.  Patient and wife are educated on wound care at home.  They will follow-up with ED or PCP for suture removal in the next 5 to 7 days.  They were given strict return precautions.  They verbalized understanding with plan of care and left the facility in stable condition.         1 acute complicated illness or injury.    I independently ordered and reviewed each unique test.    I reviewed  (Non-Medical): No   Physical Activity: Insufficiently Active (6/9/2024)    Exercise Vital Sign     Days of Exercise per Week: 3 days     Minutes of Exercise per Session: 30 min   Social Connections: Unknown (1/27/2022)    Received from [email or web address]    Social Connections     Frequency of Communication with Friends and Family: Not asked     Frequency of Social Gatherings with Friends and Family: Not asked   Intimate Partner Violence: Unknown (1/27/2022)    Received from [email or web address]    Intimate Partner Violence     Fear of Current or Ex-Partner: Not asked     Emotionally Abused: Not asked     Physically Abused: Not asked     Sexually Abused: Not asked   Housing Stability: Unknown (12/10/2024)    Housing Stability Vital Sign     Homeless in the Last Year: No        Previous Medications    ALBUTEROL SULFATE HFA (PROAIR HFA) 108 (90 BASE) MCG/ACT INHALER    Inhale 2 puffs into the lungs every 4 hours as needed for Wheezing    ASPIRIN 81 MG EC TABLET    Take 1 tablet by mouth daily    ATORVASTATIN (LIPITOR) 40 MG TABLET    Take 1 tablet by mouth every evening    BIPAP MACHINE MISC    by Does not apply route    CLOPIDOGREL (PLAVIX) 75 MG TABLET    Take 1 tablet by mouth daily    DAPAGLIFLOZIN (FARXIGA) 10 MG TABLET    Take 1 tablet by mouth every morning    DILTIAZEM (CARDIZEM CD) 240 MG EXTENDED RELEASE CAPSULE    Take 1 capsule by mouth daily    ENALAPRIL (VASOTEC) 20 MG TABLET    Take 1 tablet by mouth daily    EPINEPHRINE (EPIPEN 2-DAVID) 0.3 MG/0.3ML SOAJ INJECTION    Use as directed for allergic reaction    FUROSEMIDE (LASIX) 20 MG TABLET    Take 1 tablet by mouth daily as needed (Take for weight gain greater than 2 pounds in 24 hours or 5 pounds in 48 hours.)    LATANOPROST (XALATAN) 0.005 % OPHTHALMIC SOLUTION    1 drop nightly    METFORMIN (GLUCOPHAGE) 500 MG TABLET    Take 1 tablet by mouth 2 times daily (with meals)    NITROGLYCERIN (NITROSTAT) 0.4 MG SL TABLET    Place 1 tablet

## 2025-03-28 NOTE — DISCHARGE INSTRUCTIONS
Your CT scan and x-ray looked good!  Keep the wounds clean with normal soap and water.  Apply thin layer of Neosporin dressing.  Monitor for any surrounding redness, warmth, streaking, drainage, or any signs of infection in which you need to return immediately to the ED.    The sutures need to be removed in the next 7 days, you can return to the ED or follow-up with your primary care for this.

## 2025-03-28 NOTE — ED TRIAGE NOTES
Patient reports falling out of bed with CPAP machine on face and hitting his head on nightstand.  Abrasion noted on left upper eyelid and left face.    Patient reports only taking aspirin and stopping taking Plavix a few days ago.  Patient did not lose consciousness

## 2025-03-29 DIAGNOSIS — I10 HTN (HYPERTENSION), BENIGN: ICD-10-CM

## 2025-03-29 DIAGNOSIS — I48.91 ATRIAL FIBRILLATION (HCC): ICD-10-CM

## 2025-03-31 RX ORDER — FUROSEMIDE 20 MG/1
20 TABLET ORAL DAILY PRN
Qty: 60 TABLET | Refills: 3 | Status: SHIPPED | OUTPATIENT
Start: 2025-03-31 | End: 2025-11-26

## 2025-03-31 RX ORDER — DILTIAZEM HYDROCHLORIDE 240 MG/1
240 CAPSULE, COATED, EXTENDED RELEASE ORAL DAILY
Qty: 90 CAPSULE | Refills: 3 | Status: SHIPPED | OUTPATIENT
Start: 2025-03-31

## 2025-03-31 NOTE — TELEPHONE ENCOUNTER
Requested Prescriptions     Pending Prescriptions Disp Refills    dilTIAZem (CARDIZEM CD) 240 MG extended release capsule 90 capsule 3     Sig: Take 1 capsule by mouth daily    furosemide (LASIX) 20 MG tablet 60 tablet 3     Sig: Take 1 tablet by mouth daily as needed (Take for weight gain greater than 2 pounds in 24 hours or 5 pounds in 48 hours.)

## 2025-04-01 SDOH — ECONOMIC STABILITY: FOOD INSECURITY: WITHIN THE PAST 12 MONTHS, THE FOOD YOU BOUGHT JUST DIDN'T LAST AND YOU DIDN'T HAVE MONEY TO GET MORE.: NEVER TRUE

## 2025-04-01 SDOH — ECONOMIC STABILITY: FOOD INSECURITY: WITHIN THE PAST 12 MONTHS, YOU WORRIED THAT YOUR FOOD WOULD RUN OUT BEFORE YOU GOT MONEY TO BUY MORE.: NEVER TRUE

## 2025-04-01 SDOH — ECONOMIC STABILITY: INCOME INSECURITY: IN THE LAST 12 MONTHS, WAS THERE A TIME WHEN YOU WERE NOT ABLE TO PAY THE MORTGAGE OR RENT ON TIME?: NO

## 2025-04-01 SDOH — ECONOMIC STABILITY: TRANSPORTATION INSECURITY
IN THE PAST 12 MONTHS, HAS THE LACK OF TRANSPORTATION KEPT YOU FROM MEDICAL APPOINTMENTS OR FROM GETTING MEDICATIONS?: NO

## 2025-04-01 ASSESSMENT — PATIENT HEALTH QUESTIONNAIRE - PHQ9
SUM OF ALL RESPONSES TO PHQ9 QUESTIONS 1 & 2: 0
1. LITTLE INTEREST OR PLEASURE IN DOING THINGS: NOT AT ALL
SUM OF ALL RESPONSES TO PHQ QUESTIONS 1-9: 0
2. FEELING DOWN, DEPRESSED OR HOPELESS: NOT AT ALL
2. FEELING DOWN, DEPRESSED OR HOPELESS: NOT AT ALL
SUM OF ALL RESPONSES TO PHQ QUESTIONS 1-9: 0
1. LITTLE INTEREST OR PLEASURE IN DOING THINGS: NOT AT ALL

## 2025-04-03 ENCOUNTER — OFFICE VISIT (OUTPATIENT)
Dept: INTERNAL MEDICINE CLINIC | Facility: CLINIC | Age: 75
End: 2025-04-03

## 2025-04-03 VITALS
BODY MASS INDEX: 30.26 KG/M2 | HEART RATE: 63 BPM | WEIGHT: 211.4 LBS | RESPIRATION RATE: 16 BRPM | SYSTOLIC BLOOD PRESSURE: 120 MMHG | OXYGEN SATURATION: 96 % | DIASTOLIC BLOOD PRESSURE: 68 MMHG | HEIGHT: 70 IN
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
## 2025-04-03 DIAGNOSIS — Z48.02 VISIT FOR SUTURE REMOVAL: ICD-10-CM

## 2025-04-03 NOTE — PROGRESS NOTES
Christophe Xavier (:  1950) is a 75 y.o. male,Established patient, here for evaluation of the following chief complaint(s):  Suture / Staple Removal (Suture removal from left upper eyelid/Two cuts, 2 stitches in one, 6 stitches in the other, 8 total)         Assessment & Plan  Fall, subsequent encounter   Fall from bed, violent dream  Reviewed head and neck CT and shoulder xry         Visit for suture removal     8 sutures removed, patient tolerated well       Wounds approximated well, closed, no s/s of infection  Recommend good sunblock and vaseline to the scar lines    No follow-ups on file.       Subjective   Patient is here for follow up from ER for fall from bed. He was wearing cpap mask and fell from bed and hit face on nigtand. He got abrastion to cheek from the mask. He hit left eyesocket on the nightstand. He got sutures in 2 area, 6 and 2 stitches.   BP Readings from Last 3 Encounters:  25 : 100/66  25 : 98/70  25 : 110/66        Suture / Staple Removal        Review of Systems       Objective   Physical Exam  Vitals reviewed.   HENT:      Head:      Comments: Bruising left eye  Eyes:      Extraocular Movements: Extraocular movements intact.      Pupils: Pupils are equal, round, and reactive to light.   Cardiovascular:      Rate and Rhythm: Normal rate.   Pulmonary:      Effort: Pulmonary effort is normal.      Breath sounds: No wheezing or rhonchi.   Musculoskeletal:      Cervical back: Neck supple.   Neurological:      General: No focal deficit present.      Mental Status: He is alert and oriented to person, place, and time.                An electronic signature was used to authenticate this note.    --Jennifer Pimentel, APRN - CNP

## 2025-05-06 ASSESSMENT — SLEEP AND FATIGUE QUESTIONNAIRES
HOW LIKELY ARE YOU TO NOD OFF OR FALL ASLEEP WHEN YOU ARE A PASSENGER IN A CAR FOR AN HOUR WITHOUT A BREAK: SLIGHT CHANCE OF DOZING
HOW LIKELY ARE YOU TO NOD OFF OR FALL ASLEEP WHILE SITTING AND TALKING TO SOMEONE: WOULD NEVER DOZE
HOW LIKELY ARE YOU TO NOD OFF OR FALL ASLEEP WHILE WATCHING TV: SLIGHT CHANCE OF DOZING
HOW LIKELY ARE YOU TO NOD OFF OR FALL ASLEEP WHILE SITTING QUIETLY AFTER LUNCH WITHOUT ALCOHOL: WOULD NEVER DOZE
HOW LIKELY ARE YOU TO NOD OFF OR FALL ASLEEP WHILE LYING DOWN TO REST IN THE AFTERNOON WHEN CIRCUMSTANCES PERMIT: SLIGHT CHANCE OF DOZING
HOW LIKELY ARE YOU TO NOD OFF OR FALL ASLEEP WHILE SITTING AND READING: MODERATE CHANCE OF DOZING
HOW LIKELY ARE YOU TO NOD OFF OR FALL ASLEEP WHILE LYING DOWN TO REST IN THE AFTERNOON WHEN CIRCUMSTANCES PERMIT: SLIGHT CHANCE OF DOZING
HOW LIKELY ARE YOU TO NOD OFF OR FALL ASLEEP WHILE SITTING INACTIVE IN A PUBLIC PLACE: WOULD NEVER DOZE
HOW LIKELY ARE YOU TO NOD OFF OR FALL ASLEEP IN A CAR, WHILE STOPPED FOR A FEW MINUTES IN TRAFFIC: WOULD NEVER DOZE
HOW LIKELY ARE YOU TO NOD OFF OR FALL ASLEEP WHILE SITTING AND TALKING TO SOMEONE: WOULD NEVER DOZE
HOW LIKELY ARE YOU TO NOD OFF OR FALL ASLEEP WHEN YOU ARE A PASSENGER IN A CAR FOR AN HOUR WITHOUT A BREAK: SLIGHT CHANCE OF DOZING
HOW LIKELY ARE YOU TO NOD OFF OR FALL ASLEEP WHILE SITTING QUIETLY AFTER LUNCH WITHOUT ALCOHOL: WOULD NEVER DOZE
HOW LIKELY ARE YOU TO NOD OFF OR FALL ASLEEP WHILE SITTING INACTIVE IN A PUBLIC PLACE: WOULD NEVER DOZE
HOW LIKELY ARE YOU TO NOD OFF OR FALL ASLEEP WHILE WATCHING TV: SLIGHT CHANCE OF DOZING
HOW LIKELY ARE YOU TO NOD OFF OR FALL ASLEEP IN A CAR, WHILE STOPPED FOR A FEW MINUTES IN TRAFFIC: WOULD NEVER DOZE
ESS TOTAL SCORE: 5
HOW LIKELY ARE YOU TO NOD OFF OR FALL ASLEEP WHILE SITTING AND READING: MODERATE CHANCE OF DOZING

## 2025-05-08 NOTE — PROGRESS NOTES
Face Mask (1 per 3 mon)  (  x  ) -Full Mask (1 per month) Interface/Cushion      (  ) -Nasal Mask (1 per 3 mon)  (  ) - Nasal Mask (1 per month) Interface/Cushion  (     ) -Pillow (2 per mon)  (     ) -Ljykbqwlt (1 per 6 mon)            Other Supplies:    (   X  )-Kfsoacbx (1 per 6 mon)  (   X  )-Nevqve Tubing (1 per 3 mon)  (   X  )- Disposable Filter (2 per mon)  (   x  )-Rptffi Humidifier (1 per year)     ( x    )-Dqzwesptf (sometimes used with Full Face Mask) (1 per 6 mos)  (    )-Tubing-without heat (1 per 3 mos)  (     )-Non-Disposable Filter (1 per 6 mos)  (  x   )-Water Chamber (1 per 6 mos)  (     )-Humidifier non-heated (1 per 5 yrs)      Signed Date: 5/9/2025  Electronically Signed By: DEB Luis CNP  Electronically Dated:  5/9/2025     No orders of the defined types were placed in this encounter.        Collaborating Physician: Dr. Nava    Today's office visit was spent  reviewing test results, prognosis, importance of compliance, education about disease process, benefits of medications, instructions for management of acute flare-ups, and follow up plans.  Total time spent with patient was 25 minutes.        DEB Luis CNP  Electronically signed

## 2025-05-09 ENCOUNTER — OFFICE VISIT (OUTPATIENT)
Dept: SLEEP MEDICINE | Age: 75
End: 2025-05-09
Payer: MEDICARE

## 2025-05-09 VITALS
BODY MASS INDEX: 30.26 KG/M2 | TEMPERATURE: 97.4 F | HEART RATE: 83 BPM | HEIGHT: 70 IN | OXYGEN SATURATION: 97 % | DIASTOLIC BLOOD PRESSURE: 65 MMHG | RESPIRATION RATE: 16 BRPM | WEIGHT: 211.4 LBS | SYSTOLIC BLOOD PRESSURE: 106 MMHG

## 2025-05-09 DIAGNOSIS — G47.33 OSA (OBSTRUCTIVE SLEEP APNEA): Primary | ICD-10-CM
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
## 2025-05-09 PROCEDURE — 3078F DIAST BP <80 MM HG: CPT | Performed by: NURSE PRACTITIONER

## 2025-05-09 PROCEDURE — 1159F MED LIST DOCD IN RCRD: CPT | Performed by: NURSE PRACTITIONER

## 2025-05-09 PROCEDURE — G8427 DOCREV CUR MEDS BY ELIG CLIN: HCPCS | Performed by: NURSE PRACTITIONER

## 2025-05-09 PROCEDURE — 1036F TOBACCO NON-USER: CPT | Performed by: NURSE PRACTITIONER

## 2025-05-09 PROCEDURE — 1123F ACP DISCUSS/DSCN MKR DOCD: CPT | Performed by: NURSE PRACTITIONER

## 2025-05-09 PROCEDURE — 3074F SYST BP LT 130 MM HG: CPT | Performed by: NURSE PRACTITIONER

## 2025-05-09 PROCEDURE — 1160F RVW MEDS BY RX/DR IN RCRD: CPT | Performed by: NURSE PRACTITIONER

## 2025-05-09 PROCEDURE — 99213 OFFICE O/P EST LOW 20 MIN: CPT | Performed by: NURSE PRACTITIONER

## 2025-05-09 PROCEDURE — G8417 CALC BMI ABV UP PARAM F/U: HCPCS | Performed by: NURSE PRACTITIONER

## 2025-05-09 NOTE — PATIENT INSTRUCTIONS
Resmed F40 mask      Overnight Oximetry is a device with a watch and a finger probe that you wear when you sleep. It measures your heart rate and the amount of oxygen in your blood. This test records the data, return the device back to your medical equipment company, and the data is sent to our office for review. You will be called with the results.   This will be done on bipap and oxygen

## 2025-05-12 ENCOUNTER — APPOINTMENT (OUTPATIENT)
Dept: NON INVASIVE DIAGNOSTICS | Age: 75
End: 2025-05-12
Payer: MEDICARE

## 2025-05-12 ENCOUNTER — APPOINTMENT (OUTPATIENT)
Dept: CT IMAGING | Age: 75
End: 2025-05-12
Payer: MEDICARE

## 2025-05-12 ENCOUNTER — HOSPITAL ENCOUNTER (INPATIENT)
Age: 75
LOS: 4 days | Discharge: HOME | End: 2025-05-16
Attending: STUDENT IN AN ORGANIZED HEALTH CARE EDUCATION/TRAINING PROGRAM | Admitting: INTERNAL MEDICINE
Payer: MEDICARE

## 2025-05-12 DIAGNOSIS — I21.19 ACUTE INFERIOR MYOCARDIAL INFARCTION (HCC): ICD-10-CM

## 2025-05-12 DIAGNOSIS — I48.91 ATRIAL FIBRILLATION WITH RAPID VENTRICULAR RESPONSE (HCC): ICD-10-CM

## 2025-05-12 DIAGNOSIS — I21.3 ST ELEVATION MYOCARDIAL INFARCTION (STEMI), UNSPECIFIED ARTERY (HCC): Primary | ICD-10-CM

## 2025-05-12 DIAGNOSIS — R07.9 CHEST PAIN, UNSPECIFIED TYPE: ICD-10-CM

## 2025-05-12 DIAGNOSIS — I21.3 STEMI (ST ELEVATION MYOCARDIAL INFARCTION) (HCC): ICD-10-CM

## 2025-05-12 LAB
ANION GAP SERPL CALC-SCNC: 14 MMOL/L (ref 7–16)
BASOPHILS # BLD: 0.04 K/UL (ref 0–0.2)
BASOPHILS NFR BLD: 0.3 % (ref 0–2)
BUN SERPL-MCNC: 22 MG/DL (ref 8–23)
CALCIUM SERPL-MCNC: 10 MG/DL (ref 8.8–10.2)
CHLORIDE SERPL-SCNC: 100 MMOL/L (ref 98–107)
CO2 SERPL-SCNC: 22 MMOL/L (ref 20–29)
CREAT SERPL-MCNC: 1.16 MG/DL (ref 0.8–1.3)
DIFFERENTIAL METHOD BLD: ABNORMAL
ECHO AO ASC DIAM: 4 CM
ECHO AO ASCENDING AORTA INDEX: 1.9 CM/M2
ECHO AO ROOT DIAM: 3.6 CM
ECHO AO ROOT INDEX: 1.71 CM/M2
ECHO AV AREA PEAK VELOCITY: 3.1 CM2
ECHO AV AREA VTI: 4.6 CM2
ECHO AV AREA/BSA PEAK VELOCITY: 1.5 CM2/M2
ECHO AV AREA/BSA VTI: 2.2 CM2/M2
ECHO AV CUSP MM: 1.4 CM
ECHO AV MEAN GRADIENT: 7 MMHG
ECHO AV MEAN VELOCITY: 1.3 M/S
ECHO AV PEAK GRADIENT: 18 MMHG
ECHO AV PEAK GRADIENT: 18 MMHG
ECHO AV PEAK VELOCITY: 2.1 M/S
ECHO AV VELOCITY RATIO: 0.71
ECHO AV VTI: 33.6 CM
ECHO BSA: 2.14 M2
ECHO BSA: 2.14 M2
ECHO EST RA PRESSURE: 8 MMHG
ECHO IVC PROX: 2.6 CM
ECHO LA AREA 2C: 19.6 CM2
ECHO LA AREA 4C: 23.9 CM2
ECHO LA DIAMETER INDEX: 1.94 CM/M2
ECHO LA DIAMETER: 4.1 CM
ECHO LA MAJOR AXIS: 6.9 CM
ECHO LA MINOR AXIS: 5.4 CM
ECHO LA TO AORTIC ROOT RATIO: 1.14
ECHO LA VOL MOD A2C: 59 ML (ref 18–58)
ECHO LA VOL MOD A4C: 67 ML (ref 18–58)
ECHO LA VOLUME INDEX MOD A2C: 28 ML/M2 (ref 16–34)
ECHO LA VOLUME INDEX MOD A4C: 32 ML/M2 (ref 16–34)
ECHO LV E' LATERAL VELOCITY: 10.6 CM/S
ECHO LV E' SEPTAL VELOCITY: 7.74 CM/S
ECHO LV EDV A2C: 92 ML
ECHO LV EDV A4C: 96 ML
ECHO LV EDV INDEX A4C: 45 ML/M2
ECHO LV EDV NDEX A2C: 44 ML/M2
ECHO LV EF PHYSICIAN: 45 %
ECHO LV EJECTION FRACTION A2C: 38 %
ECHO LV EJECTION FRACTION A4C: 51 %
ECHO LV EJECTION FRACTION BIPLANE: 44 % (ref 55–100)
ECHO LV ESV A2C: 57 ML
ECHO LV ESV A4C: 47 ML
ECHO LV ESV INDEX A2C: 27 ML/M2
ECHO LV ESV INDEX A4C: 22 ML/M2
ECHO LV FRACTIONAL SHORTENING: 12 % (ref 28–44)
ECHO LV INTERNAL DIMENSION DIASTOLE INDEX: 2.32 CM/M2
ECHO LV INTERNAL DIMENSION DIASTOLIC: 4.9 CM (ref 4.2–5.9)
ECHO LV INTERNAL DIMENSION SYSTOLIC INDEX: 2.04 CM/M2
ECHO LV INTERNAL DIMENSION SYSTOLIC: 4.3 CM
ECHO LV IVSD: 0.9 CM (ref 0.6–1)
ECHO LV MASS 2D: 153 G (ref 88–224)
ECHO LV MASS INDEX 2D: 72.5 G/M2 (ref 49–115)
ECHO LV POSTERIOR WALL DIASTOLIC: 0.9 CM (ref 0.6–1)
ECHO LV RELATIVE WALL THICKNESS RATIO: 0.37
ECHO LVOT AREA: 4.5 CM2
ECHO LVOT AV VTI INDEX: 1.01
ECHO LVOT DIAM: 2.4 CM
ECHO LVOT MEAN GRADIENT: 8 MMHG
ECHO LVOT PEAK GRADIENT: 11 MMHG
ECHO LVOT PEAK VELOCITY: 1.5 M/S
ECHO LVOT STROKE VOLUME INDEX: 72.9 ML/M2
ECHO LVOT SV: 153.7 ML
ECHO LVOT VTI: 34 CM
ECHO MV A VELOCITY: 0.38 M/S
ECHO MV AREA VTI: 6.7 CM2
ECHO MV E DECELERATION TIME (DT): 195 MS
ECHO MV E VELOCITY: 1.07 M/S
ECHO MV E/A RATIO: 2.82
ECHO MV E/E' LATERAL: 10.09
ECHO MV E/E' RATIO (AVERAGED): 11.96
ECHO MV E/E' SEPTAL: 13.82
ECHO MV LVOT VTI INDEX: 0.68
ECHO MV MAX VELOCITY: 1 M/S
ECHO MV MEAN GRADIENT: 1 MMHG
ECHO MV MEAN VELOCITY: 0.5 M/S
ECHO MV PEAK GRADIENT: 4 MMHG
ECHO MV VTI: 23.1 CM
ECHO PV ACCELERATION TIME (AT): 74 MS
ECHO PV MAX VELOCITY: 0.7 M/S
ECHO PV PEAK GRADIENT: 2 MMHG
ECHO RA AREA 4C: 19.8 CM2
ECHO RA END SYSTOLIC VOLUME APICAL 4 CHAMBER INDEX BSA: 29 ML/M2
ECHO RA VOLUME: 62 ML
ECHO RIGHT VENTRICULAR SYSTOLIC PRESSURE (RVSP): 25 MMHG
ECHO RV BASAL DIMENSION: 3.9 CM
ECHO RV FREE WALL PEAK S': 12.1 CM/S
ECHO RV INTERNAL DIMENSION: 3.8 CM
ECHO RV LONGITUDINAL DIMENSION: 9.3 CM
ECHO RV MID DIMENSION: 3.3 CM
ECHO TV REGURGITANT MAX VELOCITY: 2.09 M/S
ECHO TV REGURGITANT PEAK GRADIENT: 17 MMHG
ECHO TV REGURGITANT PEAK GRADIENT: 17 MMHG
EKG ATRIAL RATE: 0 BPM
EKG DIAGNOSIS: NORMAL
EKG Q-T INTERVAL: 346 MS
EKG Q-T INTERVAL: 347 MS
EKG Q-T INTERVAL: 364 MS
EKG QRS DURATION: 104 MS
EKG QRS DURATION: 90 MS
EKG QRS DURATION: 98 MS
EKG QTC CALCULATION (BAZETT): 425 MS
EKG QTC CALCULATION (BAZETT): 465 MS
EKG QTC CALCULATION (BAZETT): 478 MS
EKG R AXIS: 62 DEGREES
EKG R AXIS: 71 DEGREES
EKG R AXIS: 74 DEGREES
EKG T AXIS: 48 DEGREES
EKG T AXIS: 63 DEGREES
EKG T AXIS: 70 DEGREES
EKG VENTRICULAR RATE: 104 BPM
EKG VENTRICULAR RATE: 109 BPM
EKG VENTRICULAR RATE: 90 BPM
EOSINOPHIL # BLD: 0.03 K/UL (ref 0–0.8)
EOSINOPHIL NFR BLD: 0.2 % (ref 0.5–7.8)
ERYTHROCYTE [DISTWIDTH] IN BLOOD BY AUTOMATED COUNT: 14.3 % (ref 11.9–14.6)
GLUCOSE BLD STRIP.AUTO-MCNC: 120 MG/DL (ref 65–100)
GLUCOSE BLD STRIP.AUTO-MCNC: 139 MG/DL (ref 65–100)
GLUCOSE BLD STRIP.AUTO-MCNC: 160 MG/DL (ref 65–100)
GLUCOSE SERPL-MCNC: 144 MG/DL (ref 70–99)
HCT VFR BLD AUTO: 42.9 % (ref 41.1–50.3)
HGB BLD-MCNC: 14.7 G/DL (ref 13.6–17.2)
IMM GRANULOCYTES # BLD AUTO: 0.05 K/UL (ref 0–0.5)
IMM GRANULOCYTES NFR BLD AUTO: 0.4 % (ref 0–5)
LYMPHOCYTES # BLD: 0.68 K/UL (ref 0.5–4.6)
LYMPHOCYTES NFR BLD: 5.7 % (ref 13–44)
MCH RBC QN AUTO: 33.6 PG (ref 26.1–32.9)
MCHC RBC AUTO-ENTMCNC: 34.3 G/DL (ref 31.4–35)
MCV RBC AUTO: 97.9 FL (ref 82–102)
MONOCYTES # BLD: 1.33 K/UL (ref 0.1–1.3)
MONOCYTES NFR BLD: 11.1 % (ref 4–12)
NEUTS SEG # BLD: 9.89 K/UL (ref 1.7–8.2)
NEUTS SEG NFR BLD: 82.3 % (ref 43–78)
NRBC # BLD: 0 K/UL (ref 0–0.2)
NT PRO BNP: 1517 PG/ML (ref 0–450)
PLATELET # BLD AUTO: 192 K/UL (ref 150–450)
PMV BLD AUTO: 10.2 FL (ref 9.4–12.3)
POTASSIUM SERPL-SCNC: 4.4 MMOL/L (ref 3.5–5.1)
RBC # BLD AUTO: 4.38 M/UL (ref 4.23–5.6)
SERVICE CMNT-IMP: ABNORMAL
SODIUM SERPL-SCNC: 137 MMOL/L (ref 136–145)
TROPONIN T SERPL HS-MCNC: 27.1 NG/L (ref 0–22)
TROPONIN T SERPL HS-MCNC: 32.1 NG/L (ref 0–22)
TROPONIN T SERPL HS-MCNC: 48.8 NG/L (ref 0–22)
TROPONIN T SERPL HS-MCNC: 97.2 NG/L (ref 0–22)
WBC # BLD AUTO: 12 K/UL (ref 4.3–11.1)

## 2025-05-12 PROCEDURE — 6360000004 HC RX CONTRAST MEDICATION: Performed by: CASE MANAGER/CARE COORDINATOR

## 2025-05-12 PROCEDURE — C1725 CATH, TRANSLUMIN NON-LASER: HCPCS | Performed by: INTERNAL MEDICINE

## 2025-05-12 PROCEDURE — C1874 STENT, COATED/COV W/DEL SYS: HCPCS | Performed by: INTERNAL MEDICINE

## 2025-05-12 PROCEDURE — 2500000003 HC RX 250 WO HCPCS: Performed by: STUDENT IN AN ORGANIZED HEALTH CARE EDUCATION/TRAINING PROGRAM

## 2025-05-12 PROCEDURE — 93005 ELECTROCARDIOGRAM TRACING: CPT | Performed by: INTERNAL MEDICINE

## 2025-05-12 PROCEDURE — 99153 MOD SED SAME PHYS/QHP EA: CPT | Performed by: INTERNAL MEDICINE

## 2025-05-12 PROCEDURE — 92941 PRQ TRLML REVSC TOT OCCL AMI: CPT | Performed by: INTERNAL MEDICINE

## 2025-05-12 PROCEDURE — 96375 TX/PRO/DX INJ NEW DRUG ADDON: CPT

## 2025-05-12 PROCEDURE — 6360000002 HC RX W HCPCS: Performed by: INTERNAL MEDICINE

## 2025-05-12 PROCEDURE — 84484 ASSAY OF TROPONIN QUANT: CPT

## 2025-05-12 PROCEDURE — 027037Z DILATION OF CORONARY ARTERY, ONE ARTERY WITH FOUR OR MORE DRUG-ELUTING INTRALUMINAL DEVICES, PERCUTANEOUS APPROACH: ICD-10-PCS | Performed by: INTERNAL MEDICINE

## 2025-05-12 PROCEDURE — 6370000000 HC RX 637 (ALT 250 FOR IP): Performed by: INTERNAL MEDICINE

## 2025-05-12 PROCEDURE — 02F03ZZ FRAGMENTATION IN CORONARY ARTERY, ONE ARTERY, PERCUTANEOUS APPROACH: ICD-10-PCS | Performed by: INTERNAL MEDICINE

## 2025-05-12 PROCEDURE — 2500000003 HC RX 250 WO HCPCS: Performed by: NURSE PRACTITIONER

## 2025-05-12 PROCEDURE — 2709999900 HC NON-CHARGEABLE SUPPLY: Performed by: INTERNAL MEDICINE

## 2025-05-12 PROCEDURE — 93458 L HRT ARTERY/VENTRICLE ANGIO: CPT | Performed by: INTERNAL MEDICINE

## 2025-05-12 PROCEDURE — 82962 GLUCOSE BLOOD TEST: CPT

## 2025-05-12 PROCEDURE — 6370000000 HC RX 637 (ALT 250 FOR IP): Performed by: NURSE PRACTITIONER

## 2025-05-12 PROCEDURE — 2100000000 HC CCU R&B

## 2025-05-12 PROCEDURE — 96376 TX/PRO/DX INJ SAME DRUG ADON: CPT

## 2025-05-12 PROCEDURE — B2151ZZ FLUOROSCOPY OF LEFT HEART USING LOW OSMOLAR CONTRAST: ICD-10-PCS | Performed by: INTERNAL MEDICINE

## 2025-05-12 PROCEDURE — 96374 THER/PROPH/DIAG INJ IV PUSH: CPT

## 2025-05-12 PROCEDURE — 93010 ELECTROCARDIOGRAM REPORT: CPT | Performed by: INTERNAL MEDICINE

## 2025-05-12 PROCEDURE — 99285 EMERGENCY DEPT VISIT HI MDM: CPT

## 2025-05-12 PROCEDURE — 92972 PERQ TRLUML CORONRY LITHOTRP: CPT | Performed by: INTERNAL MEDICINE

## 2025-05-12 PROCEDURE — 85347 COAGULATION TIME ACTIVATED: CPT

## 2025-05-12 PROCEDURE — 93306 TTE W/DOPPLER COMPLETE: CPT | Performed by: INTERNAL MEDICINE

## 2025-05-12 PROCEDURE — 2580000003 HC RX 258: Performed by: CASE MANAGER/CARE COORDINATOR

## 2025-05-12 PROCEDURE — B2111ZZ FLUOROSCOPY OF MULTIPLE CORONARY ARTERIES USING LOW OSMOLAR CONTRAST: ICD-10-PCS | Performed by: INTERNAL MEDICINE

## 2025-05-12 PROCEDURE — 6360000002 HC RX W HCPCS: Performed by: CASE MANAGER/CARE COORDINATOR

## 2025-05-12 PROCEDURE — 4A023N7 MEASUREMENT OF CARDIAC SAMPLING AND PRESSURE, LEFT HEART, PERCUTANEOUS APPROACH: ICD-10-PCS | Performed by: INTERNAL MEDICINE

## 2025-05-12 PROCEDURE — 71275 CT ANGIOGRAPHY CHEST: CPT

## 2025-05-12 PROCEDURE — 2500000003 HC RX 250 WO HCPCS: Performed by: INTERNAL MEDICINE

## 2025-05-12 PROCEDURE — C8929 TTE W OR WO FOL WCON,DOPPLER: HCPCS

## 2025-05-12 PROCEDURE — 80048 BASIC METABOLIC PNL TOTAL CA: CPT

## 2025-05-12 PROCEDURE — 6360000002 HC RX W HCPCS: Performed by: STUDENT IN AN ORGANIZED HEALTH CARE EDUCATION/TRAINING PROGRAM

## 2025-05-12 PROCEDURE — 2580000003 HC RX 258: Performed by: INTERNAL MEDICINE

## 2025-05-12 PROCEDURE — 99152 MOD SED SAME PHYS/QHP 5/>YRS: CPT | Performed by: INTERNAL MEDICINE

## 2025-05-12 PROCEDURE — 85025 COMPLETE CBC W/AUTO DIFF WBC: CPT

## 2025-05-12 PROCEDURE — 6360000002 HC RX W HCPCS: Performed by: NURSE PRACTITIONER

## 2025-05-12 PROCEDURE — B241ZZZ ULTRASONOGRAPHY OF MULTIPLE CORONARY ARTERIES: ICD-10-PCS | Performed by: INTERNAL MEDICINE

## 2025-05-12 PROCEDURE — C1769 GUIDE WIRE: HCPCS | Performed by: INTERNAL MEDICINE

## 2025-05-12 PROCEDURE — C1894 INTRO/SHEATH, NON-LASER: HCPCS | Performed by: INTERNAL MEDICINE

## 2025-05-12 PROCEDURE — 2700000000 HC OXYGEN THERAPY PER DAY

## 2025-05-12 PROCEDURE — 94640 AIRWAY INHALATION TREATMENT: CPT

## 2025-05-12 PROCEDURE — 6360000004 HC RX CONTRAST MEDICATION: Performed by: INTERNAL MEDICINE

## 2025-05-12 PROCEDURE — 36415 COLL VENOUS BLD VENIPUNCTURE: CPT

## 2025-05-12 PROCEDURE — 6370000000 HC RX 637 (ALT 250 FOR IP): Performed by: CASE MANAGER/CARE COORDINATOR

## 2025-05-12 PROCEDURE — 6360000004 HC RX CONTRAST MEDICATION: Performed by: STUDENT IN AN ORGANIZED HEALTH CARE EDUCATION/TRAINING PROGRAM

## 2025-05-12 PROCEDURE — C9606 PERC D-E COR REVASC W AMI S: HCPCS | Performed by: INTERNAL MEDICINE

## 2025-05-12 PROCEDURE — 94761 N-INVAS EAR/PLS OXIMETRY MLT: CPT

## 2025-05-12 PROCEDURE — 99223 1ST HOSP IP/OBS HIGH 75: CPT | Performed by: INTERNAL MEDICINE

## 2025-05-12 PROCEDURE — 83880 ASSAY OF NATRIURETIC PEPTIDE: CPT

## 2025-05-12 PROCEDURE — C1761 HC CATH TRANSLUM INTRAVASCULAR LITHOTRIPSY CORONARY: HCPCS | Performed by: INTERNAL MEDICINE

## 2025-05-12 PROCEDURE — 93005 ELECTROCARDIOGRAM TRACING: CPT | Performed by: STUDENT IN AN ORGANIZED HEALTH CARE EDUCATION/TRAINING PROGRAM

## 2025-05-12 PROCEDURE — 6370000000 HC RX 637 (ALT 250 FOR IP): Performed by: STUDENT IN AN ORGANIZED HEALTH CARE EDUCATION/TRAINING PROGRAM

## 2025-05-12 PROCEDURE — C1887 CATHETER, GUIDING: HCPCS | Performed by: INTERNAL MEDICINE

## 2025-05-12 DEVICE — STENT ONYXNG25012UX ONYX 2.50X12RX
Type: IMPLANTABLE DEVICE | Site: CORONARY | Status: FUNCTIONAL
Brand: ONYX FRONTIER™

## 2025-05-12 DEVICE — STENT ONYXNG30038UX ONYX 3.00X38RX
Type: IMPLANTABLE DEVICE | Site: CORONARY | Status: FUNCTIONAL
Brand: ONYX FRONTIER™

## 2025-05-12 DEVICE — STENT ONYXNG35015UX ONYX 3.50X15RX
Type: IMPLANTABLE DEVICE | Site: CORONARY | Status: FUNCTIONAL
Brand: ONYX FRONTIER™

## 2025-05-12 DEVICE — STENT ONYXNG40012UX ONYX 4.00X12RX
Type: IMPLANTABLE DEVICE | Site: CORONARY | Status: FUNCTIONAL
Brand: ONYX FRONTIER™

## 2025-05-12 RX ORDER — ASPIRIN 81 MG/1
81 TABLET ORAL DAILY
Status: DISCONTINUED | OUTPATIENT
Start: 2025-05-13 | End: 2025-05-16 | Stop reason: HOSPADM

## 2025-05-12 RX ORDER — DILTIAZEM HYDROCHLORIDE 5 MG/ML
10 INJECTION INTRAVENOUS
Status: COMPLETED | OUTPATIENT
Start: 2025-05-12 | End: 2025-05-12

## 2025-05-12 RX ORDER — METOPROLOL TARTRATE 1 MG/ML
5 INJECTION, SOLUTION INTRAVENOUS EVERY 4 HOURS PRN
Status: DISCONTINUED | OUTPATIENT
Start: 2025-05-12 | End: 2025-05-16 | Stop reason: HOSPADM

## 2025-05-12 RX ORDER — SODIUM CHLORIDE 0.9 % (FLUSH) 0.9 %
5-40 SYRINGE (ML) INJECTION EVERY 12 HOURS SCHEDULED
Status: DISCONTINUED | OUTPATIENT
Start: 2025-05-12 | End: 2025-05-12

## 2025-05-12 RX ORDER — ATORVASTATIN CALCIUM 80 MG/1
80 TABLET, FILM COATED ORAL NIGHTLY
Status: DISCONTINUED | OUTPATIENT
Start: 2025-05-12 | End: 2025-05-16 | Stop reason: HOSPADM

## 2025-05-12 RX ORDER — METOPROLOL TARTRATE 25 MG/1
25 TABLET, FILM COATED ORAL 2 TIMES DAILY
Status: DISCONTINUED | OUTPATIENT
Start: 2025-05-12 | End: 2025-05-12

## 2025-05-12 RX ORDER — OXYCODONE HYDROCHLORIDE 5 MG/1
5 TABLET ORAL
Refills: 0 | Status: COMPLETED | OUTPATIENT
Start: 2025-05-12 | End: 2025-05-12

## 2025-05-12 RX ORDER — HEPARIN SODIUM 1000 [USP'U]/ML
4000 INJECTION, SOLUTION INTRAVENOUS; SUBCUTANEOUS
Status: COMPLETED | OUTPATIENT
Start: 2025-05-12 | End: 2025-05-12

## 2025-05-12 RX ORDER — LIDOCAINE HYDROCHLORIDE 10 MG/ML
INJECTION, SOLUTION INFILTRATION; PERINEURAL PRN
Status: DISCONTINUED | OUTPATIENT
Start: 2025-05-12 | End: 2025-05-12 | Stop reason: HOSPADM

## 2025-05-12 RX ORDER — ASPIRIN 81 MG/1
81 TABLET, CHEWABLE ORAL DAILY
Status: DISCONTINUED | OUTPATIENT
Start: 2025-05-13 | End: 2025-05-12

## 2025-05-12 RX ORDER — ACETAMINOPHEN 325 MG/1
650 TABLET ORAL EVERY 6 HOURS PRN
Status: DISCONTINUED | OUTPATIENT
Start: 2025-05-12 | End: 2025-05-16 | Stop reason: HOSPADM

## 2025-05-12 RX ORDER — ACETAMINOPHEN 650 MG/1
650 SUPPOSITORY RECTAL EVERY 6 HOURS PRN
Status: DISCONTINUED | OUTPATIENT
Start: 2025-05-12 | End: 2025-05-16 | Stop reason: HOSPADM

## 2025-05-12 RX ORDER — ENALAPRIL MALEATE 20 MG/1
20 TABLET ORAL DAILY
Status: DISCONTINUED | OUTPATIENT
Start: 2025-05-12 | End: 2025-05-12 | Stop reason: CLARIF

## 2025-05-12 RX ORDER — SODIUM CHLORIDE 0.9 % (FLUSH) 0.9 %
5-40 SYRINGE (ML) INJECTION PRN
Status: DISCONTINUED | OUTPATIENT
Start: 2025-05-12 | End: 2025-05-16 | Stop reason: HOSPADM

## 2025-05-12 RX ORDER — SODIUM CHLORIDE 9 MG/ML
INJECTION, SOLUTION INTRAVENOUS CONTINUOUS
Status: ACTIVE | OUTPATIENT
Start: 2025-05-12 | End: 2025-05-12

## 2025-05-12 RX ORDER — POLYETHYLENE GLYCOL 3350 17 G/17G
17 POWDER, FOR SOLUTION ORAL DAILY PRN
Status: DISCONTINUED | OUTPATIENT
Start: 2025-05-12 | End: 2025-05-16 | Stop reason: HOSPADM

## 2025-05-12 RX ORDER — SODIUM CHLORIDE 9 MG/ML
INJECTION, SOLUTION INTRAVENOUS PRN
Status: DISCONTINUED | OUTPATIENT
Start: 2025-05-12 | End: 2025-05-12

## 2025-05-12 RX ORDER — HEPARIN SODIUM 200 [USP'U]/100ML
INJECTION, SOLUTION INTRAVENOUS CONTINUOUS PRN
Status: DISCONTINUED | OUTPATIENT
Start: 2025-05-12 | End: 2025-05-12 | Stop reason: HOSPADM

## 2025-05-12 RX ORDER — METOPROLOL TARTRATE 25 MG/1
25 TABLET, FILM COATED ORAL EVERY 6 HOURS
Status: DISCONTINUED | OUTPATIENT
Start: 2025-05-12 | End: 2025-05-14

## 2025-05-12 RX ORDER — ALBUTEROL SULFATE 0.83 MG/ML
2.5 SOLUTION RESPIRATORY (INHALATION) EVERY 6 HOURS PRN
Status: DISCONTINUED | OUTPATIENT
Start: 2025-05-12 | End: 2025-05-16 | Stop reason: HOSPADM

## 2025-05-12 RX ORDER — INSULIN LISPRO 100 [IU]/ML
0-4 INJECTION, SOLUTION INTRAVENOUS; SUBCUTANEOUS
Status: DISCONTINUED | OUTPATIENT
Start: 2025-05-12 | End: 2025-05-16 | Stop reason: HOSPADM

## 2025-05-12 RX ORDER — NITROGLYCERIN 0.4 MG/1
0.4 TABLET SUBLINGUAL EVERY 5 MIN PRN
Status: DISCONTINUED | OUTPATIENT
Start: 2025-05-12 | End: 2025-05-16 | Stop reason: HOSPADM

## 2025-05-12 RX ORDER — EPTIFIBATIDE 0.75 MG/ML
2 INJECTION, SOLUTION INTRAVENOUS CONTINUOUS
Status: DISPENSED | OUTPATIENT
Start: 2025-05-12 | End: 2025-05-12

## 2025-05-12 RX ORDER — ARFORMOTEROL TARTRATE 15 UG/2ML
15 SOLUTION RESPIRATORY (INHALATION)
Status: DISCONTINUED | OUTPATIENT
Start: 2025-05-12 | End: 2025-05-16 | Stop reason: HOSPADM

## 2025-05-12 RX ORDER — EPTIFIBATIDE 0.75 MG/ML
INJECTION, SOLUTION INTRAVENOUS CONTINUOUS PRN
Status: COMPLETED | OUTPATIENT
Start: 2025-05-12 | End: 2025-05-12

## 2025-05-12 RX ORDER — SODIUM CHLORIDE 9 MG/ML
INJECTION, SOLUTION INTRAVENOUS CONTINUOUS
Status: DISCONTINUED | OUTPATIENT
Start: 2025-05-12 | End: 2025-05-12 | Stop reason: SDUPTHER

## 2025-05-12 RX ORDER — ASPIRIN 81 MG/1
81 TABLET ORAL DAILY
Status: DISCONTINUED | OUTPATIENT
Start: 2025-05-13 | End: 2025-05-12 | Stop reason: SDUPTHER

## 2025-05-12 RX ORDER — MAGNESIUM SULFATE IN WATER 40 MG/ML
2000 INJECTION, SOLUTION INTRAVENOUS PRN
Status: DISCONTINUED | OUTPATIENT
Start: 2025-05-12 | End: 2025-05-16 | Stop reason: HOSPADM

## 2025-05-12 RX ORDER — MORPHINE SULFATE 4 MG/ML
4 INJECTION, SOLUTION INTRAMUSCULAR; INTRAVENOUS
Refills: 0 | Status: COMPLETED | OUTPATIENT
Start: 2025-05-12 | End: 2025-05-12

## 2025-05-12 RX ORDER — MIDAZOLAM HYDROCHLORIDE 1 MG/ML
INJECTION, SOLUTION INTRAMUSCULAR; INTRAVENOUS PRN
Status: DISCONTINUED | OUTPATIENT
Start: 2025-05-12 | End: 2025-05-12 | Stop reason: HOSPADM

## 2025-05-12 RX ORDER — LISINOPRIL 20 MG/1
20 TABLET ORAL DAILY
Status: DISCONTINUED | OUTPATIENT
Start: 2025-05-12 | End: 2025-05-14

## 2025-05-12 RX ORDER — POTASSIUM CHLORIDE 1500 MG/1
40 TABLET, EXTENDED RELEASE ORAL PRN
Status: DISCONTINUED | OUTPATIENT
Start: 2025-05-12 | End: 2025-05-16 | Stop reason: HOSPADM

## 2025-05-12 RX ORDER — SODIUM CHLORIDE 0.9 % (FLUSH) 0.9 %
5-40 SYRINGE (ML) INJECTION EVERY 12 HOURS SCHEDULED
Status: DISCONTINUED | OUTPATIENT
Start: 2025-05-12 | End: 2025-05-16 | Stop reason: HOSPADM

## 2025-05-12 RX ORDER — SODIUM CHLORIDE 9 MG/ML
INJECTION, SOLUTION INTRAVENOUS PRN
Status: DISCONTINUED | OUTPATIENT
Start: 2025-05-12 | End: 2025-05-16 | Stop reason: HOSPADM

## 2025-05-12 RX ORDER — POTASSIUM CHLORIDE 7.45 MG/ML
10 INJECTION INTRAVENOUS PRN
Status: DISCONTINUED | OUTPATIENT
Start: 2025-05-12 | End: 2025-05-16 | Stop reason: HOSPADM

## 2025-05-12 RX ORDER — IOPAMIDOL 755 MG/ML
INJECTION, SOLUTION INTRAVASCULAR PRN
Status: DISCONTINUED | OUTPATIENT
Start: 2025-05-12 | End: 2025-05-12 | Stop reason: HOSPADM

## 2025-05-12 RX ORDER — BIVALIRUDIN 250 MG/5ML
INJECTION, POWDER, LYOPHILIZED, FOR SOLUTION INTRAVENOUS PRN
Status: DISCONTINUED | OUTPATIENT
Start: 2025-05-12 | End: 2025-05-12 | Stop reason: HOSPADM

## 2025-05-12 RX ORDER — EPTIFIBATIDE 2 MG/ML
INJECTION, SOLUTION INTRAVENOUS PRN
Status: DISCONTINUED | OUTPATIENT
Start: 2025-05-12 | End: 2025-05-12 | Stop reason: HOSPADM

## 2025-05-12 RX ORDER — ACETAMINOPHEN 325 MG/1
650 TABLET ORAL EVERY 4 HOURS PRN
Status: DISCONTINUED | OUTPATIENT
Start: 2025-05-12 | End: 2025-05-12 | Stop reason: SDUPTHER

## 2025-05-12 RX ORDER — IOPAMIDOL 755 MG/ML
100 INJECTION, SOLUTION INTRAVASCULAR
Status: COMPLETED | OUTPATIENT
Start: 2025-05-12 | End: 2025-05-12

## 2025-05-12 RX ORDER — ASPIRIN 325 MG
325 TABLET ORAL
Status: COMPLETED | OUTPATIENT
Start: 2025-05-12 | End: 2025-05-12

## 2025-05-12 RX ORDER — DILTIAZEM HYDROCHLORIDE 240 MG/1
240 CAPSULE, COATED, EXTENDED RELEASE ORAL DAILY
Status: CANCELLED | OUTPATIENT
Start: 2025-05-12

## 2025-05-12 RX ORDER — BUDESONIDE 0.5 MG/2ML
1 INHALANT ORAL
Status: DISCONTINUED | OUTPATIENT
Start: 2025-05-12 | End: 2025-05-16 | Stop reason: HOSPADM

## 2025-05-12 RX ADMIN — IPRATROPIUM BROMIDE 0.5 MG: 0.5 SOLUTION RESPIRATORY (INHALATION) at 10:53

## 2025-05-12 RX ADMIN — SODIUM CHLORIDE: 0.9 INJECTION, SOLUTION INTRAVENOUS at 07:41

## 2025-05-12 RX ADMIN — IPRATROPIUM BROMIDE 0.5 MG: 0.5 SOLUTION RESPIRATORY (INHALATION) at 20:03

## 2025-05-12 RX ADMIN — BUDESONIDE INHALATION 1000 MCG: 0.5 SUSPENSION RESPIRATORY (INHALATION) at 20:03

## 2025-05-12 RX ADMIN — METOPROLOL TARTRATE 25 MG: 25 TABLET, FILM COATED ORAL at 18:22

## 2025-05-12 RX ADMIN — HEPARIN SODIUM 4000 UNITS: 1000 INJECTION INTRAVENOUS; SUBCUTANEOUS at 05:11

## 2025-05-12 RX ADMIN — TICAGRELOR 90 MG: 90 TABLET ORAL at 17:04

## 2025-05-12 RX ADMIN — METOPROLOL TARTRATE 5 MG: 5 INJECTION INTRAVENOUS at 22:15

## 2025-05-12 RX ADMIN — LISINOPRIL 20 MG: 20 TABLET ORAL at 10:47

## 2025-05-12 RX ADMIN — SODIUM CHLORIDE, PRESERVATIVE FREE 10 ML: 5 INJECTION INTRAVENOUS at 07:44

## 2025-05-12 RX ADMIN — EPTIFIBATIDE 2 MCG/KG/MIN: 0.75 INJECTION, SOLUTION INTRAVENOUS at 07:37

## 2025-05-12 RX ADMIN — IOPAMIDOL 100 ML: 755 INJECTION, SOLUTION INTRAVENOUS at 03:46

## 2025-05-12 RX ADMIN — ATORVASTATIN CALCIUM 80 MG: 80 TABLET, FILM COATED ORAL at 20:18

## 2025-05-12 RX ADMIN — DILTIAZEM HYDROCHLORIDE 10 MG: 5 INJECTION, SOLUTION INTRAVENOUS at 03:07

## 2025-05-12 RX ADMIN — TICAGRELOR 180 MG: 90 TABLET ORAL at 04:44

## 2025-05-12 RX ADMIN — ARFORMOTEROL TARTRATE 15 MCG: 15 SOLUTION RESPIRATORY (INHALATION) at 10:53

## 2025-05-12 RX ADMIN — EMPAGLIFLOZIN 10 MG: 10 TABLET, FILM COATED ORAL at 10:47

## 2025-05-12 RX ADMIN — ASPIRIN 325 MG: 325 TABLET, FILM COATED ORAL at 04:41

## 2025-05-12 RX ADMIN — ARFORMOTEROL TARTRATE 15 MCG: 15 SOLUTION RESPIRATORY (INHALATION) at 20:03

## 2025-05-12 RX ADMIN — SULFUR HEXAFLUORIDE 2 ML: KIT at 09:54

## 2025-05-12 RX ADMIN — BUDESONIDE INHALATION 1000 MCG: 0.5 SUSPENSION RESPIRATORY (INHALATION) at 10:53

## 2025-05-12 RX ADMIN — METOPROLOL TARTRATE 25 MG: 25 TABLET, FILM COATED ORAL at 23:30

## 2025-05-12 RX ADMIN — OXYCODONE 5 MG: 5 TABLET ORAL at 04:08

## 2025-05-12 RX ADMIN — EPTIFIBATIDE 2 MCG/KG/MIN: 0.75 INJECTION, SOLUTION INTRAVENOUS at 13:20

## 2025-05-12 RX ADMIN — DILTIAZEM HYDROCHLORIDE 10 MG: 5 INJECTION, SOLUTION INTRAVENOUS at 04:07

## 2025-05-12 RX ADMIN — IPRATROPIUM BROMIDE 0.5 MG: 0.5 SOLUTION RESPIRATORY (INHALATION) at 14:33

## 2025-05-12 RX ADMIN — MORPHINE SULFATE 4 MG: 4 INJECTION INTRAVENOUS at 03:06

## 2025-05-12 RX ADMIN — METOPROLOL TARTRATE 5 MG: 5 INJECTION INTRAVENOUS at 18:23

## 2025-05-12 RX ADMIN — METOPROLOL TARTRATE 25 MG: 25 TABLET, FILM COATED ORAL at 10:47

## 2025-05-12 ASSESSMENT — PAIN DESCRIPTION - ORIENTATION
ORIENTATION: RIGHT
ORIENTATION: ANTERIOR

## 2025-05-12 ASSESSMENT — PAIN DESCRIPTION - LOCATION
LOCATION: CHEST

## 2025-05-12 ASSESSMENT — PAIN SCALES - GENERAL
PAINLEVEL_OUTOF10: 0
PAINLEVEL_OUTOF10: 0
PAINLEVEL_OUTOF10: 2
PAINLEVEL_OUTOF10: 6
PAINLEVEL_OUTOF10: 6
PAINLEVEL_OUTOF10: 5
PAINLEVEL_OUTOF10: 0
PAINLEVEL_OUTOF10: 0

## 2025-05-12 ASSESSMENT — PAIN - FUNCTIONAL ASSESSMENT: PAIN_FUNCTIONAL_ASSESSMENT: 0-10

## 2025-05-12 ASSESSMENT — PAIN DESCRIPTION - DESCRIPTORS
DESCRIPTORS: DULL
DESCRIPTORS: ACHING

## 2025-05-12 NOTE — H&P
[facility] Cardiology Initial Cardiac Evaluation      Date of  Admission: 5/12/2025  2:42 AM     Primary Care Physician: Rashel Varlea DO  Primary Cardiologist: Dr. Ribera  Referring Physician: Dr. Zamudio  Supervising Physician: Dr. Rader    CC/Reason for evaluation: STEMI    HPI:  Christophe Xavier is a 75 y.o. male with prior history of Afib (s/p Watchman Oct 2024), CAD (MI x2, at least 1 stent per pt, last [facility] no stents 2016), DMT2, HLD, Aortic sclerosis/stenosis   presents to [facility] with central substernal chest pain radiating to the back that began Sunday morning when he woke up (although he reported to ER triage that he twisted and felt like he pulled a muscle in his back when reaching for a chair while playing with grandson).  The pain is constant since this time.  There are some inconsistencies in his description.  Associated symptoms include shortness of breath at rest and with exertion.  The pt denies any dizziness, lightheadedness, syncope, SHAH, nausea, vomiting, diarrhea, fever, chills, diaphoresis.  EMS was summoned and an ECG confirmed KUSUM in inferior leads along with Afib RVR, which continued despite rate slowing medications (Diltiazem 10mg IV x2 doses).  Pt also received Morphine 4mg IV, Oxycodone, and ASA with minimal relief.  BP: 140/80  HR: 93  RR: 24 SPO2: 97 on 3LNC.  Pertinent labwork collected and pending.  The pt was prepped and transported to the Cath Lab for emergent heart catheterization with possible PCI.            Past Medical History:   Diagnosis Date    COPD (chronic obstructive pulmonary disease) (HCC)     Diabetes mellitus, type II (HCC)     Prostate cancer (HCC) 2003      Past Surgical History:   Procedure Laterality Date    ABDOMINAL HERNIA REPAIR      25 years ago    EP DEVICE PROCEDURE N/A 11/5/2024    Watchman joseph closure device performed by Sameer Torres MD at [facility] MAIN OR    HAND SURGERY      screw in hand    JOINT REPLACEMENT Bilateral     knee     constipation, diarrhea, liver problems, or h/o GI bleed  Urinary: no frequency, urgency, hematuria, burning/pain with urination, recent flank pain, or difficulty urinating  Peripheral Vascular: no prior DVTs, swelling of calves, legs, or feet, color change  Musculoskeletal: no muscle or joint pain/stiffness, joint swelling, erythema of joints, or back pain  Neurological: no sensory or motor loss, seizures, syncope, tremors, numbness, no dementia  Hematologic: no anemia, easy bruising or bleeding  Endocrine: no thyroid problems, heat or cold intolerance, excessive sweating, polyuria, polydipsia, +  diabetes.       Subjective:     Physical Exam:    Vitals:    05/12/25 0412 05/12/25 0413 05/12/25 0430 05/12/25 0446   BP:   117/84    Pulse: 90 92 84 88   Resp: (!) 32 (!) 36 (!) 34 (!) 33   Temp:       TempSrc:       SpO2: 94% 93% 91% 92%   Weight:       Height:           Physical Exam    General: No Acute Distress  HEENT: pupils equal and round, no abnormalities noted  Neck: supple, no JVD  Heart: S1S2 with IRIR without murmurs, rubs, or gallops  Lungs: Clear throughout auscultation bilaterally   Abd: soft, nontender, nondistended  Ext: warm, no edema, calves supple/nontender, pulses 2+ bilaterally  Skin: warm and dry  Psychiatric: Normal mood and affect  Neurologic: Alert and oriented X 3    Cardiographics    Telemetry: Atrial fibrillation, rate 90's-101  ECG: Atrial fibrillation RVR with KUSUM inferior leads, rate 90  Echocardiogram: 12/19/24    CHRIS (PRN CONTRAST/BUBBLE/3D) 12/19/2024  4:51 PM (Final)    Interpretation Summary    Left Ventricle: Normal left ventricular systolic function with a visually estimated EF of 55 - 60%. Left ventricle size is normal.    Left Atrium: There is a #27 mm Watchman device which appears adequately seated.  There is no evidence of peridevice flow.    Image quality is excellent.    Signed by: Elroy Narayan MD on 12/19/2024  4:51 PM    Cath: No results found for this or any previous

## 2025-05-12 NOTE — PLAN OF CARE
Problem: Respiratory - Adult  Goal: Achieves optimal ventilation and oxygenation  Outcome: Progressing  Flowsheets (Taken 5/12/2025 1057)  Achieves optimal ventilation and oxygenation:   Assess for changes in respiratory status   Position to facilitate oxygenation and minimize respiratory effort   Respiratory therapy support as indicated   Assess for changes in mentation and behavior   Oxygen supplementation based on oxygen saturation or arterial blood gases   Encourage broncho-pulmonary hygiene including cough, deep breathe, incentive spirometry   Assess and instruct to report shortness of breath or any respiratory difficulty      Burow's Advancement Flap Text: The defect edges were debeveled with a #15 scalpel blade.  Given the location of the defect and the proximity to free margins a Burow's advancement flap was deemed most appropriate.  Using a sterile surgical marker, the appropriate advancement flap was drawn incorporating the defect and placing the expected incisions within the relaxed skin tension lines where possible.    The area thus outlined was incised deep to adipose tissue with a #15 scalpel blade.  The skin margins were undermined to an appropriate distance in all directions utilizing iris scissors.

## 2025-05-12 NOTE — ED TRIAGE NOTES
Pt ambulatory to triage reports \"twisting to grab something\" on Saturday and feeling like her strained his chest. Pt reports pain in sternum and muscle cramps in the right chest which started yesterday.    Baseline O2 3L    Hx COPD

## 2025-05-12 NOTE — INTERDISCIPLINARY ROUNDS
Multi-D Rounds/Checklist (leapfrog):  Lines: can any be removed?: None      DVT Prophylaxis: Ordered  Vent: N/A  Nutrition Ordered/appropriate: Per Primary Team  Can antibiotics or other drugs be stopped? N/A Yes/No  MRSA swab:   Inpat Anti-Infectives (From admission, onward)      None          Consults needed: None  A: Is pain control adequate? (has PRNs? Stop drip?) Yes  B: Sedation break and SBT? N/A  C: Is sedation choice appropriate? N/A  D: Delirium/CAM-ICU? No  E: Mobility goals/appropriateness? Yes  F: Family update and plan? wife is surrogate decision maker and is being updated daily by primary attending and nursing staff.    Mami Headley, APRN - CNP

## 2025-05-12 NOTE — CARE COORDINATION
Case Management Assessment  Initial Evaluation    Date/Time of Evaluation: 5/12/2025 2:46 PM  Assessment Completed by: Nracisa Catalan RN    If patient is discharged prior to next notation, then this note serves as note for discharge by case management.    Patient Name: Christophe Xavier                   YOB: 1950  Diagnosis: STEMI (ST elevation myocardial infarction) ([facility]) [I21.3]  Atrial fibrillation with rapid ventricular response (HCC) [I48.91]  ST elevation myocardial infarction (STEMI), unspecified artery (HCC) [I21.3]  Acute inferior myocardial infarction (HCC) [I21.19]                   Date / Time: 5/12/2025  2:42 AM    Patient Admission Status: Inpatient   Readmission Risk (Low < 19, Mod (19-27), High > 27): Readmission Risk Score: 12.3    Current PCP: Rashel Varela, DO  PCP verified by CM? Yes    Chart Reviewed: Yes      History Provided by: Patient  Patient Orientation: Alert and Oriented    Patient Cognition: Alert    Hospitalization in the last 30 days (Readmission):  No    If yes, Readmission Assessment in  Navigator will be completed.    Advance Directives:      Code Status: Full Code   Patient's Primary Decision Maker is: Legal Next of Kin    Primary Decision Maker: Chelsy Xavier - Spouse - [phone]    Secondary Decision Maker: TERRENCE CONTRERAS - Child - [phone]    Discharge Planning:    Patient lives with: (P) Spouse/Significant Other Type of Home: (P) House  Primary Care Giver: Self  Patient Support Systems include: Spouse/Significant Other, Children, Family Members   Current Financial resources: (P) Medicare ([facility])  Current community resources: (P) None  Current services prior to admission: (P) Durable Medical Equipment            Current DME: (P) Bipap, Oxygen Therapy (Comment)            Type of Home Care services:   none    ADLS  Prior functional level: (P) Independent in ADLs/IADLs  Current functional level: (P) Assistance with the following:    PT AM-PAC:    /24  OT AM-PAC:   /24    Family can provide assistance at DC: (P) Yes  Would you like Case Management to discuss the discharge plan with any other family members/significant others, and if so, who? (P) Yes  Plans to Return to Present Housing: (P) Yes  Other Identified Issues/Barriers to RETURNING to current housing: Pending  Potential Assistance needed at discharge:  Pending            Potential DME:  Pending  Patient expects to discharge to:  Pending  Plan for transportation at discharge: (P) Family    Financial    Payor: [facility] MEDICARE / Plan: [facility] MEDICARE COMPLETE / Product Type: *No Product type* /     Does insurance require precert for SNF: Yes    Potential assistance Purchasing Medications: (P) No  Meds-to-Beds request:  Pending      [facility] - [place], [place] - [address] - P [phone] - F [phone]  [address]  [address]  [facility] [address]  Phone: [phone] Fax: [phone]    [facility] - [place], [place] - [address] - P [phone] - F [phone]  [address]  [place] [address]  Phone: [phone] Fax: [phone]    [id number] - [place], [place] - [address] - P [phone] - F [phone]  [address]  [place] [address]  Phone: [phone] Fax: [phone]    [id number] - [place], [place] - [address] - P [phone] - F [phone]  [address]  [place] [address]  Phone: [phone] Fax: [phone]    [facility] - [place], [place] - [address] - P [phone] - F [phone]  [address]  [address]  [facility] [address]  Phone: [phone] Fax: [phone]      Notes:    Factors facilitating achievement of predicted outcomes: Family support, Motivated, Cooperative, and Pleasant    Barriers to discharge: Pending medical treatment    Additional Case Management Notes: Chart reviewed and pt

## 2025-05-12 NOTE — ED PROVIDER NOTES
[facility]  Emergency Department    DISPOSITION Decision To Admit 05/12/2025 04:50:23 AM     ICD-10-CM    1. ST elevation myocardial infarction (STEMI), unspecified artery (HCC)  I21.3       2. Atrial fibrillation with rapid ventricular response (HCC)  I48.91         New Prescriptions    No medications on file     ED Course     ED Course as of 05/12/25 0450   Mon May 12, 2025   0249 75-year-old male history of HTN, A-fib, DM presents with right sided chest pain radiating to back with shortness of breath.  Tachycardic, otherwise vitals reassuring.  On home O2.  EKG without obvious STEMI but does show A-fib with RVR.  Will obtain labs and CT imaging to rule out dissection given radiation of symptoms.  Will give pain control and diltiazem [ER]   0250 EKG: Atrial fibrillation with rapid ventricular response, rate 109.  PVCs.  No STEMI.  Diffuse baseline artifact.  Nonspecific ST and T wave changes.  Time: 0246 [ER]   0400 Initial labs show BNP 1500, Trope 32, WBC 12.0.  Repeat troponin pending.  CT imaging pending.  Remains in A-fib with RVR after diltiazem x1.  Will give additional Dilt.  Pain initially improved after IV morphine but is now returning after coming back from CT and moving around. [ER]   0440 Rate improved after IV diltiazem x 2.  Now in the 90s.  Repeat EKG now showing some inferior and possibly lateral ST elevations.  He does remain with persistent pain. I have STEMI alerted and reached out to Cardiology ([facility]) [ER]   0443 EKG 2: Atrial fibrillation, rate 90.  ST elevations inferior and leads V5, V6.  Nonspecific ST and T wave changes.  Time: 0433 [ER]      ED Course User Index  [ER] Jj Zamudio MD     Data Reviewed and Analyzed:  1 or more acute illnesses that pose a threat to life or bodily function.   Chronic medical problems impacting care include atrial fibrillation.  I independently ordered and reviewed each unique test.    I reviewed external records: provider

## 2025-05-12 NOTE — PROGRESS NOTES
STEMI w/ Dr. Sawyer  Shockwave and 4 stents to RCA  R radial access  TR band to R radial @ 12 mL  No s/sxs of bleeding or hematoma to R radial access site    Heparin 5000 units  Versed 4 mg  Fentanyl 75 mcg  Angiomax bolus and drip, drip infused until 6:35  Integrilin bolus and drip, drip to infuse for 8 hours per Dr Rader  Report given to CARMEN Monson from CCU

## 2025-05-12 NOTE — ACP (ADVANCE CARE PLANNING)
Advance Care Planning     Advance Care Planning Activator (Inpatient)  Conversation Note      Date of ACP Conversation: 5/12/2025     ]    ACP Activator: Narcisa Catalan RN    {When Decision Maker makes decisions on behalf of the incapacitated patient: Decision Maker is asked to consider and make decisions based on patient values, known preferences, or best interests. ]    Health Care Decision Maker: REBECCA/CHRIS on file. Agents listed below.     Current Designated Health Care Decision Maker:     Primary Decision Maker: Chelsy Xavier - Spouse - [phone]    Secondary Decision Maker: TERRENCE CONTRERAS - Child - [phone]  Click here to complete Healthcare Decision Makers including section of the Healthcare Decision Maker Relationship (ie \"Primary\")  Today we documented Decision Maker(s) consistent with ACP documents on file.    Care Preferences    Full code per MD orders.

## 2025-05-13 LAB
ALBUMIN SERPL-MCNC: 3.1 G/DL (ref 3.2–4.6)
ALBUMIN/GLOB SERPL: 0.9 (ref 1–1.9)
ALP SERPL-CCNC: 72 U/L (ref 40–129)
ALT SERPL-CCNC: 23 U/L (ref 8–55)
ANION GAP SERPL CALC-SCNC: 13 MMOL/L (ref 7–16)
AST SERPL-CCNC: 30 U/L (ref 15–37)
BASOPHILS # BLD: 0.04 K/UL (ref 0–0.2)
BASOPHILS NFR BLD: 0.4 % (ref 0–2)
BILIRUB SERPL-MCNC: 1.2 MG/DL (ref 0–1.2)
BUN SERPL-MCNC: 21 MG/DL (ref 8–23)
CALCIUM SERPL-MCNC: 9.8 MG/DL (ref 8.8–10.2)
CHLORIDE SERPL-SCNC: 103 MMOL/L (ref 98–107)
CHOLEST SERPL-MCNC: 100 MG/DL (ref 0–200)
CO2 SERPL-SCNC: 20 MMOL/L (ref 20–29)
CREAT SERPL-MCNC: 1.1 MG/DL (ref 0.8–1.3)
DIFFERENTIAL METHOD BLD: ABNORMAL
EOSINOPHIL # BLD: 0.09 K/UL (ref 0–0.8)
EOSINOPHIL NFR BLD: 0.9 % (ref 0.5–7.8)
ERYTHROCYTE [DISTWIDTH] IN BLOOD BY AUTOMATED COUNT: 14.2 % (ref 11.9–14.6)
EST. AVERAGE GLUCOSE BLD GHB EST-MCNC: 119 MG/DL
GLOBULIN SER CALC-MCNC: 3.4 G/DL (ref 2.3–3.5)
GLUCOSE BLD STRIP.AUTO-MCNC: 116 MG/DL (ref 65–100)
GLUCOSE BLD STRIP.AUTO-MCNC: 130 MG/DL (ref 65–100)
GLUCOSE BLD STRIP.AUTO-MCNC: 131 MG/DL (ref 65–100)
GLUCOSE BLD STRIP.AUTO-MCNC: 148 MG/DL (ref 65–100)
GLUCOSE SERPL-MCNC: 118 MG/DL (ref 70–99)
HBA1C MFR BLD: 5.8 % (ref 0–5.6)
HCT VFR BLD AUTO: 38.6 % (ref 41.1–50.3)
HDLC SERPL-MCNC: 65 MG/DL (ref 40–60)
HDLC SERPL: 1.5 (ref 0–5)
HGB BLD-MCNC: 12.6 G/DL (ref 13.6–17.2)
IMM GRANULOCYTES # BLD AUTO: 0.06 K/UL (ref 0–0.5)
IMM GRANULOCYTES NFR BLD AUTO: 0.6 % (ref 0–5)
LDLC SERPL CALC-MCNC: 24 MG/DL (ref 0–100)
LYMPHOCYTES # BLD: 0.86 K/UL (ref 0.5–4.6)
LYMPHOCYTES NFR BLD: 8.7 % (ref 13–44)
MAGNESIUM SERPL-MCNC: 2.2 MG/DL (ref 1.8–2.4)
MCH RBC QN AUTO: 32.7 PG (ref 26.1–32.9)
MCHC RBC AUTO-ENTMCNC: 32.6 G/DL (ref 31.4–35)
MCV RBC AUTO: 100.3 FL (ref 82–102)
MONOCYTES # BLD: 0.98 K/UL (ref 0.1–1.3)
MONOCYTES NFR BLD: 9.9 % (ref 4–12)
NEUTS SEG # BLD: 7.83 K/UL (ref 1.7–8.2)
NEUTS SEG NFR BLD: 79.5 % (ref 43–78)
NRBC # BLD: 0 K/UL (ref 0–0.2)
PLATELET # BLD AUTO: 180 K/UL (ref 150–450)
PMV BLD AUTO: 10 FL (ref 9.4–12.3)
POTASSIUM SERPL-SCNC: 4.2 MMOL/L (ref 3.5–5.1)
PROT SERPL-MCNC: 6.5 G/DL (ref 6.3–8.2)
RBC # BLD AUTO: 3.85 M/UL (ref 4.23–5.6)
SERVICE CMNT-IMP: ABNORMAL
SODIUM SERPL-SCNC: 136 MMOL/L (ref 136–145)
TRIGL SERPL-MCNC: 58 MG/DL (ref 0–150)
VLDLC SERPL CALC-MCNC: 12 MG/DL (ref 6–23)
WBC # BLD AUTO: 9.9 K/UL (ref 4.3–11.1)

## 2025-05-13 PROCEDURE — 36415 COLL VENOUS BLD VENIPUNCTURE: CPT

## 2025-05-13 PROCEDURE — 85025 COMPLETE CBC W/AUTO DIFF WBC: CPT

## 2025-05-13 PROCEDURE — 2580000003 HC RX 258: Performed by: INTERNAL MEDICINE

## 2025-05-13 PROCEDURE — 2500000003 HC RX 250 WO HCPCS: Performed by: NURSE PRACTITIONER

## 2025-05-13 PROCEDURE — 2700000000 HC OXYGEN THERAPY PER DAY

## 2025-05-13 PROCEDURE — 6370000000 HC RX 637 (ALT 250 FOR IP): Performed by: NURSE PRACTITIONER

## 2025-05-13 PROCEDURE — 83735 ASSAY OF MAGNESIUM: CPT

## 2025-05-13 PROCEDURE — 94640 AIRWAY INHALATION TREATMENT: CPT

## 2025-05-13 PROCEDURE — 82962 GLUCOSE BLOOD TEST: CPT

## 2025-05-13 PROCEDURE — 99232 SBSQ HOSP IP/OBS MODERATE 35: CPT | Performed by: INTERNAL MEDICINE

## 2025-05-13 PROCEDURE — 6370000000 HC RX 637 (ALT 250 FOR IP): Performed by: INTERNAL MEDICINE

## 2025-05-13 PROCEDURE — 2140000000 HC CCU INTERMEDIATE R&B

## 2025-05-13 PROCEDURE — 2500000003 HC RX 250 WO HCPCS: Performed by: STUDENT IN AN ORGANIZED HEALTH CARE EDUCATION/TRAINING PROGRAM

## 2025-05-13 PROCEDURE — 83036 HEMOGLOBIN GLYCOSYLATED A1C: CPT

## 2025-05-13 PROCEDURE — 80053 COMPREHEN METABOLIC PANEL: CPT

## 2025-05-13 PROCEDURE — 80061 LIPID PANEL: CPT

## 2025-05-13 PROCEDURE — 6370000000 HC RX 637 (ALT 250 FOR IP): Performed by: CASE MANAGER/CARE COORDINATOR

## 2025-05-13 PROCEDURE — 6360000002 HC RX W HCPCS: Performed by: NURSE PRACTITIONER

## 2025-05-13 PROCEDURE — 94761 N-INVAS EAR/PLS OXIMETRY MLT: CPT

## 2025-05-13 PROCEDURE — 2500000003 HC RX 250 WO HCPCS: Performed by: INTERNAL MEDICINE

## 2025-05-13 RX ORDER — MENTHOL/CAMPHOR/ALLANTOIN/PHE 0.6-0.5-1%
OINTMENT(EA) TOPICAL PRN
Status: DISCONTINUED | OUTPATIENT
Start: 2025-05-13 | End: 2025-05-16 | Stop reason: HOSPADM

## 2025-05-13 RX ORDER — DILTIAZEM HYDROCHLORIDE 5 MG/ML
10 INJECTION INTRAVENOUS ONCE
Status: COMPLETED | OUTPATIENT
Start: 2025-05-13 | End: 2025-05-13

## 2025-05-13 RX ORDER — METOPROLOL TARTRATE 25 MG/1
25 TABLET, FILM COATED ORAL EVERY 6 HOURS
Status: DISCONTINUED | OUTPATIENT
Start: 2025-05-13 | End: 2025-05-13

## 2025-05-13 RX ADMIN — IPRATROPIUM BROMIDE 0.5 MG: 0.5 SOLUTION RESPIRATORY (INHALATION) at 19:28

## 2025-05-13 RX ADMIN — ACETAMINOPHEN 650 MG: 325 TABLET ORAL at 21:31

## 2025-05-13 RX ADMIN — BUDESONIDE INHALATION 1000 MCG: 0.5 SUSPENSION RESPIRATORY (INHALATION) at 07:12

## 2025-05-13 RX ADMIN — SODIUM CHLORIDE, PRESERVATIVE FREE 10 ML: 5 INJECTION INTRAVENOUS at 09:12

## 2025-05-13 RX ADMIN — BUDESONIDE INHALATION 1000 MCG: 0.5 SUSPENSION RESPIRATORY (INHALATION) at 19:28

## 2025-05-13 RX ADMIN — IPRATROPIUM BROMIDE 0.5 MG: 0.5 SOLUTION RESPIRATORY (INHALATION) at 13:48

## 2025-05-13 RX ADMIN — ARFORMOTEROL TARTRATE 15 MCG: 15 SOLUTION RESPIRATORY (INHALATION) at 19:28

## 2025-05-13 RX ADMIN — ATORVASTATIN CALCIUM 80 MG: 80 TABLET, FILM COATED ORAL at 20:44

## 2025-05-13 RX ADMIN — DILTIAZEM HYDROCHLORIDE 10 MG: 5 INJECTION, SOLUTION INTRAVENOUS at 13:43

## 2025-05-13 RX ADMIN — ASPIRIN 81 MG: 81 TABLET ORAL at 09:12

## 2025-05-13 RX ADMIN — SODIUM CHLORIDE, PRESERVATIVE FREE 10 ML: 5 INJECTION INTRAVENOUS at 20:47

## 2025-05-13 RX ADMIN — EMPAGLIFLOZIN 10 MG: 10 TABLET, FILM COATED ORAL at 09:22

## 2025-05-13 RX ADMIN — METOPROLOL TARTRATE 5 MG: 5 INJECTION INTRAVENOUS at 09:11

## 2025-05-13 RX ADMIN — ARFORMOTEROL TARTRATE 15 MCG: 15 SOLUTION RESPIRATORY (INHALATION) at 07:11

## 2025-05-13 RX ADMIN — SODIUM CHLORIDE 10 MG/HR: 900 INJECTION, SOLUTION INTRAVENOUS at 13:49

## 2025-05-13 RX ADMIN — TICAGRELOR 90 MG: 90 TABLET ORAL at 20:44

## 2025-05-13 RX ADMIN — TICAGRELOR 90 MG: 90 TABLET ORAL at 09:12

## 2025-05-13 RX ADMIN — IPRATROPIUM BROMIDE 0.5 MG: 0.5 SOLUTION RESPIRATORY (INHALATION) at 07:12

## 2025-05-13 RX ADMIN — METOPROLOL TARTRATE 25 MG: 25 TABLET, FILM COATED ORAL at 05:34

## 2025-05-13 RX ADMIN — Medication 6 MG: at 01:03

## 2025-05-13 ASSESSMENT — PAIN SCALES - GENERAL
PAINLEVEL_OUTOF10: 3
PAINLEVEL_OUTOF10: 0
PAINLEVEL_OUTOF10: 0

## 2025-05-13 ASSESSMENT — PAIN DESCRIPTION - LOCATION: LOCATION: BACK

## 2025-05-13 ASSESSMENT — PAIN DESCRIPTION - ORIENTATION: ORIENTATION: MID

## 2025-05-13 ASSESSMENT — PAIN DESCRIPTION - DESCRIPTORS: DESCRIPTORS: SORE

## 2025-05-13 NOTE — PROGRESS NOTES
Therapy Note:  Therapist participated in ICU/CCU IDT rounds and believe patient is currently functioning below baseline functional mobility/ADL performance.  Patient could benefit from skilled therapy services when MD deems medically appropriate.  Thank you,  OLEGARIO DAVIS, PT

## 2025-05-13 NOTE — PLAN OF CARE
Problem: Respiratory - Adult  Goal: Achieves optimal ventilation and oxygenation  5/13/2025 0716 by Marta Payne RCP  Outcome: Progressing  Flowsheets (Taken 5/13/2025 0716)  Achieves optimal ventilation and oxygenation:   Assess for changes in respiratory status   Assess for changes in mentation and behavior   Oxygen supplementation based on oxygen saturation or arterial blood gases   Position to facilitate oxygenation and minimize respiratory effort   Encourage broncho-pulmonary hygiene including cough, deep breathe, incentive spirometry   Assess and instruct to report shortness of breath or any respiratory difficulty   Respiratory therapy support as indicated

## 2025-05-13 NOTE — PROGRESS NOTES
TRANSFER - OUT REPORT:    Verbal report given to 4TH FLOOR RN on Christophe Xavier  being transferred to [facility] for routine progression of patient care       Report consisted of patient's Situation, Background, Assessment and   Recommendations(SBAR).     Information from the following report(s) Nurse Handoff Report, Intake/Output, MAR, Recent Results, and Cardiac Rhythm A-FIB  was reviewed with the receiving nurse.           Lines:   Peripheral IV 05/12/25 Left Arm (Active)   Site Assessment Clean, dry & intact 05/13/25 1600   Line Status Flushed;Normal saline locked 05/13/25 1600   Line Care Connections checked and tightened 05/13/25 1600   Phlebitis Assessment No symptoms 05/13/25 1600   Infiltration Assessment 0 05/13/25 1600   Alcohol Cap Used Yes 05/13/25 1600   Dressing Status Clean, dry & intact 05/13/25 1600   Dressing Type Transparent 05/13/25 1600       Peripheral IV 05/12/25 Right Antecubital (Active)   Site Assessment Clean, dry & intact 05/13/25 1600   Line Status Flushed;Brisk blood return 05/13/25 1600   Line Care Connections checked and tightened 05/13/25 1600   Phlebitis Assessment No symptoms 05/13/25 1600   Infiltration Assessment 0 05/13/25 1600   Alcohol Cap Used Yes 05/13/25 1600   Dressing Status Clean, dry & intact 05/13/25 1600   Dressing Type Transparent 05/13/25 1600        Opportunity for questions and clarification was provided.      Patient transported with:  Registered Nurse

## 2025-05-13 NOTE — PROGRESS NOTES
[facility] CARDIOLOGY PROGRESS NOTE           5/13/2025 8:18 AM    Admit Date: 5/12/2025      Subjective:   Patient denies any active chest pain.  Currently in CCU.  Tachycardic with atrial fibrillation.  Rates 120-130.  Blood pressure elevated.  On room air.  Echo yesterday with mild LV dysfunction.    ROS:  Cardiovascular:  As noted above    Objective:      Vitals:    05/13/25 0615 05/13/25 0630 05/13/25 0645 05/13/25 0711   BP:  130/78     Pulse: (!) 120 (!) 105 (!) 108 (!) 113   Resp:  22 26   Temp:       TempSrc:       SpO2: 97% 97% 97% 97%   Weight:       Height:           Physical Exam:  General-No Acute Distress  Neck- supple, no JVD  CV- IRIR with tachycardic rate.   Lung- clear bilaterally  Abd- soft, nontender, nondistended  Ext- 1+  edema bilaterally.  Skin- warm and dry      Data Review:   Recent Labs     05/13/25  0327 05/12/25  0254   WBC 9.9 12.0*   HGB 12.6* 14.7   HCT 38.6* 42.9   .3 97.9    192       Recent Labs     05/13/25  0327      K 4.2      CO2 20   BUN 21   CREATININE 1.10   GLUCOSE 118*   CALCIUM 9.8   BILITOT 1.2   ALKPHOS 72   AST 30   ALT 23   LABGLOM 69   GLOB 3.4       No results for input(s): \"CKTOTAL\", \"CKMB\", \"CKMBINDEX\", \"DDIMER\", \"TROPONINI\" in the last 720 hours.    Echo (5/12/25):    Left Ventricle: Reduced left ventricular systolic function with a visually estimated EF of 40 - 45%. Left ventricle size is normal. Increased wall thickness. Global hypokinesis present. Abnormal diastolic function.    Right Ventricle: Right ventricle size is normal. Reduced systolic function.    Aortic Valve: Mild regurgitation. Mild stenosis of the aortic valve. AV mean gradient is 7 mmHg. AV area by continuity VTI is 4.6 cm2.    Tricuspid Valve: Mild regurgitation.    Left Atrium: Left atrium is mildly dilated.    Aorta: Mildly dilated ascending aorta. Ao ascending diameter is 4.0 cm.    Image quality is adequate. Contrast used: Lumason.

## 2025-05-13 NOTE — PROGRESS NOTES
Patient has home CPAP. RT set up pts machine on bedside table. Machine has distilled water, 3L O2 bled in and it is plugged up to power. PT told RT that he is able to place machine on self.

## 2025-05-14 LAB
ALBUMIN SERPL-MCNC: 3.4 G/DL (ref 3.2–4.6)
ALBUMIN/GLOB SERPL: 1 (ref 1–1.9)
ALP SERPL-CCNC: 76 U/L (ref 40–129)
ALT SERPL-CCNC: 30 U/L (ref 8–55)
ANION GAP SERPL CALC-SCNC: 14 MMOL/L (ref 7–16)
AST SERPL-CCNC: 33 U/L (ref 15–37)
BASOPHILS # BLD: 0.02 K/UL (ref 0–0.2)
BASOPHILS NFR BLD: 0.3 % (ref 0–2)
BILIRUB SERPL-MCNC: 1.1 MG/DL (ref 0–1.2)
BUN SERPL-MCNC: 26 MG/DL (ref 8–23)
CALCIUM SERPL-MCNC: 10 MG/DL (ref 8.8–10.2)
CHLORIDE SERPL-SCNC: 103 MMOL/L (ref 98–107)
CO2 SERPL-SCNC: 20 MMOL/L (ref 20–29)
CREAT SERPL-MCNC: 1.09 MG/DL (ref 0.8–1.3)
DIFFERENTIAL METHOD BLD: ABNORMAL
EOSINOPHIL # BLD: 0.17 K/UL (ref 0–0.8)
EOSINOPHIL NFR BLD: 2.2 % (ref 0.5–7.8)
ERYTHROCYTE [DISTWIDTH] IN BLOOD BY AUTOMATED COUNT: 14.3 % (ref 11.9–14.6)
GLOBULIN SER CALC-MCNC: 3.5 G/DL (ref 2.3–3.5)
GLUCOSE BLD STRIP.AUTO-MCNC: 141 MG/DL (ref 65–100)
GLUCOSE BLD STRIP.AUTO-MCNC: 142 MG/DL (ref 65–100)
GLUCOSE BLD STRIP.AUTO-MCNC: 148 MG/DL (ref 65–100)
GLUCOSE BLD STRIP.AUTO-MCNC: 182 MG/DL (ref 65–100)
GLUCOSE SERPL-MCNC: 116 MG/DL (ref 70–99)
HCT VFR BLD AUTO: 36.5 % (ref 41.1–50.3)
HGB BLD-MCNC: 12.4 G/DL (ref 13.6–17.2)
IMM GRANULOCYTES # BLD AUTO: 0.04 K/UL (ref 0–0.5)
IMM GRANULOCYTES NFR BLD AUTO: 0.5 % (ref 0–5)
LYMPHOCYTES # BLD: 0.82 K/UL (ref 0.5–4.6)
LYMPHOCYTES NFR BLD: 10.4 % (ref 13–44)
MAGNESIUM SERPL-MCNC: 2.2 MG/DL (ref 1.8–2.4)
MCH RBC QN AUTO: 33.5 PG (ref 26.1–32.9)
MCHC RBC AUTO-ENTMCNC: 34 G/DL (ref 31.4–35)
MCV RBC AUTO: 98.6 FL (ref 82–102)
MONOCYTES # BLD: 0.57 K/UL (ref 0.1–1.3)
MONOCYTES NFR BLD: 7.2 % (ref 4–12)
NEUTS SEG # BLD: 6.25 K/UL (ref 1.7–8.2)
NEUTS SEG NFR BLD: 79.4 % (ref 43–78)
NRBC # BLD: 0 K/UL (ref 0–0.2)
PLATELET # BLD AUTO: 195 K/UL (ref 150–450)
PMV BLD AUTO: 10.1 FL (ref 9.4–12.3)
POTASSIUM SERPL-SCNC: 3.7 MMOL/L (ref 3.5–5.1)
PROT SERPL-MCNC: 6.9 G/DL (ref 6.3–8.2)
RBC # BLD AUTO: 3.7 M/UL (ref 4.23–5.6)
SERVICE CMNT-IMP: ABNORMAL
SODIUM SERPL-SCNC: 136 MMOL/L (ref 136–145)
WBC # BLD AUTO: 7.9 K/UL (ref 4.3–11.1)

## 2025-05-14 PROCEDURE — 6370000000 HC RX 637 (ALT 250 FOR IP): Performed by: NURSE PRACTITIONER

## 2025-05-14 PROCEDURE — 2500000003 HC RX 250 WO HCPCS: Performed by: NURSE PRACTITIONER

## 2025-05-14 PROCEDURE — 6360000002 HC RX W HCPCS: Performed by: NURSE PRACTITIONER

## 2025-05-14 PROCEDURE — 6370000000 HC RX 637 (ALT 250 FOR IP): Performed by: INTERNAL MEDICINE

## 2025-05-14 PROCEDURE — 2500000003 HC RX 250 WO HCPCS: Performed by: STUDENT IN AN ORGANIZED HEALTH CARE EDUCATION/TRAINING PROGRAM

## 2025-05-14 PROCEDURE — 80053 COMPREHEN METABOLIC PANEL: CPT

## 2025-05-14 PROCEDURE — 6360000002 HC RX W HCPCS: Performed by: INTERNAL MEDICINE

## 2025-05-14 PROCEDURE — 99232 SBSQ HOSP IP/OBS MODERATE 35: CPT | Performed by: INTERNAL MEDICINE

## 2025-05-14 PROCEDURE — 82962 GLUCOSE BLOOD TEST: CPT

## 2025-05-14 PROCEDURE — 2580000003 HC RX 258: Performed by: NURSE PRACTITIONER

## 2025-05-14 PROCEDURE — 85025 COMPLETE CBC W/AUTO DIFF WBC: CPT

## 2025-05-14 PROCEDURE — 2580000003 HC RX 258: Performed by: INTERNAL MEDICINE

## 2025-05-14 PROCEDURE — 83735 ASSAY OF MAGNESIUM: CPT

## 2025-05-14 PROCEDURE — 36415 COLL VENOUS BLD VENIPUNCTURE: CPT

## 2025-05-14 PROCEDURE — 2140000000 HC CCU INTERMEDIATE R&B

## 2025-05-14 PROCEDURE — 94640 AIRWAY INHALATION TREATMENT: CPT

## 2025-05-14 PROCEDURE — 6370000000 HC RX 637 (ALT 250 FOR IP): Performed by: CASE MANAGER/CARE COORDINATOR

## 2025-05-14 PROCEDURE — 2500000003 HC RX 250 WO HCPCS: Performed by: INTERNAL MEDICINE

## 2025-05-14 PROCEDURE — 94760 N-INVAS EAR/PLS OXIMETRY 1: CPT

## 2025-05-14 RX ORDER — METOPROLOL SUCCINATE 25 MG/1
25 TABLET, EXTENDED RELEASE ORAL 2 TIMES DAILY
Status: DISCONTINUED | OUTPATIENT
Start: 2025-05-14 | End: 2025-05-15

## 2025-05-14 RX ORDER — LISINOPRIL 5 MG/1
10 TABLET ORAL DAILY
Status: DISCONTINUED | OUTPATIENT
Start: 2025-05-15 | End: 2025-05-16 | Stop reason: HOSPADM

## 2025-05-14 RX ADMIN — ASPIRIN 81 MG: 81 TABLET ORAL at 08:30

## 2025-05-14 RX ADMIN — ARFORMOTEROL TARTRATE 15 MCG: 15 SOLUTION RESPIRATORY (INHALATION) at 19:54

## 2025-05-14 RX ADMIN — LISINOPRIL 20 MG: 20 TABLET ORAL at 08:31

## 2025-05-14 RX ADMIN — METOPROLOL SUCCINATE 25 MG: 25 TABLET, FILM COATED, EXTENDED RELEASE ORAL at 08:30

## 2025-05-14 RX ADMIN — IPRATROPIUM BROMIDE 0.5 MG: 0.5 SOLUTION RESPIRATORY (INHALATION) at 19:54

## 2025-05-14 RX ADMIN — SODIUM CHLORIDE 7.5 MG/HR: 900 INJECTION, SOLUTION INTRAVENOUS at 22:49

## 2025-05-14 RX ADMIN — SODIUM CHLORIDE, PRESERVATIVE FREE 10 ML: 5 INJECTION INTRAVENOUS at 08:33

## 2025-05-14 RX ADMIN — Medication 3 MG: at 22:03

## 2025-05-14 RX ADMIN — METOPROLOL TARTRATE 25 MG: 25 TABLET, FILM COATED ORAL at 05:52

## 2025-05-14 RX ADMIN — ATORVASTATIN CALCIUM 80 MG: 80 TABLET, FILM COATED ORAL at 20:23

## 2025-05-14 RX ADMIN — IPRATROPIUM BROMIDE 0.5 MG: 0.5 SOLUTION RESPIRATORY (INHALATION) at 07:47

## 2025-05-14 RX ADMIN — BUDESONIDE INHALATION 1000 MCG: 0.5 SUSPENSION RESPIRATORY (INHALATION) at 19:54

## 2025-05-14 RX ADMIN — SODIUM CHLORIDE 10 MG/HR: 900 INJECTION, SOLUTION INTRAVENOUS at 12:29

## 2025-05-14 RX ADMIN — SODIUM CHLORIDE 10 MG/HR: 900 INJECTION, SOLUTION INTRAVENOUS at 10:28

## 2025-05-14 RX ADMIN — ACETAMINOPHEN 650 MG: 325 TABLET ORAL at 09:58

## 2025-05-14 RX ADMIN — TICAGRELOR 90 MG: 90 TABLET ORAL at 20:24

## 2025-05-14 RX ADMIN — TICAGRELOR 90 MG: 90 TABLET ORAL at 08:30

## 2025-05-14 RX ADMIN — SODIUM CHLORIDE, PRESERVATIVE FREE 10 ML: 5 INJECTION INTRAVENOUS at 20:24

## 2025-05-14 RX ADMIN — INSULIN LISPRO 1 UNITS: 100 INJECTION, SOLUTION INTRAVENOUS; SUBCUTANEOUS at 20:24

## 2025-05-14 RX ADMIN — BUDESONIDE INHALATION 1000 MCG: 0.5 SUSPENSION RESPIRATORY (INHALATION) at 07:47

## 2025-05-14 RX ADMIN — EMPAGLIFLOZIN 10 MG: 10 TABLET, FILM COATED ORAL at 08:30

## 2025-05-14 RX ADMIN — ARFORMOTEROL TARTRATE 15 MCG: 15 SOLUTION RESPIRATORY (INHALATION) at 07:47

## 2025-05-14 RX ADMIN — SODIUM CHLORIDE 10 MG/HR: 900 INJECTION, SOLUTION INTRAVENOUS at 00:14

## 2025-05-14 RX ADMIN — METOPROLOL SUCCINATE 25 MG: 25 TABLET, FILM COATED, EXTENDED RELEASE ORAL at 20:24

## 2025-05-14 ASSESSMENT — PAIN SCALES - GENERAL
PAINLEVEL_OUTOF10: 0
PAINLEVEL_OUTOF10: 3

## 2025-05-14 ASSESSMENT — PAIN DESCRIPTION - LOCATION: LOCATION: BACK

## 2025-05-14 NOTE — PLAN OF CARE
Problem: Chronic Conditions and Co-morbidities  Goal: Patient's chronic conditions and co-morbidity symptoms are monitored and maintained or improved  Outcome: Progressing  Flowsheets (Taken 5/13/2025 2030)  Care Plan - Patient's Chronic Conditions and Co-Morbidity Symptoms are Monitored and Maintained or Improved:   Monitor and assess patient's chronic conditions and comorbid symptoms for stability, deterioration, or improvement   Collaborate with multidisciplinary team to address chronic and comorbid conditions and prevent exacerbation or deterioration     Problem: Discharge Planning  Goal: Discharge to home or other facility with appropriate resources  Outcome: Progressing  Flowsheets (Taken 5/13/2025 2030)  Discharge to home or other facility with appropriate resources:   Identify barriers to discharge with patient and caregiver   Arrange for needed discharge resources and transportation as appropriate   Identify discharge learning needs (meds, wound care, etc)     Problem: Pain  Goal: Verbalizes/displays adequate comfort level or baseline comfort level  Outcome: Progressing  Flowsheets (Taken 5/13/2025 2030)  Verbalizes/displays adequate comfort level or baseline comfort level:   Encourage patient to monitor pain and request assistance   Assess pain using appropriate pain scale     Problem: Respiratory - Adult  Goal: Achieves optimal ventilation and oxygenation  Outcome: Progressing  Flowsheets (Taken 5/13/2025 2030)  Achieves optimal ventilation and oxygenation:   Assess for changes in respiratory status   Assess for changes in mentation and behavior     Problem: Safety - Adult  Goal: Free from fall injury  Outcome: Progressing  Flowsheets (Taken 5/13/2025 2030)  Free From Fall Injury: Instruct family/caregiver on patient safety

## 2025-05-14 NOTE — CARE COORDINATION
CM reviewed clinical notes. Patient admitted with STEMI. S/P [facility] with PCI to RCA requiring ICU stay. History of Watchman, COPD, Afib.   Transferred to [facility] for normal progression of care 5/13.  On room air. RT nebulizer treatments and CPAP (home unit) at night.  Patient is eligible for Cardiac Rehab at discharge. Per note, patient is considering the program.

## 2025-05-14 NOTE — PROGRESS NOTES
am  5/14/2025 6:13 AM    Admit Date: 5/12/2025    Admit Diagnosis: STEMI (ST elevation myocardial infarction) ([facility]) [I21.3]  Atrial fibrillation with rapid ventricular response ([facility]) [I48.91]  ST elevation myocardial infarction (STEMI), unspecified artery (HCC) [I21.3]  Acute inferior myocardial infarction (HCC) [I21.19]      Subjective:    Patient : Patient status post STEMI with occlusion of the RCA.  He appears to be doing well today with no complaints    Objective:    /69   Pulse 97   Temp 97.3 °F (36.3 °C) (Oral)   Resp 24   Ht 1.778 m (5' 10\")   Wt 94.8 kg (208 lb 14.4 oz)   SpO2 93%   BMI 29.97 kg/m²     ROS:  General ROS: negative for - chills  Hematological and Lymphatic ROS: negative for - blood clots or jaundice  Respiratory ROS: no cough, shortness of breath, or wheezing  Cardiovascular ROS: no chest pain or dyspnea on exertion  Gastrointestinal ROS: no abdominal pain, change in bowel habits, or black or bloody stools  Neurological ROS: no TIA or stroke symptoms    Physical Exam:      Physical Examination: General appearance - Appearance: alert, well appearing, and in no distress.   Neck/lymph - supple, no significant adenopathy  Chest/CV - clear to auscultation, no wheezes, rales or rhonchi, symmetric air entry  Heart - irregularly irregular rhythm with rate   Abdomen/GI - soft, nontender, nondistended, no masses or organomegaly   Musculoskeletal - no joint tenderness, deformity or swelling  Extremities - peripheral pulses normal, no pedal edema, no clubbing or cyanosis  Skin - normal coloration and turgor, no rashes, no suspicious skin lesions noted    Current Facility-Administered Medications   Medication Dose Route Frequency    medicated lip ointment (BLISTEX)   Topical PRN    dilTIAZem 100 mg in sodium chloride 0.9 % 100 mL infusion (ADD-[place])  10 mg/hr IntraVENous Continuous    sodium chloride flush 0.9 % injection 5-40 mL  5-40 mL IntraVENous 2 times per day    sodium chloride

## 2025-05-14 NOTE — PROGRESS NOTES
Patient refused this treatment stated he just laid down to get a nap does not want this time around

## 2025-05-14 NOTE — PLAN OF CARE
Problem: Respiratory - Adult  Goal: Achieves optimal ventilation and oxygenation  5/14/2025 0904 by Vivi Shaikh RCP  Outcome: Progressing  5/14/2025 0314 by Luis Carlos Mckeon RN  Outcome: Progressing  Flowsheets (Taken 5/13/2025 2030)  Achieves optimal ventilation and oxygenation:   Assess for changes in respiratory status   Assess for changes in mentation and behavior

## 2025-05-14 NOTE — PROGRESS NOTES
Cardiac Rehab: Spoke with patient regarding referral to cardiac rehab. Patient meets admission criteria based on STEMI with PCI(5/12/25).  Written information about Cardiac Rehab given and reviewed with patient. Discussed lifestyle modifications to promote cardiac wellness. Patient indicated that he may want to participate in the cardiac rehab program. We will follow up with him after discharge home as requested. His Cardiologist is Dr. Ribera.      Thank you,  Marleni Jewell, RN, BSN  Cardiopulmonary Rehabilitation Nurse Liaison  [facility]

## 2025-05-14 NOTE — PROGRESS NOTES
SPIRITUAL HEALTH  Note for Med/Surg Visit                  Room # 417/01    Name: Christophe Xavier           Age: 75 y.o.    Gender: male          MRN: [id number]  Christianity: Moravian       Preferred Language: English    Date: 05/14/25  Visit Time: Begin Time: (P) 1155 End Time : (P) 1200 Complexity of Encounter: (P) Low      Visit Summary:  offered spiritual care visit with patient. Patient declined a visit at this time.  is available for follow up at patient's request.      Referral/Consult From: (P) Rounding  Encounter Overview/Reason: (P) Spiritual/Emotional Needs  Encounter Code:     Crisis (if applicable):    Service Provided For: (P) Patient     Patient was not available. Patient declined visit but aware  can be notified as needed/desired    Naty, Belief, Meaning:   Patient unable to assess at this time  Family/Friends No family/friends present  Rituals (if applicable)      Importance and Influence:  Patient does not have spiritual/personal beliefs that influence decisions regarding their health  Family/Friends No family/friends present    Community:  Patient   unable to assess at this time   Family/Friends   No family/ friends present.    Assessment and Plan of Care:   Emotions Expressed by Patient:   Assessment: (P) Unable to assess    Interventions by :   Intervention: (P) Sustaining Presence/Ministry of presence     Result/ Response by Patient:   Outcome: (P) Refused/Declined    Patient Plan of Care:   Plan and Referrals  Plan/Referrals: (P) Continue Support (comment)     Electronically signed by JIMMIE Patel, on 5/14/2025 at 1:22 PM.  [facility]  [facility]  [phone]

## 2025-05-15 LAB
ACT BLD: 193 SECS (ref 70–128)
ACT BLD: 527 SECS (ref 70–128)
BASOPHILS # BLD: 0.04 K/UL (ref 0–0.2)
BASOPHILS NFR BLD: 0.7 % (ref 0–2)
DIFFERENTIAL METHOD BLD: ABNORMAL
EOSINOPHIL # BLD: 0.18 K/UL (ref 0–0.8)
EOSINOPHIL NFR BLD: 3.1 % (ref 0.5–7.8)
ERYTHROCYTE [DISTWIDTH] IN BLOOD BY AUTOMATED COUNT: 14.2 % (ref 11.9–14.6)
GLUCOSE BLD STRIP.AUTO-MCNC: 110 MG/DL (ref 65–100)
GLUCOSE BLD STRIP.AUTO-MCNC: 132 MG/DL (ref 65–100)
GLUCOSE BLD STRIP.AUTO-MCNC: 133 MG/DL (ref 65–100)
GLUCOSE BLD STRIP.AUTO-MCNC: 143 MG/DL (ref 65–100)
HCT VFR BLD AUTO: 34.6 % (ref 41.1–50.3)
HGB BLD-MCNC: 11.7 G/DL (ref 13.6–17.2)
IMM GRANULOCYTES # BLD AUTO: 0.02 K/UL (ref 0–0.5)
IMM GRANULOCYTES NFR BLD AUTO: 0.3 % (ref 0–5)
LYMPHOCYTES # BLD: 0.75 K/UL (ref 0.5–4.6)
LYMPHOCYTES NFR BLD: 12.8 % (ref 13–44)
MAGNESIUM SERPL-MCNC: 2.3 MG/DL (ref 1.8–2.4)
MCH RBC QN AUTO: 32.9 PG (ref 26.1–32.9)
MCHC RBC AUTO-ENTMCNC: 33.8 G/DL (ref 31.4–35)
MCV RBC AUTO: 97.2 FL (ref 82–102)
MONOCYTES # BLD: 0.5 K/UL (ref 0.1–1.3)
MONOCYTES NFR BLD: 8.5 % (ref 4–12)
NEUTS SEG # BLD: 4.36 K/UL (ref 1.7–8.2)
NEUTS SEG NFR BLD: 74.6 % (ref 43–78)
NRBC # BLD: 0 K/UL (ref 0–0.2)
PLATELET # BLD AUTO: 203 K/UL (ref 150–450)
PMV BLD AUTO: 10.1 FL (ref 9.4–12.3)
RBC # BLD AUTO: 3.56 M/UL (ref 4.23–5.6)
SERVICE CMNT-IMP: ABNORMAL
WBC # BLD AUTO: 5.9 K/UL (ref 4.3–11.1)

## 2025-05-15 PROCEDURE — 82962 GLUCOSE BLOOD TEST: CPT

## 2025-05-15 PROCEDURE — 2500000003 HC RX 250 WO HCPCS: Performed by: NURSE PRACTITIONER

## 2025-05-15 PROCEDURE — 6370000000 HC RX 637 (ALT 250 FOR IP): Performed by: INTERNAL MEDICINE

## 2025-05-15 PROCEDURE — 94760 N-INVAS EAR/PLS OXIMETRY 1: CPT

## 2025-05-15 PROCEDURE — 85025 COMPLETE CBC W/AUTO DIFF WBC: CPT

## 2025-05-15 PROCEDURE — 36415 COLL VENOUS BLD VENIPUNCTURE: CPT

## 2025-05-15 PROCEDURE — 6360000002 HC RX W HCPCS: Performed by: INTERNAL MEDICINE

## 2025-05-15 PROCEDURE — 2500000003 HC RX 250 WO HCPCS: Performed by: INTERNAL MEDICINE

## 2025-05-15 PROCEDURE — 2140000000 HC CCU INTERMEDIATE R&B

## 2025-05-15 PROCEDURE — 94640 AIRWAY INHALATION TREATMENT: CPT

## 2025-05-15 PROCEDURE — 83735 ASSAY OF MAGNESIUM: CPT

## 2025-05-15 PROCEDURE — 94761 N-INVAS EAR/PLS OXIMETRY MLT: CPT

## 2025-05-15 PROCEDURE — 99232 SBSQ HOSP IP/OBS MODERATE 35: CPT | Performed by: INTERNAL MEDICINE

## 2025-05-15 PROCEDURE — 2580000003 HC RX 258: Performed by: NURSE PRACTITIONER

## 2025-05-15 RX ORDER — METOPROLOL SUCCINATE 50 MG/1
50 TABLET, EXTENDED RELEASE ORAL 2 TIMES DAILY
Status: DISCONTINUED | OUTPATIENT
Start: 2025-05-15 | End: 2025-05-16

## 2025-05-15 RX ADMIN — BUDESONIDE INHALATION 1000 MCG: 0.5 SUSPENSION RESPIRATORY (INHALATION) at 07:31

## 2025-05-15 RX ADMIN — SODIUM CHLORIDE, PRESERVATIVE FREE 10 ML: 5 INJECTION INTRAVENOUS at 08:23

## 2025-05-15 RX ADMIN — IPRATROPIUM BROMIDE 0.5 MG: 0.5 SOLUTION RESPIRATORY (INHALATION) at 13:10

## 2025-05-15 RX ADMIN — IPRATROPIUM BROMIDE 0.5 MG: 0.5 SOLUTION RESPIRATORY (INHALATION) at 07:31

## 2025-05-15 RX ADMIN — SODIUM CHLORIDE 5 MG/HR: 900 INJECTION, SOLUTION INTRAVENOUS at 14:19

## 2025-05-15 RX ADMIN — ASPIRIN 81 MG: 81 TABLET ORAL at 08:23

## 2025-05-15 RX ADMIN — BUDESONIDE INHALATION 1000 MCG: 0.5 SUSPENSION RESPIRATORY (INHALATION) at 20:14

## 2025-05-15 RX ADMIN — METOPROLOL SUCCINATE 50 MG: 50 TABLET, EXTENDED RELEASE ORAL at 20:54

## 2025-05-15 RX ADMIN — IPRATROPIUM BROMIDE 0.5 MG: 0.5 SOLUTION RESPIRATORY (INHALATION) at 20:14

## 2025-05-15 RX ADMIN — SODIUM CHLORIDE, PRESERVATIVE FREE 10 ML: 5 INJECTION INTRAVENOUS at 20:53

## 2025-05-15 RX ADMIN — ATORVASTATIN CALCIUM 80 MG: 80 TABLET, FILM COATED ORAL at 20:54

## 2025-05-15 RX ADMIN — METOPROLOL SUCCINATE 25 MG: 25 TABLET, FILM COATED, EXTENDED RELEASE ORAL at 08:23

## 2025-05-15 RX ADMIN — TICAGRELOR 90 MG: 90 TABLET ORAL at 20:54

## 2025-05-15 RX ADMIN — EMPAGLIFLOZIN 10 MG: 10 TABLET, FILM COATED ORAL at 08:23

## 2025-05-15 RX ADMIN — ARFORMOTEROL TARTRATE 15 MCG: 15 SOLUTION RESPIRATORY (INHALATION) at 20:14

## 2025-05-15 RX ADMIN — ARFORMOTEROL TARTRATE 15 MCG: 15 SOLUTION RESPIRATORY (INHALATION) at 07:31

## 2025-05-15 RX ADMIN — TICAGRELOR 90 MG: 90 TABLET ORAL at 08:23

## 2025-05-15 NOTE — PROGRESS NOTES
05/15/25 0120   NIV Type   NIV Started/Stopped Off   Equipment Type home unit   Mode CPAP     Pt refused home CPAP. Pt on 2L NC, tolerated well.

## 2025-05-15 NOTE — PROGRESS NOTES
[facility] CARDIOLOGY PROGRESS NOTE    5/15/2025 4:46 PM    Admit Date: 5/12/2025        Subjective:   Stable overnight without angina, CHF, or palpitations but A-fib rates mildly rapid in 90s to 110s during my evaluation on diltiazem drip and oral Toprol-XL.  Otherwise vitals stable and controlled. No other complaints overnight. Tolerating meds well.         Objective:      Vitals:    05/15/25 1200 05/15/25 1300 05/15/25 1310 05/15/25 1529   BP: 90/74 112/71  97/79   Pulse: (!) 103 89 87 90   Resp:   17 16   Temp:    97.5 °F (36.4 °C)   TempSrc:    Oral   SpO2:   94% 95%   Weight:       Height:           Physical Exam:  Neck- supple, no JVD  CV-irregularly irregular, 2/6 systolic ejection murmur right upper sternal border, no S3  Lung- clear bilaterally, mildly decreased bibasilar but no crackles or wheezing  Abd- soft, nontender, nondistended  Ext-trace to 1+ bilateral lower extremity pitting edema   Skin- warm and dry    Data Review:   Pertinent lab and noninvasive imaging over the past 24 hours reviewed and evaluated.    Recent Labs     05/13/25  0327 05/14/25  0414 05/15/25  0402    136  --    K 4.2 3.7  --    MG 2.2 2.2 2.3   BUN 21 26*  --    WBC 9.9 7.9 5.9   HGB 12.6* 12.4* 11.7*   HCT 38.6* 36.5* 34.6*    195 203   CHOL 100  --   --    LDL 24  --   --    HDL 65*  --   --      Lab Results   Component Value Date    LDL 24 05/13/2025     Echocardiogram 5/12/2025:  Left Ventricle Reduced left ventricular systolic function with a visually estimated EF of 40 - 45%. Left ventricle size is normal. Increased wall thickness. Global hypokinesis present. Abnormal diastolic function.   Left Atrium Left atrium is mildly dilated.   Right Ventricle Right ventricle size is normal. Reduced systolic function.   Right Atrium Right atrium size is normal.   Aortic Valve Trileaflet valve. Calcified cusps. Sclerosis of the aortic valve cusps. Mild regurgitation. Mild stenosis of the aortic valve. AV mean gradient is

## 2025-05-16 VITALS
TEMPERATURE: 97.3 F | HEIGHT: 70 IN | WEIGHT: 206.7 LBS | HEART RATE: 84 BPM | BODY MASS INDEX: 29.59 KG/M2 | DIASTOLIC BLOOD PRESSURE: 81 MMHG | RESPIRATION RATE: 17 BRPM | SYSTOLIC BLOOD PRESSURE: 111 MMHG | OXYGEN SATURATION: 100 %

## 2025-05-16 PROBLEM — I21.19 ACUTE INFERIOR MYOCARDIAL INFARCTION (HCC): Status: RESOLVED | Noted: 2025-05-12 | Resolved: 2025-05-16

## 2025-05-16 PROBLEM — I21.3 STEMI (ST ELEVATION MYOCARDIAL INFARCTION) (HCC): Status: RESOLVED | Noted: 2025-05-12 | Resolved: 2025-05-16

## 2025-05-16 LAB
ANION GAP SERPL CALC-SCNC: 11 MMOL/L (ref 7–16)
BASOPHILS # BLD: 0.04 K/UL (ref 0–0.2)
BASOPHILS NFR BLD: 0.7 % (ref 0–2)
BUN SERPL-MCNC: 21 MG/DL (ref 8–23)
CALCIUM SERPL-MCNC: 9.8 MG/DL (ref 8.8–10.2)
CHLORIDE SERPL-SCNC: 106 MMOL/L (ref 98–107)
CO2 SERPL-SCNC: 23 MMOL/L (ref 20–29)
CREAT SERPL-MCNC: 1.12 MG/DL (ref 0.8–1.3)
DIFFERENTIAL METHOD BLD: ABNORMAL
EOSINOPHIL # BLD: 0.22 K/UL (ref 0–0.8)
EOSINOPHIL NFR BLD: 4.1 % (ref 0.5–7.8)
ERYTHROCYTE [DISTWIDTH] IN BLOOD BY AUTOMATED COUNT: 14.2 % (ref 11.9–14.6)
GLUCOSE BLD STRIP.AUTO-MCNC: 106 MG/DL (ref 65–100)
GLUCOSE SERPL-MCNC: 114 MG/DL (ref 70–99)
HCT VFR BLD AUTO: 36.9 % (ref 41.1–50.3)
HGB BLD-MCNC: 12.3 G/DL (ref 13.6–17.2)
IMM GRANULOCYTES # BLD AUTO: 0.03 K/UL (ref 0–0.5)
IMM GRANULOCYTES NFR BLD AUTO: 0.6 % (ref 0–5)
LYMPHOCYTES # BLD: 0.79 K/UL (ref 0.5–4.6)
LYMPHOCYTES NFR BLD: 14.5 % (ref 13–44)
MAGNESIUM SERPL-MCNC: 2.5 MG/DL (ref 1.8–2.4)
MCH RBC QN AUTO: 33.2 PG (ref 26.1–32.9)
MCHC RBC AUTO-ENTMCNC: 33.3 G/DL (ref 31.4–35)
MCV RBC AUTO: 99.7 FL (ref 82–102)
MONOCYTES # BLD: 0.46 K/UL (ref 0.1–1.3)
MONOCYTES NFR BLD: 8.5 % (ref 4–12)
NEUTS SEG # BLD: 3.89 K/UL (ref 1.7–8.2)
NEUTS SEG NFR BLD: 71.6 % (ref 43–78)
NRBC # BLD: 0 K/UL (ref 0–0.2)
PLATELET # BLD AUTO: 235 K/UL (ref 150–450)
PMV BLD AUTO: 9.9 FL (ref 9.4–12.3)
POTASSIUM SERPL-SCNC: 4 MMOL/L (ref 3.5–5.1)
RBC # BLD AUTO: 3.7 M/UL (ref 4.23–5.6)
SERVICE CMNT-IMP: ABNORMAL
SODIUM SERPL-SCNC: 141 MMOL/L (ref 136–145)
WBC # BLD AUTO: 5.4 K/UL (ref 4.3–11.1)

## 2025-05-16 PROCEDURE — 6370000000 HC RX 637 (ALT 250 FOR IP): Performed by: INTERNAL MEDICINE

## 2025-05-16 PROCEDURE — 94760 N-INVAS EAR/PLS OXIMETRY 1: CPT

## 2025-05-16 PROCEDURE — 80048 BASIC METABOLIC PNL TOTAL CA: CPT

## 2025-05-16 PROCEDURE — 6370000000 HC RX 637 (ALT 250 FOR IP): Performed by: NURSE PRACTITIONER

## 2025-05-16 PROCEDURE — 6360000002 HC RX W HCPCS: Performed by: INTERNAL MEDICINE

## 2025-05-16 PROCEDURE — 85025 COMPLETE CBC W/AUTO DIFF WBC: CPT

## 2025-05-16 PROCEDURE — 99238 HOSP IP/OBS DSCHRG MGMT 30/<: CPT | Performed by: INTERNAL MEDICINE

## 2025-05-16 PROCEDURE — 83735 ASSAY OF MAGNESIUM: CPT

## 2025-05-16 PROCEDURE — 36415 COLL VENOUS BLD VENIPUNCTURE: CPT

## 2025-05-16 PROCEDURE — 82962 GLUCOSE BLOOD TEST: CPT

## 2025-05-16 PROCEDURE — 94640 AIRWAY INHALATION TREATMENT: CPT

## 2025-05-16 RX ORDER — ATORVASTATIN CALCIUM 80 MG/1
80 TABLET, FILM COATED ORAL NIGHTLY
Qty: 30 TABLET | Refills: 3 | Status: SHIPPED | OUTPATIENT
Start: 2025-05-16

## 2025-05-16 RX ORDER — METOPROLOL SUCCINATE 100 MG/1
100 TABLET, EXTENDED RELEASE ORAL 2 TIMES DAILY
Qty: 180 TABLET | Refills: 3 | Status: SHIPPED | OUTPATIENT
Start: 2025-05-16

## 2025-05-16 RX ORDER — METOPROLOL SUCCINATE 100 MG/1
100 TABLET, EXTENDED RELEASE ORAL 2 TIMES DAILY
Status: DISCONTINUED | OUTPATIENT
Start: 2025-05-16 | End: 2025-05-16 | Stop reason: HOSPADM

## 2025-05-16 RX ORDER — ATORVASTATIN CALCIUM 80 MG/1
80 TABLET, FILM COATED ORAL NIGHTLY
Qty: 90 TABLET | Refills: 3 | OUTPATIENT
Start: 2025-05-16

## 2025-05-16 RX ORDER — METOPROLOL SUCCINATE 50 MG/1
50 TABLET, EXTENDED RELEASE ORAL 2 TIMES DAILY
Qty: 30 TABLET | Refills: 3 | OUTPATIENT
Start: 2025-05-16

## 2025-05-16 RX ADMIN — EMPAGLIFLOZIN 10 MG: 10 TABLET, FILM COATED ORAL at 09:19

## 2025-05-16 RX ADMIN — METOPROLOL SUCCINATE 100 MG: 100 TABLET, EXTENDED RELEASE ORAL at 09:19

## 2025-05-16 RX ADMIN — ARFORMOTEROL TARTRATE 15 MCG: 15 SOLUTION RESPIRATORY (INHALATION) at 07:36

## 2025-05-16 RX ADMIN — ACETAMINOPHEN 650 MG: 325 TABLET ORAL at 00:06

## 2025-05-16 RX ADMIN — IPRATROPIUM BROMIDE 0.5 MG: 0.5 SOLUTION RESPIRATORY (INHALATION) at 07:36

## 2025-05-16 RX ADMIN — ASPIRIN 81 MG: 81 TABLET ORAL at 09:19

## 2025-05-16 RX ADMIN — LISINOPRIL 10 MG: 5 TABLET ORAL at 09:19

## 2025-05-16 RX ADMIN — BUDESONIDE INHALATION 1000 MCG: 0.5 SUSPENSION RESPIRATORY (INHALATION) at 07:36

## 2025-05-16 RX ADMIN — TICAGRELOR 90 MG: 90 TABLET ORAL at 09:19

## 2025-05-16 ASSESSMENT — PAIN - FUNCTIONAL ASSESSMENT: PAIN_FUNCTIONAL_ASSESSMENT: ACTIVITIES ARE NOT PREVENTED

## 2025-05-16 ASSESSMENT — PAIN DESCRIPTION - ONSET: ONSET: GRADUAL

## 2025-05-16 ASSESSMENT — PAIN DESCRIPTION - DESCRIPTORS: DESCRIPTORS: ACHING;SORE

## 2025-05-16 ASSESSMENT — PAIN DESCRIPTION - ORIENTATION: ORIENTATION: MID

## 2025-05-16 ASSESSMENT — PAIN DESCRIPTION - FREQUENCY: FREQUENCY: INTERMITTENT

## 2025-05-16 ASSESSMENT — PAIN SCALES - GENERAL: PAINLEVEL_OUTOF10: 4

## 2025-05-16 ASSESSMENT — PAIN DESCRIPTION - LOCATION: LOCATION: BACK;BUTTOCKS

## 2025-05-16 ASSESSMENT — PAIN DESCRIPTION - PAIN TYPE: TYPE: ACUTE PAIN

## 2025-05-16 NOTE — CARE COORDINATION
05/16/25 0939   Services At/After Discharge   Transition of Care Consult (CM Consult) N/A   Services At/After Discharge None   [place] Resource Information Provided? No   Mode of Transport at Discharge Other (see comment)  (Spouse to transport home.)   Confirm Follow Up Transport Family   Condition of Participation: Discharge Planning   The Plan for Transition of Care is related to the following treatment goals: Home with family assistance   The Patient and/Or Patient Representative agree with the Discharge Plan? Yes     CM met with patient this AM. Patient stated he hopes to discharge today. Discharge order placed after Cardizem drip off. Patient to obtain a monitor to wear from [facility]. Patient stated he will look into the Cardiac Rehab after his follow up with Dr Ribera next week.

## 2025-05-16 NOTE — PROGRESS NOTES
Discharge instructions reviewed with patient. Prescriptions given for toprol, jardiance and brilinta and med info sheets provided for all new medications. Opportunity for questions provided. Patient voiced understanding of all discharge instructions.     IV and tele box removed

## 2025-05-16 NOTE — PLAN OF CARE
Problem: Chronic Conditions and Co-morbidities  Goal: Patient's chronic conditions and co-morbidity symptoms are monitored and maintained or improved  Outcome: Progressing     Problem: Discharge Planning  Goal: Discharge to home or other facility with appropriate resources  Outcome: Progressing     Problem: Pain  Goal: Verbalizes/displays adequate comfort level or baseline comfort level  Outcome: Progressing     Problem: Respiratory - Adult  Goal: Achieves optimal ventilation and oxygenation  Outcome: Progressing     Problem: Safety - Adult  Goal: Free from fall injury  Outcome: Progressing     Problem: ABCDS Injury Assessment  Goal: Absence of physical injury  Outcome: Progressing

## 2025-05-16 NOTE — PROGRESS NOTES
[facility] CARDIOLOGY PROGRESS NOTE           5/16/2025 8:00 AM    Admit Date: 5/12/2025      Subjective:   Patient denies any active chest pain.  Heart rate appears to be controlled but still on Cardizem 2.5 mg/h.  Receiving Toprol-XL 50 mg twice daily.  At home was on Cardizem to 40 a day  ROS:  Cardiovascular:  As noted above    Objective:      Vitals:    05/16/25 0005 05/16/25 0423 05/16/25 0736 05/16/25 0743   BP: 107/84 107/73  111/81   Pulse: 88 87  84   Resp: 16 17 17    Temp: 97.5 °F (36.4 °C) 97.2 °F (36.2 °C)  97.3 °F (36.3 °C)   TempSrc: Oral Oral     SpO2: 95% 94% 95% 100%   Weight:  93.8 kg (206 lb 11.2 oz)     Height:           Physical Exam:  General-No Acute Distress  Neck- supple, no JVD  CV- IRIR with controlled rate  Lung- clear bilaterally  Abd- soft, nontender, nondistended  Ext-trivial edema bilaterally.  Skin- warm and dry      Data Review:   Recent Labs     05/16/25  0438 05/15/25  0402 05/14/25  0414   WBC 5.4 5.9 7.9   HGB 12.3* 11.7* 12.4*   HCT 36.9* 34.6* 36.5*   MCV 99.7 97.2 98.6    203 195       Recent Labs     05/16/25  0438 05/14/25  0414    136   K 4.0 3.7    103   CO2 23 20   BUN 21 26*   CREATININE 1.12 1.09   GLUCOSE 114* 116*   CALCIUM 9.8 10.0   BILITOT  --  1.1   ALKPHOS  --  76   AST  --  33   ALT  --  30   LABGLOM 68 70   GLOB  --  3.5       No results for input(s): \"CKTOTAL\", \"CKMB\", \"CKMBINDEX\", \"DDIMER\", \"TROPONINI\" in the last 720 hours.    Echo (5/12/25):    Left Ventricle: Reduced left ventricular systolic function with a visually estimated EF of 40 - 45%. Left ventricle size is normal. Increased wall thickness. Global hypokinesis present. Abnormal diastolic function.    Right Ventricle: Right ventricle size is normal. Reduced systolic function.    Aortic Valve: Mild regurgitation. Mild stenosis of the aortic valve. AV mean gradient is 7 mmHg. AV area by continuity VTI is 4.6 cm2.    Tricuspid Valve: Mild regurgitation.    Left  Atrium: Left atrium is mildly dilated.    Aorta: Mildly dilated ascending aorta. Ao ascending diameter is 4.0 cm.    Image quality is adequate. Contrast used: Lumason.       Assessment/Plan:     Active Hospital Problems    *STEMI (ST elevation myocardial infarction) ([facility])  Patient with complex PCI of the RCA.  Continue aspirin and Brilinta therapy.  Home today.      Presence of Watchman left atrial appendage closure device  Noted.  On antiplatelet therapy.      Paroxysmal A-fib ([facility])  In atrial fibrillation.  Increase Toprol to 100 twice daily.  Stop diltiazem.  Home after lunch.  Consider outpatient monitor on Monday when he sees his primary cardiologist.        Acute inferior myocardial infarction (HCC)  See above.      Type 2 diabetes with nephropathy ([facility])  Continue Jardiance.      Mixed hyperlipidemia  Statin continue Lipitor.                Sameer Torres MD

## 2025-05-16 NOTE — DISCHARGE SUMMARY
[facility] Cardiology Discharge Summary     Patient ID:  Christophe Xavier  [id number]  75 y.o.  1950    Admit date: 5/12/2025    Discharge date:  05/16/25    Admitting Physician: Rick Rader MD     Discharge Physician: Dr. Torres    Admission Diagnoses: STEMI (ST elevation myocardial infarction) ([facility]) [I21.3]  Atrial fibrillation with rapid ventricular response ([facility]) [I48.91]  ST elevation myocardial infarction (STEMI), unspecified artery ([facility]) [I21.3]  Acute inferior myocardial infarction ([facility]) [I21.19]    Discharge Diagnoses:   Patient Active Problem List    Diagnosis    STEMI (ST elevation myocardial infarction) ([facility])    Presence of Watchman left atrial appendage closure device    Paroxysmal A-fib ([facility])    CAD in native artery    Type 2 diabetes with nephropathy ([facility])    HTN (hypertension), benign    Mixed hyperlipidemia       Cardiology Procedures this admission:  Left heart catheterization with PCI  EchoCardiogram  Consults: none    Hospital Course: Patient presented to the ER via EMS with complaints of chest pain. EMS activated STEMI protocol and patient was taken emergently to the cardiac cath lab.     Patient underwent cardiac catheterization by Dr. Rader.   [facility] results:     Emergent catheterization due to acute inferolateral myocardial infarction pattern    Heavily calcified/heavily diseased RCA requiring complex intervention using shockwave lithotripsy and GuideLiner for backup support, 4 Dre stents deployed from the distal RCA back to the proximal RCA with good result    Mild to moderate left-sided disease which is best left to medical therapy    Preserved ejection fraction with mild basilar inferior hypokinesis and elevated end-diastolic pressure    Patient agitation with sedation made the procedure extremely difficult.  Recommend minimization of sedation and narcotics with any future procedures.    Patient tolerated the procedure well and was taken to the critical  Farxiga  Take 1 tablet by mouth every morning     enalapril 20 MG tablet  Commonly known as: VASOTEC  Take 1 tablet by mouth daily     EPINEPHrine 0.3 MG/0.3ML Soaj injection  Commonly known as: EpiPen 2-Jace  Use as directed for allergic reaction     furosemide 20 MG tablet  Commonly known as: Lasix  Take 1 tablet by mouth daily as needed (Take for weight gain greater than 2 pounds in 24 hours or 5 pounds in 48 hours.)     latanoprost 0.005 % ophthalmic solution  Commonly known as: XALATAN     metFORMIN 500 MG tablet  Commonly known as: GLUCOPHAGE  Take 1 tablet by mouth 2 times daily (with meals)     nitroGLYCERIN 0.4 MG SL tablet  Commonly known as: NITROSTAT     OXYGEN     Trelegy Ellipta 200-62.5-25 MCG/ACT Aepb inhaler  Generic drug: fluticasone-umeclidin-vilant  USE 1 INHALATION BY MOUTH DAILY            STOP taking these medications      clopidogrel 75 MG tablet  Commonly known as: PLAVIX     dilTIAZem 240 MG extended release capsule  Commonly known as: CARDIZEM CD     predniSONE 10 MG (21) Tbpk               Where to Get Your Medications        These medications were sent to [facility] - [address] -  [phone] - F [phone]  [address] [place] [id number]      Phone: [phone]   atorvastatin 80 MG tablet  empagliflozin 10 MG tablet  metoprolol succinate 100 MG extended release tablet  ticagrelor 90 MG Tabs tablet       DEB Abernathy - CNP  05/16/25  8:33 AM      [facility] CARDIOLOGY     5/16/2025     11:00 AM    I have personally seen and examined Christophe Xavier with Rich Og NP. I confirm findings and plan as outlined in discharge summary. Have reviewed and discussed discharge medication, diet and instructions with patient. Patient to follow up as directed and contact our office with any questions.       Sameer Torres MD

## 2025-05-19 ENCOUNTER — OFFICE VISIT (OUTPATIENT)
Age: 75
End: 2025-05-19

## 2025-05-19 ENCOUNTER — TELEPHONE (OUTPATIENT)
Age: 75
End: 2025-05-19

## 2025-05-19 ENCOUNTER — TELEPHONE (OUTPATIENT)
Dept: CARDIOLOGY | Age: 75
End: 2025-05-19

## 2025-05-19 VITALS
BODY MASS INDEX: 29.92 KG/M2 | SYSTOLIC BLOOD PRESSURE: 110 MMHG | WEIGHT: 209 LBS | HEIGHT: 70 IN | DIASTOLIC BLOOD PRESSURE: 72 MMHG | HEART RATE: 84 BPM

## 2025-05-19 DIAGNOSIS — I21.11 ST ELEVATION MYOCARDIAL INFARCTION INVOLVING RIGHT CORONARY ARTERY (HCC): ICD-10-CM

## 2025-05-19 DIAGNOSIS — I49.5 BRADY-TACHY SYNDROME (HCC): ICD-10-CM

## 2025-05-19 DIAGNOSIS — I10 HTN (HYPERTENSION), BENIGN: Primary | ICD-10-CM

## 2025-05-19 DIAGNOSIS — Z95.818 PRESENCE OF WATCHMAN LEFT ATRIAL APPENDAGE CLOSURE DEVICE: ICD-10-CM

## 2025-05-19 DIAGNOSIS — R06.02 SHORTNESS OF BREATH: ICD-10-CM

## 2025-05-19 NOTE — TELEPHONE ENCOUNTER
Care Transitions Initial Follow Up Call    Call within 2 business days of discharge: Yes     Patient: Christophe Xavier Patient : 1950 MRN: [id number]      RARS: Readmission Risk Score: 13.1       Spoke with: Patient     Discharge department/facility: [facility]    Non-face-to-face services provided:  Diet- low saturated fat, low cholesterol and low salt diet. The patient is instructed to advance activities as tolerated to the limit of fatigue or shortness of breath. The patient is instructed to avoid all heavy lifting for 3-5 days. The patient is instructed to watch the cath site for bleeding/oozing; if seen, the patient is instructed to apply firm pressure with a clean cloth and call [facility] at [phone]. The patient is instructed to watch for signs of infection which include: increasing area of redness, fever/hot to touch or purulent drainage at the catheterization site. The patient is instructed not to soak in a bathtub for 7-10 days, but is cleared to shower. The patient is instructed to call the office or return to the ER for immediate evaluation for any shortness of breath or chest pain not relieved by NTG.  Patient reports he is doing well. Has picked up all new medications. Patient is aware of his appointment today at 4 pm. Patient voices understanding of instructions reviewed. Call office with questions or concerns.       Follow Up  Future Appointments   Date Time Provider Department Center   2025  4:00 PM Baron Ribera MD UCDG [facility] AMB   2025  9:15 AM Rashel Varela,  TRIM [facility]   2025 10:30 AM Baron Ribera MD [facility] AMB   2025 11:15 AM [id number] BLOOD DRAW [id number] [facility] AMB   2025 11:15 AM Main Petersen MD [id number] [facility] AMB   2026  1:40 PM Loli Aguirre APRN - CNP PSCD [facility] AMB       ANDRES FOSS RN

## 2025-05-19 NOTE — PROGRESS NOTES
[address], [address]  [place] [address]  PHONE: [phone]    SUBJECTIVE:   Christophe Xavier is a 75 y.o. male 1950   seen for a follow up visit regarding the following:     Chief Complaint   Patient presents with    Coronary Artery Disease    Atrial Fibrillation    Follow-up     Care Transitions Initial Follow Up Call     Call within 2 business days of discharge: Yes         Admit date: 5/12/2025     Discharge date:  05/16/25  History of Present Illness  The patient presents for evaluation following a non-ST elevation myocardial infarction (NSTEMI).    The patient initially experienced chest tightness, which was initially perceived as muscular in origin. However, the symptoms progressively intensified over a period of 20 hours, prompting an emergency room visit. An electrocardiogram (EKG) revealed a completely occluded coronary artery, necessitating the successful placement of four stents.    The patient reports satisfactory respiratory function, with blood oxygen saturation levels ranging from 93-97%, despite experiencing exacerbated breathing difficulties due to a recent upper respiratory tract infection. The patient notes an improvement in their condition compared to the previous day and reports enhanced sleep quality since hospital discharge, achieving uninterrupted sleep for two consecutive nights.    The patient expresses interest in understanding the potential side effects of their newly prescribed medications and their relation to the recent percutaneous coronary intervention. They were advised to consider cardiac rehabilitation but were discharged without specific activity restrictions.        Interval history:       Results    Angioplasty and stenting of right coronary artery 1994?     Cardiac catheterization in 2016 - stable coronary anatomy with 30% LAD, nonobstructive disease in LAD and right coronary artery.     09/10/2018 Pravastatin was

## 2025-05-19 NOTE — TELEPHONE ENCOUNTER
- Received notification from cardiac telemetry monitoring I rhythm (reference #[id number]) that the patient was sustaining atrial fibrillation with average heart rate of 104 bpm.  Same as ECG 7 days ago.  Will forward to primary cardiologist who saw the patient today for further recommendations.

## 2025-05-26 ENCOUNTER — HOSPITAL ENCOUNTER (EMERGENCY)
Age: 75
Discharge: HOME | End: 2025-05-26
Attending: EMERGENCY MEDICINE
Payer: MEDICARE

## 2025-05-26 ENCOUNTER — APPOINTMENT (OUTPATIENT)
Dept: GENERAL RADIOLOGY | Age: 75
End: 2025-05-26
Payer: MEDICARE

## 2025-05-26 VITALS
HEIGHT: 70 IN | TEMPERATURE: 97.8 F | HEART RATE: 86 BPM | RESPIRATION RATE: 19 BRPM | OXYGEN SATURATION: 92 % | SYSTOLIC BLOOD PRESSURE: 110 MMHG | BODY MASS INDEX: 29.92 KG/M2 | DIASTOLIC BLOOD PRESSURE: 74 MMHG | WEIGHT: 209 LBS

## 2025-05-26 DIAGNOSIS — I50.9 ACUTE CONGESTIVE HEART FAILURE, UNSPECIFIED HEART FAILURE TYPE (HCC): Primary | ICD-10-CM

## 2025-05-26 LAB
ALBUMIN SERPL-MCNC: 3.5 G/DL (ref 3.2–4.6)
ALBUMIN/GLOB SERPL: 1 (ref 1–1.9)
ALP SERPL-CCNC: 90 U/L (ref 40–129)
ALT SERPL-CCNC: 44 U/L (ref 8–55)
ANION GAP SERPL CALC-SCNC: 12 MMOL/L (ref 7–16)
AST SERPL-CCNC: 27 U/L (ref 15–37)
BASOPHILS # BLD: 0.07 K/UL (ref 0–0.2)
BASOPHILS NFR BLD: 0.8 % (ref 0–2)
BILIRUB SERPL-MCNC: 0.7 MG/DL (ref 0–1.2)
BUN SERPL-MCNC: 33 MG/DL (ref 8–23)
CALCIUM SERPL-MCNC: 9.9 MG/DL (ref 8.8–10.2)
CHLORIDE SERPL-SCNC: 110 MMOL/L (ref 98–107)
CO2 SERPL-SCNC: 21 MMOL/L (ref 20–29)
CREAT SERPL-MCNC: 1.23 MG/DL (ref 0.8–1.3)
DIFFERENTIAL METHOD BLD: ABNORMAL
EKG DIAGNOSIS: NORMAL
EKG Q-T INTERVAL: 332 MS
EKG QRS DURATION: 86 MS
EKG QTC CALCULATION (BAZETT): 390 MS
EKG R AXIS: 57 DEGREES
EKG T AXIS: 23 DEGREES
EKG VENTRICULAR RATE: 83 BPM
EOSINOPHIL # BLD: 0.22 K/UL (ref 0–0.8)
EOSINOPHIL NFR BLD: 2.6 % (ref 0.5–7.8)
ERYTHROCYTE [DISTWIDTH] IN BLOOD BY AUTOMATED COUNT: 14.7 % (ref 11.9–14.6)
GLOBULIN SER CALC-MCNC: 3.4 G/DL (ref 2.3–3.5)
GLUCOSE SERPL-MCNC: 100 MG/DL (ref 70–99)
HCT VFR BLD AUTO: 38.3 % (ref 41.1–50.3)
HGB BLD-MCNC: 12.3 G/DL (ref 13.6–17.2)
IMM GRANULOCYTES # BLD AUTO: 0.06 K/UL (ref 0–0.5)
IMM GRANULOCYTES NFR BLD AUTO: 0.7 % (ref 0–5)
LACTATE SERPL-SCNC: 1.4 MMOL/L (ref 0.5–2)
LYMPHOCYTES # BLD: 0.87 K/UL (ref 0.5–4.6)
LYMPHOCYTES NFR BLD: 10.4 % (ref 13–44)
MCH RBC QN AUTO: 33.5 PG (ref 26.1–32.9)
MCHC RBC AUTO-ENTMCNC: 32.1 G/DL (ref 31.4–35)
MCV RBC AUTO: 104.4 FL (ref 82–102)
MONOCYTES # BLD: 0.57 K/UL (ref 0.1–1.3)
MONOCYTES NFR BLD: 6.8 % (ref 4–12)
NEUTS SEG # BLD: 6.6 K/UL (ref 1.7–8.2)
NEUTS SEG NFR BLD: 78.7 % (ref 43–78)
NRBC # BLD: 0 K/UL (ref 0–0.2)
NT PRO BNP: 3603 PG/ML (ref 0–450)
PLATELET # BLD AUTO: 267 K/UL (ref 150–450)
PMV BLD AUTO: 10 FL (ref 9.4–12.3)
POTASSIUM SERPL-SCNC: 5 MMOL/L (ref 3.5–5.1)
PROCALCITONIN SERPL-MCNC: <0.02 NG/ML (ref 0–0.1)
PROT SERPL-MCNC: 6.9 G/DL (ref 6.3–8.2)
RBC # BLD AUTO: 3.67 M/UL (ref 4.23–5.6)
SODIUM SERPL-SCNC: 143 MMOL/L (ref 136–145)
TROPONIN T SERPL HS-MCNC: 48.3 NG/L (ref 0–22)
TROPONIN T SERPL HS-MCNC: 50.3 NG/L (ref 0–22)
WBC # BLD AUTO: 8.4 K/UL (ref 4.3–11.1)

## 2025-05-26 PROCEDURE — 83605 ASSAY OF LACTIC ACID: CPT

## 2025-05-26 PROCEDURE — 71046 X-RAY EXAM CHEST 2 VIEWS: CPT

## 2025-05-26 PROCEDURE — 96374 THER/PROPH/DIAG INJ IV PUSH: CPT

## 2025-05-26 PROCEDURE — 85025 COMPLETE CBC W/AUTO DIFF WBC: CPT

## 2025-05-26 PROCEDURE — 99285 EMERGENCY DEPT VISIT HI MDM: CPT

## 2025-05-26 PROCEDURE — 83880 ASSAY OF NATRIURETIC PEPTIDE: CPT

## 2025-05-26 PROCEDURE — 6360000002 HC RX W HCPCS: Performed by: EMERGENCY MEDICINE

## 2025-05-26 PROCEDURE — 84484 ASSAY OF TROPONIN QUANT: CPT

## 2025-05-26 PROCEDURE — 84145 PROCALCITONIN (PCT): CPT

## 2025-05-26 PROCEDURE — 80053 COMPREHEN METABOLIC PANEL: CPT

## 2025-05-26 PROCEDURE — 93005 ELECTROCARDIOGRAM TRACING: CPT | Performed by: EMERGENCY MEDICINE

## 2025-05-26 PROCEDURE — 93010 ELECTROCARDIOGRAM REPORT: CPT | Performed by: INTERNAL MEDICINE

## 2025-05-26 RX ORDER — FUROSEMIDE 10 MG/ML
60 INJECTION INTRAMUSCULAR; INTRAVENOUS ONCE
Status: COMPLETED | OUTPATIENT
Start: 2025-05-26 | End: 2025-05-26

## 2025-05-26 RX ADMIN — FUROSEMIDE 60 MG: 10 INJECTION, SOLUTION INTRAVENOUS at 11:44

## 2025-05-26 ASSESSMENT — ENCOUNTER SYMPTOMS
COUGH: 0
CHEST TIGHTNESS: 0
SHORTNESS OF BREATH: 1

## 2025-05-26 ASSESSMENT — PAIN - FUNCTIONAL ASSESSMENT
PAIN_FUNCTIONAL_ASSESSMENT: NONE - DENIES PAIN
PAIN_FUNCTIONAL_ASSESSMENT: NONE - DENIES PAIN

## 2025-05-26 NOTE — DISCHARGE INSTRUCTIONS
Take your Lasix 20 mg once daily until you follow-up with the cardiologist in their office.  If your shortness of breath is getting worse you need to return to the emergency department at that time.  Call your cardiologist office to schedule outpatient follow-up appointment.  A message has been sent to the cardiology office to make them aware of your visit to help you facilitate make an appointment

## 2025-05-26 NOTE — ED TRIAGE NOTES
Pt ambulatory to ED w/ cc of shortness of breath that started this morning. Pt had multiple cardia stents placed a few weeks ago. Pt denies chest pain. Pt hx A-fib. Pt A&O x 4

## 2025-05-26 NOTE — ED NOTES
Patient states he begins feeling SOB when he begins dozing off but also confirms he has sleep apnea     Morenita Akhtar, RN  05/26/25 [id number]

## 2025-05-26 NOTE — ED PROVIDER NOTES
5/12/2025    FINDINGS:  The heart is normal size and configuration.  The lungs are free of active infiltrates.  The rib cage is intact and the trachea is midline.      Impression    1. Negative examination for acute or active pulmonary parenchymal disease.  2. Complete resolution of the previous described infiltrates right and left  lower lobes.    Electronically signed by Rebeca Conde   CBC with Auto Differential   Result Value Ref Range    WBC 8.4 4.3 - 11.1 K/uL    RBC 3.67 (L) 4.23 - 5.6 M/uL    Hemoglobin 12.3 (L) 13.6 - 17.2 g/dL    Hematocrit 38.3 (L) 41.1 - 50.3 %    .4 (H) 82 - 102 FL    MCH 33.5 (H) 26.1 - 32.9 PG    MCHC 32.1 31.4 - 35.0 g/dL    RDW 14.7 (H) 11.9 - 14.6 %    Platelets 267 150 - 450 K/uL    MPV 10.0 9.4 - 12.3 FL    nRBC 0.00 0.0 - 0.2 K/uL    Differential Type AUTOMATED      Neutrophils % 78.7 (H) 43.0 - 78.0 %    Lymphocytes % 10.4 (L) 13.0 - 44.0 %    Monocytes % 6.8 4.0 - 12.0 %    Eosinophils % 2.6 0.5 - 7.8 %    Basophils % 0.8 0.0 - 2.0 %    Immature Granulocytes % 0.7 0.0 - 5.0 %    Neutrophils Absolute 6.60 1.70 - 8.20 K/UL    Lymphocytes Absolute 0.87 0.50 - 4.60 K/UL    Monocytes Absolute 0.57 0.10 - 1.30 K/UL    Eosinophils Absolute 0.22 0.00 - 0.80 K/UL    Basophils Absolute 0.07 0.00 - 0.20 K/UL    Immature Granulocytes Absolute 0.06 0.0 - 0.5 K/UL   Troponin now then q90 min for 2 occurances   Result Value Ref Range    Troponin T 50.3 (H) 0 - 22 ng/L   Troponin now then q90 min for 2 occurances   Result Value Ref Range    Troponin T 48.3 (H) 0 - 22 ng/L   Procalcitonin   Result Value Ref Range    Procalcitonin <0.02 0.00 - 0.10 ng/mL   Lactic Acid   Result Value Ref Range    Lactic Acid 1.4 0.5 - 2.0 mmol/L   Comprehensive Metabolic Panel w/ Reflex to MG   Result Value Ref Range    Sodium 143 136 - 145 mmol/L    Potassium 5.0 3.5 - 5.1 mmol/L    Chloride 110 (H) 98 - 107 mmol/L    CO2 21 20 - 29 mmol/L    Anion Gap 12 7 - 16 mmol/L    Glucose 100 (H) 70 - 99 mg/dL 
152.4

## 2025-05-28 ENCOUNTER — OFFICE VISIT (OUTPATIENT)
Age: 75
End: 2025-05-28
Payer: MEDICARE

## 2025-05-28 VITALS
SYSTOLIC BLOOD PRESSURE: 110 MMHG | WEIGHT: 209 LBS | DIASTOLIC BLOOD PRESSURE: 70 MMHG | BODY MASS INDEX: 29.92 KG/M2 | HEART RATE: 88 BPM | HEIGHT: 70 IN

## 2025-05-28 DIAGNOSIS — I25.10 ASCVD (ARTERIOSCLEROTIC CARDIOVASCULAR DISEASE): ICD-10-CM

## 2025-05-28 DIAGNOSIS — I10 HTN (HYPERTENSION), BENIGN: ICD-10-CM

## 2025-05-28 DIAGNOSIS — Z95.818 PRESENCE OF WATCHMAN LEFT ATRIAL APPENDAGE CLOSURE DEVICE: Primary | ICD-10-CM

## 2025-05-28 DIAGNOSIS — I50.22 HEART FAILURE WITH MILDLY REDUCED EJECTION FRACTION (HFMREF) (HCC): ICD-10-CM

## 2025-05-28 PROCEDURE — 3074F SYST BP LT 130 MM HG: CPT | Performed by: INTERNAL MEDICINE

## 2025-05-28 PROCEDURE — G8428 CUR MEDS NOT DOCUMENT: HCPCS | Performed by: INTERNAL MEDICINE

## 2025-05-28 PROCEDURE — 1123F ACP DISCUSS/DSCN MKR DOCD: CPT | Performed by: INTERNAL MEDICINE

## 2025-05-28 PROCEDURE — 99214 OFFICE O/P EST MOD 30 MIN: CPT | Performed by: INTERNAL MEDICINE

## 2025-05-28 PROCEDURE — 1126F AMNT PAIN NOTED NONE PRSNT: CPT | Performed by: INTERNAL MEDICINE

## 2025-05-28 PROCEDURE — G8417 CALC BMI ABV UP PARAM F/U: HCPCS | Performed by: INTERNAL MEDICINE

## 2025-05-28 PROCEDURE — 1036F TOBACCO NON-USER: CPT | Performed by: INTERNAL MEDICINE

## 2025-05-28 PROCEDURE — 1111F DSCHRG MED/CURRENT MED MERGE: CPT | Performed by: INTERNAL MEDICINE

## 2025-05-28 PROCEDURE — 3078F DIAST BP <80 MM HG: CPT | Performed by: INTERNAL MEDICINE

## 2025-05-28 NOTE — PROGRESS NOTES
11.1 K/uL    RBC 3.70 (L) 4.23 - 5.6 M/uL    Hemoglobin 12.3 (L) 13.6 - 17.2 g/dL    Hematocrit 36.9 (L) 41.1 - 50.3 %    MCV 99.7 82 - 102 FL    MCH 33.2 (H) 26.1 - 32.9 PG    MCHC 33.3 31.4 - 35.0 g/dL    RDW 14.2 11.9 - 14.6 %    Platelets 235 150 - 450 K/uL    MPV 9.9 9.4 - 12.3 FL    nRBC 0.00 0.0 - 0.2 K/uL    Differential Type AUTOMATED      Neutrophils % 71.6 43.0 - 78.0 %    Lymphocytes % 14.5 13.0 - 44.0 %    Monocytes % 8.5 4.0 - 12.0 %    Eosinophils % 4.1 0.5 - 7.8 %    Basophils % 0.7 0.0 - 2.0 %    Immature Granulocytes % 0.6 0.0 - 5.0 %    Neutrophils Absolute 3.89 1.70 - 8.20 K/UL    Lymphocytes Absolute 0.79 0.50 - 4.60 K/UL    Monocytes Absolute 0.46 0.10 - 1.30 K/UL    Eosinophils Absolute 0.22 0.00 - 0.80 K/UL    Basophils Absolute 0.04 0.00 - 0.20 K/UL    Immature Granulocytes Absolute 0.03 0.0 - 0.5 K/UL   Magnesium    Collection Time: 05/16/25  4:38 AM   Result Value Ref Range    Magnesium 2.5 (H) 1.8 - 2.4 mg/dL   POCT Glucose    Collection Time: 05/16/25  7:47 AM   Result Value Ref Range    POC Glucose 106 (H) 65 - 100 mg/dL    [facility]: [facility]    Cardiac event/MCOT monitor    Collection Time: 05/19/25  5:13 PM   Result Value Ref Range    Body Surface Area 2.16 m2   EKG 12 Lead    Collection Time: 05/26/25  9:11 AM   Result Value Ref Range    Ventricular Rate 83 BPM    QRS Duration 86 ms    Q-T Interval 332 ms    QTc Calculation (Bazett) 390 ms    R Axis 57 degrees    T Axis 23 degrees    Diagnosis       Atrial fibrillation  Possible Anterior infarct , age undetermined  Abnormal ECG  When compared with ECG of 12-MAY-2025 07:07,  ST now depressed in Anterior leads  Nonspecific T wave abnormality, worse in Anterior leads  QT has shortened  Confirmed by ARSH ARMSTRONG (), KALI JOSE ([id number]) on 5/26/2025 10:49:48 AM     CBC with Auto Differential    Collection Time: 05/26/25  9:21 AM   Result Value Ref Range    WBC 8.4 4.3 - 11.1 K/uL    RBC 3.67 (L) 4.23 - 5.6 M/uL    Hemoglobin

## 2025-05-29 ENCOUNTER — HOSPITAL ENCOUNTER (OUTPATIENT)
Dept: CARDIAC REHAB | Age: 75
Setting detail: RECURRING SERIES
End: 2025-05-29
Payer: MEDICARE

## 2025-05-29 PROCEDURE — 93798 PHYS/QHP OP CAR RHAB W/ECG: CPT

## 2025-05-29 ASSESSMENT — PATIENT HEALTH QUESTIONNAIRE - PHQ9
7. TROUBLE CONCENTRATING ON THINGS, SUCH AS READING THE NEWSPAPER OR WATCHING TELEVISION: MORE THAN HALF THE DAYS
1. LITTLE INTEREST OR PLEASURE IN DOING THINGS: SEVERAL DAYS
8. MOVING OR SPEAKING SO SLOWLY THAT OTHER PEOPLE COULD HAVE NOTICED. OR THE OPPOSITE, BEING SO FIGETY OR RESTLESS THAT YOU HAVE BEEN MOVING AROUND A LOT MORE THAN USUAL: NOT AT ALL
3. TROUBLE FALLING OR STAYING ASLEEP: MORE THAN HALF THE DAYS
SUM OF ALL RESPONSES TO PHQ QUESTIONS 1-9: 6
6. FEELING BAD ABOUT YOURSELF - OR THAT YOU ARE A FAILURE OR HAVE LET YOURSELF OR YOUR FAMILY DOWN: NOT AT ALL
SUM OF ALL RESPONSES TO PHQ QUESTIONS 1-9: 6
5. POOR APPETITE OR OVEREATING: NOT AT ALL
2. FEELING DOWN, DEPRESSED OR HOPELESS: NOT AT ALL
9. THOUGHTS THAT YOU WOULD BE BETTER OFF DEAD, OR OF HURTING YOURSELF: NOT AT ALL
4. FEELING TIRED OR HAVING LITTLE ENERGY: SEVERAL DAYS
10. IF YOU CHECKED OFF ANY PROBLEMS, HOW DIFFICULT HAVE THESE PROBLEMS MADE IT FOR YOU TO DO YOUR WORK, TAKE CARE OF THINGS AT HOME, OR GET ALONG WITH OTHER PEOPLE: NOT DIFFICULT AT ALL

## 2025-05-29 NOTE — CARDIO/PULMONARY
Dear Dr. Ribera    Thank you for referring your patient, Christophe Xavier ( 1950), to the Cardiopulmonary Rehabilitation Program at [facility]. He is a good candidate for the Cardiac Rehab Program and should see improvements with regular participation.    We will be addressing appropriate interventions for modifiable risk factors with your patient during the next 12 weeks. We will contact you with any issues or concerns that may arise, or you can follow your patient's progress through [facility] at any time. A final summary will be sent to you when the program is completed.    Again, thank you for the referral. If we can be of further assistance, please feel free to contact the Cardiopulmonary Rehab staff at [phone].    Sincerely,    Sp Christiansen MS, RN  Cardiopulmonary Rehabilitation Nurse   [facility]

## 2025-06-02 ENCOUNTER — TELEPHONE (OUTPATIENT)
Dept: CARDIAC CATH/INVASIVE PROCEDURES | Age: 75
End: 2025-06-02

## 2025-06-02 ENCOUNTER — HOSPITAL ENCOUNTER (OUTPATIENT)
Dept: CARDIAC REHAB | Age: 75
Setting detail: RECURRING SERIES
Discharge: HOME | End: 2025-06-05
Payer: MEDICARE

## 2025-06-02 PROCEDURE — 93798 PHYS/QHP OP CAR RHAB W/ECG: CPT

## 2025-06-02 NOTE — TELEPHONE ENCOUNTER
Follow up call made to patient to follow up after radiation exposure during PCI on 5/12/25. Patient reports that he is doing well since procedure, Denies having any skin irritation or skin issues following PCI.

## 2025-06-04 ENCOUNTER — HOSPITAL ENCOUNTER (OUTPATIENT)
Dept: CARDIAC REHAB | Age: 75
Setting detail: RECURRING SERIES
Discharge: HOME | End: 2025-06-07
Payer: MEDICARE

## 2025-06-04 PROCEDURE — 93798 PHYS/QHP OP CAR RHAB W/ECG: CPT

## 2025-06-05 NOTE — TELEPHONE ENCOUNTER
Sleep study has not been evaluated. I tried to explained this to the patient his study has not read. He was becoming very upset with me. I told him I will get the  to contact him . ambulatory

## 2025-06-06 ENCOUNTER — HOSPITAL ENCOUNTER (OUTPATIENT)
Dept: CARDIAC REHAB | Age: 75
Setting detail: RECURRING SERIES
Discharge: HOME | End: 2025-06-09
Payer: MEDICARE

## 2025-06-06 PROCEDURE — 93798 PHYS/QHP OP CAR RHAB W/ECG: CPT

## 2025-06-09 ENCOUNTER — TELEPHONE (OUTPATIENT)
Age: 75
End: 2025-06-09

## 2025-06-09 DIAGNOSIS — R06.02 SHORTNESS OF BREATH: ICD-10-CM

## 2025-06-09 DIAGNOSIS — I50.22 HEART FAILURE WITH MILDLY REDUCED EJECTION FRACTION (HFMREF) (HCC): Primary | ICD-10-CM

## 2025-06-09 NOTE — TELEPHONE ENCOUNTER
Patient is on the 2nd floor at Cardiac Rehab. Wants to talk to someone now bc he is having SOB and doesn't feel as though he should do the rehab today. He wants to come on up to the 4th floor so he can see or talk to someone.

## 2025-06-09 NOTE — TELEPHONE ENCOUNTER
Patient notified of MD response. Patient will take extra 40 mg Lasix today and then take 40 mg  daily. BMP in 3 days. Keep appointment scheduled for 6/12/25. Patient voiced understanding.

## 2025-06-09 NOTE — TELEPHONE ENCOUNTER
Patient went to Cardiac rehab this am- was not able to do rehab due to SOB. Patient reports SOB at rest- feels panicked at night. Feels like his respirations are shallow. Weight up 3-4 pounds today. Took 40 mg lasix today. Reports no swelling but feet feel tight. Denies CP. Reports feels like he will pass out at night due to breathing. /70. O2 sat 98%. Will address with MD.

## 2025-06-09 NOTE — TELEPHONE ENCOUNTER
Please advise patient to take extra Lasix 40 mg today and 40 mg daily.  Please have BMP drawn within the next 3 days.  Please schedule follow-up soon.  If he is having symptoms that are worse please advise him to go to emergency department

## 2025-06-10 SDOH — HEALTH STABILITY: PHYSICAL HEALTH: ON AVERAGE, HOW MANY MINUTES DO YOU ENGAGE IN EXERCISE AT THIS LEVEL?: 30 MIN

## 2025-06-10 SDOH — HEALTH STABILITY: PHYSICAL HEALTH: ON AVERAGE, HOW MANY DAYS PER WEEK DO YOU ENGAGE IN MODERATE TO STRENUOUS EXERCISE (LIKE A BRISK WALK)?: 5 DAYS

## 2025-06-10 ASSESSMENT — LIFESTYLE VARIABLES
HOW OFTEN DO YOU HAVE A DRINK CONTAINING ALCOHOL: 5
HOW OFTEN DURING THE LAST YEAR HAVE YOU NEEDED AN ALCOHOLIC DRINK FIRST THING IN THE MORNING TO GET YOURSELF GOING AFTER A NIGHT OF HEAVY DRINKING: NEVER
HAS A RELATIVE, FRIEND, DOCTOR, OR ANOTHER HEALTH PROFESSIONAL EXPRESSED CONCERN ABOUT YOUR DRINKING OR SUGGESTED YOU CUT DOWN: NO
HOW OFTEN DURING THE LAST YEAR HAVE YOU FAILED TO DO WHAT WAS NORMALLY EXPECTED FROM YOU BECAUSE OF DRINKING: NEVER
HOW MANY STANDARD DRINKS CONTAINING ALCOHOL DO YOU HAVE ON A TYPICAL DAY: 1
HOW OFTEN DURING THE LAST YEAR HAVE YOU BEEN UNABLE TO REMEMBER WHAT HAPPENED THE NIGHT BEFORE BECAUSE YOU HAD BEEN DRINKING: NEVER
HOW OFTEN DO YOU HAVE SIX OR MORE DRINKS ON ONE OCCASION: 1
HAVE YOU OR SOMEONE ELSE BEEN INJURED AS A RESULT OF YOUR DRINKING: NO
HOW OFTEN DURING THE LAST YEAR HAVE YOU NEEDED AN ALCOHOLIC DRINK FIRST THING IN THE MORNING TO GET YOURSELF GOING AFTER A NIGHT OF HEAVY DRINKING: NEVER
HOW MANY STANDARD DRINKS CONTAINING ALCOHOL DO YOU HAVE ON A TYPICAL DAY: 1 OR 2
HOW OFTEN DURING THE LAST YEAR HAVE YOU HAD A FEELING OF GUILT OR REMORSE AFTER DRINKING: NEVER
HOW OFTEN DURING THE LAST YEAR HAVE YOU HAD A FEELING OF GUILT OR REMORSE AFTER DRINKING: NEVER
HOW OFTEN DURING THE LAST YEAR HAVE YOU FAILED TO DO WHAT WAS NORMALLY EXPECTED FROM YOU BECAUSE OF DRINKING: NEVER
HOW OFTEN DURING THE LAST YEAR HAVE YOU FOUND THAT YOU WERE NOT ABLE TO STOP DRINKING ONCE YOU HAD STARTED: NEVER
HAVE YOU OR SOMEONE ELSE BEEN INJURED AS A RESULT OF YOUR DRINKING: NO
HOW OFTEN DURING THE LAST YEAR HAVE YOU BEEN UNABLE TO REMEMBER WHAT HAPPENED THE NIGHT BEFORE BECAUSE YOU HAD BEEN DRINKING: NEVER
HOW OFTEN DURING THE LAST YEAR HAVE YOU FOUND THAT YOU WERE NOT ABLE TO STOP DRINKING ONCE YOU HAD STARTED: NEVER
HAS A RELATIVE, FRIEND, DOCTOR, OR ANOTHER HEALTH PROFESSIONAL EXPRESSED CONCERN ABOUT YOUR DRINKING OR SUGGESTED YOU CUT DOWN: NO
HOW OFTEN DO YOU HAVE A DRINK CONTAINING ALCOHOL: 4 OR MORE TIMES A WEEK

## 2025-06-10 ASSESSMENT — PATIENT HEALTH QUESTIONNAIRE - PHQ9
SUM OF ALL RESPONSES TO PHQ9 QUESTIONS 1 & 2: 0
SUM OF ALL RESPONSES TO PHQ QUESTIONS 1-9: 0
2. FEELING DOWN, DEPRESSED OR HOPELESS: NOT AT ALL
1. LITTLE INTEREST OR PLEASURE IN DOING THINGS: NOT AT ALL
SUM OF ALL RESPONSES TO PHQ QUESTIONS 1-9: 0
SUM OF ALL RESPONSES TO PHQ QUESTIONS 1-9: 0
2. FEELING DOWN, DEPRESSED OR HOPELESS: NOT AT ALL
SUM OF ALL RESPONSES TO PHQ QUESTIONS 1-9: 0
1. LITTLE INTEREST OR PLEASURE IN DOING THINGS: NOT AT ALL

## 2025-06-11 ENCOUNTER — HOSPITAL ENCOUNTER (OUTPATIENT)
Dept: CARDIAC REHAB | Age: 75
Setting detail: RECURRING SERIES
Discharge: HOME | End: 2025-06-14
Payer: MEDICARE

## 2025-06-11 DIAGNOSIS — R06.02 SHORTNESS OF BREATH: ICD-10-CM

## 2025-06-11 DIAGNOSIS — I50.22 HEART FAILURE WITH MILDLY REDUCED EJECTION FRACTION (HFMREF) (HCC): ICD-10-CM

## 2025-06-11 LAB
ANION GAP SERPL CALC-SCNC: 14 MMOL/L (ref 7–16)
BUN SERPL-MCNC: 47 MG/DL (ref 8–23)
CALCIUM SERPL-MCNC: 9.9 MG/DL (ref 8.8–10.2)
CHLORIDE SERPL-SCNC: 102 MMOL/L (ref 98–107)
CO2 SERPL-SCNC: 25 MMOL/L (ref 20–29)
CREAT SERPL-MCNC: 1.51 MG/DL (ref 0.8–1.3)
GLUCOSE SERPL-MCNC: 103 MG/DL (ref 70–99)
POTASSIUM SERPL-SCNC: 4.4 MMOL/L (ref 3.5–5.1)
SODIUM SERPL-SCNC: 140 MMOL/L (ref 136–145)

## 2025-06-11 PROCEDURE — 93798 PHYS/QHP OP CAR RHAB W/ECG: CPT

## 2025-06-12 ENCOUNTER — OFFICE VISIT (OUTPATIENT)
Age: 75
End: 2025-06-12
Payer: MEDICARE

## 2025-06-12 VITALS
HEART RATE: 76 BPM | HEIGHT: 70 IN | DIASTOLIC BLOOD PRESSURE: 68 MMHG | BODY MASS INDEX: 29.39 KG/M2 | SYSTOLIC BLOOD PRESSURE: 90 MMHG | WEIGHT: 205.3 LBS

## 2025-06-12 DIAGNOSIS — I50.22 HEART FAILURE WITH MILDLY REDUCED EJECTION FRACTION (HFMREF) (HCC): Primary | ICD-10-CM

## 2025-06-12 DIAGNOSIS — I25.10 ASCVD (ARTERIOSCLEROTIC CARDIOVASCULAR DISEASE): ICD-10-CM

## 2025-06-12 DIAGNOSIS — Z95.818 PRESENCE OF WATCHMAN LEFT ATRIAL APPENDAGE CLOSURE DEVICE: ICD-10-CM

## 2025-06-12 DIAGNOSIS — I10 HTN (HYPERTENSION), BENIGN: ICD-10-CM

## 2025-06-12 PROCEDURE — 3078F DIAST BP <80 MM HG: CPT | Performed by: INTERNAL MEDICINE

## 2025-06-12 PROCEDURE — 1036F TOBACCO NON-USER: CPT | Performed by: INTERNAL MEDICINE

## 2025-06-12 PROCEDURE — 1111F DSCHRG MED/CURRENT MED MERGE: CPT | Performed by: INTERNAL MEDICINE

## 2025-06-12 PROCEDURE — 99214 OFFICE O/P EST MOD 30 MIN: CPT | Performed by: INTERNAL MEDICINE

## 2025-06-12 PROCEDURE — G8428 CUR MEDS NOT DOCUMENT: HCPCS | Performed by: INTERNAL MEDICINE

## 2025-06-12 PROCEDURE — G8417 CALC BMI ABV UP PARAM F/U: HCPCS | Performed by: INTERNAL MEDICINE

## 2025-06-12 PROCEDURE — 1123F ACP DISCUSS/DSCN MKR DOCD: CPT | Performed by: INTERNAL MEDICINE

## 2025-06-12 PROCEDURE — 3074F SYST BP LT 130 MM HG: CPT | Performed by: INTERNAL MEDICINE

## 2025-06-12 PROCEDURE — 1126F AMNT PAIN NOTED NONE PRSNT: CPT | Performed by: INTERNAL MEDICINE

## 2025-06-12 RX ORDER — ENALAPRIL MALEATE 5 MG/1
5 TABLET ORAL DAILY
Qty: 90 TABLET | Refills: 3 | Status: SHIPPED | OUTPATIENT
Start: 2025-06-12 | End: 2026-06-12

## 2025-06-12 RX ORDER — FUROSEMIDE 40 MG/1
TABLET ORAL
Qty: 90 TABLET | Refills: 3 | Status: SHIPPED | OUTPATIENT
Start: 2025-06-12

## 2025-06-12 NOTE — PROGRESS NOTES
PCI    HFmrEF:  - Medications: Metoprolol succinate, Lasix 40 mg daily, Farxiga, Enalapril 5 mg daily  - No device-based therapies indicated  - Aldactone not prescribed due to high normal potassium  - Additional Lasix dose if weight gain >2 lbs in 24 hours or >5 lbs in 48 hours  - Monitor weight daily, inform if >206 lbs  - BMP next week    AFib:  - Watchman device in place  - redmond controlled     Hyperlipidemia:\  -atorvastatin - 80 MG       - Discussed risks, benefits, and alternatives of treatment  - Emphasized balance in diuretic therapy to avoid renal impairment while controlling fluid retention  - Educated on daily weight monitoring and dietary sodium restriction for heart failure management  - Emphasized potential need for medication adjustments based on BP and weight changes    Follow-up:  - BMP next week  - Regular monitoring at cardiac rehab three times a week      Cardiac Medications       ACE Inhibitors       enalapril (VASOTEC) 5 MG tablet Take 1 tablet by mouth daily       Anaphylaxis Therapy Agents       EPINEPHrine (EPIPEN 2-DAVID) 0.3 MG/0.3ML SOAJ injection Use as directed for allergic reaction       Nitrates       nitroGLYCERIN (NITROSTAT) 0.4 MG SL tablet Place 1 tablet under the tongue       Beta Blockers Cardio-Selective       metoprolol succinate (TOPROL XL) 100 MG extended release tablet Take 1 tablet by mouth in the morning and at bedtime       Loop Diuretics       furosemide (LASIX) 40 MG tablet Take 1 tab daily if weight increases by 2 lbs in 24 hours or 4 lbs in 48 hours.       HMG CoA Reductase Inhibitors       atorvastatin (LIPITOR) 80 MG tablet Take 1 tablet by mouth nightly       Salicylates       aspirin 81 MG EC tablet Take 1 tablet by mouth daily       Platelet Aggregation Inhibitors       ticagrelor (BRILINTA) 90 MG TABS tablet Take 1 tablet by mouth 2 times daily              Current Outpatient Medications   Medication Sig    furosemide (LASIX) 40 MG tablet Take 1 tab daily if

## 2025-06-13 ENCOUNTER — OFFICE VISIT (OUTPATIENT)
Dept: INTERNAL MEDICINE CLINIC | Facility: CLINIC | Age: 75
End: 2025-06-13

## 2025-06-13 VITALS
HEIGHT: 70 IN | WEIGHT: 206 LBS | OXYGEN SATURATION: 92 % | BODY MASS INDEX: 29.49 KG/M2 | DIASTOLIC BLOOD PRESSURE: 60 MMHG | SYSTOLIC BLOOD PRESSURE: 110 MMHG | HEART RATE: 78 BPM

## 2025-06-13 DIAGNOSIS — Z00.00 MEDICARE ANNUAL WELLNESS VISIT, SUBSEQUENT: Primary | ICD-10-CM

## 2025-06-13 DIAGNOSIS — Z95.5 HISTORY OF CORONARY ARTERY STENT PLACEMENT: ICD-10-CM

## 2025-06-13 DIAGNOSIS — J96.11 CHRONIC RESPIRATORY FAILURE WITH HYPOXIA (HCC): ICD-10-CM

## 2025-06-13 DIAGNOSIS — J44.9 CHRONIC OBSTRUCTIVE PULMONARY DISEASE, UNSPECIFIED COPD TYPE (HCC): ICD-10-CM

## 2025-06-13 DIAGNOSIS — E11.21 TYPE 2 DIABETES WITH NEPHROPATHY (HCC): ICD-10-CM

## 2025-06-13 DIAGNOSIS — I10 HTN (HYPERTENSION), BENIGN: ICD-10-CM

## 2025-06-13 NOTE — PROGRESS NOTES
Medicare Annual Wellness Visit    Christophe Xavier is here for Medicare AWV    Assessment & Plan  Medicare AWV    -S/P Coronary stenting  Stable without chest pain currently.  Continue with Brilinta per Cardiology.  Bleeding precautions.  Continue with statin therapy as well.    - Atrial Fibrillation.  Heart rhythm remains irregular but not rapid. He senses when he goes into atrial fibrillation. Continue monitoring symptoms and report significant changes.  S/p Watchman, continue metoprolol and follow up with Cardiology as scheduled.    - Pulmonary Edema.  Experienced dyspnea and diagnosed with pulmonary edema, requiring Lasix in the ER. Currently on 40 mg of Lasix daily, which manages symptoms. Will continue to monitor, may adjust dosing based on symptoms.  Cardiology following as well.    - Diabetes Mellitus.  Tolerating medication well and achieving desired therapeutic response.  Will continue with:   Diabetic Medications       Biguanides       metFORMIN (GLUCOPHAGE) 500 MG tablet Take 1 tablet by mouth 2 times daily (with meals)       Sodium-Glucose Co-Transporter 2 (SGLT2) Inhibitors       dapagliflozin (FARXIGA) 10 MG tablet Take 1 tablet by mouth every morning        .  A1c levels reviewed. Encourage routine foot care. Recommend routine eye exams.  Encourage diet and exercise and medication adherence.     - HTN  Tolerating medication well for HTN. Achieving desired therapeutic response with   Hypertension Medications       ACE Inhibitors       enalapril (VASOTEC) 5 MG tablet Take 1 tablet by mouth daily       Beta Blockers Cardio-Selective       metoprolol succinate (TOPROL XL) 100 MG extended release tablet Take 1 tablet by mouth in the morning and at bedtime      --will continue. Will periodically review and adjust if needed.  Encourage home monitoring.     COPD/Hypoxia  -Continue with Trelegy/albuterol on prn basis.  Oxygen as ordered. Follow up with Pulmonology as scheduled.  Follow-up  Follow up in

## 2025-06-16 ENCOUNTER — HOSPITAL ENCOUNTER (OUTPATIENT)
Dept: CARDIAC REHAB | Age: 75
Setting detail: RECURRING SERIES
Discharge: HOME | End: 2025-06-19
Payer: MEDICARE

## 2025-06-16 PROCEDURE — 93798 PHYS/QHP OP CAR RHAB W/ECG: CPT

## 2025-06-18 ENCOUNTER — HOSPITAL ENCOUNTER (OUTPATIENT)
Dept: CARDIAC REHAB | Age: 75
Setting detail: RECURRING SERIES
Discharge: HOME | End: 2025-06-21
Payer: MEDICARE

## 2025-06-18 ENCOUNTER — TELEPHONE (OUTPATIENT)
Age: 75
End: 2025-06-18

## 2025-06-18 PROCEDURE — 93798 PHYS/QHP OP CAR RHAB W/ECG: CPT

## 2025-06-18 NOTE — TELEPHONE ENCOUNTER
Patient reports his BP was noted to be low at Cardiac rehab today- 80/60- was not having any symptoms. They made patient stop and drink fluids- BP up to 90 systolic. Now that home patient /70. Patient reports he did take Lasix 40 mg this am based on weight gain- weight is currently below baseline of 205. Advised to make sure he is only taking lasix if 2 lbs above baseline weight. Patient voices understanding and will continue to monitor BP.

## 2025-06-18 NOTE — TELEPHONE ENCOUNTER
Christophe Xavier \"Samuel\" to ANNY l [facility] Cardiology Clinical Staff (supporting Baron Ribera MD)        6/18/25  4:10 PM  While exercising today at Cardiac Rehab, they recorded BP IN THE 80/60 range.  They asked me to report it to your office.  After about ten minutes of drinking water, the BP returned to something over 90.  This morning I did take 2 -20 mg Lasix in respond to a weight gain of about 2 lbs.  The staff felt I was too dehydrated.

## 2025-06-20 ENCOUNTER — HOSPITAL ENCOUNTER (OUTPATIENT)
Dept: CARDIAC REHAB | Age: 75
Setting detail: RECURRING SERIES
Discharge: HOME | End: 2025-06-23
Payer: MEDICARE

## 2025-06-20 DIAGNOSIS — I50.22 HEART FAILURE WITH MILDLY REDUCED EJECTION FRACTION (HFMREF) (HCC): ICD-10-CM

## 2025-06-20 LAB
ANION GAP SERPL CALC-SCNC: 13 MMOL/L (ref 7–16)
BUN SERPL-MCNC: 36 MG/DL (ref 8–23)
CALCIUM SERPL-MCNC: 9.6 MG/DL (ref 8.8–10.2)
CHLORIDE SERPL-SCNC: 103 MMOL/L (ref 98–107)
CO2 SERPL-SCNC: 24 MMOL/L (ref 20–29)
CREAT SERPL-MCNC: 1.26 MG/DL (ref 0.8–1.3)
GLUCOSE SERPL-MCNC: 113 MG/DL (ref 70–99)
POTASSIUM SERPL-SCNC: 4.3 MMOL/L (ref 3.5–5.1)
SODIUM SERPL-SCNC: 140 MMOL/L (ref 136–145)

## 2025-06-20 PROCEDURE — 93798 PHYS/QHP OP CAR RHAB W/ECG: CPT

## 2025-06-20 NOTE — PROGRESS NOTES
I met Samuel, patient's preferred name, to invite him to the support group for patients living with heart failure. The support group is available to all outpatients living with heart failure who receive care at [facility]. It was a pleasure meeting Samuel today during his cardiac rehab. Please refer to our support group card for group details or call [phone].        JIMMIE Salazar (Board-Certified ) on 6/20/2025 at 11:17 AM  [facility]   [phone]

## 2025-06-23 ENCOUNTER — HOSPITAL ENCOUNTER (OUTPATIENT)
Dept: CARDIAC REHAB | Age: 75
Setting detail: RECURRING SERIES
Discharge: HOME | End: 2025-06-26
Payer: MEDICARE

## 2025-06-23 PROCEDURE — 93798 PHYS/QHP OP CAR RHAB W/ECG: CPT

## 2025-06-25 ENCOUNTER — HOSPITAL ENCOUNTER (OUTPATIENT)
Dept: CARDIAC REHAB | Age: 75
Setting detail: RECURRING SERIES
Discharge: HOME | End: 2025-06-28
Payer: MEDICARE

## 2025-06-25 PROCEDURE — 93798 PHYS/QHP OP CAR RHAB W/ECG: CPT

## 2025-06-27 ENCOUNTER — HOSPITAL ENCOUNTER (OUTPATIENT)
Dept: CARDIAC REHAB | Age: 75
Setting detail: RECURRING SERIES
Discharge: HOME | End: 2025-06-30
Payer: MEDICARE

## 2025-06-27 PROCEDURE — 93798 PHYS/QHP OP CAR RHAB W/ECG: CPT

## 2025-07-07 ENCOUNTER — HOSPITAL ENCOUNTER (OUTPATIENT)
Dept: CARDIAC REHAB | Age: 75
Setting detail: RECURRING SERIES
Discharge: HOME | End: 2025-07-10
Payer: MEDICARE

## 2025-07-07 PROCEDURE — 93798 PHYS/QHP OP CAR RHAB W/ECG: CPT

## 2025-07-09 ENCOUNTER — HOSPITAL ENCOUNTER (OUTPATIENT)
Dept: CARDIAC REHAB | Age: 75
Setting detail: RECURRING SERIES
Discharge: HOME | End: 2025-07-12
Payer: MEDICARE

## 2025-07-09 PROCEDURE — 93798 PHYS/QHP OP CAR RHAB W/ECG: CPT

## 2025-07-11 ENCOUNTER — HOSPITAL ENCOUNTER (OUTPATIENT)
Dept: CARDIAC REHAB | Age: 75
Setting detail: RECURRING SERIES
Discharge: HOME | End: 2025-07-14
Payer: MEDICARE

## 2025-07-11 PROCEDURE — 93798 PHYS/QHP OP CAR RHAB W/ECG: CPT

## 2025-07-14 ENCOUNTER — HOSPITAL ENCOUNTER (OUTPATIENT)
Dept: CARDIAC REHAB | Age: 75
Setting detail: RECURRING SERIES
Discharge: HOME | End: 2025-07-17
Payer: MEDICARE

## 2025-07-14 PROCEDURE — 93798 PHYS/QHP OP CAR RHAB W/ECG: CPT

## 2025-07-16 ENCOUNTER — HOSPITAL ENCOUNTER (OUTPATIENT)
Dept: CARDIAC REHAB | Age: 75
Setting detail: RECURRING SERIES
Discharge: HOME | End: 2025-07-19
Payer: MEDICARE

## 2025-07-16 PROCEDURE — 93798 PHYS/QHP OP CAR RHAB W/ECG: CPT

## 2025-07-16 NOTE — PROGRESS NOTES
Cardiac Rehabilitation Nutrition Education    Patient attended cardiac rehab nutrition education class.  Topics included: role of nutrition in managing heart disease, [place] Healthy Eating Plate, Mediterranean diet, principles of a heart healthy diet, foods/beverages effect on metabolic parameters (blood pressure, lipids, glucose, weight), food sources and portion size of carbohydrate, fat, protein, discussed fiber and benefits of a plant-based eating pattern at length, examples of heart healthy meals and snacks, limiting sodium, added sugars, saturated fat, food label reading. Additionally covered importance of adequate sleep, stress management, and aerobic exercise for overall health.     Learners: patient   Method: group class, copy of power point with space to take notes provided.     Encouraged lifestyle modifications and to follow up with any questions for RD during rehab sessions.    Deirdre Galvin, MS, RD, LDN   Cardiopulmonary Rehab Dietitian  [facility]

## 2025-07-18 ENCOUNTER — HOSPITAL ENCOUNTER (OUTPATIENT)
Dept: CARDIAC REHAB | Age: 75
Setting detail: RECURRING SERIES
Discharge: HOME | End: 2025-07-21
Payer: MEDICARE

## 2025-07-18 PROCEDURE — 93798 PHYS/QHP OP CAR RHAB W/ECG: CPT

## 2025-07-21 ENCOUNTER — HOSPITAL ENCOUNTER (OUTPATIENT)
Dept: CARDIAC REHAB | Age: 75
Setting detail: RECURRING SERIES
Discharge: HOME | End: 2025-07-24
Payer: MEDICARE

## 2025-07-21 PROCEDURE — 93798 PHYS/QHP OP CAR RHAB W/ECG: CPT

## 2025-07-23 ENCOUNTER — HOSPITAL ENCOUNTER (OUTPATIENT)
Dept: CARDIAC REHAB | Age: 75
Setting detail: RECURRING SERIES
Discharge: HOME | End: 2025-07-26
Payer: MEDICARE

## 2025-07-23 DIAGNOSIS — C61 PROSTATE CANCER (HCC): ICD-10-CM

## 2025-07-23 PROCEDURE — 93798 PHYS/QHP OP CAR RHAB W/ECG: CPT

## 2025-07-24 LAB — PSA SERPL DL<=0.01 NG/ML-MCNC: 0.55 NG/ML (ref 0–4)

## 2025-07-25 ENCOUNTER — HOSPITAL ENCOUNTER (OUTPATIENT)
Dept: CARDIAC REHAB | Age: 75
Setting detail: RECURRING SERIES
Discharge: HOME | End: 2025-07-28
Payer: MEDICARE

## 2025-07-25 PROCEDURE — 93798 PHYS/QHP OP CAR RHAB W/ECG: CPT

## 2025-07-28 ENCOUNTER — HOSPITAL ENCOUNTER (OUTPATIENT)
Dept: CARDIAC REHAB | Age: 75
Setting detail: RECURRING SERIES
Discharge: HOME | End: 2025-07-31
Payer: MEDICARE

## 2025-07-28 PROCEDURE — 93798 PHYS/QHP OP CAR RHAB W/ECG: CPT

## 2025-07-30 ENCOUNTER — APPOINTMENT (OUTPATIENT)
Dept: CARDIAC REHAB | Age: 75
End: 2025-07-30
Payer: MEDICARE

## 2025-08-01 ENCOUNTER — APPOINTMENT (OUTPATIENT)
Dept: CARDIAC REHAB | Age: 75
End: 2025-08-01
Payer: MEDICARE

## 2025-08-04 ENCOUNTER — APPOINTMENT (OUTPATIENT)
Dept: CARDIAC REHAB | Age: 75
End: 2025-08-04
Payer: MEDICARE

## 2025-08-06 ENCOUNTER — HOSPITAL ENCOUNTER (OUTPATIENT)
Dept: CARDIAC REHAB | Age: 75
Setting detail: RECURRING SERIES
Discharge: HOME | End: 2025-08-09
Payer: MEDICARE

## 2025-08-06 DIAGNOSIS — I25.10 CAD IN NATIVE ARTERY: ICD-10-CM

## 2025-08-06 PROCEDURE — 93798 PHYS/QHP OP CAR RHAB W/ECG: CPT

## 2025-08-06 RX ORDER — DAPAGLIFLOZIN 10 MG/1
10 TABLET, FILM COATED ORAL EVERY MORNING
Qty: 90 TABLET | Refills: 3 | Status: SHIPPED | OUTPATIENT
Start: 2025-08-06

## 2025-08-08 ENCOUNTER — HOSPITAL ENCOUNTER (OUTPATIENT)
Dept: CARDIAC REHAB | Age: 75
Setting detail: RECURRING SERIES
End: 2025-08-08
Payer: MEDICARE

## 2025-08-08 ENCOUNTER — HOSPITAL ENCOUNTER (OUTPATIENT)
Dept: CARDIAC REHAB | Age: 75
Setting detail: RECURRING SERIES
Discharge: HOME | End: 2025-08-11
Payer: MEDICARE

## 2025-08-08 PROCEDURE — 93798 PHYS/QHP OP CAR RHAB W/ECG: CPT

## 2025-08-11 ENCOUNTER — HOSPITAL ENCOUNTER (OUTPATIENT)
Dept: CARDIAC REHAB | Age: 75
Setting detail: RECURRING SERIES
Discharge: HOME | End: 2025-08-14
Payer: MEDICARE

## 2025-08-11 PROCEDURE — 93798 PHYS/QHP OP CAR RHAB W/ECG: CPT

## 2025-08-13 ENCOUNTER — HOSPITAL ENCOUNTER (OUTPATIENT)
Dept: CARDIAC REHAB | Age: 75
Setting detail: RECURRING SERIES
Discharge: HOME | End: 2025-08-16
Payer: MEDICARE

## 2025-08-13 PROCEDURE — 93798 PHYS/QHP OP CAR RHAB W/ECG: CPT

## 2025-08-15 ENCOUNTER — HOSPITAL ENCOUNTER (OUTPATIENT)
Dept: CARDIAC REHAB | Age: 75
Setting detail: RECURRING SERIES
Discharge: HOME | End: 2025-08-18
Payer: MEDICARE

## 2025-08-15 PROCEDURE — 93798 PHYS/QHP OP CAR RHAB W/ECG: CPT

## 2025-08-18 ENCOUNTER — HOSPITAL ENCOUNTER (OUTPATIENT)
Dept: CARDIAC REHAB | Age: 75
Setting detail: RECURRING SERIES
Discharge: HOME | End: 2025-08-21
Payer: MEDICARE

## 2025-08-18 PROCEDURE — 93798 PHYS/QHP OP CAR RHAB W/ECG: CPT

## 2025-08-20 ENCOUNTER — HOSPITAL ENCOUNTER (OUTPATIENT)
Dept: CARDIAC REHAB | Age: 75
Setting detail: RECURRING SERIES
Discharge: HOME | End: 2025-08-23
Payer: MEDICARE

## 2025-08-20 PROCEDURE — 93798 PHYS/QHP OP CAR RHAB W/ECG: CPT

## 2025-08-22 ENCOUNTER — OFFICE VISIT (OUTPATIENT)
Age: 75
End: 2025-08-22
Payer: MEDICARE

## 2025-08-22 ENCOUNTER — HOSPITAL ENCOUNTER (OUTPATIENT)
Dept: CARDIAC REHAB | Age: 75
Setting detail: RECURRING SERIES
Discharge: HOME | End: 2025-08-25
Payer: MEDICARE

## 2025-08-22 VITALS
SYSTOLIC BLOOD PRESSURE: 100 MMHG | HEART RATE: 76 BPM | WEIGHT: 209 LBS | BODY MASS INDEX: 29.99 KG/M2 | DIASTOLIC BLOOD PRESSURE: 66 MMHG

## 2025-08-22 DIAGNOSIS — E11.65 TYPE 2 DIABETES MELLITUS WITH HYPERGLYCEMIA, UNSPECIFIED WHETHER LONG TERM INSULIN USE (HCC): ICD-10-CM

## 2025-08-22 DIAGNOSIS — I35.0 NONRHEUMATIC AORTIC VALVE STENOSIS: ICD-10-CM

## 2025-08-22 DIAGNOSIS — Z95.818 PRESENCE OF WATCHMAN LEFT ATRIAL APPENDAGE CLOSURE DEVICE: ICD-10-CM

## 2025-08-22 DIAGNOSIS — N18.30 STAGE 3 CHRONIC KIDNEY DISEASE, UNSPECIFIED WHETHER STAGE 3A OR 3B CKD (HCC): ICD-10-CM

## 2025-08-22 DIAGNOSIS — I50.22 HEART FAILURE WITH MILDLY REDUCED EJECTION FRACTION (HFMREF) (HCC): ICD-10-CM

## 2025-08-22 DIAGNOSIS — E78.5 HYPERLIPIDEMIA LDL GOAL <70: ICD-10-CM

## 2025-08-22 DIAGNOSIS — I25.10 ASCVD (ARTERIOSCLEROTIC CARDIOVASCULAR DISEASE): Primary | ICD-10-CM

## 2025-08-22 DIAGNOSIS — I48.91 ATRIAL FIBRILLATION, UNSPECIFIED TYPE (HCC): ICD-10-CM

## 2025-08-22 PROCEDURE — 3044F HG A1C LEVEL LT 7.0%: CPT | Performed by: INTERNAL MEDICINE

## 2025-08-22 PROCEDURE — 99215 OFFICE O/P EST HI 40 MIN: CPT | Performed by: INTERNAL MEDICINE

## 2025-08-22 PROCEDURE — 1123F ACP DISCUSS/DSCN MKR DOCD: CPT | Performed by: INTERNAL MEDICINE

## 2025-08-22 PROCEDURE — 93798 PHYS/QHP OP CAR RHAB W/ECG: CPT

## 2025-08-22 PROCEDURE — G8417 CALC BMI ABV UP PARAM F/U: HCPCS | Performed by: INTERNAL MEDICINE

## 2025-08-22 PROCEDURE — 1036F TOBACCO NON-USER: CPT | Performed by: INTERNAL MEDICINE

## 2025-08-22 PROCEDURE — 1126F AMNT PAIN NOTED NONE PRSNT: CPT | Performed by: INTERNAL MEDICINE

## 2025-08-22 PROCEDURE — 3078F DIAST BP <80 MM HG: CPT | Performed by: INTERNAL MEDICINE

## 2025-08-22 PROCEDURE — 3074F SYST BP LT 130 MM HG: CPT | Performed by: INTERNAL MEDICINE

## 2025-08-22 PROCEDURE — G8428 CUR MEDS NOT DOCUMENT: HCPCS | Performed by: INTERNAL MEDICINE

## 2025-08-25 ENCOUNTER — HOSPITAL ENCOUNTER (OUTPATIENT)
Dept: CARDIAC REHAB | Age: 75
Setting detail: RECURRING SERIES
Discharge: HOME | End: 2025-08-28
Payer: MEDICARE

## 2025-08-25 PROCEDURE — 93798 PHYS/QHP OP CAR RHAB W/ECG: CPT

## 2025-08-27 ENCOUNTER — HOSPITAL ENCOUNTER (OUTPATIENT)
Dept: CARDIAC REHAB | Age: 75
Setting detail: RECURRING SERIES
Discharge: HOME | End: 2025-08-30
Payer: MEDICARE

## 2025-08-27 PROCEDURE — 93798 PHYS/QHP OP CAR RHAB W/ECG: CPT

## 2025-08-27 ASSESSMENT — PATIENT HEALTH QUESTIONNAIRE - PHQ9
4. FEELING TIRED OR HAVING LITTLE ENERGY: NOT AT ALL
7. TROUBLE CONCENTRATING ON THINGS, SUCH AS READING THE NEWSPAPER OR WATCHING TELEVISION: NOT AT ALL
SUM OF ALL RESPONSES TO PHQ QUESTIONS 1-9: 1
8. MOVING OR SPEAKING SO SLOWLY THAT OTHER PEOPLE COULD HAVE NOTICED. OR THE OPPOSITE, BEING SO FIGETY OR RESTLESS THAT YOU HAVE BEEN MOVING AROUND A LOT MORE THAN USUAL: NOT AT ALL
SUM OF ALL RESPONSES TO PHQ QUESTIONS 1-9: 1
5. POOR APPETITE OR OVEREATING: NOT AT ALL
1. LITTLE INTEREST OR PLEASURE IN DOING THINGS: NOT AT ALL
SUM OF ALL RESPONSES TO PHQ QUESTIONS 1-9: 1
9. THOUGHTS THAT YOU WOULD BE BETTER OFF DEAD, OR OF HURTING YOURSELF: NOT AT ALL
2. FEELING DOWN, DEPRESSED OR HOPELESS: NOT AT ALL
3. TROUBLE FALLING OR STAYING ASLEEP: SEVERAL DAYS
SUM OF ALL RESPONSES TO PHQ QUESTIONS 1-9: 1
10. IF YOU CHECKED OFF ANY PROBLEMS, HOW DIFFICULT HAVE THESE PROBLEMS MADE IT FOR YOU TO DO YOUR WORK, TAKE CARE OF THINGS AT HOME, OR GET ALONG WITH OTHER PEOPLE: NOT DIFFICULT AT ALL
6. FEELING BAD ABOUT YOURSELF - OR THAT YOU ARE A FAILURE OR HAVE LET YOURSELF OR YOUR FAMILY DOWN: NOT AT ALL

## 2025-08-29 ENCOUNTER — HOSPITAL ENCOUNTER (OUTPATIENT)
Dept: CARDIAC REHAB | Age: 75
Setting detail: RECURRING SERIES
Discharge: HOME | End: 2025-09-01
Payer: MEDICARE

## 2025-08-29 PROCEDURE — 93798 PHYS/QHP OP CAR RHAB W/ECG: CPT

## 2025-09-03 ENCOUNTER — HOSPITAL ENCOUNTER (OUTPATIENT)
Dept: CARDIAC REHAB | Age: 75
Setting detail: RECURRING SERIES
Discharge: HOME | End: 2025-09-06
Payer: MEDICARE

## 2025-09-03 PROCEDURE — 93798 PHYS/QHP OP CAR RHAB W/ECG: CPT

## (undated) DEVICE — SYR 50ML LR LCK 1ML GRAD NSAF --

## (undated) DEVICE — SUTURE VCRL SZ 2-0 L27IN ABSRB UD L36MM CP-1 1/2 CIR REV J266H

## (undated) DEVICE — K WIRE FIX L165MM DIA1.4MM DBL TRCR TIP FOR HDLSS COMPR SCR
Type: IMPLANTABLE DEVICE | Site: WRIST | Status: NON-FUNCTIONAL
Removed: 2021-09-22

## (undated) DEVICE — DRAPE,U/SHT,SPLIT,FILM,60X84,STERILE: Brand: MEDLINE

## (undated) DEVICE — CLOSURE SKIN FLX NONINVASIVE PRELOC TECHNOLOGY FOR 24IN

## (undated) DEVICE — SUTURE VCRL SZ 1 L27IN ABSRB UD L36MM CP-1 1/2 CIR REV CUT J268H

## (undated) DEVICE — 18G NG KIT WITH 96IN PROBE COVER (10 PK): Brand: SITE-RITE

## (undated) DEVICE — DRAPE SHT 3 QTR PROXIMA 53X77 --

## (undated) DEVICE — X-RAY SPONGES,12 PLY: Brand: DERMACEA

## (undated) DEVICE — BNDG COHESIVE 4INX5YD COBAN --

## (undated) DEVICE — T4 HOOD

## (undated) DEVICE — TOTAL KNEE DR RIDGEWAY: Brand: MEDLINE INDUSTRIES, INC.

## (undated) DEVICE — NEEDLE HYPO 18GA L1.5IN PNK S STL HUB POLYPR SHLD REG BVL

## (undated) DEVICE — CATHETER GUID 6FR DIA0.071IN SHFT NYL STD L JR 4 CRV ENH

## (undated) DEVICE — ZIMMER® STERILE DISPOSABLE TOURNIQUET CUFF WITH PLC, DUAL PORT, SINGLE BLADDER, 18 IN. (46 CM)

## (undated) DEVICE — Z DISCONTINUED USE 2744636  DRESSING AQUACEL 14 IN ALG W3.5XL14IN POLYUR FLM CVR W/ HYDRCOLL

## (undated) DEVICE — (D)PREP SKN CHLRAPRP APPL 26ML -- CONVERT TO ITEM 371833

## (undated) DEVICE — DRAPE,TOP,102X53,STERILE: Brand: MEDLINE

## (undated) DEVICE — PERCLOSE™ PROSTYLE™ SUTURE-MEDIATED CLOSURE AND REPAIR SYSTEM: Brand: PERCLOSE™ PROSTYLE™

## (undated) DEVICE — CATH BLLN ANGIO 3X27MM NC EUPHORIA RX

## (undated) DEVICE — SOLUTION IRRIG 3000ML 0.9% SOD CHL FLX CONT 0797208] ICU MEDICAL INC]

## (undated) DEVICE — BUTTON SWITCH PENCIL BLADE ELECTRODE, HOLSTER: Brand: EDGE

## (undated) DEVICE — HANDPIECE SET WITH COAXIAL HIGH FLOW TIP AND SUCTION TUBE: Brand: INTERPULSE

## (undated) DEVICE — 3M™ STERI-DRAPE™ INSTRUMENT POUCH 1018: Brand: STERI-DRAPE™

## (undated) DEVICE — BIT DRL DIA2.7MM CANN QUIK CONN FOR HDLSS COMPR SCR SYS

## (undated) DEVICE — GUIDEWIRE 035IN 210CM PTFE COAT FIX COR J TIP 15MM FIRM BODY

## (undated) DEVICE — HAND: Brand: MEDLINE INDUSTRIES, INC.

## (undated) DEVICE — STERILE (15.2 TAPERED TO 7.6 X 183CM) POLYETHYLENE ACCORDION-FOLDED COVER FOR USE WITH SIEMENS ACUNAV ULTRASOUND CATHETER FAMILY CONNECTOR: Brand: SWIFTLINK TRANSDUCER COVER

## (undated) DEVICE — CATHETER IVL C2PLUS SHOCKWAVE 3.0MM X 12MM

## (undated) DEVICE — CHECK-FLO PERFORMER INTRODUCER: Brand: PERFORMER

## (undated) DEVICE — 450 ML BOTTLE OF 0.05% CHLORHEXIDINE GLUCONATE IN 99.95% STERILE WATER FOR IRRIGATION, USP AND APPLICATOR.: Brand: IRRISEPT ANTIMICROBIAL WOUND LAVAGE

## (undated) DEVICE — DRESSING,GAUZE,XEROFORM,CURAD,1"X8",ST: Brand: CURAD

## (undated) DEVICE — PADDING CAST W3INXL4YD COT BLEND MIC PLEAT UNDERCAST SPEC

## (undated) DEVICE — ACCESS SHEATH WITH DILATOR: Brand: WATCHMAN FXD CURVE™ ACCESS SYSTEM

## (undated) DEVICE — Device: Brand: JELCO

## (undated) DEVICE — DUAL CUT SAGITTAL BLADE

## (undated) DEVICE — SOLUTION IV 250ML 0.9% SOD CHL CLR INJ FLX BG CONT PRT CLSR

## (undated) DEVICE — Z DISCONTINUED PER MEDLINE USE 2741944 DRESSING AQUACEL 12 IN SURG W9XL30CM SIL CVR WTRPRF VIR BACT BARR ANTIMIC

## (undated) DEVICE — CATH BLLN ANGIO 3X20MM NC EUPHORIA RX

## (undated) DEVICE — CATH BLLN ANGIO 3.50X27MM NC EUPHORIA RX

## (undated) DEVICE — TRAY PREP DRY W/ PREM GLV 2 APPL 6 SPNG 2 UNDPD 1 OVERWRAP

## (undated) DEVICE — 1010 S-DRAPE TOWEL DRAPE 10/BX: Brand: STERI-DRAPE™

## (undated) DEVICE — GOWN,REINF,POLY,ECL,PP SLV,XL: Brand: MEDLINE

## (undated) DEVICE — PINNACLE INTRODUCER SHEATH: Brand: PINNACLE

## (undated) DEVICE — 1 X VERSACROSS CONNECT TRANSSEPTAL DILATOR (INCLUDING 1 X J-TIP MECHANICAL GUIDEWIRE); 1 X VERSACROSS RF WIRE (INCLUDING 1 X CONNECTOR CABLE (SINGLE USE)); 1 X DISPERSIVE ELECTRODE: Brand: VERSACROSS CONNECT LAAC ACCESS SOLUTION

## (undated) DEVICE — REM POLYHESIVE ADULT PATIENT RETURN ELECTRODE: Brand: VALLEYLAB

## (undated) DEVICE — CATHETER GUID 6FR L150CM RAP EXCHG L25CM TIP 5.1FR PUSHROD

## (undated) DEVICE — SUTURE STRATAFIX SPRL SZ 1 L14IN ABSRB VLT L48CM CTX 1/2 SXPD2B405

## (undated) DEVICE — BIPOLAR SEALER 23-112-1 AQM 6.0: Brand: AQUAMANTYS ®

## (undated) DEVICE — SYR LR LCK 1ML GRAD NSAF 30ML --

## (undated) DEVICE — BAND COMPR L24CM REG CLR PLAS HEMSTAT EXT HK AND LOOP RETEN

## (undated) DEVICE — CATHETER DIAG 6FR L110CM PIG CRV SZ 6 SIDE H DBL BRAID WIRE

## (undated) DEVICE — SUTURE STRATAFIX SPRL MCRYL + SZ 4-0 L12IN ABSRB UD PS-2 SXMP1B117

## (undated) DEVICE — VALVE HEMSTAS W/ GWIRE INSRTN TOOL GRDIAN II NC

## (undated) DEVICE — CATHETER COR DIAG PIGTAILS PIG 145 CRV 5FR 110CM 6 SIDE H

## (undated) DEVICE — RUNTHROUGH NS EXTRA FLOPPY PTCA GUIDEWIRE: Brand: RUNTHROUGH

## (undated) DEVICE — CATHETER COR DIAG 4.0 5FR 110CM 2 SIDE H

## (undated) DEVICE — CATH BLLN ANGIO 2.50X20MM NC EUPHORIA RX

## (undated) DEVICE — GLIDESHEATH SLENDER STAINLESS STEEL KIT: Brand: GLIDESHEATH SLENDER

## (undated) DEVICE — CATH BLLN ANGIO 2.50X20MM SC EUPHORA RX

## (undated) DEVICE — STERILE PRESSURE PROTECTOR PAD® FOR DE MAYO UNIVERSAL DISTRACTOR® (10/CASE): Brand: DE MAYO UNIVERSAL DISTRACTOR®

## (undated) DEVICE — CLOSURE SKIN FACILITATES COMPATIBILITY W/ CERTAIN IS DSG

## (undated) DEVICE — DISPOSABLE BIPOLAR CODE, 12' (3.66 M): Brand: CONMED

## (undated) DEVICE — BLADE SAW W12.5XL70MM THK0.8MM CUT THK1.12MM S STL RECIP

## (undated) DEVICE — GUIDE NDL ST W/ 14 X 147CM TELESCOPICALLY FLD CIV FLX CVR

## (undated) DEVICE — BLADE SAW LG BNE 90X19X1.27 MM PARL STRYKR NS EZ BLADE DISP

## (undated) DEVICE — PREP SKN CHLRAPRP APL 26ML STR --

## (undated) DEVICE — CATHETER REPROC ULTRASOUND 10 FRX90 CM ACUSON ACUNAV

## (undated) DEVICE — Device: Brand: POWER-FLO®

## (undated) DEVICE — CATHETER DIAG SM AD 6FR L100CM 0.038IN COR POLYUR JUDKINS L

## (undated) DEVICE — SOLUTION IV 1000ML 0.9% SOD CHL

## (undated) DEVICE — DRIVER SURG DIA3.5MM FOR HDLSS COMPR SCR SYS REDUCT

## (undated) DEVICE — SUTURE VCRL SZ 3-0 L18IN ABSRB UD PS-2 L19MM 3/8 CRV PRIM J497H